# Patient Record
Sex: FEMALE | Race: BLACK OR AFRICAN AMERICAN | NOT HISPANIC OR LATINO | Employment: UNEMPLOYED | URBAN - METROPOLITAN AREA
[De-identification: names, ages, dates, MRNs, and addresses within clinical notes are randomized per-mention and may not be internally consistent; named-entity substitution may affect disease eponyms.]

---

## 2017-06-02 ENCOUNTER — HOSPITAL ENCOUNTER (EMERGENCY)
Facility: CLINIC | Age: 42
Discharge: HOME | End: 2017-06-02
Attending: EMERGENCY MEDICINE | Admitting: EMERGENCY MEDICINE
Payer: COMMERCIAL

## 2017-06-02 VITALS
RESPIRATION RATE: 16 BRPM | OXYGEN SATURATION: 90 % | HEART RATE: 79 BPM | SYSTOLIC BLOOD PRESSURE: 157 MMHG | TEMPERATURE: 98.9 F | DIASTOLIC BLOOD PRESSURE: 104 MMHG

## 2017-06-02 DIAGNOSIS — J02.0 ACUTE STREPTOCOCCAL PHARYNGITIS: ICD-10-CM

## 2017-06-02 LAB
DEPRECATED S PYO AG THROAT QL EIA: ABNORMAL
MICRO REPORT STATUS: ABNORMAL
SPECIMEN SOURCE: ABNORMAL

## 2017-06-02 PROCEDURE — 99284 EMERGENCY DEPT VISIT MOD MDM: CPT | Mod: Z6 | Performed by: EMERGENCY MEDICINE

## 2017-06-02 PROCEDURE — 87880 STREP A ASSAY W/OPTIC: CPT | Performed by: EMERGENCY MEDICINE

## 2017-06-02 PROCEDURE — 99283 EMERGENCY DEPT VISIT LOW MDM: CPT | Performed by: EMERGENCY MEDICINE

## 2017-06-02 PROCEDURE — 25000132 ZZH RX MED GY IP 250 OP 250 PS 637: Performed by: EMERGENCY MEDICINE

## 2017-06-02 RX ORDER — IBUPROFEN 600 MG/1
600 TABLET, FILM COATED ORAL ONCE
Status: COMPLETED | OUTPATIENT
Start: 2017-06-02 | End: 2017-06-02

## 2017-06-02 RX ORDER — IBUPROFEN 600 MG/1
600 TABLET, FILM COATED ORAL EVERY 8 HOURS PRN
Qty: 20 TABLET | Refills: 0 | Status: SHIPPED | OUTPATIENT
Start: 2017-06-02 | End: 2021-11-21

## 2017-06-02 RX ORDER — ACETAMINOPHEN 325 MG/1
975 TABLET ORAL ONCE
Status: COMPLETED | OUTPATIENT
Start: 2017-06-02 | End: 2017-06-02

## 2017-06-02 RX ORDER — AMOXICILLIN 500 MG/1
1000 CAPSULE ORAL 2 TIMES DAILY
Qty: 40 CAPSULE | Refills: 0 | Status: SHIPPED | OUTPATIENT
Start: 2017-06-02 | End: 2017-06-12

## 2017-06-02 RX ADMIN — ACETAMINOPHEN 975 MG: 325 TABLET, FILM COATED ORAL at 10:48

## 2017-06-02 RX ADMIN — IBUPROFEN 600 MG: 600 TABLET ORAL at 10:48

## 2017-06-02 ASSESSMENT — ENCOUNTER SYMPTOMS
SORE THROAT: 1
FEVER: 0

## 2017-06-02 NOTE — ED PROVIDER NOTES
History     Chief Complaint   Patient presents with     Pharyngitis     sore throat      HPI  Jessica Kennedy is a 42 year old female with a history of HTN who presents to the Emergency Department for evaluation of a sore throat. For the past 4 days, the patient has been experiencing a sore throat. He denies any other concerns or complaints at this time.    Past Medical History:   Diagnosis Date     Hypertension        Past Surgical History:   Procedure Laterality Date     HEAD & NECK SURGERY       ORTHOPEDIC SURGERY         No family history on file.    Social History   Substance Use Topics     Smoking status: Not on file     Smokeless tobacco: Not on file     Alcohol use Not on file       No current facility-administered medications for this encounter.      Current Outpatient Prescriptions   Medication     amoxicillin (AMOXIL) 500 MG capsule      No Known Allergies    I have reviewed the Medications, Allergies, Past Medical and Surgical History, and Social History in the Epic system.    Review of Systems   Constitutional: Negative for fever.   HENT: Positive for sore throat.    All other systems reviewed and are negative.      Physical Exam   BP: 122/71  Pulse: 79  Temp: 98.9  F (37.2  C)  Resp: 16  Weight:  (refused)  SpO2: 99 %  Physical Exam   Constitutional: She appears well-developed and well-nourished. No distress.   HENT:   Mouth/Throat: Oropharynx is clear and moist.   Neck: Neck supple.   Cardiovascular: Normal rate, regular rhythm and normal heart sounds.    Pulmonary/Chest: Effort normal and breath sounds normal.   Lymphadenopathy:     She has cervical adenopathy.   Neurological: She is alert.   Nursing note and vitals reviewed.      ED Course     10:12 AM  The patient was seen and examined by Dr. Robbins in Room 7.     ED Course     Procedures               Labs Ordered and Resulted from Time of ED Arrival Up to the Time of Departure from the ED   RAPID STREP SCREEN - Abnormal; Notable for the  following:        Result Value    Rapid Strep A Screen   (*)     Value: POSITIVE: Group A Streptococcal antigen detected by immunoassay.    All other components within normal limits            Assessments & Plan (with Medical Decision Making)   42 year old female without significant past medical history presenting to the ED today with a sore throat. A rapid strep test was obtained which is positive. Will discharge the patient home on Amoxacillin.     I have reviewed the nursing notes.  I have reviewed the findings, diagnosis, plan and need for follow up with the patient.  New Prescriptions    AMOXICILLIN (AMOXIL) 500 MG CAPSULE    Take 2 capsules (1,000 mg) by mouth 2 times daily for 10 days       Final diagnoses:   Acute streptococcal pharyngitis     IJerica, am serving as a trained medical scribe to document services personally performed by Rico Robbins MD, based on the provider's statements to me.      Rico MEJIA MD, was physically present and have reviewed and verified the accuracy of this note documented by Jerica Wallis.     6/2/2017   [facility], [place], EMERGENCY DEPARTMENT     Rico Robbins MD  06/02/17 7050

## 2017-06-02 NOTE — ED AVS SNAPSHOT
[facility], Emergency Department    [address]    [place] [address]    Phone:  [phone]    Fax:  [phone]                                       Jessica Kennedy   MRN: [id number]    Department:  [facility], Emergency Department   Date of Visit:  6/2/2017           After Visit Summary Signature Page     I have received my discharge instructions, and my questions have been answered. I have discussed any challenges I see with this plan with the nurse or doctor.    ..........................................................................................................................................  Patient/Patient Representative Signature      ..........................................................................................................................................  Patient Representative Print Name and Relationship to Patient    ..................................................               ................................................  Date                                            Time    ..........................................................................................................................................  Reviewed by Signature/Title    ...................................................              ..............................................  Date                                                            Time

## 2017-06-02 NOTE — DISCHARGE INSTRUCTIONS
Start antibiotics as directed  Your infectious to others for 24 hours after starting antibiotics    Pharyngitis: Strep (Confirmed)     You have had a positive test for strep throat. Strep throat is a contagious illness. It is spread by coughing, kissing or by touching others after touching your mouth or nose. Symptoms include throat pain which is worse with swallowing, aching all over, headache and fever. It is treated with antibiotic medication. This should help you start to feel better within 1-2 days.  Home care    Rest at home. Drink plenty of fluids to avoid dehydration.    No work or school for the first 2 days of taking the antibiotics. After this time, you will not be contagious. You can then return to school or work if you are feeling better.     The antibiotic medication must be taken for the full 10 days, even if you feel better. This is very important to ensure the infection is treated. It is also important to prevent drug-resistent organisms from developing. If you were given an antibiotic shot, no more antibiotics are needed.    You may use acetaminophen (Tylenol) or ibuprofen (Motrin, Advil) to control pain or fever, unless another medicine was prescribed for this. (NOTE: If you have chronic liver or kidney disease or ever had a stomach ulcer or GI bleeding, talk with your doctor before using these medicines.)    Throat lozenges or sprays (such as Chloraseptic) help reduce pain. Gargling with warm salt water will also reduce throat pain. Dissolve 1/2 teaspoon of salt in 1 glass of warm water. This may be useful just before meals.     Soft foods are okay. Avoid salty or spicy foods.  Follow-up care  Follow up with your healthcare provider or our staff if you are not improving over the next week.  When to seek medical advice  Call your healthcare provider right away if any of these occur:    Fever as directed by your doctor     New or worsening ear pain, sinus pain, or headache    Painful lumps in the  back of neck    Stiff neck    Lymph nodes are getting larger or becoming soft in the middle    Inability to swallow liquids, excessive drooling, or inability to open mouth wide due to throat pain    Signs of dehydration (very dark urine or no urine, sunken eyes, dizziness)    Trouble breathing or noisy breathing    Muffled voice    New rash    0764-6223 [employer]. [address] [place] [address]. All rights reserved. This information is not intended as a substitute for professional medical care. Always follow your healthcare professional's instructions.

## 2017-06-02 NOTE — ED AVS SNAPSHOT
[facility], Emergency Department    [address]    [place] [address]    Phone:  [phone]    Fax:  [phone]                                       Jessica Kennedy   MRN: [id number]    Department:  [facility], Emergency Department   Date of Visit:  6/2/2017           Patient Information     Date Of Birth          1975        Your diagnoses for this visit were:     Acute streptococcal pharyngitis        You were seen by Rico Robbins MD.        Discharge Instructions         Start antibiotics as directed  Your infectious to others for 24 hours after starting antibiotics    Pharyngitis: Strep (Confirmed)     You have had a positive test for strep throat. Strep throat is a contagious illness. It is spread by coughing, kissing or by touching others after touching your mouth or nose. Symptoms include throat pain which is worse with swallowing, aching all over, headache and fever. It is treated with antibiotic medication. This should help you start to feel better within 1-2 days.  Home care    Rest at home. Drink plenty of fluids to avoid dehydration.    No work or school for the first 2 days of taking the antibiotics. After this time, you will not be contagious. You can then return to school or work if you are feeling better.     The antibiotic medication must be taken for the full 10 days, even if you feel better. This is very important to ensure the infection is treated. It is also important to prevent drug-resistent organisms from developing. If you were given an antibiotic shot, no more antibiotics are needed.    You may use acetaminophen (Tylenol) or ibuprofen (Motrin, Advil) to control pain or fever, unless another medicine was prescribed for this. (NOTE: If you have chronic liver or kidney disease or ever had a stomach ulcer or GI bleeding, talk with your doctor before using these medicines.)    Throat lozenges or sprays (such as Chloraseptic) help reduce pain. Gargling with  warm salt water will also reduce throat pain. Dissolve 1/2 teaspoon of salt in 1 glass of warm water. This may be useful just before meals.     Soft foods are okay. Avoid salty or spicy foods.  Follow-up care  Follow up with your healthcare provider or our staff if you are not improving over the next week.  When to seek medical advice  Call your healthcare provider right away if any of these occur:    Fever as directed by your doctor     New or worsening ear pain, sinus pain, or headache    Painful lumps in the back of neck    Stiff neck    Lymph nodes are getting larger or becoming soft in the middle    Inability to swallow liquids, excessive drooling, or inability to open mouth wide due to throat pain    Signs of dehydration (very dark urine or no urine, sunken eyes, dizziness)    Trouble breathing or noisy breathing    Muffled voice    New rash    6751-7274 [employer]. [address] [place] [address]. All rights reserved. This information is not intended as a substitute for professional medical care. Always follow your healthcare professional's instructions.          24 Hour Appointment Hotline       To make an appointment at any [facility], call [phone] ([phone]). If you don't have a family doctor or clinic, we will help you find one. [facility] clinics are conveniently located to serve the needs of you and your family.             Review of your medicines      START taking        Dose / Directions Last dose taken    amoxicillin 500 MG capsule   Commonly known as:  AMOXIL   Dose:  1000 mg   Quantity:  40 capsule        Take 2 capsules (1,000 mg) by mouth 2 times daily for 10 days   Refills:  0        ibuprofen 600 MG tablet   Commonly known as:  ADVIL/MOTRIN   Dose:  600 mg   Quantity:  20 tablet        Take 1 tablet (600 mg) by mouth every 8 hours as needed for moderate pain   Refills:  0                Prescriptions were sent or printed at these locations (2  "Prescriptions)                   Other Prescriptions                Printed at Department/Unit printer (2 of 2)         amoxicillin (AMOXIL) 500 MG capsule               ibuprofen (ADVIL/MOTRIN) 600 MG tablet                Procedures and tests performed during your visit     Rapid strep screen      Orders Needing Specimen Collection     None      Pending Results     No orders found from 2017 to 6/3/2017.            Pending Culture Results     No orders found from 2017 to 6/3/2017.            Pending Results Instructions     If you had any lab results that were not finalized at the time of your Discharge, you can call the ED Lab Result RN at [phone]. You will be contacted by this team for any positive Lab results or changes in treatment. The nurses are available 7 days a week from 10A to 6:30P.  You can leave a message 24 hours per day and they will return your call.        Thank you for choosing [facility]       Thank you for choosing [facility] for your care. Our goal is always to provide you with excellent care. Hearing back from our patients is one way we can continue to improve our services. Please take a few minutes to complete the written survey that you may receive in the mail after you visit with us. Thank you!        [employer] Information     [employer] lets you send messages to your doctor, view your test results, renew your prescriptions, schedule appointments and more. To sign up, go to [email or web address] . Click on \"Log in\" on the left side of the screen, which will take you to the Welcome page. Then click on \"Sign up Now\" on the right side of the page.     You will be asked to enter the access code listed below, as well as some personal information. Please follow the directions to create your username and password.     Your access code is: [email or web address]  Expires: 2017 10:41 AM     Your access code will  in 90 days. If you need help or a new code, please call your [facility] clinic " or [phone].        Care EveryWhere ID     This is your Care EveryWhere ID. This could be used by other organizations to access your [facility] medical records  [id number]        After Visit Summary       This is your record. Keep this with you and show to your community pharmacist(s) and doctor(s) at your next visit.

## 2020-05-07 ENCOUNTER — HOSPITAL ENCOUNTER (EMERGENCY)
Facility: CLINIC | Age: 45
Discharge: HOME | End: 2020-05-07
Attending: EMERGENCY MEDICINE | Admitting: EMERGENCY MEDICINE
Payer: COMMERCIAL

## 2020-05-07 ENCOUNTER — APPOINTMENT (OUTPATIENT)
Dept: GENERAL RADIOLOGY | Facility: CLINIC | Age: 45
End: 2020-05-07
Attending: EMERGENCY MEDICINE
Payer: COMMERCIAL

## 2020-05-07 VITALS
RESPIRATION RATE: 20 BRPM | HEART RATE: 66 BPM | DIASTOLIC BLOOD PRESSURE: 70 MMHG | SYSTOLIC BLOOD PRESSURE: 138 MMHG | TEMPERATURE: 98.2 F | OXYGEN SATURATION: 100 % | WEIGHT: 206 LBS

## 2020-05-07 DIAGNOSIS — I10 UNCONTROLLED HYPERTENSION: ICD-10-CM

## 2020-05-07 LAB
ALBUMIN SERPL-MCNC: 3.8 G/DL (ref 3.4–5)
ALP SERPL-CCNC: 110 U/L (ref 40–150)
ALT SERPL W P-5'-P-CCNC: 17 U/L (ref 0–50)
ANION GAP SERPL CALCULATED.3IONS-SCNC: 8 MMOL/L (ref 3–14)
AST SERPL W P-5'-P-CCNC: 12 U/L (ref 0–45)
BASOPHILS # BLD AUTO: 0 10E9/L (ref 0–0.2)
BASOPHILS NFR BLD AUTO: 0.1 %
BILIRUB SERPL-MCNC: 0.6 MG/DL (ref 0.2–1.3)
BUN SERPL-MCNC: 17 MG/DL (ref 7–30)
CALCIUM SERPL-MCNC: 9.5 MG/DL (ref 8.5–10.1)
CHLORIDE SERPL-SCNC: 106 MMOL/L (ref 94–109)
CO2 SERPL-SCNC: 24 MMOL/L (ref 20–32)
CREAT SERPL-MCNC: 0.67 MG/DL (ref 0.52–1.04)
DIFFERENTIAL METHOD BLD: NORMAL
EOSINOPHIL # BLD AUTO: 0.1 10E9/L (ref 0–0.7)
EOSINOPHIL NFR BLD AUTO: 1 %
ERYTHROCYTE [DISTWIDTH] IN BLOOD BY AUTOMATED COUNT: 12.3 % (ref 10–15)
GFR SERPL CREATININE-BSD FRML MDRD: >90 ML/MIN/{1.73_M2}
GLUCOSE SERPL-MCNC: 105 MG/DL (ref 70–99)
HCG UR QL: NEGATIVE
HCT VFR BLD AUTO: 41.7 % (ref 35–47)
HGB BLD-MCNC: 14.2 G/DL (ref 11.7–15.7)
IMM GRANULOCYTES # BLD: 0 10E9/L (ref 0–0.4)
IMM GRANULOCYTES NFR BLD: 0.3 %
INR PPP: 1.07 (ref 0.86–1.14)
INTERNAL QC OK POCT: YES
LYMPHOCYTES # BLD AUTO: 3 10E9/L (ref 0.8–5.3)
LYMPHOCYTES NFR BLD AUTO: 28.3 %
MCH RBC QN AUTO: 30.6 PG (ref 26.5–33)
MCHC RBC AUTO-ENTMCNC: 34.1 G/DL (ref 31.5–36.5)
MCV RBC AUTO: 90 FL (ref 78–100)
MONOCYTES # BLD AUTO: 0.5 10E9/L (ref 0–1.3)
MONOCYTES NFR BLD AUTO: 4.9 %
NEUTROPHILS # BLD AUTO: 6.9 10E9/L (ref 1.6–8.3)
NEUTROPHILS NFR BLD AUTO: 65.4 %
NRBC # BLD AUTO: 0 10*3/UL
NRBC BLD AUTO-RTO: 0 /100
PLATELET # BLD AUTO: 248 10E9/L (ref 150–450)
POTASSIUM SERPL-SCNC: 3.5 MMOL/L (ref 3.4–5.3)
PROT SERPL-MCNC: 7.9 G/DL (ref 6.8–8.8)
RBC # BLD AUTO: 4.64 10E12/L (ref 3.8–5.2)
SODIUM SERPL-SCNC: 138 MMOL/L (ref 133–144)
TROPONIN I SERPL-MCNC: <0.015 UG/L (ref 0–0.04)
WBC # BLD AUTO: 10.6 10E9/L (ref 4–11)

## 2020-05-07 PROCEDURE — 99285 EMERGENCY DEPT VISIT HI MDM: CPT | Mod: 25 | Performed by: EMERGENCY MEDICINE

## 2020-05-07 PROCEDURE — 71045 X-RAY EXAM CHEST 1 VIEW: CPT

## 2020-05-07 PROCEDURE — 84484 ASSAY OF TROPONIN QUANT: CPT | Performed by: EMERGENCY MEDICINE

## 2020-05-07 PROCEDURE — 25000128 H RX IP 250 OP 636: Performed by: EMERGENCY MEDICINE

## 2020-05-07 PROCEDURE — 85610 PROTHROMBIN TIME: CPT | Performed by: EMERGENCY MEDICINE

## 2020-05-07 PROCEDURE — 93005 ELECTROCARDIOGRAM TRACING: CPT

## 2020-05-07 PROCEDURE — 96374 THER/PROPH/DIAG INJ IV PUSH: CPT

## 2020-05-07 PROCEDURE — 81025 URINE PREGNANCY TEST: CPT | Performed by: FAMILY MEDICINE

## 2020-05-07 PROCEDURE — 25000132 ZZH RX MED GY IP 250 OP 250 PS 637: Performed by: EMERGENCY MEDICINE

## 2020-05-07 PROCEDURE — 85025 COMPLETE CBC W/AUTO DIFF WBC: CPT | Performed by: EMERGENCY MEDICINE

## 2020-05-07 PROCEDURE — 99285 EMERGENCY DEPT VISIT HI MDM: CPT | Mod: 25

## 2020-05-07 PROCEDURE — 80053 COMPREHEN METABOLIC PANEL: CPT | Performed by: EMERGENCY MEDICINE

## 2020-05-07 PROCEDURE — 93010 ELECTROCARDIOGRAM REPORT: CPT | Mod: Z6 | Performed by: EMERGENCY MEDICINE

## 2020-05-07 RX ORDER — KETOROLAC TROMETHAMINE 15 MG/ML
15 INJECTION, SOLUTION INTRAMUSCULAR; INTRAVENOUS ONCE
Status: COMPLETED | OUTPATIENT
Start: 2020-05-07 | End: 2020-05-07

## 2020-05-07 RX ORDER — CLONIDINE HYDROCHLORIDE 0.1 MG/1
0.1 TABLET ORAL ONCE
Status: COMPLETED | OUTPATIENT
Start: 2020-05-07 | End: 2020-05-07

## 2020-05-07 RX ORDER — ACETAMINOPHEN 500 MG
1000 TABLET ORAL ONCE
Status: COMPLETED | OUTPATIENT
Start: 2020-05-07 | End: 2020-05-07

## 2020-05-07 RX ORDER — LISINOPRIL 20 MG/1
20 TABLET ORAL ONCE
Status: COMPLETED | OUTPATIENT
Start: 2020-05-07 | End: 2020-05-07

## 2020-05-07 RX ORDER — LISINOPRIL 20 MG/1
20 TABLET ORAL DAILY
COMMUNITY
End: 2020-05-07 | Stop reason: ALTCHOICE

## 2020-05-07 RX ORDER — AMLODIPINE BESYLATE 5 MG/1
5 TABLET ORAL AT BEDTIME
COMMUNITY
End: 2020-05-07

## 2020-05-07 RX ORDER — AMLODIPINE BESYLATE 10 MG/1
10 TABLET ORAL AT BEDTIME
Qty: 60 TABLET | Refills: 0 | Status: SHIPPED | OUTPATIENT
Start: 2020-05-08 | End: 2023-01-24

## 2020-05-07 RX ORDER — AMLODIPINE BESYLATE 5 MG/1
5 TABLET ORAL ONCE
Status: COMPLETED | OUTPATIENT
Start: 2020-05-07 | End: 2020-05-07

## 2020-05-07 RX ADMIN — CLONIDINE HYDROCHLORIDE 0.1 MG: 0.1 TABLET ORAL at 18:00

## 2020-05-07 RX ADMIN — ACETAMINOPHEN 1000 MG: 500 TABLET, FILM COATED ORAL at 18:18

## 2020-05-07 RX ADMIN — KETOROLAC TROMETHAMINE 15 MG: 15 INJECTION, SOLUTION INTRAMUSCULAR; INTRAVENOUS at 18:37

## 2020-05-07 RX ADMIN — AMLODIPINE BESYLATE 5 MG: 5 TABLET ORAL at 18:06

## 2020-05-07 RX ADMIN — LISINOPRIL 20 MG: 20 TABLET ORAL at 18:41

## 2020-05-07 NOTE — ED PROVIDER NOTES
History     Chief Complaint   Patient presents with     Headache     Hypertension     HPI  Jessica Kennedy is a 45 year old female who presents to the emergency department with complaints of a headache that she has had over the last 2 months.  Patient states the headache is superior and posterior.  Patient denies any fevers any coughing any visual blurriness any focal numbness or weakness.  Patient denies any vomiting and denies any chest pain or shortness of breath.  Patient states that her blood pressure has been high lately and that she has run out of her blood pressure medications.  Patient is uncertain what they are but that she [facility]  Patient has not taken her medications for at least a couple weeks.    I have reviewed the Medications, Allergies, Past Medical and Surgical History, and Social History in the [employer] system.    Past Medical History:   Diagnosis Date     Hypertension        Past Surgical History:   Procedure Laterality Date     HEAD & NECK SURGERY       ORTHOPEDIC SURGERY             Dose / Directions   amLODIPine 5 MG tablet  Commonly known as:  NORVASC      Dose:  5 mg  Take 5 mg by mouth At Bedtime  Refills:  0     ibuprofen 600 MG tablet  Commonly known as:  ADVIL/MOTRIN      Dose:  600 mg  Take 1 tablet (600 mg) by mouth every 8 hours as needed for moderate pain  Quantity:  20 tablet  Refills:  0     lisinopril 20 MG tablet  Commonly known as:  ZESTRIL      Dose:  20 mg  Take 20 mg by mouth daily  Refills:  0            Past medical history, past surgical history, medications, and allergies were reviewed with the patient. Additional pertinent items: None    No family history on file.    Social History     Tobacco Use     Smoking status: Not on file   Substance Use Topics     Alcohol use: Not on file     Social history was reviewed with the patient. Additional pertinent items: None    No Known Allergies    Review of Systems   Patient states she is currently  fasting   ROS: 10 point ROS neg other than the symptoms noted above in the HPI.      Physical Exam   BP: (!) 197/108  Pulse: 86  Temp: 98.2  F (36.8  C)  Resp: 12  Weight: 93.4 kg (206 lb)  SpO2: 99 %      Physical Exam  Vitals signs and nursing note reviewed.   Constitutional:       Appearance: She is not diaphoretic.      Comments: Lying with eyes closed but alert and conversant   HENT:      Head: Atraumatic.   Eyes:      Extraocular Movements: Extraocular movements intact.      Pupils: Pupils are equal, round, and reactive to light.   Neck:      Musculoskeletal: Neck supple.   Cardiovascular:      Rate and Rhythm: Regular rhythm.      Heart sounds: Normal heart sounds.   Pulmonary:      Breath sounds: Normal breath sounds.   Abdominal:      Palpations: Abdomen is soft.      Comments: Somewhat obese but nontender   Musculoskeletal:         General: No deformity.   Neurological:      General: No focal deficit present.      Mental Status: She is oriented to person, place, and time.      Motor: No weakness.   Psychiatric:         Mood and Affect: Mood normal.         ED Course     Orders Placed This Encounter - initially   Procedures     XR Chest Port 1 View     CBC with platelets differential     Troponin I     INR     Comprehensive metabolic panel     EKG 12 lead     Pulse oximetry nursing     Cardiac Continuous Monitoring     Peripheral IV: Standard     Review medications with patient     Oxygen: Nasal cannula, Oxygen mask     hCG qual urine POCT     Medications - initially   cloNIDine (CATAPRES) tablet 0.1 mg (0.1 mg Oral Given 5/7/20 1800)   amLODIPine (NORVASC) tablet 5 mg (5 mg Oral Given 5/7/20 1806)   acetaminophen (TYLENOL) tablet 1,000 mg (1,000 mg Oral Given 5/7/20 1818)          Procedures           EKG revealed a normal sinus rhythm at a rate of 74 with a NJ interval of 0.156 and a QRS duration of 0.084.  The patient had a normal axis with some nonspecific T wave changes but no acute ST segment changes  significant for ischemia.  This was read by me personally.    Results for orders placed or performed during the hospital encounter of 05/07/20 (from the past 24 hour(s))   CBC with platelets differential   Result Value Ref Range    WBC 10.6 4.0 - 11.0 10e9/L    RBC Count 4.64 3.8 - 5.2 10e12/L    Hemoglobin 14.2 11.7 - 15.7 g/dL    Hematocrit 41.7 35.0 - 47.0 %    MCV 90 78 - 100 fl    MCH 30.6 26.5 - 33.0 pg    MCHC 34.1 31.5 - 36.5 g/dL    RDW 12.3 10.0 - 15.0 %    Platelet Count 248 150 - 450 10e9/L    Diff Method Automated Method     % Neutrophils 65.4 %    % Lymphocytes 28.3 %    % Monocytes 4.9 %    % Eosinophils 1.0 %    % Basophils 0.1 %    % Immature Granulocytes 0.3 %    Nucleated RBCs 0 0 /100    Absolute Neutrophil 6.9 1.6 - 8.3 10e9/L    Absolute Lymphocytes 3.0 0.8 - 5.3 10e9/L    Absolute Monocytes 0.5 0.0 - 1.3 10e9/L    Absolute Eosinophils 0.1 0.0 - 0.7 10e9/L    Absolute Basophils 0.0 0.0 - 0.2 10e9/L    Abs Immature Granulocytes 0.0 0 - 0.4 10e9/L    Absolute Nucleated RBC 0.0    Troponin I   Result Value Ref Range    Troponin I ES <0.015 0.000 - 0.045 ug/L   INR   Result Value Ref Range    INR 1.07 0.86 - 1.14   Comprehensive metabolic panel   Result Value Ref Range    Sodium 138 133 - 144 mmol/L    Potassium 3.5 3.4 - 5.3 mmol/L    Chloride 106 94 - 109 mmol/L    Carbon Dioxide 24 20 - 32 mmol/L    Anion Gap 8 3 - 14 mmol/L    Glucose 105 (H) 70 - 99 mg/dL    Urea Nitrogen 17 7 - 30 mg/dL    Creatinine 0.67 0.52 - 1.04 mg/dL    GFR Estimate >90 >60 mL/min/[1.73_m2]    GFR Estimate If Black >90 >60 mL/min/[1.73_m2]    Calcium 9.5 8.5 - 10.1 mg/dL    Bilirubin Total 0.6 0.2 - 1.3 mg/dL    Albumin 3.8 3.4 - 5.0 g/dL    Protein Total 7.9 6.8 - 8.8 g/dL    Alkaline Phosphatase 110 40 - 150 U/L    ALT 17 0 - 50 U/L    AST 12 0 - 45 U/L   hCG qual urine POCT   Result Value Ref Range    HCG Qual Urine Negative neg    Internal QC OK Yes    XR Chest Port 1 View    Narrative    CHEST ONE VIEW  5/7/2020  7:02 PM     HISTORY: hypertension    COMPARISON: None.      Impression    IMPRESSION: Opacities of the lung bases are likely overlying soft  tissue. Minimal right pleural fluid and airspace disease cannot be  excluded. No pneumothorax. The cardiomediastinal silhouette is mildly  enlarged.    LESTER J FAHRNER, MD         Assessments & Plan (with Medical Decision Making)     I have reviewed the nursing notes.    Medications   cloNIDine (CATAPRES) tablet 0.1 mg (0.1 mg Oral Given 5/7/20 1800)   amLODIPine (NORVASC) tablet 5 mg (5 mg Oral Given 5/7/20 1806)   acetaminophen (TYLENOL) tablet 1,000 mg (1,000 mg Oral Given 5/7/20 1818)   ketorolac (TORADOL) injection 15 mg (15 mg Intravenous Given 5/7/20 1837)   lisinopril (ZESTRIL) tablet 20 mg (20 mg Oral Given 5/7/20 1841)      BP (!) 164/91   Pulse 70   Temp 98.2  F (36.8  C)   Resp 12   Wt 93.4 kg (206 lb)   SpO2 99%      Patient's blood pressure on discharge is 138/70 and at this time I will reinstitute the patient's Norvasc but increase the dose to 10 mg daily and just get rid of the lisinopril because the patient has not been taking it for several months anyway.    I have reviewed the findings, diagnosis, plan and need for follow up with the patient.       Review of your medicines      UNREVIEWED medicines. Ask your doctor about these medicines      Dose / Directions   ibuprofen 600 MG tablet  Commonly known as:  ADVIL/MOTRIN      Dose:  600 mg  Take 1 tablet (600 mg) by mouth every 8 hours as needed for moderate pain  Quantity:  20 tablet  Refills:  0        CONTINUE these medicines which may have CHANGED, or have new prescriptions. If we are uncertain of the size of tablets/capsules you have at home, strength may be listed as something that might have changed.      Dose / Directions   amLODIPine 10 MG tablet  Commonly known as:  NORVASC  This may have changed:      medication strength    how much to take      Dose:  10 mg  Start taking on:  May 8, 2020  Take  1 tablet (10 mg) by mouth At Bedtime  Quantity:  60 tablet  Refills:  0        STOP taking    lisinopril 20 MG tablet  Commonly known as:  ZESTRIL              Where to get your medicines      Some of these will need a paper prescription and others can be bought over the counter. Ask your nurse if you have questions.    Bring a paper prescription for each of these medications    amLODIPine 10 MG tablet           Final diagnoses:   Uncontrolled hypertension     Please make an appointment to follow up with Your Primary Care Provider or our [facility] (phone: [phone]) in one week for recheck.    Take your blood pressure daily, about the same time of day, and record it for your follow-up appointment.    Return to the emergency department for worsening.    Jeffery Simpson MD, MD    5/7/2020   [facility], [place], EMERGENCY DEPARTMENT     Jeffery Simpson MD  05/07/20 0350

## 2020-05-07 NOTE — ED AVS SNAPSHOT
[facility], [place], Emergency Department  [address]  [facility] [address]  Phone:  [phone]  Fax:  [phone]                                    Jessica Kennedy   MRN: [id number]    Department:  [facility], Emergency Department   Date of Visit:  5/7/2020           After Visit Summary Signature Page    I have received my discharge instructions, and my questions have been answered. I have discussed any challenges I see with this plan with the nurse or doctor.    ..........................................................................................................................................  Patient/Patient Representative Signature      ..........................................................................................................................................  Patient Representative Print Name and Relationship to Patient    ..................................................               ................................................  Date                                   Time    ..........................................................................................................................................  Reviewed by Signature/Title    ...................................................              ..............................................  Date                                               Time          22EPIC Rev 08/18

## 2020-05-07 NOTE — ED TRIAGE NOTES
Pt states she has a bad headache and feels her heart racing. Pt was taking tylenol with no relief (last night). Headache started March (doesn't know exact date).  Getting worse

## 2020-05-08 LAB — INTERPRETATION ECG - MUSE: NORMAL

## 2020-05-08 NOTE — DISCHARGE INSTRUCTIONS
Please make an appointment to follow up with Your Primary Care Provider or our [facility] (phone: [phone]) in one week for recheck.    Take your blood pressure daily, about the same time of day, and record it for your follow-up appointment.    Return to the emergency department for worsening.

## 2020-05-19 ENCOUNTER — HOSPITAL ENCOUNTER (EMERGENCY)
Facility: CLINIC | Age: 45
Discharge: HOME | End: 2020-05-19
Attending: EMERGENCY MEDICINE | Admitting: EMERGENCY MEDICINE
Payer: COMMERCIAL

## 2020-05-19 VITALS
TEMPERATURE: 97.7 F | OXYGEN SATURATION: 100 % | SYSTOLIC BLOOD PRESSURE: 147 MMHG | RESPIRATION RATE: 18 BRPM | DIASTOLIC BLOOD PRESSURE: 99 MMHG

## 2020-05-19 DIAGNOSIS — J02.9 ACUTE PHARYNGITIS, UNSPECIFIED ETIOLOGY: ICD-10-CM

## 2020-05-19 LAB
DEPRECATED S PYO AG THROAT QL EIA: NEGATIVE
SPECIMEN SOURCE: NORMAL
SPECIMEN SOURCE: NORMAL
STREP GROUP A PCR: NOT DETECTED

## 2020-05-19 PROCEDURE — 87651 STREP A DNA AMP PROBE: CPT | Performed by: EMERGENCY MEDICINE

## 2020-05-19 PROCEDURE — 25000132 ZZH RX MED GY IP 250 OP 250 PS 637: Performed by: EMERGENCY MEDICINE

## 2020-05-19 PROCEDURE — 87635 SARS-COV-2 COVID-19 AMP PRB: CPT | Performed by: EMERGENCY MEDICINE

## 2020-05-19 PROCEDURE — 99283 EMERGENCY DEPT VISIT LOW MDM: CPT | Performed by: EMERGENCY MEDICINE

## 2020-05-19 PROCEDURE — 40001204 ZZHCL STATISTIC STREP A RAPID: Performed by: EMERGENCY MEDICINE

## 2020-05-19 PROCEDURE — 99282 EMERGENCY DEPT VISIT SF MDM: CPT | Mod: Z6 | Performed by: EMERGENCY MEDICINE

## 2020-05-19 RX ORDER — ACETAMINOPHEN 500 MG
500-1000 TABLET ORAL EVERY 6 HOURS PRN
Qty: 1 BOTTLE | Refills: 0 | Status: SHIPPED | OUTPATIENT
Start: 2020-05-19 | End: 2020-05-19

## 2020-05-19 RX ORDER — ACETAMINOPHEN 500 MG
1000 TABLET ORAL ONCE
Status: COMPLETED | OUTPATIENT
Start: 2020-05-19 | End: 2020-05-19

## 2020-05-19 RX ORDER — ACETAMINOPHEN 500 MG
500-1000 TABLET ORAL EVERY 6 HOURS PRN
Qty: 1 BOTTLE | Refills: 0 | Status: ON HOLD | OUTPATIENT
Start: 2020-05-19 | End: 2023-08-13

## 2020-05-19 RX ADMIN — ACETAMINOPHEN 1000 MG: 500 TABLET, FILM COATED ORAL at 16:03

## 2020-05-19 ASSESSMENT — ENCOUNTER SYMPTOMS
GASTROINTESTINAL NEGATIVE: 1
FEVER: 0
SORE THROAT: 1
CHILLS: 0
RHINORRHEA: 0

## 2020-05-19 NOTE — ED PROVIDER NOTES
[facility] EMERGENCY DEPARTMENT ([facility])    5/19/20     ED 6      History     Chief Complaint   Patient presents with     Pharyngitis     sore throat and ear pain x 2 days     HPI evaluation via   Jessica Kennedy is a 45 year old female with history of latent TB, hypertension and prior episodes of strep pharyngitis who presents with sore throat, headache, left ear and neck pain for the past 2 days. She was fasting but broke her fast to drink cold water.  She has not taking any medications for this.  She denies exposures.  She says it hurts to swallow and talk.  She denies fevers/chills, cough, diarrhea, vomiting, abdominal pain.  She has hx of hypertension and is taking her meds.  She denies being pregnant.    Past Medical History  Past Medical History:   Diagnosis Date     Hypertension      Past Surgical History:   Procedure Laterality Date     HEAD & NECK SURGERY       ORTHOPEDIC SURGERY       acetaminophen (TYLENOL) 500 MG tablet  amLODIPine (NORVASC) 10 MG tablet  ibuprofen (ADVIL/MOTRIN) 600 MG tablet      No Known Allergies  Past medical history, past surgical history, medications, and allergies were reviewed with the patient. Additional pertinent items: None    Family History  No family history on file.  Family history was reviewed with the patient. Additional pertinent items: None    Social History  Social History     Tobacco Use     Smoking status: None   Substance Use Topics     Alcohol use: None     Drug use: None      Social history was reviewed with the patient. Additional pertinent items: None    Review of Systems   Constitutional: Negative for chills and fever.   HENT: Positive for ear pain and sore throat. Negative for rhinorrhea.    Gastrointestinal: Negative.    All other systems reviewed and are negative.    A complete review of systems was performed with pertinent positives and negatives noted in the HPI, and all other systems negative.    Physical Exam   BP: (!)  147/99  Heart Rate: 119  Temp: 97.7  F (36.5  C)  Resp: 18  SpO2: 100 %  Physical Exam  Vitals signs and nursing note reviewed.   Constitutional:       Appearance: She is well-developed.      Comments: Normal speech. No difficulty swallowing.    HENT:      Head: Normocephalic and atraumatic.      Right Ear: Tympanic membrane and ear canal normal.      Left Ear: Tympanic membrane and ear canal normal.      Nose: No rhinorrhea.      Comments: No dental pain or abscess     Mouth/Throat:      Mouth: Mucous membranes are moist. No oral lesions.      Pharynx: Uvula midline. No pharyngeal swelling, oropharyngeal exudate, posterior oropharyngeal erythema or uvula swelling.      Tonsils: No tonsillar exudate or tonsillar abscesses.   Eyes:      Conjunctiva/sclera: Conjunctivae normal.      Pupils: Pupils are equal, round, and reactive to light.   Neck:      Musculoskeletal: Normal range of motion and neck supple.      Thyroid: No thyromegaly.   Cardiovascular:      Rate and Rhythm: Normal rate.   Pulmonary:      Effort: Pulmonary effort is normal.   Lymphadenopathy:      Cervical: Cervical adenopathy (tender mild left anterior adenopathy) present.   Skin:     General: Skin is warm and dry.   Neurological:      General: No focal deficit present.      Mental Status: She is alert and oriented to person, place, and time.   Psychiatric:         Mood and Affect: Mood normal.         ED Course      Procedures         Results for orders placed or performed during the hospital encounter of 05/19/20   Streptococcus A Rapid Scr w Reflx to PCR     Status: None    Specimen: Throat   Result Value Ref Range    Strep Specimen Description Throat     Streptococcus Group A Rapid Screen Negative NEG^Negative     Medications   acetaminophen (TYLENOL) tablet 1,000 mg (1,000 mg Oral Given 5/19/20 1603)        Assessments & Plan (with Medical Decision Making)   The patient presents with left neck pain, sore throat and left ear pain. Diff dx: dental  infection, ear infection, pharyngitis, viral illness including covid.  Dentition normal.  She has mild left anterior neck lymphadenopathy.  Oral exam unremarkable.  No airway concerns. Rapid strep is negative.  Tylenol 1000 mg po given and she is feeling better.  covid test sent.  She was discharged home with guidelines to quarantine until results are back.      I have reviewed the nursing notes. I have reviewed the findings, diagnosis, plan and need for follow up with the patient.    New Prescriptions    ACETAMINOPHEN (TYLENOL) 500 MG TABLET    Take 1-2 tablets (500-1,000 mg) by mouth every 6 hours as needed for mild pain       Final diagnoses:   Acute pharyngitis, unspecified etiology       --  Fatuma Pena MD   Emergency Medicine   [facility], EMERGENCY DEPARTMENT  5/19/2020     Fatuma Pena MD  05/19/20 1672

## 2020-05-19 NOTE — DISCHARGE INSTRUCTIONS
You can take tylenol for pain.  Use as the bottle directs.     TODAY'S VISIT  YOU ARE BEING TESTED FOR COVID-19 (NOVEL CORONAVIRUS).   -  The cause of your symptoms is not yet known, but you are being tested for COVID-19.  -  It has been determined that you do not medically need to be hospitalized at this time, and  you can be monitored with self-isolation at home.     YOUR TESTS FOR THIS ILLNESS ARE CURRENTLY IN PROCESS.    -  These tests are performed by the [place] Department [facility].  -  Our staff will contact you with your results, likely within the next 24-72 hours.  -  If your test is positive, you will also be contacted by the [place] Department [facility].   -  Please remain under home isolation precautions until your healthcare provider and/or state and local health department tell you it is safe, and you no longer need to self-isolate at home.     SELF-ISOLATION INSTRUCTIONS  STAY HOME. Do not go to work, school, or public areas. Avoid using public transportation, ride-sharing (Uber/Lyft), or taxis. You should only leave your home if you require medical attention (See instructions below, please contact your provider first.)   SEPARATE YOURSELF FROM OTHER PEOPLE AND ANIMALS. As much as possible, you should stay in a specific room and away from other people in your home. You should use a separate bathroom, if available. Avoid contact with pets and other animals. When possible, have another household member care for your  family and animals while you are sick.   DO NOT SHARE HOUSEHOLD ITEMS. Do not share dishes, drinking glasses, eating utensils, towels, bedding, etc., with others or pets in your home. These items should be washed with soap and water.   WEAR A FACEMASK. Wear a facemask if you need to be around other people, and cover your mouth and nose with tissue when you cough or sneeze.  WASH YOUR HANDS OFTEN. Wash your hands with soap and water for at least 20 second, or use hand   regularly. Avoid touching your face with unwashed hands.     SELF-MONITORING INSTRUCTIONS  SEEK PROMPT MEDICAL ATTENTION IF YOUR ILLNESS IS WORSENING. Watch closely for new or worsening symptoms, such as fever, cough, shortness of breath or difficulty breathing.   SIGN UP FOR [employer]. Sign up for the [employer] application, using the information at the end of this document. Your results and a message about those results can be sent through [employer]. If you do not have [employer], we will call you with your results, but it may take longer.  IF YOU NEED MEDICAL CARE OR HAVE A MEDICAL APPOINTMENT (and it is not an emergency):   -  CALL YOUR HEALTHCARE PROVIDER BEFORE GOING TO THE [facility]  -  TELL THEM THAT YOU ARE BEING TESTING FOR AND MAY HAVE COVID-19.  YOUR CLOSE CONTACTS SHOULD MONITOR THEIR HEALTH. If your close contacts develop symptoms, they should visit www.[email or web address].org to determine if testing is needed, and where this is done.   If you have any questions, please contact your usual health care provider or the [facility] ([facility]) at [phone]    EMERGENCY INSTRUCTIONS  IF YOU NEED EMERGENCY MEDICAL ATTENTION, CALL 911 AND LET THEM KNOW YOU MAY HAVE ARE BEING EVALUATED FOR COVID-19.      WHAT IS COVID-19 (CORONAVIRUS)  COVID-19 is a viral respiratory illness caused by a newly identified coronavirus that was discovered in late 2019 in [place]. Coronaviruses are a large family of viruses. Some coronaviruses cause illnesses in people and others only circulate among animals. Rarely, animal coronaviruses can evolve and infect people. The virus causing COVID-19 may have emerged from an animal source, and it is now able to spread from person to person.     How does it spread?   The virus is thought to spread between people who are in close contact (within about six feet) through respiratory droplets produced when an infected person coughs, sneezes, or talks. It may also spread when one person touches a  surface or object that has the virus on it and then touches their mouth, nose, or eyes. However, this is not thought to be the main way the virus is transmitted.    SYMPTOMS  This coronavirus causes mild to severe respiratory illness with often with fever, cough, and/or difficulty breathing. After exposure, symptoms typically present within 2 to 14 days.     TREATMENTS AND PREVENTION  Patients may also be asked to self-quarantine at home in order to prevent the spread of the coronavirus. There is no antiviral treatment recommended for COVID-19. People with COVID-19 may receive supportive care to help relieve symptoms.    Prevention  No vaccine is currently available for the coronavirus causing COVID-19. The best way to prevent spread of the illness is to avoid exposure through simple precautions. Prevention steps include:   Stay home if you're feeling sick.   Avoid close contact with others, and try to stay at least 6-feet away from others if you're ill.   Wash your hands often with soap and warm water for at least 20 seconds, especially after going to the bathroom; before eating; and after blowing your nose, coughing, or sneezing. Use an alcohol-based hand  with at least 60 percent alcohol if soap and water are not readily available.   Clean and disinfect frequently touched objects and surfaces using a regular household cleaning spray or wipe.     Thank you for your understanding and cooperation.

## 2020-05-19 NOTE — ED AVS SNAPSHOT
[facility], [place], Emergency Department  [address]  [facility] [address]  Phone:  [phone]  Fax:  [phone]                                    Jessica Kennedy   MRN: [id number]    Department:  [facility], Emergency Department   Date of Visit:  5/19/2020           After Visit Summary Signature Page    I have received my discharge instructions, and my questions have been answered. I have discussed any challenges I see with this plan with the nurse or doctor.    ..........................................................................................................................................  Patient/Patient Representative Signature      ..........................................................................................................................................  Patient Representative Print Name and Relationship to Patient    ..................................................               ................................................  Date                                   Time    ..........................................................................................................................................  Reviewed by Signature/Title    ...................................................              ..............................................  Date                                               Time          22EPIC Rev 08/18

## 2020-05-20 LAB
SARS-COV-2 RNA SPEC QL NAA+PROBE: NOT DETECTED
SPECIMEN SOURCE: NORMAL

## 2021-04-20 ENCOUNTER — APPOINTMENT (OUTPATIENT)
Dept: GENERAL RADIOLOGY | Facility: CLINIC | Age: 46
End: 2021-04-20
Attending: EMERGENCY MEDICINE
Payer: COMMERCIAL

## 2021-04-20 ENCOUNTER — HOSPITAL ENCOUNTER (EMERGENCY)
Facility: CLINIC | Age: 46
Discharge: HOME | End: 2021-04-20
Attending: EMERGENCY MEDICINE | Admitting: EMERGENCY MEDICINE
Payer: COMMERCIAL

## 2021-04-20 VITALS
DIASTOLIC BLOOD PRESSURE: 92 MMHG | RESPIRATION RATE: 16 BRPM | HEART RATE: 75 BPM | SYSTOLIC BLOOD PRESSURE: 170 MMHG | WEIGHT: 220 LBS | OXYGEN SATURATION: 100 % | TEMPERATURE: 98.1 F

## 2021-04-20 DIAGNOSIS — B07.0 PLANTAR WARTS: ICD-10-CM

## 2021-04-20 PROCEDURE — 99284 EMERGENCY DEPT VISIT MOD MDM: CPT | Performed by: EMERGENCY MEDICINE

## 2021-04-20 PROCEDURE — 250N000013 HC RX MED GY IP 250 OP 250 PS 637: Performed by: EMERGENCY MEDICINE

## 2021-04-20 PROCEDURE — 73630 X-RAY EXAM OF FOOT: CPT | Mod: LT

## 2021-04-20 PROCEDURE — 99283 EMERGENCY DEPT VISIT LOW MDM: CPT

## 2021-04-20 RX ORDER — ACETAMINOPHEN 325 MG/1
975 TABLET ORAL ONCE
Status: COMPLETED | OUTPATIENT
Start: 2021-04-20 | End: 2021-04-20

## 2021-04-20 RX ADMIN — ACETAMINOPHEN 975 MG: 325 TABLET, FILM COATED ORAL at 17:00

## 2021-04-20 NOTE — ED PROVIDER NOTES
"    [facility] EMERGENCY DEPARTMENT ([facility])  4/20/21    History     Chief Complaint   Patient presents with     Foot Pain     has been having L foot pain \" I am not sure if there is wood or metal inside foot, I am not sure what I stepped on but I have been having pain, swelling for a month\" Pt denies seeing MD for this and has not taken any pain meds at all.     The history is provided by the patient and medical records. The history is limited by a language barrier. A  was used ((Togolese)).     Jessica Kennedy is a 46 year old female with a medical history significant for HTN and diabetes who presents to the Emergency Department for evaluation of left heel pain.  She notes she felt like something poked her foot about a month ago.  She did not see the object but feels like it is still bothering her.  Since then she has had pain with walking on the foot.  At rest she feels a \"tapping\" pain when she is sitting.  She denies measured fever or chills.        Past Medical History:   Diagnosis Date     Hypertension        Past Surgical History:   Procedure Laterality Date     HEAD & NECK SURGERY       ORTHOPEDIC SURGERY         No family history on file.    Social History     Tobacco Use     Smoking status: Not on file   Substance Use Topics     Alcohol use: Not on file       No current facility-administered medications for this encounter.      Current Outpatient Medications   Medication     acetaminophen (TYLENOL) 500 MG tablet     amLODIPine (NORVASC) 10 MG tablet     ibuprofen (ADVIL/MOTRIN) 600 MG tablet      No Known Allergies         Review of Systems   Musculoskeletal:        L foot pain       A complete review of systems was performed with pertinent positives and negatives noted in the HPI, and all other systems negative.    Physical Exam   BP: (!) 185/104  Pulse: 83  Temp: 98.1  F (36.7  C)  Resp: 16  Weight: 99.8 kg (220 lb)  SpO2: 97 %  Physical Exam  General: patient is alert and " oriented and in no acute distress   Head: atraumatic and normocephalic   EENT: moist mucus membranes, sclera anicteric  Neck: supple   Cardiovascular: regular rate and rhythm, extremities warm and well perfused, no lower extremity edema, brisk capillary refill in the left foot  Pulmonary: no respiratory distress  Musculoskeletal: normal range of motion of the ankle and toes without discomfort  Neurological: alert and oriented, moving all extremities symmetrically, gait normal   Skin: warm, dry, small area of callus on the left heel with tiny central area of thrombosed vessels, no surrounding erythema or fluctuance    ED Course    4:24 PM  The patient was seen and examined by Ernestine Mcclure in Room ED04.     Procedures               Results for orders placed or performed during the hospital encounter of 04/20/21   Foot XR, G/E 3 views, left     Status: None    Narrative    Examination:  XR FOOT LEFT G/E 3 VIEWS    Date:  4/20/2021 4:53 PM     Clinical Information: left heel pain rule out FB.     Comparison: none.      Impression    Impression:    1.  Skin thickening under the heel. No radiopaque foreign body.     2.  Left foot negative for acute fracture or joint malalignment.  Chronic rounded focus of heterotopic ossification versus an old  fracture fragment on the lateral aspect of the IP joint in the left  great toe.    DORIS MCPHERSON MD     Medications   acetaminophen (TYLENOL) tablet 975 mg (975 mg Oral Given 4/20/21 1700)        Assessments & Plan (with Medical Decision Making)   Ms. Kennedy is a 46 year old female with a medical history significant for HTN and diabetes who presents to the Emergency Department for evaluation of left heel pain.  She is neurovascularly intact.  On exam she does not have evidence of diabetic foot wound or cellulitis.  She did have a plain film of the left foot which I have reviewed and shows no evidence of acute fracture or foreign body.  Her history and exam are most consistent  with a plantar wart.  She was instructed to use salicylic acid daily and was instructed to follow-up with primary care in 2 weeks for reevaluation to ensure improvement.  She was given close return precautions for the emergency department and voiced understanding.    I have reviewed the nursing notes. I have reviewed the findings, diagnosis, plan and need for follow up with the patient.    Current Discharge Medication List      START taking these medications    Details   salicylic acid (COMPOUND W MAX STRENGTH) 17 % external gel Apply topically daily Apply to wart once a day and cover with a bandage.  Use daily for the next two weeks.  Qty: 1.25 g, Refills: 0             Final diagnoses:   Plantar warts     I, Radha Kam, am serving as a trained medical scribe to document services personally performed by Ernestine Mcclure MD based on the provider's statements to me on April 20, 2021.  This document has been checked and approved by the attending provider.    I, Ernestnie Mcclure MD, was physically present and have reviewed and verified the accuracy of this note documented by Radha Kam, medical scribe.      --  Ernestine Mcclure MD  [facility] EMERGENCY DEPARTMENT  4/20/2021     Ernestine Mcclure MD  04/20/21 [id number]

## 2021-04-20 NOTE — DISCHARGE INSTRUCTIONS
Please make an appointment to follow up with Your Primary Care Provider in 14 days for re-evaluation.    If you have worsening pain, redness, swelling, fevers or other concerns, return to the emergency department for re-evaluation.

## 2021-11-20 ENCOUNTER — HOSPITAL ENCOUNTER (EMERGENCY)
Facility: CLINIC | Age: 46
Discharge: HOME | End: 2021-11-21
Attending: FAMILY MEDICINE | Admitting: FAMILY MEDICINE
Payer: COMMERCIAL

## 2021-11-20 DIAGNOSIS — J02.9 ACUTE PHARYNGITIS, UNSPECIFIED ETIOLOGY: ICD-10-CM

## 2021-11-20 LAB
ALBUMIN SERPL-MCNC: 3.5 G/DL (ref 3.4–5)
ALP SERPL-CCNC: 102 U/L (ref 40–150)
ALT SERPL W P-5'-P-CCNC: 20 U/L (ref 0–50)
ANION GAP SERPL CALCULATED.3IONS-SCNC: 6 MMOL/L (ref 3–14)
AST SERPL W P-5'-P-CCNC: 18 U/L (ref 0–45)
BASOPHILS # BLD AUTO: 0.1 10E3/UL (ref 0–0.2)
BASOPHILS NFR BLD AUTO: 1 %
BILIRUB SERPL-MCNC: 0.3 MG/DL (ref 0.2–1.3)
BUN SERPL-MCNC: 13 MG/DL (ref 7–30)
CALCIUM SERPL-MCNC: 9.7 MG/DL (ref 8.5–10.1)
CHLORIDE BLD-SCNC: 106 MMOL/L (ref 94–109)
CO2 SERPL-SCNC: 25 MMOL/L (ref 20–32)
CREAT SERPL-MCNC: 0.75 MG/DL (ref 0.52–1.04)
DEPRECATED S PYO AG THROAT QL EIA: NEGATIVE
EOSINOPHIL # BLD AUTO: 0.2 10E3/UL (ref 0–0.7)
EOSINOPHIL NFR BLD AUTO: 2 %
ERYTHROCYTE [DISTWIDTH] IN BLOOD BY AUTOMATED COUNT: 11.8 % (ref 10–15)
GFR SERPL CREATININE-BSD FRML MDRD: >90 ML/MIN/1.73M2
GLUCOSE BLD-MCNC: 117 MG/DL (ref 70–99)
HCT VFR BLD AUTO: 38.7 % (ref 35–47)
HGB BLD-MCNC: 13.2 G/DL (ref 11.7–15.7)
IMM GRANULOCYTES # BLD: 0 10E3/UL
IMM GRANULOCYTES NFR BLD: 0 %
LYMPHOCYTES # BLD AUTO: 3.7 10E3/UL (ref 0.8–5.3)
LYMPHOCYTES NFR BLD AUTO: 30 %
MCH RBC QN AUTO: 30.4 PG (ref 26.5–33)
MCHC RBC AUTO-ENTMCNC: 34.1 G/DL (ref 31.5–36.5)
MCV RBC AUTO: 89 FL (ref 78–100)
MONOCYTES # BLD AUTO: 0.6 10E3/UL (ref 0–1.3)
MONOCYTES NFR BLD AUTO: 5 %
NEUTROPHILS # BLD AUTO: 7.6 10E3/UL (ref 1.6–8.3)
NEUTROPHILS NFR BLD AUTO: 62 %
NRBC # BLD AUTO: 0 10E3/UL
NRBC BLD AUTO-RTO: 0 /100
PLATELET # BLD AUTO: 279 10E3/UL (ref 150–450)
POTASSIUM BLD-SCNC: 3.3 MMOL/L (ref 3.4–5.3)
PROT SERPL-MCNC: 7.4 G/DL (ref 6.8–8.8)
RBC # BLD AUTO: 4.34 10E6/UL (ref 3.8–5.2)
SODIUM SERPL-SCNC: 137 MMOL/L (ref 133–144)
WBC # BLD AUTO: 12.3 10E3/UL (ref 4–11)

## 2021-11-20 PROCEDURE — 87651 STREP A DNA AMP PROBE: CPT | Performed by: FAMILY MEDICINE

## 2021-11-20 PROCEDURE — C9803 HOPD COVID-19 SPEC COLLECT: HCPCS | Performed by: FAMILY MEDICINE

## 2021-11-20 PROCEDURE — 36415 COLL VENOUS BLD VENIPUNCTURE: CPT | Performed by: FAMILY MEDICINE

## 2021-11-20 PROCEDURE — 250N000011 HC RX IP 250 OP 636: Performed by: FAMILY MEDICINE

## 2021-11-20 PROCEDURE — 99284 EMERGENCY DEPT VISIT MOD MDM: CPT | Performed by: FAMILY MEDICINE

## 2021-11-20 PROCEDURE — 250N000013 HC RX MED GY IP 250 OP 250 PS 637: Performed by: FAMILY MEDICINE

## 2021-11-20 PROCEDURE — 87637 SARSCOV2&INF A&B&RSV AMP PRB: CPT | Performed by: FAMILY MEDICINE

## 2021-11-20 PROCEDURE — 85004 AUTOMATED DIFF WBC COUNT: CPT | Performed by: FAMILY MEDICINE

## 2021-11-20 PROCEDURE — 258N000003 HC RX IP 258 OP 636: Performed by: FAMILY MEDICINE

## 2021-11-20 PROCEDURE — 96374 THER/PROPH/DIAG INJ IV PUSH: CPT | Performed by: FAMILY MEDICINE

## 2021-11-20 PROCEDURE — 99284 EMERGENCY DEPT VISIT MOD MDM: CPT | Mod: 25 | Performed by: FAMILY MEDICINE

## 2021-11-20 PROCEDURE — 96361 HYDRATE IV INFUSION ADD-ON: CPT | Performed by: FAMILY MEDICINE

## 2021-11-20 PROCEDURE — 80053 COMPREHEN METABOLIC PANEL: CPT | Performed by: FAMILY MEDICINE

## 2021-11-20 RX ORDER — ACETAMINOPHEN 500 MG
1000 TABLET ORAL ONCE
Status: COMPLETED | OUTPATIENT
Start: 2021-11-20 | End: 2021-11-20

## 2021-11-20 RX ORDER — KETOROLAC TROMETHAMINE 15 MG/ML
15 INJECTION, SOLUTION INTRAMUSCULAR; INTRAVENOUS ONCE
Status: COMPLETED | OUTPATIENT
Start: 2021-11-20 | End: 2021-11-20

## 2021-11-20 RX ADMIN — KETOROLAC TROMETHAMINE 15 MG: 15 INJECTION, SOLUTION INTRAMUSCULAR; INTRAVENOUS at 22:18

## 2021-11-20 RX ADMIN — ACETAMINOPHEN 1000 MG: 500 TABLET ORAL at 22:01

## 2021-11-20 RX ADMIN — SODIUM CHLORIDE 1000 ML: 9 INJECTION, SOLUTION INTRAVENOUS at 22:21

## 2021-11-21 VITALS
DIASTOLIC BLOOD PRESSURE: 90 MMHG | WEIGHT: 225 LBS | TEMPERATURE: 98.1 F | OXYGEN SATURATION: 98 % | HEART RATE: 85 BPM | RESPIRATION RATE: 18 BRPM | SYSTOLIC BLOOD PRESSURE: 147 MMHG

## 2021-11-21 LAB
FLUAV RNA SPEC QL NAA+PROBE: NEGATIVE
FLUBV RNA RESP QL NAA+PROBE: NEGATIVE
GROUP A STREP BY PCR: NOT DETECTED
RSV RNA SPEC NAA+PROBE: NEGATIVE
SARS-COV-2 RNA RESP QL NAA+PROBE: NEGATIVE

## 2021-11-21 RX ORDER — AMOXICILLIN 500 MG/1
500 CAPSULE ORAL 3 TIMES DAILY
Qty: 21 CAPSULE | Refills: 0 | Status: SHIPPED | OUTPATIENT
Start: 2021-11-21 | End: 2021-11-28

## 2021-11-21 RX ORDER — IBUPROFEN 200 MG
600 TABLET ORAL EVERY 6 HOURS PRN
Qty: 30 TABLET | Refills: 0 | Status: ON HOLD | OUTPATIENT
Start: 2021-11-21 | End: 2023-08-23

## 2021-11-21 NOTE — ED PROVIDER NOTES
ED Provider Note  [facility]      History     Chief Complaint   Patient presents with     Headache     Pharyngitis     For two days, worse on right. Reports issues drinking and talking.      HPI  Jessica Kennedy is a 46 year old female who presents with 3-day history of progressively worsening sore throat, is, fever, headache, feeling like she needs to clear her throat.  Is very painful for her to eat or drink.  She does take Tylenol which provides some relief.  She is immunized against Covid and does not believe she has any recent contagious exposures.  Not tried any other medications.  No shortness of breath.  Her chest does hurt when she takes a deep breath.  She has a prior history of streptococcal pharyngitis and feels this is similar.    Past Medical History  Past Medical History:   Diagnosis Date     Hypertension      Past Surgical History:   Procedure Laterality Date     HEAD & NECK SURGERY       ORTHOPEDIC SURGERY       amoxicillin (AMOXIL) 500 MG capsule  Benzocaine-Menthol 10-2.1 MG LOZG  ibuprofen (ADVIL/MOTRIN) 200 MG tablet  acetaminophen (TYLENOL) 500 MG tablet  amLODIPine (NORVASC) 10 MG tablet  salicylic acid (COMPOUND W MAX STRENGTH) 17 % external gel      No Known Allergies  Family History  No family history on file.  Social History   Social History     Tobacco Use     Smoking status: Never Smoker     Smokeless tobacco: Never Used   Substance Use Topics     Alcohol use: Never     Drug use: Never      Past medical history, past surgical history, medications, allergies, family history, and social history were reviewed with the patient. No additional pertinent items.       Review of Systems  A complete review of systems was performed with pertinent positives and negatives noted in the HPI, and all other systems negative.    Physical Exam   BP: (!) 153/105  Pulse: 93  Temp: 97.9  F (36.6  C)  Resp: 18  Weight: 102.1 kg (225 lb)  SpO2: 95 %  Physical Exam  Vitals and  nursing note reviewed.   Constitutional:       General: She is not in acute distress.     Appearance: She is not diaphoretic.   HENT:      Head: Atraumatic.      Mouth/Throat:      Mouth: No oral lesions.      Pharynx: Uvula midline. Posterior oropharyngeal erythema present. No pharyngeal swelling.   Eyes:      General: No scleral icterus.     Pupils: Pupils are equal, round, and reactive to light.   Cardiovascular:      Heart sounds: Normal heart sounds.   Pulmonary:      Effort: No respiratory distress.      Breath sounds: Normal breath sounds.   Abdominal:      General: Bowel sounds are normal.      Palpations: Abdomen is soft.      Tenderness: There is no abdominal tenderness.   Musculoskeletal:         General: No tenderness.      Cervical back: Normal range of motion and neck supple.   Lymphadenopathy:      Cervical: Cervical adenopathy present.   Skin:     General: Skin is warm.      Findings: No rash.   Neurological:      General: No focal deficit present.      Mental Status: She is oriented to person, place, and time.         ED Course      Procedures       The medical record was reviewed and interpreted.  Current labs reviewed and interpreted.  Previous labs reviewed and interpreted.  Managed outpatient prescription medications.   utilized.       Results for orders placed or performed during the hospital encounter of 11/20/21   Comprehensive metabolic panel     Status: Abnormal   Result Value Ref Range    Sodium 137 133 - 144 mmol/L    Potassium 3.3 (L) 3.4 - 5.3 mmol/L    Chloride 106 94 - 109 mmol/L    Carbon Dioxide (CO2) 25 20 - 32 mmol/L    Anion Gap 6 3 - 14 mmol/L    Urea Nitrogen 13 7 - 30 mg/dL    Creatinine 0.75 0.52 - 1.04 mg/dL    Calcium 9.7 8.5 - 10.1 mg/dL    Glucose 117 (H) 70 - 99 mg/dL    Alkaline Phosphatase 102 40 - 150 U/L    AST 18 0 - 45 U/L    ALT 20 0 - 50 U/L    Protein Total 7.4 6.8 - 8.8 g/dL    Albumin 3.5 3.4 - 5.0 g/dL    Bilirubin Total 0.3 0.2 - 1.3 mg/dL    GFR  Estimate >90 >60 mL/min/1.73m2   CBC with platelets and differential     Status: Abnormal   Result Value Ref Range    WBC Count 12.3 (H) 4.0 - 11.0 10e3/uL    RBC Count 4.34 3.80 - 5.20 10e6/uL    Hemoglobin 13.2 11.7 - 15.7 g/dL    Hematocrit 38.7 35.0 - 47.0 %    MCV 89 78 - 100 fL    MCH 30.4 26.5 - 33.0 pg    MCHC 34.1 31.5 - 36.5 g/dL    RDW 11.8 10.0 - 15.0 %    Platelet Count 279 150 - 450 10e3/uL    % Neutrophils 62 %    % Lymphocytes 30 %    % Monocytes 5 %    % Eosinophils 2 %    % Basophils 1 %    % Immature Granulocytes 0 %    NRBCs per 100 WBC 0 <1 /100    Absolute Neutrophils 7.6 1.6 - 8.3 10e3/uL    Absolute Lymphocytes 3.7 0.8 - 5.3 10e3/uL    Absolute Monocytes 0.6 0.0 - 1.3 10e3/uL    Absolute Eosinophils 0.2 0.0 - 0.7 10e3/uL    Absolute Basophils 0.1 0.0 - 0.2 10e3/uL    Absolute Immature Granulocytes 0.0 <=0.0 10e3/uL    Absolute NRBCs 0.0 10e3/uL   Symptomatic Influenza A/B & SARS-CoV2 (COVID-19) Virus PCR Multiplex Nasopharyngeal     Status: Normal    Specimen: Nasopharyngeal; Swab   Result Value Ref Range    Influenza A target Negative Negative    Influenza B target Negative Negative    RSV target Negative Negative    SARS CoV2 PCR Negative Negative, Testing sent to reference lab. Results will be returned via unsolicited result    Narrative    Testing was performed using the Xpert Xpress CoV2/Flu/RSV Assay on the Cepheid GeneXpert Instrument. This test should be ordered for the detection of SARS-CoV-2 and influenza viruses in individuals who meet clinical and/or epidemiological criteria. Test performance is unknown in asymptomatic patients. This test is for in vitro diagnostic use under the FDA EUA for laboratories certified under CLIA to perform high or moderate complexity testing. This test has not been FDA cleared or approved. A negative result does not rule out the presence of PCR inhibitors in the specimen or target RNA in concentration below the limit of detection for the assay. If only  one viral target is positive but coinfection with multiple targets is suspected, the sample should be re-tested with another FDA cleared, approved, or authorized test, if coinfection would change clinical management. This test was validated by the [facility]. These laboratories are certified under the Clinical  Laboratory Improvement Amendments of 1988 (CLIA-88) as qualified to perform high complexity laboratory testing.   Streptococcus A Rapid Scr w Reflx to PCR     Status: Normal    Specimen: Throat; Swab   Result Value Ref Range    Group A Strep antigen Negative Negative   CBC with platelets differential     Status: Abnormal    Narrative    The following orders were created for panel order CBC with platelets differential.  Procedure                               Abnormality         Status                     ---------                               -----------         ------                     CBC with platelets and d...[[id number]]  Abnormal            Final result                 Please view results for these tests on the individual orders.     Medications   acetaminophen (TYLENOL) tablet 1,000 mg (1,000 mg Oral Given 11/20/21 2201)   0.9% sodium chloride BOLUS (0 mLs Intravenous Stopped 11/21/21 0033)   ketorolac (TORADOL) injection 15 mg (15 mg Intravenous Given 11/20/21 2218)        Assessments & Plan (with Medical Decision Making)   46-year-old woman, previously healthy with a history of strep throat in the past presenting with 3-day history of progressively worsening sore throat, fever and myalgias.  Initial differential diagnosis includes tonsillo-pharyngitis due to group A beta-hemolytic strep or other bacterial etiology, COVID-19, infectious mononucleosis, other viral pharyngitis, peritonsillar abscess, retropharyngeal abscess, Pranav's angina, epiglottitis, thyroiditis, less likely meningitis, referred pain.  On exam, the patient has evidence of pharyngitis.  There is no sign of  Krystal-tonsillar abscess, Retropharyngeal abscess, Pranav's Angina, Epiglottitis, thyroid tenderness or Meningitis.  Her influenza, Covid testing and strep testing are negative.  She improved with IV fluids and Toradol.  There is no sign of airway compromise or any red flags to support life-threatening etiology of her symptoms.  Based on the clinical findings and the entire clinical scenario, the patient appears stable at this time to be treated symptomatically, and to follow-up as an outpatient for any further evaluation and treatment.  Discussed expected course, need for follow up, and indications for return with the patient.  See discharge instructions.        I have reviewed the nursing notes. I have reviewed the findings, diagnosis, plan and need for follow up with the patient.    New Prescriptions    AMOXICILLIN (AMOXIL) 500 MG CAPSULE    Take 1 capsule (500 mg) by mouth 3 times daily for 7 days    BENZOCAINE-MENTHOL 10-2.1 MG LOZG    Take 1 lozenge by mouth 4 times daily as needed (Cough or sore throat)    IBUPROFEN (ADVIL/MOTRIN) 200 MG TABLET    Take 3 tablets (600 mg) by mouth every 6 hours as needed for mild pain       Final diagnoses:   Acute pharyngitis, unspecified etiology       --  Erwin Saldaña MD  [facility] EMERGENCY DEPARTMENT  11/20/2021     Erwin Saldaña MD  11/21/21 0108

## 2021-11-21 NOTE — ED NOTES
Pt was given discharge paperwork and prescriptions, pt requested heartburn medication and was given prescription as asked. Vital signs stable. No questions or concerns.

## 2021-11-21 NOTE — RESULT ENCOUNTER NOTE
Group A Streptococcus PCR is NEGATIVE  No treatment or change in treatment [facility] ED lab result Strep Group A protocol.

## 2021-11-21 NOTE — DISCHARGE INSTRUCTIONS
Thank you for choosing [facility].     Please closely monitor for further symptoms. Return to the Emergency Department if you develop any new or worsening signs or symptoms.    If you received any opiate pain medications or sedatives during your visit, please do not drive for at least 8 hours.     Labs, cultures or final xray interpretations may still need to be reviewed.  We will call you if your plan of care needs to be changed.    Please follow up with your primary care physician or clinic.

## 2021-11-21 NOTE — ED TRIAGE NOTES
Patient reports two days of a headache and sore throat. Patient reports throat is more sore on the right. Reports difficulty talking and swallowing.

## 2021-11-23 LAB
ALT SERPL-CCNC: 12 U/L (ref 6–29)
AST SERPL-CCNC: 17 U/L (ref 10–30)
CREATININE (EXTERNAL): 0.58 MG/DL (ref 0.5–1.1)
GFR ESTIMATED (EXTERNAL): 112 ML/MIN/1.73M2
GFR ESTIMATED (IF AFRICAN AMERICAN) (EXTERNAL): 130 ML/MIN/1.73M2
GLUCOSE (EXTERNAL): 158 MG/DL (ref 65–99)
POTASSIUM (EXTERNAL): 4 MMOL/L (ref 3.5–5.3)

## 2021-12-09 LAB
CHOLESTEROL (EXTERNAL): 215 MG/DL
HDLC SERPL-MCNC: 60 MG/DL
NON HDL CHOLESTEROL (EXTERNAL): 155 MG/DL
TRIGLYCERIDES (EXTERNAL): 143 MG/DL

## 2022-03-17 LAB — HEP C HIM: NORMAL

## 2022-05-10 ENCOUNTER — HOSPITAL ENCOUNTER (EMERGENCY)
Facility: CLINIC | Age: 47
Discharge: HOME | End: 2022-05-11
Attending: EMERGENCY MEDICINE | Admitting: EMERGENCY MEDICINE
Payer: COMMERCIAL

## 2022-05-10 DIAGNOSIS — R73.9 HYPERGLYCEMIA: ICD-10-CM

## 2022-05-10 PROCEDURE — 99284 EMERGENCY DEPT VISIT MOD MDM: CPT | Performed by: EMERGENCY MEDICINE

## 2022-05-10 PROCEDURE — 99283 EMERGENCY DEPT VISIT LOW MDM: CPT | Performed by: EMERGENCY MEDICINE

## 2022-05-11 VITALS
HEIGHT: 69 IN | HEART RATE: 82 BPM | BODY MASS INDEX: 30.66 KG/M2 | RESPIRATION RATE: 16 BRPM | WEIGHT: 207 LBS | DIASTOLIC BLOOD PRESSURE: 98 MMHG | TEMPERATURE: 97.8 F | OXYGEN SATURATION: 100 % | SYSTOLIC BLOOD PRESSURE: 153 MMHG

## 2022-05-11 LAB
ALBUMIN SERPL-MCNC: 3.1 G/DL (ref 3.4–5)
ALP SERPL-CCNC: 101 U/L (ref 40–150)
ALT SERPL W P-5'-P-CCNC: 20 U/L (ref 0–50)
ANION GAP SERPL CALCULATED.3IONS-SCNC: 7 MMOL/L (ref 3–14)
AST SERPL W P-5'-P-CCNC: 11 U/L (ref 0–45)
BASE EXCESS BLDV CALC-SCNC: -0.7 MMOL/L (ref -7.7–1.9)
BASOPHILS # BLD AUTO: 0 10E3/UL (ref 0–0.2)
BASOPHILS NFR BLD AUTO: 1 %
BILIRUB SERPL-MCNC: 0.5 MG/DL (ref 0.2–1.3)
BUN SERPL-MCNC: 24 MG/DL (ref 7–30)
CALCIUM SERPL-MCNC: 9.9 MG/DL (ref 8.5–10.1)
CHLORIDE BLD-SCNC: 104 MMOL/L (ref 94–109)
CO2 SERPL-SCNC: 25 MMOL/L (ref 20–32)
CREAT SERPL-MCNC: 0.61 MG/DL (ref 0.52–1.04)
EOSINOPHIL # BLD AUTO: 0.2 10E3/UL (ref 0–0.7)
EOSINOPHIL NFR BLD AUTO: 2 %
ERYTHROCYTE [DISTWIDTH] IN BLOOD BY AUTOMATED COUNT: 11.8 % (ref 10–15)
GFR SERPL CREATININE-BSD FRML MDRD: >90 ML/MIN/1.73M2
GLUCOSE BLD-MCNC: 278 MG/DL (ref 70–99)
GLUCOSE BLDC GLUCOMTR-MCNC: 308 MG/DL (ref 70–99)
GLUCOSE BLDC GLUCOMTR-MCNC: 351 MG/DL (ref 70–99)
HCO3 BLDV-SCNC: 24 MMOL/L (ref 21–28)
HCT VFR BLD AUTO: 35.7 % (ref 35–47)
HGB BLD-MCNC: 12.2 G/DL (ref 11.7–15.7)
IMM GRANULOCYTES # BLD: 0 10E3/UL
IMM GRANULOCYTES NFR BLD: 0 %
LYMPHOCYTES # BLD AUTO: 3.2 10E3/UL (ref 0.8–5.3)
LYMPHOCYTES NFR BLD AUTO: 37 %
MCH RBC QN AUTO: 30.7 PG (ref 26.5–33)
MCHC RBC AUTO-ENTMCNC: 34.2 G/DL (ref 31.5–36.5)
MCV RBC AUTO: 90 FL (ref 78–100)
MONOCYTES # BLD AUTO: 0.5 10E3/UL (ref 0–1.3)
MONOCYTES NFR BLD AUTO: 6 %
NEUTROPHILS # BLD AUTO: 4.7 10E3/UL (ref 1.6–8.3)
NEUTROPHILS NFR BLD AUTO: 54 %
NRBC # BLD AUTO: 0 10E3/UL
NRBC BLD AUTO-RTO: 0 /100
O2/TOTAL GAS SETTING VFR VENT: 100 %
PCO2 BLDV: 40 MM HG (ref 40–50)
PH BLDV: 7.39 [PH] (ref 7.32–7.43)
PLATELET # BLD AUTO: 234 10E3/UL (ref 150–450)
PO2 BLDV: 47 MM HG (ref 25–47)
POTASSIUM BLD-SCNC: 3.7 MMOL/L (ref 3.4–5.3)
PROT SERPL-MCNC: 6.6 G/DL (ref 6.8–8.8)
RBC # BLD AUTO: 3.98 10E6/UL (ref 3.8–5.2)
SODIUM SERPL-SCNC: 136 MMOL/L (ref 133–144)
WBC # BLD AUTO: 8.6 10E3/UL (ref 4–11)

## 2022-05-11 PROCEDURE — 85025 COMPLETE CBC W/AUTO DIFF WBC: CPT | Performed by: EMERGENCY MEDICINE

## 2022-05-11 PROCEDURE — 80053 COMPREHEN METABOLIC PANEL: CPT | Performed by: EMERGENCY MEDICINE

## 2022-05-11 PROCEDURE — 36415 COLL VENOUS BLD VENIPUNCTURE: CPT | Performed by: EMERGENCY MEDICINE

## 2022-05-11 PROCEDURE — 82803 BLOOD GASES ANY COMBINATION: CPT | Performed by: EMERGENCY MEDICINE

## 2022-05-11 RX ORDER — SODIUM CHLORIDE 9 MG/ML
INJECTION, SOLUTION INTRAVENOUS CONTINUOUS
Status: DISCONTINUED | OUTPATIENT
Start: 2022-05-11 | End: 2022-05-11 | Stop reason: HOSPADM

## 2022-05-11 ASSESSMENT — ENCOUNTER SYMPTOMS
FEVER: 0
MYALGIAS: 1
COLOR CHANGE: 0
EYE REDNESS: 0
COUGH: 0
CONFUSION: 0
CHILLS: 0
SHORTNESS OF BREATH: 0
DIFFICULTY URINATING: 0
ARTHRALGIAS: 0
NECK STIFFNESS: 0
HEADACHES: 0
ABDOMINAL PAIN: 1

## 2022-05-11 NOTE — ED PROVIDER NOTES
[facility] EMERGENCY DEPARTMENT ([facility])       5/10/22  History     Chief Complaint   Patient presents with     Hyperglycemia     Hypertension     The history is provided by the patient and medical records. A  was used ([employer]).     Jessica Kennedy is a 47 year old female with a past medical history significant for diabetes mellitus type 2 with diabetic neuropathy, hypertension, GERD, bilateral preglaucoma, hyperlipidemia who presents to the Emergency Department for evaluation of hyperglycemia.    Patient reports HPI information through iPad [employer] .    Patient reports that she has been experiencing high blood sugars for the past 2 months.  She states that she saw her PCP today who referred her to the ED due to her high blood sugars.  She says that in April 2022 (please see chart review below) she was hospitalized for 3 days due to hyperglycemia.  She adds that she has been taking her metformin and insulin as prescribed.  She notes that her blood sugar has been between 200-300.  She adds that today her blood sugar was about 350.  She says that she is experiencing symptoms including body aches. No abd pain Patient denies recent medication changes.  Patient denies fever, chills, cough, shortness of breath, diarrhea, and constipation.      Per chart review, patient was admitted to [facility] from 04/12/2022 to 0/14/22 for evaluation of hyperglycemia. admitted on 4/12/2022 for nausea, abdominal pain and found to have significant hyperglycemia and hypertension with blood sugars greater than 400 and systolic blood pressure greater than 200. On initial labs she was noted to have a low sodium at 132, low magnesium 1.5 with normal BNP and negative troponin. Hemoglobin A1c 9.7. She was admitted to the medical floor, given about 15 units of insulin, IV Protonix and started on IV fluids. On initial CT she was noted to have incidental bilateral adrenal masses found  measuring 3.1 on the left and 2.6 on the right. Follow-up MRI to evaluate for malignancy (4/13/22) however patient did not wish to wait for results.  Patient's family members were informed that they will be contacted if the MRI showed concern of malignancy or if further action needed to be taken.    CT ABD AND PELVIS WITH IV CONTRAST  IMPRESSION:  1. Findings most likely reflective of lipid poor bilateral adrenal adenomas.  In a patient with a history of cancer, metastatic disease should be considered.  Pheochromocytoma should be considered in a patient with corresponding symptoms  associated with this entity.  2. Moderate diffuse fatty infiltration of the liver.       Past Medical History:   Diagnosis Date     Hypertension        Past Surgical History:   Procedure Laterality Date     HEAD & NECK SURGERY       ORTHOPEDIC SURGERY         History reviewed. No pertinent family history.    Social History     Tobacco Use     Smoking status: Never Smoker     Smokeless tobacco: Never Used   Substance Use Topics     Alcohol use: Never       Current Facility-Administered Medications   Medication     0.9% sodium chloride BOLUS    Followed by     sodium chloride 0.9% infusion     Current Outpatient Medications   Medication     acetaminophen (TYLENOL) 500 MG tablet     amLODIPine (NORVASC) 10 MG tablet     Benzocaine-Menthol 10-2.1 MG LOZG     ibuprofen (ADVIL/MOTRIN) 200 MG tablet     salicylic acid (COMPOUND W MAX STRENGTH) 17 % external gel      No Known Allergies n    I have reviewed the Medications, Allergies, Past Medical and Surgical History, and Social History in the Epic system.    Review of Systems   Constitutional: Negative for chills and fever.   HENT: Negative for congestion.    Eyes: Negative for redness.   Respiratory: Negative for cough and shortness of breath.    Cardiovascular: Negative for chest pain.   Gastrointestinal: Positive for abdominal pain (Generalized).   Genitourinary: Negative for difficulty  "urinating.   Musculoskeletal: Positive for myalgias. Negative for arthralgias and neck stiffness.   Skin: Negative for color change.   Neurological: Negative for headaches.   Psychiatric/Behavioral: Negative for confusion.     A complete review of systems was performed with pertinent positives and negatives noted in the HPI, and all other systems negative.    Physical Exam   BP: (!) 144/91  Pulse: 86  Temp: 97.8  F (36.6  C)  Resp: 18  Height: 175.3 cm (5' 9\")  Weight: 93.9 kg (207 lb)  SpO2: 96 %      Physical Exam  Physical Exam   Constitutional: oriented to person, place, and time. appears well-developed and well-nourished.   HENT:   Head: Normocephalic and atraumatic.   Neck: Normal range of motion.   Pulmonary/Chest: Effort normal. No respiratory distress.   Cardiac: No murmurs, rubs, gallops. RRR.  Abdominal: Abdomen soft, nontender, nondistended. No rebound tenderness.  MSK: Long bones without deformity or evidence of trauma  Neurological: alert and oriented to person, place, and time.   Skin: Skin is warm and dry.   Psychiatric:  normal mood and affect.  behavior is normal. Thought content normal.     ED Course     At 12:59 AM the patient was seen and examined by Bernabe Graves MD in Room ED04.        Procedures           Results for orders placed or performed during the hospital encounter of 05/10/22 (from the past 24 hour(s))   Glucose by meter   Result Value Ref Range    GLUCOSE BY METER POCT 308 (H) 70 - 99 mg/dL     Medications   0.9% sodium chloride BOLUS (has no administration in time range)     Followed by   sodium chloride 0.9% infusion (has no administration in time range)             Assessments & Plan (with Medical Decision Making)   MDM  Patient is presenting with hyperglycemia.  She otherwise appears well and nontoxic.  She has mild hypertension as well.  Labs reassuring without evidence of DKA.  Blood sugar did come down with fluids.  Her not feel that further treatment is necessary for her " hyperglycemia.  She has no urinary symptoms and did not want to provide us.  Otherwise looks nontoxic and less likely infection, cardiac ischemia or other issue causing her blood sugar to be elevated.  The patient does have a glucose monitor and insulin at home.  It sounds like her sugars have been elevated for months.  She will follow-up with her primary care provider.    I have reviewed the nursing notes.    I have reviewed the findings, diagnosis, plan and need for follow up with the patient.    New Prescriptions    No medications on file       Final diagnoses:   Hyperglycemia     I, Torie Gonzales, am serving as a trained medical scribe to document services personally performed by Bernabe Graves MD based on the provider's statements to me on May 11, 2022.  This document has been checked and approved by the attending provider.    I, Bernabe Graves MD, was physically present and have reviewed and verified the accuracy of this note documented by Torie Gonzales medical scribe.      Bernabe Graves MD  5/10/2022   [facility] EMERGENCY DEPARTMENT     Bernabe Graves MD  05/11/22 0420

## 2022-05-11 NOTE — ED TRIAGE NOTES
Pt requesting to be seen for high blood pressure and hyperglycemia.  At home blood glucose was 380.     Triage Assessment     Row Name 05/10/22 1470       Triage Assessment (Adult)    Airway WDL WDL       Respiratory WDL    Respiratory WDL WDL       Skin Circulation/Temperature WDL    Skin Circulation/Temperature WDL WDL       Cardiac WDL    Cardiac WDL WDL       Peripheral/Neurovascular WDL    Peripheral Neurovascular WDL WDL       Cognitive/Neuro/Behavioral WDL    Cognitive/Neuro/Behavioral WDL WDL

## 2022-05-31 LAB
HBA1C MFR BLD: 11 %
HEP C HIM: NORMAL

## 2022-06-10 LAB — HBA1C MFR BLD: 11 %

## 2022-10-03 LAB
ALBUMIN (URINE) MG/SPEC: 63 MG/DL
ALBUMIN/CREATININE RATIO: 1658 MCG/MG CREAT
ALT SERPL-CCNC: 9 U/L (ref 6–29)
AST SERPL-CCNC: 12 U/L (ref 10–35)
CHOLESTEROL (EXTERNAL): 188 MG/DL
CREATININE (EXTERNAL): 0.7 MG/DL (ref 0.5–0.99)
CREATININE (URINE): 38 MG/DL (ref 20–275)
GLUCOSE (EXTERNAL): 190 MG/DL (ref 65–99)
HBA1C MFR BLD: 8.6 %
HDLC SERPL-MCNC: 67 MG/DL
NON HDL CHOLESTEROL (EXTERNAL): 121 MG/DL
POTASSIUM (EXTERNAL): 3.5 MMOL/L (ref 3.5–5.3)
TRIGLYCERIDES (EXTERNAL): 225 MG/DL

## 2022-10-05 LAB
ALBUMIN (URINE) MG/SPEC: 27.4 MG/DL
ALBUMIN/CREATININE RATIO: 1096 MCG/MG CREAT
CREATININE (URINE): 25 MG/DL (ref 20–275)

## 2022-12-06 LAB
ALT SERPL-CCNC: 8 U/L (ref 6–29)
AST SERPL-CCNC: 10 U/L (ref 10–35)
CREATININE (EXTERNAL): 0.81 MG/DL (ref 0.5–0.99)
GLUCOSE (EXTERNAL): 165 MG/DL (ref 65–99)
HBA1C MFR BLD: 8.1 %
POTASSIUM (EXTERNAL): 4.1 MMOL/L (ref 3.5–5.3)

## 2022-12-08 ENCOUNTER — TRANSFERRED RECORDS (OUTPATIENT)
Dept: ENDOCRINOLOGY | Facility: CLINIC | Age: 47
End: 2022-12-08

## 2022-12-16 ENCOUNTER — TRANSFERRED RECORDS (OUTPATIENT)
Dept: HEALTH INFORMATION MANAGEMENT | Facility: CLINIC | Age: 47
End: 2022-12-16

## 2023-01-03 NOTE — PROGRESS NOTES
1/3/23 9:30am: Phone call to [facility] film room requesting images push to PACS for 4/13/22 MRI abdomen and CT abdomen/pelvis. They will push it by the end of today.  Julian Kay [facility]  Adult Endocrinology  [facility]    Jessica is a 48 year old who is being evaluated via a billable video visit.      How would you like to obtain your AVS? Mail a copy  If the video visit is dropped, the invitation should be resent by: Text to cell phone: [phone]  Will anyone else be joining your video visit? No    Patient has Jabari sensor. Her children help her get it set up so she does not know the password and username to l ink and share data with clinic today. Patient states blood sugar range 150-170 morning, 250-300 2hr after meals. She states CT was completed recently but contrast was not used, may need to repeat CT with contrast to recheck. She also wants to have tests to check kidneys and other areas.    Julian [facility]  Adult Endocrinology  [facility]

## 2023-01-04 ENCOUNTER — VIRTUAL VISIT (OUTPATIENT)
Dept: ENDOCRINOLOGY | Facility: CLINIC | Age: 48
End: 2023-01-04
Payer: COMMERCIAL

## 2023-01-04 ENCOUNTER — TELEPHONE (OUTPATIENT)
Dept: ENDOCRINOLOGY | Facility: CLINIC | Age: 48
End: 2023-01-04

## 2023-01-04 DIAGNOSIS — R79.89 ELEVATED PLASMA METANEPHRINES: ICD-10-CM

## 2023-01-04 DIAGNOSIS — E27.8 MASS OF BOTH ADRENAL GLANDS (H): Primary | ICD-10-CM

## 2023-01-04 DIAGNOSIS — D35.00 PHEOCHROMOCYTOMA, UNSPECIFIED LATERALITY: Primary | ICD-10-CM

## 2023-01-04 DIAGNOSIS — D35.00 PHEOCHROMOCYTOMA, UNSPECIFIED LATERALITY: ICD-10-CM

## 2023-01-04 PROCEDURE — 99205 OFFICE O/P NEW HI 60 MIN: CPT | Mod: 95 | Performed by: INTERNAL MEDICINE

## 2023-01-04 RX ORDER — AMLODIPINE AND BENAZEPRIL HYDROCHLORIDE 10; 40 MG/1; MG/1
1 CAPSULE ORAL DAILY
COMMUNITY
Start: 2022-12-19 | End: 2023-01-24 | Stop reason: ALTCHOICE

## 2023-01-04 RX ORDER — DAPAGLIFLOZIN 10 MG/1
1 TABLET, FILM COATED ORAL
COMMUNITY
Start: 2022-12-21 | End: 2023-03-08

## 2023-01-04 RX ORDER — IODINE SOLUTION STRONG 5% (LUGOL'S) 5 %
1.2 SOLUTION ORAL DAILY
Qty: 15 ML | Refills: 0 | Status: SHIPPED | OUTPATIENT
Start: 2023-01-04 | End: 2023-01-07

## 2023-01-04 RX ORDER — AZELASTINE HYDROCHLORIDE 0.5 MG/ML
1 SOLUTION/ DROPS OPHTHALMIC 2 TIMES DAILY
COMMUNITY
Start: 2021-11-23 | End: 2023-04-05

## 2023-01-04 RX ORDER — FINERENONE 10 MG/1
1 TABLET, FILM COATED ORAL DAILY
COMMUNITY
Start: 2022-11-21 | End: 2023-01-24 | Stop reason: DRUGHIGH

## 2023-01-04 RX ORDER — IODINE SOLUTION STRONG 5% (LUGOL'S) 5 %
SOLUTION ORAL
Qty: 15 ML | Refills: 0 | Status: CANCELLED | OUTPATIENT
Start: 2023-01-04

## 2023-01-04 RX ORDER — ASPIRIN 81 MG/1
81 TABLET, CHEWABLE ORAL DAILY
COMMUNITY
Start: 2022-12-19 | End: 2023-04-05

## 2023-01-04 RX ORDER — IODINE SOLUTION STRONG 5% (LUGOL'S) 5 %
1.2 SOLUTION ORAL 3 TIMES DAILY
Qty: 15 ML | Refills: 0 | Status: SHIPPED | OUTPATIENT
Start: 2023-01-04 | End: 2023-01-04

## 2023-01-04 NOTE — PROGRESS NOTES
Video-Visit Details    Type of service:  Video Visit    Joined the call at 1/4/2023, 10:10:33 am.  Left the call at 1/4/2023, 11:33:34 am.    Originating Location (pt. Location): Home  Distant Location (provider location): Off-site    Mode of Communication:  Video Conference via [facility]     Jessica Kennedy is a 48 year old [place] female with a history of hypertension, seen for evaluation of bilateral adrenal masses in the context of elevated plasma metanephrines.    She was formally diagnosed with hypertension in 2017 and it appears her blood pressure has been difficult to control, although the compliance is difficult to assess.  Clinically, she does endorse signs and symptoms suggestive of pheochromocytoma: Heat intolerance, tachycardia, headaches, constipation.  The recent hypercalcemia is most likely due to catecholamine excess.  The CT images revealed bilateral adrenal masses, measuring 2.5 and 3.4 cm in maximum diameter, with a noncontrast Hounsfield unit density of 30/45 and heterogeneous enhancement with contrast, suggestive of pheochromocytoma.  Plasma metanephrines were elevated, when checked by LCMS.  Family history is significant for hypertension on the paternal side of the family, with the caveat the patient has limited knowledge of her family medical history.     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  Given the presentation and her age, there is a strong suspicion for germline mutations, with MEN A and VHL being more prevalent in patients with bilateral pheochromocytomas.  In general, the risk of malignancy is low and the preferred approach is cortical sparing bilateral adrenalectomy, if posssible.  This surgical approach has a risk of recurrent pheochromocytoma of approximately 13%.    Briefly discussed with the patient about surgery and the risk of primary adrenal insufficiency, requiring treatment with steroids  lifelong.  Also discussed about the fairly high risk of having an inherited disease, which can be transmitted to her children, and she agreed to complete genetic testing.    Plan:  Recheck plasma metanephrines using our assay  Check ACTH, cortisol and DHEA-S  Orders placed for hereditary genomics for paraganglioma/pheochromocytoma.  Patient will sign the consent prior to testing.  I123 MIBG, to evaluation for metastatic disease.  Counseled the patient on the importance of taking iodine prior to this imaging test and I prescribed both SSKI and Lugol solution, as SSKI is frequently not covered by insurance.  Also discussed with nuclear medicine who recommended to discontinue amlodipine 2 to 3 days prior to the imaging test.  Appointment to be scheduled with a genetic counselor     I could not make any changes in her medications and the plan is to contact her later today, when her daughter is back home and she is able to tell us the medications she takes.      Orders Placed This Encounter   Procedures     NM I 123 MIBG     Metanephrines Plasma Free     Adrenal corticotropin     Cortisol     DHEA sulfate     Cancer Risk Mgmt/Cancer Genetic Counseling Referral       The patient is seen in consultation at the request of Dr. Kelley.  The patient was seen with the help of a [facility] .    History of Present Illness:  Jessica Kennedy is a 48 year old female with a past medical history significant for hypertension and type 2 diabetes, complicated by nephropathy.     Jess[place] immig[place] in [place] in [facility].  She was born in [place].  She reports being first told she had an elevated blood pressure in 2020.  According to the outside notes, she was taking blood pressure medications back in 2017, when she was also diagnosed with prediabetes.  The patient is illiterate and she could not tell me the medications she has been taking.  The only medication she knows is Farxiga, which she  "considers to be an antihypertensive medication.     Both her blood pressure and her blood glucose have been uncontrolled.  Her diabetes is currently managed by Dr. Kelley and she has a follow-up appointment scheduled with him on 1/10/22.  She is a single mother and she reports going through significant stress and anxiety.  Her weight back in 2017 was 204 pounds, according to the outside records.  Her current weight is 207 pounds.  Since last year, she did loss a significant amount of weight, of approximately 20 pounds or more and the patient attributes this to dietary changes.     She denies experiencing episodes of hot flashes.  Her menstrual periods are regular, monthly.  However, she reports feeling warm all the time and sweating easily, with minimal physical activity.  These symptoms have been present for last 2 years.  Quite often, she feels her heart beating faster.  There is a history of chronic headaches, now present on a daily basis and requiring treatment with Tylenol.  For the last 3 to 4 months, she has been experiencing constipation, with 3-4 bowel movements a week.   At home, she has been checking her blood pressure and reports values variable between 160/100 and 200/120.    On questioning, she denies new stretch marks, facial hair, acne.  When she lies down at night, she describes experiencing some pain \" where the kidneys are\".  With physical activity like climbing stairs, she gets shortness of breath and her leg muscles start to hurt.  While doing house chores or driving, she does experience intermittent episodes of lightheadedness.    The contrast abdominal CT from April 13, 2022 revealed bilateral adrenal nodules, measuring 3.1 x 2.1 cm on the left and 2.6 x 1.9 cm on the right.  The abdominal MRI from 4/13/2022 showed no signal dropout on out of phase images.  The dedicated adrenal CT from 12/8/2022, done at [facility] revealed stable adrenal nodules, lipid poor, with lack of calcification " or hyperenhancement.  Precontrast images on the right side demonstrate an average density of about 30 Hounsfield units. On the left, average density is between 40-45 Hounsfield units.  The right adrenal mass was measured at 2.5 cm, the left adrenal gland was measured at 3.4 cm in length. With contrast, the masses enhance heterogeneously.  I reviewed the CT and MRI images.    The blood work done at [facility], revealed the following results:  12/6/2022: Free metanephrines 942 (<57), free normetanephrine 647 (<148), LC/MS assay; chromogranin A 363 (<311)  10/5/2022: Free metanephrine 663, free normetanephrine 414, aldosterone 10 (LC MS), renin activity 0.48 (0.25-5.82), aldosterone to renin ratio 20.8  12/16/2022 24-hour urinary dopamine 147 (), epinephrine 5 (2-24) norepinephrine 54 ().  No 24 hours urinary creatinine or urinary volume was reported.    Seen in ER on 12/5/22 with hyperglycemia and hypertension.  At that time, her blood pressure was 179/118 pulse 72   Prior visit at the emergency room in September 2022 documents a blood pressure of 168/116.  Outside notes reviewed.    Most recent A1c values:  8/20/2022 A1c 10.1%  12/6/2022 A1c 8.1%    Other pertinent labs reviewed in the outside records:  3/17/2022  hepatitis panel negative  6/10/2022 MANDY 65 antibodies, insulin autoantibodies and antiislet cell antibodies, zinc transporter antibodies undetectable; C-peptide 2.56  10/20/2022 normal CBC  10/3/2022 triglycerides 225, HDL cholesterol 67, LDL cholesterol 90, magnesium 1.6 (1.5-2.5), vitamin D level 21, morning cortisol 6.4 (time of collection unavailable), B12 419, ferritin 51, free T4 1.3 (0.8-1.8), TSH 1.72  10/5/2022 urine microalbumin to creatinine ratio 1096  12/6/2022 phosphate 2.9 (2.5-4.5), CEA 1.8, parathyroid hormone 67 (16-77), BUN mildly elevated at 27, creatinine normal at 0.81, sodium 133, potassium 4.1, albumin 4.2, calcium 10.4 (8.6-10.2).  Prior calcium levels were 10.2  "on 11/23/21 and 10.8 on 10/3/2022.  Alkaline phosphatase has been normal.    The chest x-ray from September 2022 showed clear lungs.  Head CT from December 2022 was unremarkable, done for evaluation of headaches.     9/29/2022 calcium 11.1 (normal range 8.4-10.4.)  12/5/2022 calcium 9.6 (normal range 8.5-10.5)  9/29/2022 TSH 0.99    Antihypertensive medications:  Carvedilol, 25 mg twice daily  Amlodipine/benadril 10/40 mg daily (lotrel) - amlodipine 14 days   Finerinone 20 mg daily    Antidiabetic meds:   Metformin 1 gm BID  Farxiga, 10 mg daily     A1c was 6.6 on 2/17/2021, 9.7 on 4/13/2022 12/14/20 C peptide 6.8, glucose 125, MANDY 65 antibodies negative       Thyroid US from 8/28/20 done at [facility]:  \"The right thyroid lobe measures: 1.5 cmx1.4 cmx3.6 cm   The left thyroid lobe measures: 1.1 cmx1.2 cmx3.2 cm   The isthmus measures: 1 mm in AP diameter.     The thyroid parenchyma appears: mildly heterogeneous.     A nodule in the mid left lobe measures 0.7 cm and has low suspicion for malignancy (1-5%). There are   small solid nodules inferior to each lobe of the thyroid gland, the largest on   the right measuring 1.5 cm, likely parathyroid glands. Additionally, there is a   1.2 cm soft tissue mass superior to the left lobe of the thyroid gland, which   may represent a lymph node.\"    Heart rate 67 on the EKG from 12/5/2022  Sinus rhythm with a heart rate of 75 on the EKG from September 2022.  Sinus tachycardia noted on the EKG from September 2020.  Transthoracic echocardiogram from January 2021 was unremarkable, with an ejection fraction of 59%.    9/29/2022 TSH 0.99    Past Medical History   Past Medical History:   Diagnosis Date     Hypertension    Right proximal tibial and fibular fracture 2017   Post traumatic deformity at the fourth right proximal phalangeal base 2018   Degenerative changes of the knees    Past Surgical History   Past Surgical History:   Procedure Laterality Date     HEAD & NECK SURGERY   "     ORTHOPEDIC SURGERY         Current Medications    Current Outpatient Medications:      acetaminophen (TYLENOL) 500 MG tablet, Take 1-2 tablets (500-1,000 mg) by mouth every 6 hours as needed for mild pain, Disp: 1 Bottle, Rfl: 0     amLODIPine-benazepril (LOTREL) 10-40 MG capsule, Take 1 capsule by mouth daily, Disp: , Rfl:      aspirin (ASA) 81 MG chewable tablet, Take 81 mg by mouth daily CHEW AND SWALLOW, Disp: , Rfl:      azelastine (OPTIVAR) 0.05 % ophthalmic solution, Apply 1 drop to eye 2 times daily, Disp: , Rfl:      Benzocaine-Menthol 10-2.1 MG LOZG, Take 1 lozenge by mouth 4 times daily as needed (Cough or sore throat), Disp: 84 lozenge, Rfl: 0     FARXIGA 10 MG TABS tablet, Take 1 tablet by mouth daily at 2 pm, Disp: , Rfl:      ibuprofen (ADVIL/MOTRIN) 200 MG tablet, Take 3 tablets (600 mg) by mouth every 6 hours as needed for mild pain, Disp: 30 tablet, Rfl: 0     KERENDIA 10 MG TABS, Take 1 tablet by mouth daily, Disp: , Rfl:      metFORMIN (GLUCOPHAGE) 1000 MG tablet, Take 1,000 mg by mouth 2 times daily (with meals), Disp: , Rfl:      salicylic acid (COMPOUND W MAX STRENGTH) 17 % external gel, Apply topically daily Apply to wart once a day and cover with a bandage.  Use daily for the next two weeks., Disp: 1.25 g, Rfl: 0     amLODIPine (NORVASC) 10 MG tablet, Take 1 tablet (10 mg) by mouth At Bedtime, Disp: 60 tablet, Rfl: 0    Family History   Father passed away from MI while her mother was pregnant with her (not sure of his age). Paternal grandfather and paternal uncles have HTN.  No family history of diabetes. Mother  of cancer.      Social History  Single. She has 8 children, 9 to 24 yo. She denies smoking, drinking alcohol or using illicit drugs. Occupation: home care.     Review of Systems   Systemic:             As above  Eye:                      No eye symptoms   Efren-Laryngeal:     No dysphagia (she occasionally chokes when eating), no hoarseness, intermittent cough when her throat  is dry     Breast:                  No breast symptoms  Genitourinary:       Urinates 3-4 times a night; some urinary urgency  Neurological:        No tremor; numbness sensation in her hands and feet for the last year  Musculoskeletal:  Diffuse musculoskeletal pain   Skin:                     increased hair falling out               Vital Signs     Previous Weights:    Wt Readings from Last 10 Encounters:   05/10/22 93.9 kg (207 lb)   11/20/21 102.1 kg (225 lb)   04/20/21 99.8 kg (220 lb)   05/07/20 93.4 kg (206 lb)        LMP 12/14/2022 (Approximate)     Physical Exam  General Appearance: General obesity, no distress noted  Eyes: grossly normal to inspection, conjunctivae and sclerae normal, no lid lag or stare   Respiratory: no audible wheeze, cough, or visible cyanosis.  No visible retractions or increased work of breathing.    Neurological: Cranial nerves grossly intact; no tremor of the hands     Lab Results  I reviewed prior lab results documented in Epic.    No results found for: TSH    80 minutes spent on the date of the encounter doing chart review, history and exam, documentation and further activities as noted above.

## 2023-01-04 NOTE — TELEPHONE ENCOUNTER
Dr. Vanessa saw patient today for virtual consult for bi-lateral pheochromocytoma.    Dr. Vanessa would like patient to have MIBG nuclear medicine scan. Patient will need to take either SSKI or Lugol's for 2 days prior to scan and day of scan (3 total days). Dr. Vanessa placed both prescriptions to determine what patient can get for better cost.     As per Dr. Vanessa patient's blood pressure medications will need to change for scan. Patient also needing better blood pressure control. At the time of visit patient was not able to give clear answers on what her current blood pressure medications and doses are. Patient is not able to read or write.  Patient does have daughter, Radha, listed as emergency contact. Writer will contact patient to confirm Radha's phone number and confirm ok for verbal release to review medications and medical follow-up.     Writer contacted patient on 3 way call with . No answer by patient. Left voicemail for patient to contact our office.     If patient or family does not return call today, writer will call back tomorrow.         Missy Patricia RN  Endocrine Care Coordinator  [facility]

## 2023-01-04 NOTE — LETTER
Patient:  Jessica Kennedy  :   1975  MRN:     [id number]        Ms.Muhubo PORFIRIO Kennedy  [address]  [place] [address]        2023    Dear ,    These are the most recent lab results.    No results found from the In Basket message.    It was a pleasure to see you at your recent visit. Please let me know if you have any questions or concerns.     Sincerely,    Deloris Vanessa MD

## 2023-01-04 NOTE — TELEPHONE ENCOUNTER
Per Endocrine Clinic staff, we received an automated fax from [employer] saying Lugol's solution plan requires generic med to be dispensed or initiate prior auth for brand.        Writer contacted pharmacy to review. They are unable to get SSKI currently (on back order) and it is not covered by insurance. They can get generic Lugol's but that too requires prior authorization.     Writer will request PA team to complete PA on Iodine solution. If not approved writer confirmed that the prescription would be $11.00 out of pocket.      Will make separate encounter for prior authorization.       Missy Patricia RN  Endocrine Care Coordinator  [facility]

## 2023-01-04 NOTE — Clinical Note
Please call her at 3 pm when her kids are at home and ask the kids to confirm the meds she is taking. The patient is illiterate.  Please schedule pt for an apt with the genetic counselor, RTC 1 month and blood work. The labs can be done anytime (she does not need to fast). She lives in  and the labs should be done here. At the time she comes for the labs, please ask her to sign the consent form for the genetic testing.

## 2023-01-04 NOTE — TELEPHONE ENCOUNTER
Left Voicemail (1st Attempt) for the patient to call back and schedule the following:    Appointment type: return  Provider: dr. ferrera  Return date: 2/4/2023   Specialty phone number: [phone]   Additonal Notes: Return in about 1 month (around 2/4/2023) for nonfasting labs    .Pricila cervantes Procedure   Orthopedics, Podiatry, Sports Medicine, Ent ,Eye , Audiology, Adult Endocrine & Diabetes, Nutrition & Medication Therapy Management Specialties   [facility]

## 2023-01-04 NOTE — LETTER
1/4/2023         RE: Jessica Kennedy  [address]  [facility] [address]        Dear Colleague,    Thank you for referring your patient, Jessica Kennedy, to the [facility]. Please see a copy of my visit note below.    1/3/23 9:30am: Phone call to [facility] film room requesting images push to PACS for 4/13/22 MRI abdomen and CT abdomen/pelvis. They will push it by the end of today.  Julian Kay [facility]  Adult Endocrinology  [facility]    Jessica is a 48 year old who is being evaluated via a billable video visit.      How would you like to obtain your AVS? Mail a copy  If the video visit is dropped, the invitation should be resent by: Text to cell phone: [phone]  Will anyone else be joining your video visit? No    Patient has Jabari sensor. Her children help her get it set up so she does not know the password and username to l ink and share data with clinic today. Patient states blood sugar range 150-170 morning, 250-300 2hr after meals. She states CT was completed recently but contrast was not used, may need to repeat CT with contrast to recheck. She also wants to have tests to check kidneys and other areas.    Julian Kay CMA  Adult Endocrinology  [facility]        Video-Visit Details    Type of service:  Video Visit    Joined the call at 1/4/2023, 10:10:33 am.  Left the call at 1/4/2023, 11:33:34 am.    Originating Location (pt. Location): Home  Distant Location (provider location): Off-site    Mode of Communication:  Video Conference via [employer]      Assessment     Jessica Kennedy is a 48 year old [place] female with a history of hypertension, seen for evaluation of bilateral adrenal masses in the context of elevated plasma metanephrines.    She was formally diagnosed with hypertension in 2017 and it appears her blood pressure has been difficult to control, although the compliance is difficult to  assess.  Clinically, she does endorse signs and symptoms suggestive of pheochromocytoma: Heat intolerance, tachycardia, headaches, constipation.  The recent hypercalcemia is most likely due to catecholamine excess.  The CT images revealed bilateral adrenal masses, measuring 2.5 and 3.4 cm in maximum diameter, with a noncontrast Hounsfield unit density of 30/45 and heterogeneous enhancement with contrast, suggestive of pheochromocytoma.  Plasma metanephrines were elevated, when checked by LCMS.  Family history is significant for hypertension on the paternal side of the family, with the caveat the patient has limited knowledge of her family medical history.     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  Given the presentation and her age, there is a strong suspicion for germline mutations, with MEN A and VHL being more prevalent in patients with bilateral pheochromocytomas.  In general, the risk of malignancy is low and the preferred approach is cortical sparing bilateral adrenalectomy, if posssible.  This surgical approach has a risk of recurrent pheochromocytoma of approximately 13%.    Briefly discussed with the patient about surgery and the risk of primary adrenal insufficiency, requiring treatment with steroids lifelong.  Also discussed about the fairly high risk of having an inherited disease, which can be transmitted to her children, and she agreed to complete genetic testing.    Plan:  Recheck plasma metanephrines using our assay  Check ACTH, cortisol and DHEA-S  Orders placed for hereditary genomics for paraganglioma/pheochromocytoma.  Patient will sign the consent prior to testing.  I123 MIBG, to evaluation for metastatic disease.  Counseled the patient on the importance of taking iodine prior to this imaging test and I prescribed both SSKI and Lugol solution, as SSKI is frequently not covered by insurance.  Also discussed with nuclear medicine who  recommended to discontinue amlodipine 2 to 3 days prior to the imaging test.  Appointment to be scheduled with a genetic counselor     I could not make any changes in her medications and the plan is to contact her later today, when her daughter is back home and she is able to tell us the medications she takes.      Orders Placed This Encounter   Procedures     NM I 123 MIBG     Metanephrines Plasma Free     Adrenal corticotropin     Cortisol     DHEA sulfate     Cancer Risk Mgmt/Cancer Genetic Counseling Referral       The patient is seen in consultation at the request of Dr. Kelley.  The patient was seen with the help of a [facility] .    History of Present Illness:  Jessica Kennedy is a 48 year old female with a past medical history significant for hypertension and type 2 diabetes, complicated by nephropathy.     Jess[place] immigrated to [place] in [place], from a [place] in [place].  She was born in [facility].  She reports being first told she had an elevated blood pressure in 2020.  According to the outside notes, she was taking blood pressure medications back in 2017, when she was also diagnosed with prediabetes.  The patient is illiterate and she could not tell me the medications she has been taking.  The only medication she knows is Farxiga, which she considers to be an antihypertensive medication.     Both her blood pressure and her blood glucose have been uncontrolled.  Her diabetes is currently managed by Dr. Kelley and she has a follow-up appointment scheduled with him on 1/10/22.  She is a single mother and she reports going through significant stress and anxiety.  Her weight back in 2017 was 204 pounds, according to the outside records.  Her current weight is 207 pounds.  Since last year, she did loss a significant amount of weight, of approximately 20 pounds or more and the patient attributes this to dietary changes.     She denies experiencing episodes of hot flashes.  Her menstrual  "periods are regular, monthly.  However, she reports feeling warm all the time and sweating easily, with minimal physical activity.  These symptoms have been present for last 2 years.  Quite often, she feels her heart beating faster.  There is a history of chronic headaches, now present on a daily basis and requiring treatment with Tylenol.  For the last 3 to 4 months, she has been experiencing constipation, with 3-4 bowel movements a week.   At home, she has been checking her blood pressure and reports values variable between 160/100 and 200/120.    On questioning, she denies new stretch marks, facial hair, acne.  When she lies down at night, she describes experiencing some pain \" where the kidneys are\".  With physical activity like climbing stairs, she gets shortness of breath and her leg muscles start to hurt.  While doing house chores or driving, she does experience intermittent episodes of lightheadedness.    The contrast abdominal CT from April 13, 2022 revealed bilateral adrenal nodules, measuring 3.1 x 2.1 cm on the left and 2.6 x 1.9 cm on the right.  The abdominal MRI from 4/13/2022 showed no signal dropout on out of phase images.  The dedicated adrenal CT from 12/8/2022, done at [facility] revealed stable adrenal nodules, lipid poor, with lack of calcification or hyperenhancement.  Precontrast images on the right side demonstrate an average density of about 30 Hounsfield units. On the left, average density is between 40-45 Hounsfield units.  The right adrenal mass was measured at 2.5 cm, the left adrenal gland was measured at 3.4 cm in length. With contrast, the masses enhance heterogeneously.  I reviewed the CT and MRI images.    The blood work done at [facility], revealed the following results:  12/6/2022: Free metanephrines 942 (<57), free normetanephrine 647 (<148), LC/MS assay; chromogranin A 363 (<311)  10/5/2022: Free metanephrine 663, free normetanephrine 414, aldosterone 10 (LC MS), " renin activity 0.48 (0.25-5.82), aldosterone to renin ratio 20.8  12/16/2022 24-hour urinary dopamine 147 (), epinephrine 5 (2-24) norepinephrine 54 ().  No 24 hours urinary creatinine or urinary volume was reported.    Seen in ER on 12/5/22 with hyperglycemia and hypertension.  At that time, her blood pressure was 179/118 pulse 72   Prior visit at the emergency room in September 2022 documents a blood pressure of 168/116.  Outside notes reviewed.    Most recent A1c values:  8/20/2022 A1c 10.1%  12/6/2022 A1c 8.1%    Other pertinent labs reviewed in the outside records:  3/17/2022  hepatitis panel negative  6/10/2022 MANDY 65 antibodies, insulin autoantibodies and antiislet cell antibodies, zinc transporter antibodies undetectable; C-peptide 2.56  10/20/2022 normal CBC  10/3/2022 triglycerides 225, HDL cholesterol 67, LDL cholesterol 90, magnesium 1.6 (1.5-2.5), vitamin D level 21, morning cortisol 6.4 (time of collection unavailable), B12 419, ferritin 51, free T4 1.3 (0.8-1.8), TSH 1.72  10/5/2022 urine microalbumin to creatinine ratio 1096  12/6/2022 phosphate 2.9 (2.5-4.5), CEA 1.8, parathyroid hormone 67 (16-77), BUN mildly elevated at 27, creatinine normal at 0.81, sodium 133, potassium 4.1, albumin 4.2, calcium 10.4 (8.6-10.2).  Prior calcium levels were 10.2 on 11/23/21 and 10.8 on 10/3/2022.  Alkaline phosphatase has been normal.    The chest x-ray from September 2022 showed clear lungs.  Head CT from December 2022 was unremarkable, done for evaluation of headaches.     9/29/2022 calcium 11.1 (normal range 8.4-10.4.)  12/5/2022 calcium 9.6 (normal range 8.5-10.5)  9/29/2022 TSH 0.99    Antihypertensive medications:  Carvedilol, 25 mg twice daily  Amlodipine/benadril 10/40 mg daily (lotrel) - amlodipine 14 days   Finerinone 20 mg daily    Antidiabetic meds:   Metformin 1 gm BID  Farxiga, 10 mg daily     A1c was 6.6 on 2/17/2021, 9.7 on 4/13/2022 12/14/20 C peptide 6.8, glucose 125, MANDY 65  "antibodies negative       Thyroid US from 8/28/20 done at [facility]:  \"The right thyroid lobe measures: 1.5 cmx1.4 cmx3.6 cm   The left thyroid lobe measures: 1.1 cmx1.2 cmx3.2 cm   The isthmus measures: 1 mm in AP diameter.     The thyroid parenchyma appears: mildly heterogeneous.     A nodule in the mid left lobe measures 0.7 cm and has low suspicion for malignancy (1-5%). There are   small solid nodules inferior to each lobe of the thyroid gland, the largest on   the right measuring 1.5 cm, likely parathyroid glands. Additionally, there is a   1.2 cm soft tissue mass superior to the left lobe of the thyroid gland, which   may represent a lymph node.\"    Heart rate 67 on the EKG from 12/5/2022  Sinus rhythm with a heart rate of 75 on the EKG from September 2022.  Sinus tachycardia noted on the EKG from September 2020.  Transthoracic echocardiogram from January 2021 was unremarkable, with an ejection fraction of 59%.    9/29/2022 TSH 0.99    Past Medical History   Past Medical History:   Diagnosis Date     Hypertension    Right proximal tibial and fibular fracture 2017   Post traumatic deformity at the fourth right proximal phalangeal base 2018   Degenerative changes of the knees    Past Surgical History   Past Surgical History:   Procedure Laterality Date     HEAD & NECK SURGERY       ORTHOPEDIC SURGERY         Current Medications    Current Outpatient Medications:      acetaminophen (TYLENOL) 500 MG tablet, Take 1-2 tablets (500-1,000 mg) by mouth every 6 hours as needed for mild pain, Disp: 1 Bottle, Rfl: 0     amLODIPine-benazepril (LOTREL) 10-40 MG capsule, Take 1 capsule by mouth daily, Disp: , Rfl:      aspirin (ASA) 81 MG chewable tablet, Take 81 mg by mouth daily CHEW AND SWALLOW, Disp: , Rfl:      azelastine (OPTIVAR) 0.05 % ophthalmic solution, Apply 1 drop to eye 2 times daily, Disp: , Rfl:      Benzocaine-Menthol 10-2.1 MG LOZG, Take 1 lozenge by mouth 4 times daily as needed (Cough or sore throat), " Disp: 84 lozenge, Rfl: 0     FARXIGA 10 MG TABS tablet, Take 1 tablet by mouth daily at 2 pm, Disp: , Rfl:      ibuprofen (ADVIL/MOTRIN) 200 MG tablet, Take 3 tablets (600 mg) by mouth every 6 hours as needed for mild pain, Disp: 30 tablet, Rfl: 0     KERENDIA 10 MG TABS, Take 1 tablet by mouth daily, Disp: , Rfl:      metFORMIN (GLUCOPHAGE) 1000 MG tablet, Take 1,000 mg by mouth 2 times daily (with meals), Disp: , Rfl:      salicylic acid (COMPOUND W MAX STRENGTH) 17 % external gel, Apply topically daily Apply to wart once a day and cover with a bandage.  Use daily for the next two weeks., Disp: 1.25 g, Rfl: 0     amLODIPine (NORVASC) 10 MG tablet, Take 1 tablet (10 mg) by mouth At Bedtime, Disp: 60 tablet, Rfl: 0    Family History   Father passed away from MI while her mother was pregnant with her (not sure of his age). Paternal grandfather and paternal uncles have HTN.  No family history of diabetes. Mother  of cancer.      Social History  Single. She has 8 children, 9 to 22 yo. She denies smoking, drinking alcohol or using illicit drugs. Occupation: home care.     Review of Systems   Systemic:             As above  Eye:                      No eye symptoms   Efren-Laryngeal:     No dysphagia (she occasionally chokes when eating), no hoarseness, intermittent cough when her throat is dry     Breast:                  No breast symptoms  Genitourinary:       Urinates 3-4 times a night; some urinary urgency  Neurological:        No tremor; numbness sensation in her hands and feet for the last year  Musculoskeletal:  Diffuse musculoskeletal pain   Skin:                     increased hair falling out               Vital Signs     Previous Weights:    Wt Readings from Last 10 Encounters:   05/10/22 93.9 kg (207 lb)   21 102.1 kg (225 lb)   21 99.8 kg (220 lb)   20 93.4 kg (206 lb)        LMP 2022 (Approximate)     Physical Exam  General Appearance: General obesity, no distress noted  Eyes:  grossly normal to inspection, conjunctivae and sclerae normal, no lid lag or stare   Respiratory: no audible wheeze, cough, or visible cyanosis.  No visible retractions or increased work of breathing.    Neurological: Cranial nerves grossly intact; no tremor of the hands     Lab Results  I reviewed prior lab results documented in Epic.    No results found for: TSH    80 minutes spent on the date of the encounter doing chart review, history and exam, documentation and further activities as noted above.                            Again, thank you for allowing me to participate in the care of your patient.        Sincerely,        Deloris Vanessa MD

## 2023-01-06 ENCOUNTER — TELEPHONE (OUTPATIENT)
Dept: ENDOCRINOLOGY | Facility: CLINIC | Age: 48
End: 2023-01-06

## 2023-01-06 NOTE — CONFIDENTIAL NOTE
Writer contacted patient again to try and review medications with patient and family member. No answer again. Left voicemail for patient to contact our office.     Writer will also try to phone number again listed for daughter in chart, this time with . Again a male answered and hung up the line again on writer and . No other numbers listed in chart. Writer had  call patient one more time. Again it went right to voicemail.     Will try back again on Monday.    Will update Dr. Vanessa.      Missy Patricia, RN  Endocrine Care Coordinator  [facility]

## 2023-01-06 NOTE — CONFIDENTIAL NOTE
Received notice that patient requires a prior authorization for Lugol's generic solution.    strong iodine (LUGOL'S) 5 % solution 15 mL 0 1/4/2023 1/7/2023 No   Sig - Route: Take 1.2 mLs by mouth daily for 3 days - Oral   Sent to pharmacy as: Iodine Strong 5 % Oral Solution (LUGOL'S)   Class: E-Prescribe   Order: [id number]   E-Prescribing Status: Receipt confirmed by pharmacy (1/4/2023  1:06 PM CST)         Will send to prior authorization team.        Missy Patricia, RN  Endocrine Care Coordinator  [facility]

## 2023-01-06 NOTE — TELEPHONE ENCOUNTER
Central Prior Authorization Team   Phone: [phone]    PA Initiation    Medication: Lugol's/Iodine solution  Insurance Company: [employer] - Phone [phone] Fax [phone]  Pharm[facility] the Rx[id number] [place], [place] - [address] AT [facility]  Filling Pharmacy Phone: [phone]  Filling Pharmacy Fax:    Start Date: 1/6/2023

## 2023-01-09 ENCOUNTER — TELEPHONE (OUTPATIENT)
Dept: ENDOCRINOLOGY | Facility: CLINIC | Age: 48
End: 2023-01-09

## 2023-01-09 NOTE — TELEPHONE ENCOUNTER
PRIOR AUTHORIZATION DENIED    Medication: Lugol's/Iodine solution    Denial Date: 1/9/2023    Denial Rational:       Appeal Information: This medication was denied. If physician would like to appeal because patient has contraindication or allergy to covered medication please write letter of medical necessity and route back to PA team to initiate.  If no further action is needed please close encounter thank you.

## 2023-01-09 NOTE — TELEPHONE ENCOUNTER
Medication Appeal Initiation    We have initiated an appeal for the requested medication:  Medication: Lugol's/Iodine solution  Appeal Start Date:  1/9/2023  Insurance Company: [employer] - Phone [phone] Fax [phone]  Comments:  Faxed letter to [facility] [phone]

## 2023-01-09 NOTE — CONFIDENTIAL NOTE
"Writer again attempted to contact patient on 3 way call with . Patient did answer the phone. Writer reviewed with patient that Dr. Vanessa is wanting to follow-up to determine what blood pressure medications she is taking. Discussed with patient the importance of getting her blood pressure under control as well as the plan for MIBG scan and labs.     Patient reports that she is taking Metformin 1,000 mg for her diabetes and a medication with \"10-40\" for her blood pressure, along with Tylenol and Aspirin. Patient reports that her blood sugars are between 160-170 and her blood pressure last night was 170/100 with a heart rate of 86. Patient notes that she has not taken her blood pressure today but \"knows it is high.\" Patient reports that she also as of yesterday has a fever, sore throat, and headache. Patient feels she has \"the flu.\"    Patient is not able to reach or write. Questioned if patient would be available to have an in person visit to go over genetic lab consent form, discuss Lugol's drops and MIBG scan. Patient states that she can come to clinic for visit, she just needs date and time. Writer asked patient if she has a family member or friend who could also come along as support. Patient states that her oldest daughter, who is 18 years old, does not live with them and is at school or work most days. Patient notes that she does have a 16 year old daughter who could come with her if needed.     When speaking with Jessica her breathing became labored. Writer asked patient if she was having difficulty breathing. Patient confirms that yes, she is having difficulty breathing and she is also having pressure and heaviness between her breasts. Writer recommended patient be evaluated in the emergency room now. Patient states that she will call her daughter, Radha, to see if she can take her to the emergency room. Patient will call an ambulance if she is not able to reach her. Writer advised patient that " she will call back to her in 10 mns to confirm she is going in to be evaluated. Total length of call 32 minutes.       Writer paged Dr. Vanessa. Reviewed above information with Dr. Vanessa. Per Dr. Vanessa, lets get patient scheduled for MIBG and plan to call her tomorrow, since being evaluated tonight for current symptoms, to add another blood pressure medication.       Writer then called patient back with . Patient notes that she is not able to reach her daughter. Writer questioned if patient will be calling an ambulance for symptoms. Patient states that she wants to monitor her symptoms and take a nap to see if she feels better. Writer explained that with her chest pressure/heaviness, along with shortness of breath and high blood pressure it is recommended she be evaluated in the emergency room now. Patient verbalizes understanding and agrees to plan. She notes that she wants to try her daughter first again, otherwise she will call for an ambulance.       Missy Patricia RN  Endocrine Care Coordinator  [facility]

## 2023-01-09 NOTE — CONFIDENTIAL NOTE
Please refer to 1/6/23 telephone encounter. PA is to be done on:  strong iodine (LUGOL'S) 5 % solution 15 mL 0 1/4/2023 1/7/2023 No   Sig - Route: Take 1.2 mLs by mouth daily for 3 days - Oral   Sent to pharmacy as: Iodine Strong 5 % Oral Solution (LUGOL'S)   Class: E-Prescribe   Order: [id number]   E-Prescribing Status: Receipt confirmed by pharmacy (1/4/2023  1:06 PM CST)       PA in process.     Missy Patricia, RN  Endocrine Care Coordinator  [facility]

## 2023-01-09 NOTE — TELEPHONE ENCOUNTER
"Prior Authorization Retail Medication Request    Medication/Dose: Potassium Iodide 1gm/mL solution    \"Plan does not cover this medication POTASSIUM IODIDE 1GM/ML Solution and product on back order.  Please call plan at [phone] to initiate prior authorization or call/fax pharmacy to change medication.  Patient ID # is [id number].\"  " Nutrition Assessment   Assessment Type: Initial assessment  Reason for Visit: Consult  Referral Requested By: Physician/Staff (Dr. Ledezma)  Chart Medications Lab Results Reviewed: yes    Nutritional Risk Factors: Unintentional weight loss and Poor PO prior to admission    Current Diet Order: NPO  Diet Tolerance: n/a  Food Allergies: no  Priority Points: Status 2    Demographic/Anthropometrics Information  Gender: male   Patient Age: 78 year old  Height: Height: 5' 9\" (175.3 cm)  Weight: Weight: 75.2 kg  BMI: Body mass index is 24.48 kg/m².    Usual Body Weight: 78.5 kg  % Weight change: -4 % x 2-3 weeks per pt's spouse at bedside  Reason for Weight Change: Decreased intake    Weight Classification: Normal weight (BMI 18.5-24.9)    Estimated Nutritional Needs  Assessment Weight: 75  kg  Energy Needs: 23-27 kcal/kg  (3054-1180 kcal/day)  Protein Needs: 1 g/kg      (75 grams/day)  Fluid Needs: 1800-2250ml per day     Nutrition Diagnosis (PES)  Inadequate oral intake related to Difficulty swallowing as evidenced by Need for NPO status    Nutrition Plan  Recommended Nutrition Intervention: Coordination of nutrition care by a nutrition professional  Monitor: Biochemical data, medical tests, procedures, Weight and Enteral nutrition    Discharge Needs: Pending  Care Plan Discussed With: Patient and Family  Goals: Meet >/= 75% of estimated needs  Goal Progress: Initiated  Timeframe to Achieve Goal: 1-3 days    Dietitian Notes/Impressions/Recommendations:  10/1/20: Received consult to start tube feedings. Dobhoff tube placed today. Pt has failed swallow evaluations since admission. Pt was admitted 2 days ago for weakness, dyspnea, fevers. COVID was negative. Spouse reports 6-10 lb wt loss over past few weeks due to poor appetite. Noted PMHx of HTN, HLD, CAD, atrial fibrillation, prostate cancer, CKD stage 3. K+ has been on the higher side of normal, and pt also getting a high dose of steroid - therefore will start Nepro with  Carbsteady formula.   Weight Hx: moderate-severe wt loss in timeframe   NFPE: n/a      TREATMENT PLAN: Monitoring & Interventions   1. Enteral nutrition: Recommend Nepro @ 20 ml/hr, increase as tolerated by 20 ml q 6 hrs to goal rate of 40 ml/hr.  This will provide total of 1728 kcals, 78 gm protein, 698 ml free water.   2. FWF: Recommend 200 ml q 6 hrs. Adjust PRN.   3. Monitor TF tolerance, labs, weights, ability to swallow. RD to follow.        Malnutrition Status: deferred

## 2023-01-10 NOTE — CONFIDENTIAL NOTE
Writer scheduled patient for MIBG scan on 1/26 (injection) and 1/27 (scan). Writer will have patient come in to clinic next week on 1/18 for nurse visit ( Dr. Vanessa in clinic this day) to have  go over Genetic lab consent and to help patient write out schedule for MIBG scan and drops pre-scan.     Dr. Vanessa updated. Dr. Vanessa would like to know if patient was dispensed Kerendia 10 mg? If she has been we will have her also take this for her blood pressure, if not while she is in clinic next week we will prescribe Spironolactone.       Writer contacted [facility] to confirm patient medications. [facility] staff confirmed that patient did  Kerendia 10 mg tablets the end of November but only a 30 day supply. Patient does also have a 20 mg prescription for Kerendia that she did not . Pharmacy staff also confirmed that patient did  the amlodipine 10-40 mg tablet, along with Metformin and Farxia on 12/21/2022.       Will update Dr. Vanessa.      Missy Patricia, RN  Endocrine Care Coordinator  [facility]

## 2023-01-10 NOTE — CONFIDENTIAL NOTE
Writer contact patient with  to check in and determine if she went in for evaluation. Patient reports that she felt better and did not go in for evaluation. Patient states that she no longer has difficulty breathing and does not have any chest pressure.     Patient reports that her blood pressure when she woke up today was 163/97 and now at 10:40am, it was 150/94.    Writer explained that Dr. Vanessa is wanting to add another blood pressure medication and confirmed patient pharmacy is [facility].       Patient confirmed with writer that she can read and write in Liberian.      Writer will review further with Dr. Vanessa.         Missy Patricia, RN  Endocrine Care Coordinator  [facility]

## 2023-01-10 NOTE — TELEPHONE ENCOUNTER
MEDICATION APPEAL APPROVED    Medication: Lugol's/Iodine solution  Authorization Effective Date: 12/11/2022  Authorization Expiration Date: 1/10/2024  Approved Dose/Quantity:   Reference #:     Insurance Company: [employer] [phone] Fax [phone]  Expected CoPay:       CoPay Card Available:      Foundation Assistance Needed:    Which Pharmacy is filling the prescription (Not needed for infusion/clinic administered): [id number] [place] [address] AT [facility]    Per call from [employer] this has been approved.  Pharmacy already received the call and they have a paid claim.     Tech did say that another medication, Potassium Iodide 1gm/ml was also sent to the pharmacy.      If that is no longer needed due to this appeal being approved please call the pharmacy to cancel that Rx.

## 2023-01-10 NOTE — CONFIDENTIAL NOTE
Dr. Vanessa received call from patients primary care provider because patient is in clinic now and wanted to know if Dr. Vanessa wanted anything relayed at this time.     Dr. Vanessa will have them advise patient to come to clinic next week on 1/18/23 for nurse visit (Dr. Vanessa in office that day) to go over consent form for labs and to go over details for MIBG scan prep. She will also have patient start Kerendia 20 mg daily. Will also advise patient to  iodine drops but do not take them until review with nurse at 1/18/23 nurse visit.       Missy Patricia, RN  Endocrine Care Coordinator  [facility]

## 2023-01-10 NOTE — PATIENT INSTRUCTIONS
Will have patient come in for nurse visit next week on 1/17 or 1/18 (while Dr. Vanessa is in clinic). Will request in person .     Scheduled patient for MIBG scan on 1/26 and 1/27.    Patient will need to take Lugol's drops 1/25, 1/26 (at least 30 mins before injection), and 1/27.

## 2023-01-10 NOTE — CONFIDENTIAL NOTE
Please see 1/4/23 telephone note. Patient has 1/18/23 visit to go over MIBG scan with iodine prep.       Missy Patricia RN  Endocrine Care Coordinator  [facility]

## 2023-01-11 NOTE — TELEPHONE ENCOUNTER
Discussed with Dr. Kelley who saw the patient in the clinic today.  The patient was in the clinic at the time of the call.  The plan is to discontinue lisinopril as the patient complains of a dry cough and replace it with losartan, 50 mg twice daily.  The patient should continue to take carvedilol.  The plan is to discontinue Norvasc and start treatment with Kerendia, 20 mg daily.

## 2023-01-18 ENCOUNTER — TELEPHONE (OUTPATIENT)
Dept: ENDOCRINOLOGY | Facility: CLINIC | Age: 48
End: 2023-01-18

## 2023-01-18 NOTE — CONFIDENTIAL NOTE
Please refer back to 1/4/2023 telephone encounter for past details.       Patient did not show to her nurse visit on 1/18/23 to review genetic lab consent form and to review upcoming plan for MIBG scan with Lugol's drops.       Contacted patient on 3 way call with . Left voicemail for patient to contact clinic.      Missy Patricia RN  Endocrine Care Coordinator  [facility]

## 2023-01-19 ENCOUNTER — PATIENT OUTREACH (OUTPATIENT)
Dept: CARE COORDINATION | Facility: CLINIC | Age: 48
End: 2023-01-19

## 2023-01-19 NOTE — PROGRESS NOTES
Social Work Telephone Message Note  [facility]     Patient Name:  Jessica Kennedy  /Age:  1975 (48 year old)    Referral Source: Dr Otf wheat  Reason for Referral:  Resources and support     contacted Patient via telephone on 23 via the support of interpretive services. Sw received a consult to connect with patient regarding medications and affordability.  Spoke with Jessica and stated that her MA was likely inactive. Provided guidance and encouraged patient to connect with [facility] and check to see status of her MA. She thought she may need to provide updated income information since that was overdue.    Patient denied any additional concerns or questions. Provided her writer contact information and encouraged her to call with additional concerns or questions. Sw will continue to assist as needed.               MOY Lopez, [facility]    [facility]  [phone]  [email or web address]

## 2023-01-20 NOTE — CONFIDENTIAL NOTE
Writer again attempted to contact patient on 3 way call with .  attempted to contact patient on phone number x2 and both times the phone is not working and no voicemail available.     Writer also tried phone with no  and it rings twice and then has busy signal.     Writer will try back again on Monday.      Missy Patricia, RN  Endocrine Care Coordinator  [facility]

## 2023-01-23 NOTE — CONFIDENTIAL NOTE
Writer spoke to patient today, 1/23/23, via 3 way call with . Patient had talked to call center and rescheduled her nurse visit and labs to tomorrow, 1/24/23. Writer explained to patient at that time we will go over plan for MIBG scan later this week. Patient did confirm that she has the Lugol's drops and she will being them tomorrow.       Missy Patricia RN  Endocrine Care Coordinator  [facility]

## 2023-01-24 ENCOUNTER — OFFICE VISIT (OUTPATIENT)
Dept: ENDOCRINOLOGY | Facility: CLINIC | Age: 48
End: 2023-01-24
Payer: COMMERCIAL

## 2023-01-24 ENCOUNTER — LAB (OUTPATIENT)
Dept: LAB | Facility: CLINIC | Age: 48
End: 2023-01-24
Payer: COMMERCIAL

## 2023-01-24 VITALS — HEART RATE: 68 BPM | DIASTOLIC BLOOD PRESSURE: 119 MMHG | OXYGEN SATURATION: 98 % | SYSTOLIC BLOOD PRESSURE: 177 MMHG

## 2023-01-24 DIAGNOSIS — D35.00 PHEOCHROMOCYTOMA, UNSPECIFIED LATERALITY: Primary | ICD-10-CM

## 2023-01-24 DIAGNOSIS — D35.00 PHEOCHROMOCYTOMA, UNSPECIFIED LATERALITY: ICD-10-CM

## 2023-01-24 DIAGNOSIS — R79.89 ELEVATED PLASMA METANEPHRINES: ICD-10-CM

## 2023-01-24 DIAGNOSIS — E27.8 MASS OF BOTH ADRENAL GLANDS (H): ICD-10-CM

## 2023-01-24 LAB
CORTIS SERPL-MCNC: 7.3 UG/DL
INTERPRETATION: ABNORMAL
SIGNIFICANT RESULTS: ABNORMAL
SPECIMEN DESCRIPTION: ABNORMAL
TEST DETAILS, MDL: ABNORMAL

## 2023-01-24 PROCEDURE — 82533 TOTAL CORTISOL: CPT

## 2023-01-24 PROCEDURE — 82024 ASSAY OF ACTH: CPT

## 2023-01-24 PROCEDURE — 83835 ASSAY OF METANEPHRINES: CPT | Mod: 90

## 2023-01-24 PROCEDURE — 36415 COLL VENOUS BLD VENIPUNCTURE: CPT

## 2023-01-24 PROCEDURE — 82627 DEHYDROEPIANDROSTERONE: CPT

## 2023-01-24 RX ORDER — LOSARTAN POTASSIUM 50 MG/1
50 TABLET ORAL DAILY
Qty: 30 TABLET | Refills: 0 | Status: SHIPPED | OUTPATIENT
Start: 2023-01-24 | End: 2023-01-30

## 2023-01-24 RX ORDER — CARVEDILOL 25 MG/1
25 TABLET ORAL 2 TIMES DAILY WITH MEALS
Qty: 60 TABLET | Refills: 0
Start: 2023-01-24 | End: 2023-02-22

## 2023-01-24 RX ORDER — FINERENONE 20 MG/1
20 TABLET, FILM COATED ORAL DAILY
Qty: 30 TABLET | Refills: 0
Start: 2023-01-24 | End: 2023-02-22

## 2023-01-24 RX ORDER — AMLODIPINE BESYLATE 10 MG/1
10 TABLET ORAL AT BEDTIME
Qty: 30 TABLET | Refills: 0 | Status: SHIPPED | OUTPATIENT
Start: 2023-01-24 | End: 2023-02-15

## 2023-01-24 NOTE — PATIENT INSTRUCTIONS
MIBG scan on 1/26 and 1/27.     Patient will need to take Lugol's drops 1/25, 1/26 (30 mins before injection), and 1/27.    1/25/23: Take Lugol's drops 24 hours before MIBG injection (1:00pm)    1/26/23: Take Lugol's drops 30 minutes before MIBG injection (12:30pm)    1/27/23:Take Lugol's drops 12:30pm before MIBG scan

## 2023-01-24 NOTE — PROGRESS NOTES
Please refer back to 1/4 and 1/18 telephone encounter.     Patient presented today to review and discuss upcoming plan for MIBG scan, to review/sign Consent for GeneticTesting and to address her blood pressure medications.     Danish  in person at today's nurse visit.     First writer reviewed upcoming MIBG scan plan.     1/25/23: Take Lugol's drops 24 hours before MIBG injection (1:00pm)     1/26/23: Take Lugol's drops 30 minutes before MIBG injection (12:30pm)     1/27/23:Take Lugol's drops 12:30pm before MIBG scan      Writer reviewed in detail. Patient also brought her Lugol's drops and writer provided patient with syringe to use to draw up the 1.2 ml each time. Patient verbalizes understanding. Patient transcribed instructions in Danish. Patient also has the address for the 1/26 injection and 1/27 scan.      At time of nurse visit patient's blood pressure was 177/119 (pulse 68). Dr. Vanessa came into nurse visit to review patient's medications for her blood pressure. Patient reports that she is taking Kerendia 20 mg daily and Carvedilol 25 mg twice daily. Patient notes that her primary doctor took her off the amLODIPine-benazepril (LOTREL) 10-40 MG capsule last week.     Dr. Vanessa discussed with patient that she would like to add Amlodipine 10 mg daily and losartan 50 mg daily. This will be in addition to her Kerendia and Carvedilol. Dr. Vanessa explained to patient that once the MIBG scan is completed we will make change in blood pressure medications that will work better with her hypertension but cannot until after scan is completed or the medications will interfere with the results. Patient verbalizes understanding and agrees to plan. Prescriptions sent to pharmacy and medication list updated. Patient also transcribed the medications for her blood pressure in Danish.        Patient was also here to sign consent form for genetic testing for pheochromocytomas. Writer reviewed form in detail.  Dr. Vanessa also came in and discussed form with patient. Patient initialed and signed form appropriately.       Writer will call patient tomorrow to confirm taking Lugol's drops. Patient will go to pharmacy to  Amlodipine 10 mg and losartan 50 mg.    Writer walked patient over to lab for bloodwork.       Total time of visit 60 minutes.     Missy Patricia RN  Endocrine Care Coordinator  [facility]

## 2023-01-25 ENCOUNTER — TELEPHONE (OUTPATIENT)
Dept: ENDOCRINOLOGY | Facility: CLINIC | Age: 48
End: 2023-01-25
Payer: COMMERCIAL

## 2023-01-25 LAB
ACTH PLAS-MCNC: 12 PG/ML
DHEA-S SERPL-MCNC: 82 UG/DL (ref 35–430)
INTERPRETATION: NORMAL

## 2023-01-25 NOTE — CONFIDENTIAL NOTE
Please refer to 1/24/23 nurse visit note for follow-up details.       Missy Patricia, RN  Endocrine Care Coordinator  [facility]

## 2023-01-25 NOTE — CONFIDENTIAL NOTE
Please refer back to yesterday's nurse visit (1/24/23) for past details.     Writer contacted patient on 3 way call with  to check in patient to review plan for today, which is to take 1.2ml of Lugol's drops. Patient verbalizes understanding and agrees to plan.     Writer questioned if patient was able to get her blood pressure medications. Patient notes that she went there last night and they were not ready but they are ready now and she is going to pick them up.     Writer will check in again tomorrow with patient. Also ensured that patient has clinic phone number with any questions or concerns.         Missy Patricia, RN  Endocrine Care Coordinator  [facility]

## 2023-01-26 ENCOUNTER — HOSPITAL ENCOUNTER (OUTPATIENT)
Dept: NUCLEAR MEDICINE | Facility: CLINIC | Age: 48
Setting detail: NUCLEAR MEDICINE
Discharge: HOME | End: 2023-01-26
Attending: INTERNAL MEDICINE
Payer: COMMERCIAL

## 2023-01-26 PROCEDURE — 78802 RP LOCLZJ TUM WHBDY 1 D IMG: CPT | Mod: 26 | Performed by: RADIOLOGY

## 2023-01-26 PROCEDURE — A9509 IODINE I-123 SOD IODIDE MIL: HCPCS | Performed by: INTERNAL MEDICINE

## 2023-01-26 PROCEDURE — 343N000001 HC RX 343: Performed by: INTERNAL MEDICINE

## 2023-01-26 PROCEDURE — 78803 RP LOCLZJ TUM SPECT 1 AREA: CPT | Mod: 26 | Performed by: RADIOLOGY

## 2023-01-26 PROCEDURE — 78830 RP LOCLZJ TUM SPECT W/CT 1: CPT

## 2023-01-26 RX ADMIN — IOBENGUANE I-123 11 MILLICURIE: 2 INJECTION INTRAVENOUS at 13:30

## 2023-01-26 NOTE — CONFIDENTIAL NOTE
Writer contacted patient to confirm plan for today, 1/26/23. Patient has MIBG injection and patient needs to take her drops 30 minutes before injection.     Patient confirms that she took her drops and is leaving to her injection appointment. Patient did note that she feels the Lugol's drops made her sick to her stomach yesterday. Patient notes that she was vomiting.     Writer advised patient that message will be sent to Dr. Vanessa and writer will call back with recommendations later today. Patient heading to her appointment now.      Writer paged Dr. Vanessa.     Dr. Vanessa noted that it is not common side effect from Lugol's drops. Hopefully patient will be able to tolerate today.    Will contact patient later today for update.      Missy Patricia RN  Endocrine Care Coordinator  [facility]

## 2023-01-26 NOTE — CONFIDENTIAL NOTE
Writer confirmed via Epic that patient did have injection today at 1:00pm. Writer again reached out to patient. No answer. Left voicemail for patient to contact clinic.    Writer will request Endocrine clinic staff contact patient tomorrow to confirm she tolerated drops and will take 3rd dose before scan tomorrow 1/27/23.    Missy Patricia, RN  Endocrine Care Coordinator  [facility]

## 2023-01-27 ENCOUNTER — HOSPITAL ENCOUNTER (OUTPATIENT)
Dept: NUCLEAR MEDICINE | Facility: CLINIC | Age: 48
Setting detail: NUCLEAR MEDICINE
Discharge: HOME | End: 2023-01-27
Attending: INTERNAL MEDICINE
Payer: COMMERCIAL

## 2023-01-27 LAB
ANNOTATION COMMENT IMP: ABNORMAL
METANEPHS SERPL-SCNC: 3.42 NMOL/L
NORMETANEPHRINE SERPL-SCNC: 2.3 NMOL/L

## 2023-01-27 NOTE — TELEPHONE ENCOUNTER
I contacted Jessica MONROY Brent with the assistance of the [facility] .    She confirmed that she is going to take her Lugols drops at noon today. Scan to follow.    She did not have any further questions for me.    Sury Capellan RN, [facility]

## 2023-01-30 ENCOUNTER — TELEPHONE (OUTPATIENT)
Dept: ENDOCRINOLOGY | Facility: CLINIC | Age: 48
End: 2023-01-30
Payer: COMMERCIAL

## 2023-01-30 DIAGNOSIS — E11.9 TYPE 2 DIABETES MELLITUS NOT AT GOAL (H): Primary | ICD-10-CM

## 2023-01-30 RX ORDER — DOXAZOSIN 1 MG/1
1 TABLET ORAL 2 TIMES DAILY
Qty: 180 TABLET | Refills: 3 | Status: SHIPPED | OUTPATIENT
Start: 2023-01-30 | End: 2023-02-15

## 2023-01-30 NOTE — PROGRESS NOTES
Missy, please call the patient and tell her that the MIBG scan revealed that both adrenal glands have tumors which are over secreting the hormone that causes the high blood pressure.  I placed a referral for her to see Dr. Caceres.  Meantime, we should try to schedule her with a genetic counselor.     Here is the plan regarding changing her antihypertensive regimen prior to bilateral adrenalectomy:   Start doxazosin at 1 mg twice daily for 3 days, then increase the 2 mg twice daily. The prescription is for 1 mg. She should take the first dose at bedtime. Dizziness is common with this medication.   Continue the carvedilol, kerendia and the amlodipine   Discontinue the losartan     Encourage her to drink at least 8 glasses of water daily   We should try to schedule her in the clinic for a BP/orthostasis check weekly. Maybe at the end of this week?

## 2023-01-30 NOTE — CONFIDENTIAL NOTE
Writer received the following message from Dr. Vanessa:      Missy, please call the patient and tell her that the MIBG scan revealed that both adrenal glands have tumors which are over secreting the hormone that causes the high blood pressure.  I placed a referral for her to see Dr. Caceres.  Meantime, we should try to schedule her with a genetic counselor.      Here is the plan regarding changing her antihypertensive regimen prior to bilateral adrenalectomy:     Start doxazosin at 1 mg twice daily for 3 days, then increase the 2 mg twice daily. The prescription is for 1 mg. She should take the first dose at bedtime. Dizziness is common with this medication.   Continue the carvedilol, kerendia and the amlodipine   Discontinue the losartan      Encourage her to drink at least 8 glasses of water daily   We should try to schedule her in the clinic for a BP/orthostasis check weekly. Maybe at the end of this week?           Writer attempted to contact patient. No answer. Writer will try patient back tomorrow.       Missy Patricia RN  Endocrine Care Coordinator  [facility]

## 2023-01-31 ENCOUNTER — TELEPHONE (OUTPATIENT)
Dept: ENDOCRINOLOGY | Facility: CLINIC | Age: 48
End: 2023-01-31
Payer: COMMERCIAL

## 2023-01-31 RX ORDER — FLASH GLUCOSE SENSOR
KIT MISCELLANEOUS
Qty: 6 EACH | Refills: 11 | Status: SHIPPED | OUTPATIENT
Start: 2023-01-31 | End: 2023-06-28

## 2023-01-31 NOTE — CONFIDENTIAL NOTE
Writer contacted Lola with [facility] to review further. Writer explained the importance of genetic testing. Writer also explained that currently the genetic counselor appointment is in March but we hope to get that moved up. Patient also received counseling on the genetic lab from Dr. Vanessa in clinic on 1/24/23. Sammi notes that all that is need then is a brief letter from Dr. Vanessa stating how this test will impact the future management or treatment of patient's disease. They need to know how it will help her make decisions for patient's care.    Writer will send to Dr. Vanessa to review. Lola is asking this be faxed within the next 24 hours.     Writer will text page Dr. Vanessa and send via in-basket.       Missy Patricia RN  Endocrine Care Coordinator  [facility]

## 2023-01-31 NOTE — LETTER
Patient:  Jessica Kennedy  :   1975  MRN:     [id number]        Ms.Muhubo PORFIRIO Kennedy  [address]    [facility] [address]        2023    Dear ,    To whom it may concern,     I am writing this letter in support of my patient, Jessica Clark.     The patient was diagnosed with bilateral pheochromocytoma and she is scheduled to undergo bilateral adrenalectomy in the near future.  Approximately 80% of patients with bilateral pheochromocytoma might carry a germline mutation.  I think this would be important for us to know this prior to surgery as it might guide us on whether or not to pursue subtotal adrenalectomy.  It is also important for patient's children, as Jessica has 8 biological children. Genetic disease like MEN 2A, VHL, MEN 2B and NF1 have been associated with bilateral pheochromocytoma.  By themselves, these diseases have significant comorbidities and mortality and her children would benefit from early screening.     Please let me know if you have questions.    Sincerely,    Deloris Vanessa MD

## 2023-01-31 NOTE — CONFIDENTIAL NOTE
Dr. Vanessa wrote letter. See letter tab 1/31/23.    Letter faxed to [phone].    Lola with  Care updated.       Missy Patricia, RN  Endocrine Care Coordinator  [facility]

## 2023-01-31 NOTE — TELEPHONE ENCOUNTER
[facility] Call Center    Phone Message    May a detailed message be left on voicemail: yes     Reason for Call: Other: Sammi ashton [facility] coordination calling. states they are working on authorization for patients genetic testing for patients paraganglioma diagnosis and she is not meeting criteria for federal but they are hoing that she can meet criteria for state . The information they are looking for is whether the patient has had genetic counciling and that documentation and a brief description on how this will influence her future treatment and prognosis. please advise Lola at [phone] fax: [phone] this is needed within the next 24 hrs      Action Taken: Other: endo    Travel Screening: Not Applicable

## 2023-01-31 NOTE — CONFIDENTIAL NOTE
Writer again attempted to contact patient on 3 way call with .       Writer reviewed in detail recommendations from Dr. Vanessa. Patient verbalizes understanding and repeated back instructions. Patient also wrote down instructions in Malaysian. Patient will stop Losartan 50 mg daily and continue Kerendia 20 mg daily, amlodipine 10 mg daily and Carvedilol 25 mg twice daily. Patient will also now add doxazosin 1 mg twice daily for 3 days and then 2 mg twice daily thereafter. Patient confirms she will take that first dose at bedtime. Patient also instructed to drink at least 8 glasses of water daily.     Scheduled patient for nurse visit for this Friday.     Patient is also asking Dr. Vanessa to send in a 14 day Jabari sensor refill. Patient states that she is out of refills.       Writer will send request to Dr. Vanessa to sign Jabari Rx refill.     Total length call 32 minutes.       Missy Patricia RN  Endocrine Care Coordinator  [facility]

## 2023-02-03 ENCOUNTER — DOCUMENTATION ONLY (OUTPATIENT)
Dept: LAB | Facility: CLINIC | Age: 48
End: 2023-02-03

## 2023-02-03 ENCOUNTER — TELEPHONE (OUTPATIENT)
Dept: ENDOCRINOLOGY | Facility: CLINIC | Age: 48
End: 2023-02-03

## 2023-02-03 NOTE — TELEPHONE ENCOUNTER
Patient calling in stating that she cannot come to the visit today to have her blood pressure checked. Writer called back on 3 way call with . Patient states that she does not have a car today and cannot come to nurse visit. Patient also states that she was not able to  her new blood pressure medication but is having her neighbor bring her today.    Please refer back to 1/30/23 telephone encounter. Patient was to start doxazosin at 1 mg twice daily for 3 days, then increase the 2 mg twice daily. She was to take the first dose at bedtime and continue the carvedilol, kerendia and the amlodipine but discontinue the losartan. Patient reports that she does take her blood pressure in the morning and the evening. Patient reports that her blood pressure has been running between 140-150/90's.     Patient states that she did stop the Losartan but will not start the doxazosin until tonight. Re-reviewed with patient in detail that plan is to take doxazosin at 1 mg twice daily for 3 days, then increase the 2 mg twice daily. Patient verbalizes understanding and agrees to plan. Writer provided patient with on call provider phone number for after hour or weekend concerns.     Writer will check back in with patient on Monday. Will update Dr. Vanessa.         Missy Patricia, RN  Endocrine Care Coordinator  [facility]

## 2023-02-03 NOTE — PROGRESS NOTES
Jessica Kennedy has an upcoming lab appointment:    Future Appointments   Date Time Provider Department Center   2/3/2023 11:00 AM MG ENDO NURSE [facility] [place]   2/6/2023  1:10 PM LAB FIRST FLOOR [facility] [place]   2/9/2023  9:00 AM Shannan Rosa [facility]   2/15/2023  7:30 AM Domenico Caceres MD [facility]     Patient is scheduled for the following lab(s): Dr. Vanessa    There is no order available. Please review and place either future orders or HMPO (Review of Health Maintenance Protocol Orders), as appropriate.    Health Maintenance Due   Topic     ANNUAL REVIEW OF HM ORDERS      HIV SCREENING      Martha Moreno

## 2023-02-06 ENCOUNTER — TELEPHONE (OUTPATIENT)
Dept: ENDOCRINOLOGY | Facility: CLINIC | Age: 48
End: 2023-02-06

## 2023-02-06 NOTE — LETTER
Patient:  Jessica Kennedy  :   1975  MRN:     [id number]        Ms.Muhubo PORFIRIO Kennedy  [address]    [place] [address]        2023    To whom it may concern,     I am treating Jessica Clark for bilateral pheochromocytoma.  The patient has severe hypertension, difficult to control, and she is in need of doxazosin, at a dose much higher than the regular dose prescribed for essential hypertension.  Also, in order to gradually titrate the dose up, she might need prescriptions of different strengths.   Patient with pheochromocytoma might need dosages of doxazosin as high as 56 mg daily in order to control the high blood pressure.  Please see below reference article: J Clin Endocrinol Metab 105: 9719-7311, 2020.    I am hoping that the insurance is going to provide coverage for doxazosin at the recommended dosages, in order for the patient to safely pursue pheochromocytoma surgery.    Please let me know if you have questions.    Sincerely,    Deloris Vanessa MD

## 2023-02-06 NOTE — CONFIDENTIAL NOTE
Please note there is now a prior authorization encounter for doxazosin.     Writer attempted to contact patient on 2 way call to review. Patient reports that she is not able to get the medication. Writer explained that writer will work with prior authorization team to get approval. Patient verbalizes understanding. Patient reports that her blood pressure today was 140/93 with heart rate of 82.    Will update Dr. Vanessa.       Missy Patricia, RN  Endocrine Care Coordinator  [facility]

## 2023-02-06 NOTE — TELEPHONE ENCOUNTER
Central Prior Authorization Team   Phone: [phone]    PA Initiation    Medication: doxazosin (CARDURA) 1 MG tablet  Insurance Company: [employer] - Phone [phone] Fax [phone]  Pharmacy Filling the Rx: [id number] [place], [place] - [address] AT [facility]  Filling Pharmacy Phone: [phone]  Filling Pharmacy Fax: [phone]  Start Date: 2/6/2023             Please note in pt chart this patient's psychiatric care is being discharged from me and returned to their primary care provider. Thanks!

## 2023-02-07 NOTE — TELEPHONE ENCOUNTER
PRIOR AUTHORIZATION DENIED    Medication: doxazosin (CARDURA) 1 MG tablet-PA DENIED     Denial Date: 2/6/2023    Denial Rational:         Appeal Information:

## 2023-02-08 NOTE — TELEPHONE ENCOUNTER
[facility] Call Center    Phone Message    May a detailed message be left on voicemail: yes     Reason for Call: Other: Nirali Pharm D calling from [facility] about the appeal for the doxazosin. She needs to talk the care team about the medication. Please call her back to discuss further.     Action Taken: Message routed to:  [facility] Endo    Travel Screening: Not Applicable

## 2023-02-08 NOTE — TELEPHONE ENCOUNTER
Medication Appeal Initiation    We have initiated an appeal for the requested medication:  Medication: doxazosin (CARDURA) 1 MG tablet-PA DENIED , INITIATE PA APPEAL   Appeal Start Date:  2/8/2023  Insurance Company: [employer] - Phone [phone] Fax [phone]  Comments:       Initiate Urgent PA Appeal via fax [phone] with Letter of Medical Necessity (dated 2/7/2023) attached with Request. Awaiting on a response on Appeal.

## 2023-02-08 NOTE — CONFIDENTIAL NOTE
Writer contacted representative to discuss further.  Care representative noted that they figured it out and they will stefani of for quantity limit to all doses up to 56 mg daily.       Writer called patient on 3 way call with  to give update on appeal approval. Patient updated. Also re-reviewed with patient dosing instructions 1 mg twice daily for 3 days, then increase the 2 mg twice daily. Patient will take first dose in evening as recommended by Dr. Vanessa in case of dizziness.       Writer also contacted pharmacy and prescription went through at $0 copay.      Will update Dr. Vanessa.      Missy Patricia, RN  Endocrine Care Coordinator  [facility]

## 2023-02-09 ENCOUNTER — VIRTUAL VISIT (OUTPATIENT)
Dept: ONCOLOGY | Facility: CLINIC | Age: 48
End: 2023-02-09
Attending: INTERNAL MEDICINE
Payer: COMMERCIAL

## 2023-02-09 DIAGNOSIS — D35.00 PHEOCHROMOCYTOMA, UNSPECIFIED LATERALITY: Primary | ICD-10-CM

## 2023-02-09 PROCEDURE — 96040 HC GENETIC COUNSELING, EACH 30 MINUTES: CPT | Mod: TEL,95 | Performed by: GENETIC COUNSELOR, MS

## 2023-02-09 NOTE — CONFIDENTIAL NOTE
Per Dr. Vanessa, would like patient to come in for blood pressure check Friday if possible.       Writer contacted patient to review. Scheduled patient for blood pressure check tomorrow, 2/20/23 at 11:15am. Patient confirms she was able to get the doxazosin and started it last night. Patient has not checked her blood pressure today.       Missy Patricia RN  Endocrine Care Coordinator  [facility]

## 2023-02-09 NOTE — PROGRESS NOTES
"Jessica is a 48 year old who is being evaluated via a billable telephone visit.      Distant Location (provider location):  Off-site    Phone call duration: 39 minutes    2/9/2023    Referring Provider: Deloris Vanessa MD    Presenting Information:   I spoke with Jessica Kennedy via  over the phone today for genetic counseling to discuss her personal and family history of cancer. With her permission, this appointment was conducted virtually due to COVID-19 precautions. We talked today to review this history, cancer screening recommendations, and available genetic testing options.    Personal History:  Jessica is a 48 year old female. She was seen in endocrinology by Dr. Vanessa on 1/4/23 due to bilateral adrenal masses in the context of elevated plasma metanephrines. She has a history of hypertension since 2017 and reported \"signs and symptoms suggestive of pheochromocytoma: Heat intolerance, tachycardia, headaches, constipation.\" A NM I 123 MIBG scan on 1/26/23 showed \"uptake in the bilateral adrenal nodules consistent with pheochromocytoma.\" She is planning for bilateral adrenalectomy.     Genetic testing via the paraganglioma/pheochromocytoma panel has been requested by Dr. Vanessa.     Jessica has clinical breast exams and mammograms; her most recent mammogram on 8/31/20 was negative. Jessica has not had a colonoscopy.     Family History: (Please see scanned pedigree for detailed family history information)  Children:    She has five daughters and three sons (ages 10-24), all with no known history of tumors or cancer.  Siblings:    She has one full sister, five half-sisters, and four half-brothers (some half-siblings through mother and some through father). She reports that one of her sisters has had a tumor removed from her uterus that was not a cancer. She is not aware of any siblings with high blood pressure.  Maternal:    Her mother was diagnosed with a cancer at age 58. Jessica is not " "sure what type it was, but it was in her stomach. She passed away at age 63.    She is not aware of any other tumors or cancers in her maternal relatives.     Her grandmother passed away after being \"bitten by something,\" and her grandfather passed away due to an unknown cause.  Paternal:    Her father passed away at a young age due to a heart attack while Jessica's mother was pregnant with her.    She is not aware of any tumors or cancers in her paternal relatives, although she does report that her father's siblings have high blood pressure.    Her grandfather also had high blood pressure and was \"very forgetful.\"     Her grandmother passed away last year at age 110 and was very healthy.    Her maternal and paternal ethnicity is [place].     Discussion:    We reviewed the features of sporadic, familial, and hereditary cancers. In looking at Jessica's family history, it is possible that a cancer susceptibility gene is present due to her recent diagnosis of bilateral adrenal nodules consistent with pheochromocytoma.   We discussed the natural history and genetics of hereditary cancer. We discussed the genetic testing via the paraganglioma/pheochromocytoma panel that has been requested by Dr. Vanessa.  Pheochromocytomas (PCC) and paragangliomas (PGLs) are endocrine tumors that arise from the nerves and tissues associated with the autonomic nervous system, which controls involuntary processes in our body such as heart rate, breathing, and digestion. PCCs form on the adrenal gland, on top of the kidney. PGLs arise in other locations of the body, such as the head, neck, chest, and abdomen.   Paragangliomas and pheochromocytomas are thought to have a hereditary component in at least one third of cases. Hereditary Paraganglioma and Pheochromocytoma syndrome is often caused by mutations in the SDHA, SDHAF2, SDHB, SDHC, or SDHD genes. There are also additional genes that could cause increased risk for developing " paragangliomas. As many of these genes present with overlapping features in a family and accurate cancer risk cannot always be established based upon the pedigree analysis alone, it is reasonable for Jessica to have panel genetic testing to analyze multiple genes at once.    A handout regarding hereditary cancer and the information we discussed will be provided to Jessica along with this letter and can be found in the after visit summary.      Based on her personal and family history, Jessica meets current National Comprehensive Cancer Network (NCCN) criteria for genetic testing of Hereditary Endocrine Neoplasia Syndromes.      We reviewed that if Jessica is found to have an inherited gene mutation that has caused her bilateral pheochromocytomas, then her children, siblings, and other relatives may also have the same mutation and could also be at risk to develop these tumors. We discussed that if a mutation is identified by her testing, then genetic testing for her family members will also be recommended.     Medical Management: For Jessica, we reviewed that the information from genetic testing may determine:    surgical approach to treat Jessica's bilateral pheochromocytomas (per Dr. Vanessa, it might guide us on whether or not to pursue subtotal adrenalectomy),    additional tumor/cancer screening for which Jessica may qualify (i.e. biochemical evaluation, imaging, etc.),    These recommendations will be discussed in detail once genetic testing is completed.     Plan:  1) Genetic testing via the paraganglioma/pheochromocytoma panel offered by the [facility] at [facility] has already been requested by Dr. Vanessa. No additional genetic testing was ordered today.  2) Results should be available in 4-6 weeks.  3) Dr. Vanessa will review results with Jessica when available. I am also happy to discuss her results with her when available.     Shannan Rosa MS, [facility]  Licensed, Certified Genetic  Counselor  Office: [phone]  Email: [email or web address]

## 2023-02-09 NOTE — LETTER
"    Cancer Risk Management  Program Locations    [facility]  [facility]  Mailing Address  Cancer Risk Management Program  [facility]  [address]  [place] [address]    New patient appointments  [phone]  February 13, 2023    Jessica Kennedy  [address]    [place] [address]      Dear Jessica,    It was a pleasure speaking with you over the phone for genetic counseling on 2/9/2023. Here is a copy of the progress note from our discussion. If you have any additional questions, please feel free to call.     Referring Provider: Deloris Vanessa MD    Presenting Information:   I spoke with Jessica Kennedy via  over the phone today for genetic counseling to discuss her personal and family history of cancer. With her permission, this appointment was conducted virtually due to COVID-19 precautions. We talked today to review this history, cancer screening recommendations, and available genetic testing options.    Personal History:  Jessica is a 48 year old female. She was seen in endocrinology by Dr. Vanessa on 1/4/23 due to bilateral adrenal masses in the context of elevated plasma metanephrines. She has a history of hypertension since 2017 and reported \"signs and symptoms suggestive of pheochromocytoma: Heat intolerance, tachycardia, headaches, constipation.\" A NM I 123 MIBG scan on 1/26/23 showed \"uptake in the bilateral adrenal nodules consistent with pheochromocytoma.\" She is planning for bilateral adrenalectomy.     Genetic testing via the paraganglioma/pheochromocytoma panel has been requested by Dr. Vanessa.     Jessica has clinical breast exams and mammograms; her most recent mammogram on 8/31/20 was negative. Jessica has not had a colonoscopy.     Family History: (Please see scanned " "pedigree for detailed family history information)  Children:    She has five daughters and three sons (ages 10-24), all with no known history of tumors or cancer.  Siblings:    She has one full sister, five half-sisters, and four half-brothers (some half-siblings through mother and some through father). She reports that one of her sisters has had a tumor removed from her uterus that was not a cancer. She is not aware of any siblings with high blood pressure.  Maternal:    Her mother was diagnosed with a cancer at age 58. Jessica is not sure what type it was, but it was in her stomach. She passed away at age 63.    She is not aware of any other tumors or cancers in her maternal relatives.     Her grandmother passed away after being \"bitten by something,\" and her grandfather passed away due to an unknown cause.  Paternal:    Her father passed away at a young age due to a heart attack while Jessica's mother was pregnant with her.    She is not aware of any tumors or cancers in her paternal relatives, although she does report that her father's siblings have high blood pressure.    Her grandfather also had high blood pressure and was \"very forgetful.\"     Her grandmother passed away last year at age 110 and was very healthy.    Her maternal and paternal ethnicity is [place].     Discussion:    We reviewed the features of sporadic, familial, and hereditary cancers. In looking at Jessica's family history, it is possible that a cancer susceptibility gene is present due to her recent diagnosis of bilateral adrenal nodules consistent with pheochromocytoma.   We discussed the natural history and genetics of hereditary cancer. We discussed the genetic testing via the paraganglioma/pheochromocytoma panel that has been requested by Dr. Vanessa.  Pheochromocytomas (PCC) and paragangliomas (PGLs) are endocrine tumors that arise from the nerves and tissues associated with the autonomic nervous system, which controls involuntary " processes in our body such as heart rate, breathing, and digestion. PCCs form on the adrenal gland, on top of the kidney. PGLs arise in other locations of the body, such as the head, neck, chest, and abdomen.   Paragangliomas and pheochromocytomas are thought to have a hereditary component in at least one third of cases. Hereditary Paraganglioma and Pheochromocytoma syndrome is often caused by mutations in the SDHA, SDHAF2, SDHB, SDHC, or SDHD genes. There are also additional genes that could cause increased risk for developing paragangliomas. As many of these genes present with overlapping features in a family and accurate cancer risk cannot always be established based upon the pedigree analysis alone, it is reasonable for Jessica to have panel genetic testing to analyze multiple genes at once.    A handout regarding hereditary cancer and the information we discussed will be provided to Jessica along with this letter and can be found in the after visit summary.      Based on her personal and family history, Jessica meets current National Comprehensive Cancer Network (NCCN) criteria for genetic testing of Hereditary Endocrine Neoplasia Syndromes.      We reviewed that if Jessica is found to have an inherited gene mutation that has caused her bilateral pheochromocytomas, then her children, siblings, and other relatives may also have the same mutation and could also be at risk to develop these tumors. We discussed that if a mutation is identified by her testing, then genetic testing for her family members will also be recommended.     Medical Management: For Jessica, we reviewed that the information from genetic testing may determine:    surgical approach to treat Jessica's bilateral pheochromocytomas (per Dr. Vanessa, it might guide us on whether or not to pursue subtotal adrenalectomy),    additional tumor/cancer screening for which Jessica may qualify (i.e. biochemical evaluation, imaging, etc.),    These  recommendations will be discussed in detail once genetic testing is completed.     Plan:  1) Genetic testing via the paraganglioma/pheochromocytoma panel offered by the [facility] at [facility] has already been requested by Dr. Vanessa. No additional genetic testing was ordered today.  2) Results should be available in 4-6 weeks.  3) Dr. Vanessa will review results with Jessica when available. I am also happy to discuss her results with her when available.     Shannan Rosa MS, [facility]  Licensed, Certified Genetic Counselor  Office: [phone]  Email: [email or web address]

## 2023-02-13 NOTE — TELEPHONE ENCOUNTER
REFERRAL INFORMATION:    Referring Provider:  Deloris Vanessa MD    Referring Clinic:  [facility]    Reason for Visit/Diagnosis: Adrenal tumor // per pt // recs in Epic       FUTURE VISIT INFORMATION:    Appointment Date: 2/15/23    Appointment Time: 7:30AM     NOTES RECORD STATUS  DETAILS   OFFICE NOTE from Referring Provider Internal 1/4/23 OV  from Deloris Vanessa MD   IMAGING (CT, MRI, US, XR)  Internal  Received  1/26/23 NM I 123 MIBG     NM : 12/8/22 CT ABD  [facility]: 4/13/22 CT ABD

## 2023-02-13 NOTE — PATIENT INSTRUCTIONS
Assessing Cancer Risk  Only about 5-10% of cancers are thought to be due to an inherited cancer susceptibility gene.    These families often have:  Several people with the same or related types of cancer  Cancers diagnosed at a young age (before age 50)  Individuals with more than one primary cancer  Multiple generations of the family affected with cancer    Genetic Testing  Genetic testing involves a simple blood test and will look at the genetic information in select genes for any harmful mutations that are associated with increased cancer risk.  If possible, it is recommended that the person(s) who has had cancer be tested before other family members.  That person will give us the most useful information about whether or not a specific gene is associated with the cancer in the family.     Results  There are three possible results of genetic testing:  Positive--a harmful mutation was identified  Negative--no mutation was identified  Variant of unknown significance--a variation in one of the genes was identified, but it is unclear how this impacts cancer risk in the family    Advantages and Disadvantages  There are advantages and disadvantages to genetic testing of these genes.    Advantages  May clarify your cancer risk  Can help you make medical decisions  May explain the cancers in your family  May give useful information to your family members (if you share your results)    Disadvantages  Possible negative emotional impact of learning about inherited cancer risk  Uncertainty in interpreting a negative test result in some situations  Possible genetic discrimination concerns (see below)    Inheritance   Mutations in most cancer risk genes are inherited in an autosomal dominant pattern.  This means that if a parent has a mutation, each of his or her children will have a 50% chance of inheriting that same mutation.  Therefore, each child--male or female--would have a 50% chance of being at increased risk for  developing cancer.                                              Image obtained from Genetics Home Reference, 2013     Genetic Information Nondiscrimination Act (JC)  CJ is a federal law that protects individuals from health insurance or employment discrimination based on a genetic test result alone.  Although rare, there are currently no legal protections in terms of life insurance, long term care, or disability insurances.  Visit the National Human Genome Research [place] at Genome.gov/67215373 to learn more.    Reducing Cancer Risk  If a harmful mutation is found in a cancer risk gene, there may be certain screens or preventative surgeries that can be offered. This information will be discussed after genetic testing is completed. If no mutations are found on genetic testing, screening is then recommended based on personal and/or family history of cancer.     Questions to Think About Regarding Genetic Testing  What effect will the test result have on me and my relationship with my family members if I have an inherited gene mutation?  If I don t have a gene mutation?  Should I share my test results, and how will my family react to this news, which may also affect them?  Are my children ready to learn new information that may one day affect their own health?    Resources  American Cancer Society (ACS) cancer.org   National Cancer [place] (NCI) cancer.gov     Please call us if you have any questions or concerns.   Cancer Risk Management Program [phone] ([phone])  Riki Baum, MS [facility]  [phone]  Radha Zayas, MS, [facility] [phone]  Coreen Nathan, MS, [facility]  [phone]  Patricia Washington, MS, [facility]  [phone]  Shannan Rosa, MS, [facility]  [phone]  Lupe Simmons, MS, [facility] [phone]  Abbey Galeas, MS, [facility] [phone]

## 2023-02-14 ENCOUNTER — OFFICE VISIT (OUTPATIENT)
Dept: ENDOCRINOLOGY | Facility: CLINIC | Age: 48
End: 2023-02-14
Payer: COMMERCIAL

## 2023-02-14 VITALS — SYSTOLIC BLOOD PRESSURE: 137 MMHG | HEART RATE: 79 BPM | DIASTOLIC BLOOD PRESSURE: 90 MMHG

## 2023-02-14 DIAGNOSIS — R03.0 ELEVATED BLOOD PRESSURE READING: Primary | ICD-10-CM

## 2023-02-14 NOTE — PROGRESS NOTES
Orthostatic blood Pressure readings.     Per Dr. Vanessa instructions:  BP readings    5 Minutes sittin/94 p 82  2 minute stand: 156/100 p. 88  3 minute layin/90 p 79    Reyna Kelly on 2023 at 10:24 AM

## 2023-02-15 ENCOUNTER — TELEPHONE (OUTPATIENT)
Dept: ENDOCRINOLOGY | Facility: CLINIC | Age: 48
End: 2023-02-15

## 2023-02-15 ENCOUNTER — OFFICE VISIT (OUTPATIENT)
Dept: SURGERY | Facility: CLINIC | Age: 48
End: 2023-02-15
Attending: INTERNAL MEDICINE
Payer: COMMERCIAL

## 2023-02-15 ENCOUNTER — PRE VISIT (OUTPATIENT)
Dept: SURGERY | Facility: CLINIC | Age: 48
End: 2023-02-15

## 2023-02-15 VITALS
SYSTOLIC BLOOD PRESSURE: 143 MMHG | WEIGHT: 205.4 LBS | BODY MASS INDEX: 30.42 KG/M2 | HEIGHT: 69 IN | DIASTOLIC BLOOD PRESSURE: 96 MMHG | OXYGEN SATURATION: 100 % | HEART RATE: 90 BPM

## 2023-02-15 DIAGNOSIS — D35.00 PHEOCHROMOCYTOMA, UNSPECIFIED LATERALITY: Primary | ICD-10-CM

## 2023-02-15 DIAGNOSIS — D35.00 PHEOCHROMOCYTOMA, UNSPECIFIED LATERALITY: ICD-10-CM

## 2023-02-15 PROCEDURE — 99204 OFFICE O/P NEW MOD 45 MIN: CPT | Performed by: SURGERY

## 2023-02-15 RX ORDER — CEFAZOLIN SODIUM 2 G/50ML
2 SOLUTION INTRAVENOUS
Status: CANCELLED | OUTPATIENT
Start: 2023-02-15

## 2023-02-15 RX ORDER — DOXAZOSIN 2 MG/1
4 TABLET ORAL 2 TIMES DAILY
Qty: 360 TABLET | Refills: 3 | Status: SHIPPED | OUTPATIENT
Start: 2023-02-15 | End: 2023-02-22

## 2023-02-15 RX ORDER — CEFAZOLIN SODIUM 2 G/50ML
2 SOLUTION INTRAVENOUS SEE ADMIN INSTRUCTIONS
Status: CANCELLED | OUTPATIENT
Start: 2023-02-15

## 2023-02-15 ASSESSMENT — PAIN SCALES - GENERAL: PAINLEVEL: EXTREME PAIN (8)

## 2023-02-15 NOTE — PROGRESS NOTES
This is a 48-year-old female referred by  for consideration of  bilateral cortical sparing adrenalectomies.  The patient is a 48-year-old female from [facility] whom I interviewed and examined with the aid of an online  as well as my nurse.  The patient has a history of difficult to control hypertension since 2017.  As part of her work-up she has been noted to have elevated plasma free metanephrines.  CT scan of the abdomen is consistent with bilateral adrenal masses and she has had an NM I-123 MIBG scan on January 27, 2023 demonstrating a right 2.4 x 1.8 cm adrenal lesion and a 3.5 x 2.45 centimeters lesion on the left uptake in the bilateral adrenal nodules is consistent with a pheochromocytoma.  On CT imaging the Hounsfield units were 30/45.  Plasma metanephrines most recently were  Elevated: 3.42 nmol/L (metanephrines) and 2.30 nmol/L (normetanephrine).  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  The patient has been started on alpha blockade 1 mg of prazosin twice a day for 3 days.  Her most recent blood pressures still remain elevated and are under active management and surveillance by the endocrine team.  Her past medical history significant for type 2 diabetes and dyslipidemia as well as:  Past Medical History:   Diagnosis Date     Hypertension      Past Surgical History:   Procedure Laterality Date     HEAD & NECK SURGERY       ORTHOPEDIC SURGERY         Current Outpatient Medications:      acetaminophen (TYLENOL) 500 MG tablet, Take 1-2 tablets (500-1,000 mg) by mouth every 6 hours as needed for mild pain, Disp: 1 Bottle, Rfl: 0     amLODIPine (NORVASC) 10 MG tablet, Take 1 tablet (10 mg) by mouth At Bedtime, Disp: 30 tablet, Rfl: 0     aspirin (ASA) 81 MG chewable tablet, Take 81 mg by mouth daily CHEW AND SWALLOW, Disp: , Rfl:      azelastine (OPTIVAR) 0.05 % ophthalmic solution, Apply 1 drop to eye 2 times daily, Disp: , Rfl:       "Benzocaine-Menthol 10-2.1 MG LOZG, Take 1 lozenge by mouth 4 times daily as needed (Cough or sore throat), Disp: 84 lozenge, Rfl: 0     carvedilol (COREG) 25 MG tablet, Take 1 tablet (25 mg) by mouth 2 times daily (with meals), Disp: 60 tablet, Rfl: 0     Continuous Blood Gluc Sensor (FREESTYLE ABHIJIT 14 DAY SENSOR) MISC, Change every 14 days., Disp: 6 each, Rfl: 11     doxazosin (CARDURA) 1 MG tablet, Take 1 tablet (1 mg) by mouth 2 times daily, Disp: 180 tablet, Rfl: 3     FARXIGA 10 MG TABS tablet, Take 1 tablet by mouth daily at 2 pm, Disp: , Rfl:      Finerenone (KERENDIA) 20 MG TABS, Take 20 mg by mouth daily, Disp: 30 tablet, Rfl: 0     ibuprofen (ADVIL/MOTRIN) 200 MG tablet, Take 3 tablets (600 mg) by mouth every 6 hours as needed for mild pain, Disp: 30 tablet, Rfl: 0     metFORMIN (GLUCOPHAGE) 1000 MG tablet, Take 1,000 mg by mouth 2 times daily (with meals), Disp: , Rfl:      salicylic acid (COMPOUND W MAX STRENGTH) 17 % external gel, Apply topically daily Apply to wart once a day and cover with a bandage.  Use daily for the next two weeks., Disp: 1.25 g, Rfl: 0     BP (!) 143/96 (BP Location: Left arm, Patient Position: Sitting, Cuff Size: Adult Large)   Pulse 90   Ht 1.753 m (5' 9\")   Wt 93.2 kg (205 lb 6.4 oz)   SpO2 100%   BMI 30.33 kg/m      On physical examination the patient has mixed habitus obesity.  I examined her back for access for the posterior approach for adrenalectomy.  There appears to be an adequate space at the level of the 12th rib.  Abdomen is soft and was not deeply palpated.  No scars noted    Assessment/plan: As we await the results of the genetic testing plan will be to proceed with bilateral cortical sparing adrenalectomy as a possible.  Preferable approach will be posterior which we will attempt first should the access be challenging then we will do the right side followed by the left side.  The patient understands our intent to try and preserve cortex.  She is not quite " ready for surgery and will work with my office and endocrine to schedule.  We discussed in the presence of  the procedure and its details.

## 2023-02-15 NOTE — NURSING NOTE
"Chief Complaint   Patient presents with     New Patient     Adrenal tumor       Vitals:    02/15/23 0737   BP: (!) 143/96   BP Location: Left arm   Patient Position: Sitting   Cuff Size: Adult Large   Pulse: 90   SpO2: 100%   Weight: 93.2 kg (205 lb 6.4 oz)   Height: 1.753 m (5' 9\")       Body mass index is 30.33 kg/m .                          Rd Sanderson, EMT    "

## 2023-02-15 NOTE — Clinical Note
Dr. Pastor rescue thank you so much for sending this patient.  Her body mass index and habitus may make the posterior approach somewhat of a challenge but I think is still the preferable way to move forward with the bilateral cortical sparing adrenalectomy's.  If not we will do this laterally on 1 side followed by the other at the same setting.  The patient understands this plan and understands the need to have adequate alpha blockade prior to moving forward.  Do think the genetic testing will be important to have back before proceeding with surgery?  Amos: Lets start with posterior bilateral cortical sparing adrenalectomy's we will need 6 hours.  I also will need to perform an intraoperative ultrasound in order to delineate the extent of the pheochromocytoma to preserve the maximal amount of cortex.

## 2023-02-15 NOTE — Clinical Note
Please work with 's nurse to coordinate the timing for surgery (sorry for the previously misspelling)

## 2023-02-15 NOTE — LETTER
2/15/2023       RE: Jessica Kennedy  [address]  [address]  [facility] [place] [address]     Dear Colleague,    Thank you for referring your patient, Jessica Kennedy, to the [facility]. Please see a copy of my visit note below.    This is a 48-year-old female referred by  for consideration of  bilateral cortical sparing adrenalectomies.  The patient is a 48-year-old female from [facility] whom I interviewed and examined with the aid of an online  as well as my nurse.  The patient has a history of difficult to control hypertension since 2017.  As part of her work-up she has been noted to have elevated plasma free metanephrines.  CT scan of the abdomen is consistent with bilateral adrenal masses and she has had an NM I-123 MIBG scan on January 27, 2023 demonstrating a right 2.4 x 1.8 cm adrenal lesion and a 3.5 x 2.45 centimeters lesion on the left uptake in the bilateral adrenal nodules is consistent with a pheochromocytoma.  On CT imaging the Hounsfield units were 30/45.  Plasma metanephrines most recently were  Elevated: 3.42 nmol/L (metanephrines) and 2.30 nmol/L (normetanephrine).  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  The patient has been started on alpha blockade 1 mg of prazosin twice a day for 3 days.  Her most recent blood pressures still remain elevated and are under active management and surveillance by the endocrine team.  Her past medical history significant for type 2 diabetes and dyslipidemia as well as:  Past Medical History:   Diagnosis Date     Hypertension      Past Surgical History:   Procedure Laterality Date     HEAD & NECK SURGERY       ORTHOPEDIC SURGERY         Current Outpatient Medications:      acetaminophen (TYLENOL) 500 MG tablet, Take 1-2 tablets (500-1,000 mg) by mouth every 6 hours as needed for mild pain, Disp: 1  "Bottle, Rfl: 0     amLODIPine (NORVASC) 10 MG tablet, Take 1 tablet (10 mg) by mouth At Bedtime, Disp: 30 tablet, Rfl: 0     aspirin (ASA) 81 MG chewable tablet, Take 81 mg by mouth daily CHEW AND SWALLOW, Disp: , Rfl:      azelastine (OPTIVAR) 0.05 % ophthalmic solution, Apply 1 drop to eye 2 times daily, Disp: , Rfl:      Benzocaine-Menthol 10-2.1 MG LOZG, Take 1 lozenge by mouth 4 times daily as needed (Cough or sore throat), Disp: 84 lozenge, Rfl: 0     carvedilol (COREG) 25 MG tablet, Take 1 tablet (25 mg) by mouth 2 times daily (with meals), Disp: 60 tablet, Rfl: 0     Continuous Blood Gluc Sensor ([employer] ABHIJIT 14 DAY SENSOR) [facility], Change every 14 days., Disp: 6 each, Rfl: 11     doxazosin (CARDURA) 1 MG tablet, Take 1 tablet (1 mg) by mouth 2 times daily, Disp: 180 tablet, Rfl: 3     FARXIGA 10 MG TABS tablet, Take 1 tablet by mouth daily at 2 pm, Disp: , Rfl:      Finerenone (KERENDIA) 20 MG TABS, Take 20 mg by mouth daily, Disp: 30 tablet, Rfl: 0     ibuprofen (ADVIL/MOTRIN) 200 MG tablet, Take 3 tablets (600 mg) by mouth every 6 hours as needed for mild pain, Disp: 30 tablet, Rfl: 0     metFORMIN (GLUCOPHAGE) 1000 MG tablet, Take 1,000 mg by mouth 2 times daily (with meals), Disp: , Rfl:      salicylic acid (COMPOUND W MAX STRENGTH) 17 % external gel, Apply topically daily Apply to wart once a day and cover with a bandage.  Use daily for the next two weeks., Disp: 1.25 g, Rfl: 0     BP (!) 143/96 (BP Location: Left arm, Patient Position: Sitting, Cuff Size: Adult Large)   Pulse 90   Ht 1.753 m (5' 9\")   Wt 93.2 kg (205 lb 6.4 oz)   SpO2 100%   BMI 30.33 kg/m      On physical examination the patient has mixed habitus obesity.  I examined her back for access for the posterior approach for adrenalectomy.  There appears to be an adequate space at the level of the 12th rib.  Abdomen is soft and was not deeply palpated.  No scars noted    Assessment/plan: As we await the results of the genetic testing " plan will be to proceed with bilateral cortical sparing adrenalectomy as a possible.  Preferable approach will be posterior which we will attempt first should the access be challenging then we will do the right side followed by the left side.  The patient understands our intent to try and preserve cortex.  She is not quite ready for surgery and will work with my office and endocrine to schedule.  We discussed in the presence of  the procedure and its details.      Sincerely,    Domenico Caceres MD

## 2023-02-15 NOTE — TELEPHONE ENCOUNTER
Please refer to 2/14/23 nurse visit for past details.     ----- Message from Deloris Vanessa MD sent at 2/15/2023  2:35 PM CST -----  Regarding: RE: Blood Pressures  Please ask her to increase the dose of doxazocin to 4 mg BID and discontinue the amlodipine. Please ask her to contact us with the BP and pulse numbers in 3 days.   ----- Message -----  From: Ruth Patricia RN  Sent: 2/15/2023  11:23 AM CST  To: Deloris Vanessa MD  Subject: Blood Pressures                                  Hello Dr. Vanessa,    Patient was in clinic yesterday for blood pressure check. See 2/14/23 encounter. Reyna noted she had forgot to copy you on it yesterday. Patient should be up to doxazosin 2 mg twice daily.    Thanks!  Missy

## 2023-02-15 NOTE — TELEPHONE ENCOUNTER
Writer attempted to contact patient. No answer. Left voicemail for patient to contact our office.         Missy Patricia RN  Endocrine Care Coordinator  [facility]

## 2023-02-16 ENCOUNTER — TELEPHONE (OUTPATIENT)
Dept: LAB | Facility: CLINIC | Age: 48
End: 2023-02-16
Payer: COMMERCIAL

## 2023-02-16 ENCOUNTER — LAB (OUTPATIENT)
Dept: LAB | Facility: CLINIC | Age: 48
End: 2023-02-16
Payer: COMMERCIAL

## 2023-02-16 DIAGNOSIS — E31.22 MULTIPLE ENDOCRINE NEOPLASIA TYPE 2A (MEN2A) (H): Primary | ICD-10-CM

## 2023-02-16 DIAGNOSIS — D35.00 PHEOCHROMOCYTOMA, UNSPECIFIED LATERALITY: Primary | ICD-10-CM

## 2023-02-16 PROCEDURE — 81404 MOPATH PROCEDURE LEVEL 5: CPT | Performed by: INTERNAL MEDICINE

## 2023-02-16 PROCEDURE — 81479 UNLISTED MOLECULAR PATHOLOGY: CPT | Performed by: INTERNAL MEDICINE

## 2023-02-16 PROCEDURE — 81405 MOPATH PROCEDURE LEVEL 6: CPT | Performed by: INTERNAL MEDICINE

## 2023-02-16 PROCEDURE — 81408 MOPATH PROCEDURE LEVEL 9: CPT | Performed by: INTERNAL MEDICINE

## 2023-02-16 PROCEDURE — 81406 MOPATH PROCEDURE LEVEL 7: CPT | Performed by: INTERNAL MEDICINE

## 2023-02-16 PROCEDURE — G0452 MOLECULAR PATHOLOGY INTERPR: HCPCS | Mod: 26 | Performed by: PATHOLOGY

## 2023-02-16 NOTE — CONFIDENTIAL NOTE
Writer contacted patient on 3 way call with . Advised patient of recommendations from Dr. Vanessa. Patient verbalizes understanding and agrees to plan. Patient repeated back the recommendations and has no further questions at this time. Patient will  the 2 mg tablets of doxazosin to take 2 tabs twice daily (4mg BID). If patient is not able to get to pharmacy tonight, she will take four of her 1 mg tablets. Writer also explained the importance of not mixing tablets into pill bottles. All doses should stay separate. Patient verbalizes understanding and agrees to plan.     Scheduled patient for video nurse visit next week on 2/22/23 when patient is available.         Missy Patricia RN  Endocrine Care Coordinator  [facility]

## 2023-02-16 NOTE — PROGRESS NOTES
With the help of a [place] , notified Jessica that we received prior authorization approval for her genetic testing. Valid from 1/4/23 to 7/4/23.     Explained that insurance benefits may still apply, therefore, there could be an out of pocket cost.Provided her with estimated out of pocket cost for testing.    Jessica expressed understanding and stated that she wants to proceed with testing. We will call when results are available. Jessica had no further questions. [facility] Hold For Preauthorization: [[id number]] order was placed by a genetics provider.    TRISH Chavez  Genomics Billing    [facility]   [facility] Diagn[facility]  [address] [place] [id number] [phone]

## 2023-02-17 ENCOUNTER — PATIENT OUTREACH (OUTPATIENT)
Dept: CARE COORDINATION | Facility: CLINIC | Age: 48
End: 2023-02-17

## 2023-02-17 ENCOUNTER — APPOINTMENT (OUTPATIENT)
Dept: INTERPRETER SERVICES | Facility: CLINIC | Age: 48
End: 2023-02-17
Payer: COMMERCIAL

## 2023-02-17 ENCOUNTER — TELEPHONE (OUTPATIENT)
Dept: ENDOCRINOLOGY | Facility: CLINIC | Age: 48
End: 2023-02-17

## 2023-02-17 NOTE — TELEPHONE ENCOUNTER
Writer contacted patient in 3 way call with . Writer began to give patient results and  requested to add another  to the line due to difficulty with terminology.     Writer was then disconnected. Writer called back to  services and called patient again.       Patient advised of results and recommendations. Writer discussed in detail results and reasoning for labs and thyroid ultrasound. Patient verbalizes understanding. Scheduled patient for labs, ultrasound, and office visit with Dr. Vanessa next week.     Patient became tearful while speaking with writer. Patient kept stating that she is not well and she hates herself. Writer asked patient if she was having symptoms of depression or having suicidal thoughts. Patient stated that she cannot talk about this now but would confirm with writer that she is safe and will come to appointments as scheduled next week. Writer asked if she has family support with her. Patient states that she has no family support and she has 8 children who are too young to help her. She notes she has one older in college but she is not home often. Patient also noted that her  is still in [place] and his sponsorship was denied. Patient states that she does not have the means to pursue legal action and she is trying to work and take care of her children on top of these medical needs. Writer asked if patient would be willing to speak with our clinic  and/or our Behavioral Health Clinician. Patient states that she is open to writer talking to social work but states that she does not want to talk with [facility] because it is too difficult with the language barrier. Writer advised patient that we can also look into Hungarian speaking behavioral health. Writer advised patient that we will be in contact after talking with social work. Patient verbalizes understanding and agrees to plan.     Writer reached out to , Shalonda, to review  above details.  Please see Shalonda's telephone encounter from today, 2/17/23 for further details. Writer also called Dr. Vanessa and updated her on all details.         Missy Patricia RN  Endocrine Care Coordinator  [facility]

## 2023-02-17 NOTE — PROGRESS NOTES
Social Work Telephone Message Note  [facility]     Patient Name:  Jessica Kennedy  /Age:  1975 (48 year old)    Referral Source: Ruth Patricia, RN  Reason for Referral:  Resources and support     attempted to contact Patient via telephone and the support of interpretive services on 23.  Writer was contacted by [facility] regarding support needed for patient regarding assistance with her 8 children for an upcoming surgery. Patients  is in [place] and was denied sponsorship. Patient also in need of  provider, however understandably only wanting a Nigerien speaking provider.    Writer connected with [employer] and they were able to provide me information with a  who is Nigerien speaking.    Jasmyne [facility] - [phone]. Writer contacted [facility] and he shared that they could potentially file for an Expedited Humanitarian [employer]ol, however following Covid, things are taking much longer to move forward. Jasmyne encouraged writer to give his contact informationt o patient and he would see if he could help    Was also given contact information for [facility] [phone]. They stated that would be able to connect patient with a Behavior Health Provider that speaks Nigerien.    Again, writer attempted to connect with patient however was only able to leave a message with writer contact information via the support of interpretive services. Sw will continue to assist as needed.           MOY Lopez, [facility]    [facility]  [phone]  [email or web address]

## 2023-02-17 NOTE — RESULT ENCOUNTER NOTE
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  The next (and most important) step remains to pursue adrenal surgery.  Meantime, I want her to schedule an appointment with me to discuss more about the thyroid and parathyroid disease associated with this condition.  Prior to seeing me, she is going to require to have a calcitonin level checked and a thyroid ultrasound. You can schedule her either at 7 am or 4 pm on Wednesdays.

## 2023-02-17 NOTE — PROGRESS NOTES
Social Work Telephone Note  [facility]     Patient Name:  Jessica Kennedy  /Age:  1975 (48 year old)      Patient returned writers call from earlier today.    With the support of interpretive services, writer was able to talk to Jessica and provide her with information that I had for an  as well as information of an a Behavioral Health Provider that speaks Swazi.    Jessica, shared that she had filed for sponsorship for her  back in 2017 and has been waiting this time.    Provided Jessica with the  - Jasmyne Brandon - [phone]    Also provided her with [facility] for Behavioral Health Provider - [phone].        MOY Lopez, [facility]    [facility]  [phone]  [email or web address]

## 2023-02-17 NOTE — TELEPHONE ENCOUNTER
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  The next (and most important) step remains to pursue adrenal surgery.  Meantime, I want her to schedule an appointment with me to discuss more about the thyroid and parathyroid disease associated with this condition.  Prior to seeing me, she is going to require to have a calcitonin level checked and a thyroid ultrasound. You can schedule her either at 7 am or 4 pm on Wednesdays.

## 2023-02-20 ENCOUNTER — ANCILLARY PROCEDURE (OUTPATIENT)
Dept: ULTRASOUND IMAGING | Facility: CLINIC | Age: 48
End: 2023-02-20
Attending: INTERNAL MEDICINE
Payer: COMMERCIAL

## 2023-02-20 DIAGNOSIS — E31.22 MULTIPLE ENDOCRINE NEOPLASIA TYPE 2A (MEN2A) (H): ICD-10-CM

## 2023-02-20 LAB
ALBUMIN SERPL-MCNC: 3.6 G/DL (ref 3.4–5)
CALCIUM SERPL-MCNC: 10 MG/DL (ref 8.5–10.1)
PHOSPHATE SERPL-MCNC: 3.3 MG/DL (ref 2.5–4.5)
PTH-INTACT SERPL-MCNC: 64 PG/ML (ref 15–65)

## 2023-02-20 PROCEDURE — 76536 US EXAM OF HEAD AND NECK: CPT | Performed by: RADIOLOGY

## 2023-02-20 PROCEDURE — 83970 ASSAY OF PARATHORMONE: CPT

## 2023-02-20 PROCEDURE — 82308 ASSAY OF CALCITONIN: CPT | Mod: 90

## 2023-02-20 PROCEDURE — 82040 ASSAY OF SERUM ALBUMIN: CPT

## 2023-02-20 PROCEDURE — 82310 ASSAY OF CALCIUM: CPT

## 2023-02-20 PROCEDURE — 36415 COLL VENOUS BLD VENIPUNCTURE: CPT

## 2023-02-20 PROCEDURE — 84100 ASSAY OF PHOSPHORUS: CPT

## 2023-02-20 PROCEDURE — 82306 VITAMIN D 25 HYDROXY: CPT

## 2023-02-20 PROCEDURE — 99000 SPECIMEN HANDLING OFFICE-LAB: CPT

## 2023-02-21 ENCOUNTER — TELEPHONE (OUTPATIENT)
Dept: ENDOCRINOLOGY | Facility: CLINIC | Age: 48
End: 2023-02-21
Payer: COMMERCIAL

## 2023-02-21 LAB
DEPRECATED CALCIDIOL+CALCIFEROL SERPL-MC: 24 UG/L (ref 20–75)
VITAMIN D2 SERPL-MCNC: 5 UG/L
VITAMIN D3 SERPL-MCNC: 19 UG/L

## 2023-02-21 NOTE — PROGRESS NOTES
Outcome for 02/21/23 11:09 AM: Patient is using jennifer sensor and reader. Pt is unable to upload jennifer from home or come into clinic. Pt was only able to get a few readings off jennifer located below.  used [place]  id# [id number]  Larisa Arroyo MA    2/21/23  6:06am: 152  8:35am: 146  9:20am: 170

## 2023-02-21 NOTE — TELEPHONE ENCOUNTER
Outcome for 02/21/23 11:09 AM: Patient is using jennifer sensor and reader. Pt is unable to upload jennifer from home or come into clinic. Pt was only able to get a few readings off jennifer located below. Tried walking patient through getting historical data but patient unable to.   Larisa Arroyo MA    2/21/23  6:06am: 152  8:35am: 146  9:20am: 170

## 2023-02-22 ENCOUNTER — VIRTUAL VISIT (OUTPATIENT)
Dept: ENDOCRINOLOGY | Facility: CLINIC | Age: 48
End: 2023-02-22
Payer: COMMERCIAL

## 2023-02-22 DIAGNOSIS — D35.00 PHEOCHROMOCYTOMA, UNSPECIFIED LATERALITY: Primary | ICD-10-CM

## 2023-02-22 LAB — CALCIT SERPL-MCNC: 56.7 PG/ML

## 2023-02-22 PROCEDURE — 99215 OFFICE O/P EST HI 40 MIN: CPT | Mod: VID | Performed by: INTERNAL MEDICINE

## 2023-02-22 PROCEDURE — 99417 PROLNG OP E/M EACH 15 MIN: CPT | Mod: VID | Performed by: INTERNAL MEDICINE

## 2023-02-22 RX ORDER — DOXAZOSIN 2 MG/1
8 TABLET ORAL 2 TIMES DAILY
Qty: 360 TABLET | Refills: 3 | Status: SHIPPED | OUTPATIENT
Start: 2023-02-22 | End: 2023-03-08

## 2023-02-22 RX ORDER — METOPROLOL SUCCINATE 50 MG/1
50 TABLET, EXTENDED RELEASE ORAL DAILY
Qty: 90 TABLET | Refills: 3 | Status: SHIPPED | OUTPATIENT
Start: 2023-02-22 | End: 2023-03-08

## 2023-02-22 NOTE — NURSING NOTE
Is the patient currently in the state of [place]? YES    Visit mode:VIDEO    If the visit is dropped, the patient can be reconnected by: VIDEO VISIT: Text to cell phone: [phone]    Will anyone else be joining the visit? NO    How would you like to obtain your AVS? Mail a copy    Are changes needed to the allergy or medication list? NO    Reason for visit:   Chief Complaint   Patient presents with     Video Visit     Adrenal Follow Up     Leora Ya MA         4 Unable to assess

## 2023-02-22 NOTE — Clinical Note
Please call patient on Monday with the BP and pulse numbers.  Also schedule her for an in person clinic in 2 weeks - end of the clinic or 7 am

## 2023-02-22 NOTE — LETTER
2/22/2023         RE: Jessica Kennedy  [address]  [address]  [facility] [address]        Dear Colleague,    Thank you for referring your patient, Jessica Kennedy, to the [facility]. Please see a copy of my visit note below.    Outcome for 02/21/23 11:09 AM: Patient is using jennifer sensor and reader. Pt is unable to upload jennifer from home or come into clinic. Pt was only able to get a few readings off jennifer located below.  used Sudanese  id# [id number]  Larisa Arroyo MA    2/21/23  6:06am: 152  8:35am: 146  9:20am: 170        Video-Visit Details    Type of service:  Video Visit    Video Start Time (time video started): 4:11 pm  Video End Time (time video stopped): 4:59 pm     Originating Location (pt. Location): Home  Distant Location (provider location): Off-site    Mode of Communication:  Video Conference via [facility]    Assessment     Jessica Kennedy is a 48 year old [place] female seen in follow-up for evaluation of bilateral pheochromocytoma and MEN2A.      1.  Bilateral synchronous pheochromocytoma    I have discussed with the surgeon the option of considering subtotal adrenalectomy.  I counseled the patient on the risk of adrenal insufficiency, signs and symptoms of adrenal insufficiency and treatment with hydrocortisone and Florinef.  Counseled on the administration of these medications and their essential role.  Discussed about complications which can occur during surgery especially if her blood pressure and pulse are not well controlled preop.  Recommendations:  Discontinue Finerinone  Increase Doxazocin to 4 tablets twice daily (8 mg twice daily)  Change carvedilol to metoprolol, 50 mg daily.   Maintain a good hydration by drinking 8 glasses of 8oz of liquids daily  Have salty snacks ad libitum     2. Medullary thyroid cancer  I recommended to consider prophylactic thyroidectomy following the adrenal surgery.  I counseled the patient on the very high  likelihood she has medullary thyroid cancer and the overall guarded prognosis of this type of cancer.  Discussed about what thyroid surgery involves, recovery time, complications associated with surgery, the need of taking thyroid hormone by mouth postop, lifelong.    3.  Primary hyperparathyroidism  Following adrenalectomy we are going to pursue a SPECT-CT to further try to identify the enlarged parathyroid glands and pursue both thyroidectomy and parathyroidectomy simultaneously.    4.  Type 2 diabetes  Overall decent control.      The patient is overwhelmed by her medical problems.  Denies significant depression or seeing a counselor who speaks English.  She is a single mother with 6 kids in the household. The youngest is 10 yo and the oldest (22 yo) has significant developmental delays and requires constant care.  She expresses her concern regarding the care of her children while she is hospitalized.  A  is already involved.     No orders of the defined types were placed in this encounter.    =========================================================================================================================================  The patient is seen with the help of a [employer] , available over the phone.    History of Present Illness:  Jessica Kennedy is a 48 year old female with a past medical history significant for hypertension, obesity and type 2 diabetes seen in follow-up for evaluation of recently diagnosed bilateral pheochromocytoma in the context of MEN2A.    The patient has a history of hypertension formally diagnosed in 2017 and symptoms suggestive of pheochromocytoma: Heat intolerance, tachycardia, headaches, constipation.  The bilateral adrenal masses were initially discovered on abdominal CT in April 2022.  The abdominal MRI from 4/13/2022 showed no signal dropout on out of phase images. The 12/2022 adrenal CT revealed bilateral adrenal masses, measuring 2.5 and 3.4 cm in  maximum diameter, with a noncontrast Hounsfield unit density of 30/45 and heterogeneous enhancement with contrast, suggestive of pheochromocytoma.  Plasma metanephrines were elevated, when checked by LCMS.  Family history is significant for hypertension on the paternal side of the family, with the caveat the patient has limited knowledge of her family medical history.      In preparation for bilateral subtotal adrenalectomy, the patient was started on treatment with doxazosin.  Current antihypertensive medications:  Doxazocin 2 tablets of 2 mg each, twice daily (4 mg BID). Doxazocin started on 1/30/23; last dose increase on 2/15/23.  Carvedilol, 25 mg twice daily  Finerinone, 20 mg daily    The patient is illiterate but I was able to verify with her during the visit that she takes the correct medications, at the correct dose.    Overall, since her last visit with me, she reports some improvement of the headaches.  At the time of the visit, she did experience a headache.  I asked her to check her blood pressure during the visit and it was 151/96, with a pulse of 75.  The patient attributes the elevated blood pressure to recent physical activity (walking).     I reviewed the blood pressure numbers documented in our records:  ENDO VITALS-[facility] 2/15/2023 2/14/2023 2/14/2023 2/14/2023 1/24/2023   /96 137/90 156/100 145/94 177/119   Pulse 90 79 88 82 68       2. MEN2A  Recent labs revealed an elevated calcitonin level of 56.7.   confirmed the patient has not had a calcitonin level checked in the past.  Calcium level was normal but at the upper end of normal range, phosphorus was normal at 3.3 and parathyroid hormone level was 64.  Vitamin D level was 24.    I reviewed the ultrasound images from 2/20/2020.  Possible parathyroid adenoma right inferior thyroid gland.  The left inferior thyroid pole is not well visualized.  There is a left thyroid nodule measuring 0.7 cm, poorly delineated, with  microcalcifications.    3. Type 2 diabetes   Current antidiabetic regimen:  Metformin 1 gm BID  Farxiga, 10 mg daily  I reviewed the provided blood sugar numbers.      Pertinent outside labs and imaging:   The blood work done at [facility], revealed the following results:  12/6/2022: Free metanephrines 942 (<57), free normetanephrine 647 (<148), LC/MS assay; chromogranin A 363 (<311)  10/5/2022: Free metanephrine 663, free normetanephrine 414, aldosterone 10 (LC MS), renin activity 0.48 (0.25-5.82), aldosterone to renin ratio 20.8  12/16/2022 24-hour urinary dopamine 147 (), epinephrine 5 (2-24) norepinephrine 54 ().  No 24 hours urinary creatinine or urinary volume was reported.    Most recent A1c values:  8/20/2022 A1c 10.1%  12/6/2022 A1c 8.1%    Other pertinent labs reviewed in the outside records:  3/17/2022  hepatitis panel negative  6/10/2022 MANDY 65 antibodies, insulin autoantibodies and antiislet cell antibodies, zinc transporter antibodies undetectable; C-peptide 2.56  10/20/2022 normal CBC  10/3/2022 triglycerides 225, HDL cholesterol 67, LDL cholesterol 90, magnesium 1.6 (1.5-2.5), vitamin D level 21, morning cortisol 6.4 (time of collection unavailable), B12 419, ferritin 51, free T4 1.3 (0.8-1.8), TSH 1.72  10/5/2022 urine microalbumin to creatinine ratio 1096  12/6/2022 phosphate 2.9 (2.5-4.5), CEA 1.8, parathyroid hormone 67 (16-77), BUN mildly elevated at 27, creatinine normal at 0.81, sodium 133, potassium 4.1, albumin 4.2, calcium 10.4 (8.6-10.2).  Prior calcium levels were 10.2 on 11/23/21 and 10.8 on 10/3/2022.  Alkaline phosphatase has been normal.    The chest x-ray from September 2022 showed clear lungs.  Head CT from December 2022 was unremarkable, done for evaluation of headaches.     9/29/2022 calcium 11.1 (normal range 8.4-10.4.)  12/5/2022 calcium 9.6 (normal range 8.5-10.5)  9/29/2022 TSH 0.99     A1c was 6.6 on 2/17/2021, 9.7 on 4/13/2022 12/14/20 C peptide 6.8,  "glucose 125, MANDY 65 antibodies negative       Thyroid US from 8/28/20 done at [facility]:  \"The right thyroid lobe measures: 1.5 cmx1.4 cmx3.6 cm   The left thyroid lobe measures: 1.1 cmx1.2 cmx3.2 cm   The isthmus measures: 1 mm in AP diameter.     The thyroid parenchyma appears: mildly heterogeneous.     A nodule in the mid left lobe measures 0.7 cm and has low suspicion for malignancy (1-5%). There are   small solid nodules inferior to each lobe of the thyroid gland, the largest on   the right measuring 1.5 cm, likely parathyroid glands. Additionally, there is a   1.2 cm soft tissue mass superior to the left lobe of the thyroid gland, which   may represent a lymph node.\"    Transthoracic echocardiogram from January 2021 was unremarkable, with an ejection fraction of 59%.    Past Medical History   Past Medical History:   Diagnosis Date     Hypertension    Right proximal tibial and fibular fracture 2017   Post traumatic deformity at the fourth right proximal phalangeal base 2018   Degenerative changes of the knees    Past Surgical History   Past Surgical History:   Procedure Laterality Date     HEAD & NECK SURGERY       ORTHOPEDIC SURGERY         Current Medications    Current Outpatient Medications:      acetaminophen (TYLENOL) 500 MG tablet, Take 1-2 tablets (500-1,000 mg) by mouth every 6 hours as needed for mild pain, Disp: 1 Bottle, Rfl: 0     aspirin (ASA) 81 MG chewable tablet, Take 81 mg by mouth daily CHEW AND SWALLOW, Disp: , Rfl:      azelastine (OPTIVAR) 0.05 % ophthalmic solution, Apply 1 drop to eye 2 times daily, Disp: , Rfl:      Benzocaine-Menthol 10-2.1 MG LOZG, Take 1 lozenge by mouth 4 times daily as needed (Cough or sore throat), Disp: 84 lozenge, Rfl: 0     carvedilol (COREG) 25 MG tablet, Take 1 tablet (25 mg) by mouth 2 times daily (with meals), Disp: 60 tablet, Rfl: 0     Continuous Blood Gluc Sensor (FREESTYLE ABHIJIT 14 DAY SENSOR) MISC, Change every 14 days., Disp: 6 each, Rfl: 11     " doxazosin (CARDURA) 2 MG tablet, Take 2 tablets (4 mg) by mouth 2 times daily, Disp: 360 tablet, Rfl: 3     FARXIGA 10 MG TABS tablet, Take 1 tablet by mouth daily at 2 pm, Disp: , Rfl:      Finerenone (KERENDIA) 20 MG TABS, Take 20 mg by mouth daily, Disp: 30 tablet, Rfl: 0     ibuprofen (ADVIL/MOTRIN) 200 MG tablet, Take 3 tablets (600 mg) by mouth every 6 hours as needed for mild pain, Disp: 30 tablet, Rfl: 0     metFORMIN (GLUCOPHAGE) 1000 MG tablet, Take 1,000 mg by mouth 2 times daily (with meals), Disp: , Rfl:      salicylic acid (COMPOUND W MAX STRENGTH) 17 % external gel, Apply topically daily Apply to wart once a day and cover with a bandage.  Use daily for the next two weeks., Disp: 1.25 g, Rfl: 0    Family History   Father passed away from MI while her mother was pregnant with her (not sure of his age). Paternal grandfather and paternal uncles have HTN.  No family history of diabetes. Mother  of cancer.      Social History  Jessica immig[place] in , from [place] in [place].  [place] was [place] [facility].    Single. She has 8 children, 9 to 24 yo. She denies smoking, drinking alcohol or using illicit drugs. Occupation: home care.     Review of Systems   Systemic:             As above  Eye:                      No eye symptoms   Efren-Laryngeal:     No dysphagia (she occasionally chokes when eating), no hoarseness, intermittent cough when her throat is dry     Breast:                  No breast symptoms  Genitourinary:       Urinates 3-4 times a night; some urinary urgency  Neurological:        No tremor; numbness sensation in her hands and feet for the last year  Musculoskeletal:  Diffuse musculoskeletal pain   Skin:                     increased hair falling out               Vital Signs     Previous Weights:    Wt Readings from Last 10 Encounters:   02/15/23 93.2 kg (205 lb 6.4 oz)   05/10/22 93.9 kg (207 lb)   21 102.1 kg (225 lb)   21 99.8 kg (220 lb)   20 93.4 kg  (206 lb)        There were no vitals taken for this visit.    Physical Exam  General Appearance: General obesity, no distress noted  Eyes: grossly normal to inspection, conjunctivae and sclerae normal, no lid lag or stare   Respiratory: no audible wheeze, cough, or visible cyanosis.  No visible retractions or increased work of breathing.    Neurological: Cranial nerves grossly intact; no tremor of the hands     65 minutes spent on the date of the encounter doing chart review, history and exam, documentation and further activities as noted above.                            Again, thank you for allowing me to participate in the care of your patient.        Sincerely,        Deloris Vanessa MD

## 2023-02-22 NOTE — PROGRESS NOTES
Video-Visit Details    Type of service:  Video Visit    Video Start Time (time video started): 4:11 pm  Video End Time (time video stopped): 4:59 pm     Originating Location (pt. Location): Home  Distant Location (provider location): Off-site    Mode of Communication:  Video Conference via [facility]     Jessica Kennedy is a 48 year old [place] female seen in follow-up for evaluation of bilateral pheochromocytoma and MEN2A.      1.  Bilateral synchronous pheochromocytoma    I have discussed with the surgeon the option of considering subtotal adrenalectomy.  I counseled the patient on the risk of adrenal insufficiency, signs and symptoms of adrenal insufficiency and treatment with hydrocortisone and Florinef.  Counseled on the administration of these medications and their essential role.  Discussed about complications which can occur during surgery especially if her blood pressure and pulse are not well controlled preop.  Recommendations:  Discontinue Finerinone  Increase Doxazocin to 4 tablets twice daily (8 mg twice daily)  Change carvedilol to metoprolol, 50 mg daily.   Maintain a good hydration by drinking 8 glasses of 8oz of liquids daily  Have salty snacks ad libitum     2. Medullary thyroid cancer  I recommended to consider prophylactic thyroidectomy following the adrenal surgery.  I counseled the patient on the very high likelihood she has medullary thyroid cancer and the overall guarded prognosis of this type of cancer.  Discussed about what thyroid surgery involves, recovery time, complications associated with surgery, the need of taking thyroid hormone by mouth postop, lifelong.    3.  Primary hyperparathyroidism  Following adrenalectomy we are going to pursue a SPECT-CT to further try to identify the enlarged parathyroid glands and pursue both thyroidectomy and parathyroidectomy simultaneously.    4.  Type 2 diabetes  Overall decent control.      The patient is overwhelmed by her medical  problems.  Denies significant depression or seeing a counselor who speaks English.  She is a single mother with 6 kids in the household. The youngest is 8 yo and the oldest (22 yo) has significant developmental delays and requires constant care.  She expresses her concern regarding the care of her children while she is hospitalized.  A  is already involved.     No orders of the defined types were placed in this encounter.    =========================================================================================================================================  The patient is seen with the help of a [employer] , available over the phone.    History of Present Illness:  Jessica Kennedy is a 48 year old female with a past medical history significant for hypertension, obesity and type 2 diabetes seen in follow-up for evaluation of recently diagnosed bilateral pheochromocytoma in the context of MEN2A.    The patient has a history of hypertension formally diagnosed in 2017 and symptoms suggestive of pheochromocytoma: Heat intolerance, tachycardia, headaches, constipation.  The bilateral adrenal masses were initially discovered on abdominal CT in April 2022.  The abdominal MRI from 4/13/2022 showed no signal dropout on out of phase images. The 12/2022 adrenal CT revealed bilateral adrenal masses, measuring 2.5 and 3.4 cm in maximum diameter, with a noncontrast Hounsfield unit density of 30/45 and heterogeneous enhancement with contrast, suggestive of pheochromocytoma.  Plasma metanephrines were elevated, when checked by LCMS.  Family history is significant for hypertension on the paternal side of the family, with the caveat the patient has limited knowledge of her family medical history.      In preparation for bilateral subtotal adrenalectomy, the patient was started on treatment with doxazosin.  Current antihypertensive medications:  Doxazocin 2 tablets of 2 mg each, twice daily (4 mg BID).  Doxazocin started on 1/30/23; last dose increase on 2/15/23.  Carvedilol, 25 mg twice daily  Finerinone, 20 mg daily    The patient is illiterate but I was able to verify with her during the visit that she takes the correct medications, at the correct dose.    Overall, since her last visit with me, she reports some improvement of the headaches.  At the time of the visit, she did experience a headache.  I asked her to check her blood pressure during the visit and it was 151/96, with a pulse of 75.  The patient attributes the elevated blood pressure to recent physical activity (walking).     I reviewed the blood pressure numbers documented in our records:  ENDO VITALS-[facility] 2/15/2023 2/14/2023 2/14/2023 2/14/2023 1/24/2023   /96 137/90 156/100 145/94 177/119   Pulse 90 79 88 82 68       2. MEN2A  Recent labs revealed an elevated calcitonin level of 56.7.   confirmed the patient has not had a calcitonin level checked in the past.  Calcium level was normal but at the upper end of normal range, phosphorus was normal at 3.3 and parathyroid hormone level was 64.  Vitamin D level was 24.    I reviewed the ultrasound images from 2/20/2020.  Possible parathyroid adenoma right inferior thyroid gland.  The left inferior thyroid pole is not well visualized.  There is a left thyroid nodule measuring 0.7 cm, poorly delineated, with microcalcifications.    3. Type 2 diabetes   Current antidiabetic regimen:  Metformin 1 gm BID  Farxiga, 10 mg daily  I reviewed the provided blood sugar numbers.      Pertinent outside labs and imaging:   The blood work done at [facility], revealed the following results:  12/6/2022: Free metanephrines 942 (<57), free normetanephrine 647 (<148), LC/MS assay; chromogranin A 363 (<311)  10/5/2022: Free metanephrine 663, free normetanephrine 414, aldosterone 10 (LC MS), renin activity 0.48 (0.25-5.82), aldosterone to renin ratio 20.8  12/16/2022 24-hour urinary dopamine 147  "(), epinephrine 5 (2-24) norepinephrine 54 ().  No 24 hours urinary creatinine or urinary volume was reported.    Most recent A1c values:  8/20/2022 A1c 10.1%  12/6/2022 A1c 8.1%    Other pertinent labs reviewed in the outside records:  3/17/2022  hepatitis panel negative  6/10/2022 MANDY 65 antibodies, insulin autoantibodies and antiislet cell antibodies, zinc transporter antibodies undetectable; C-peptide 2.56  10/20/2022 normal CBC  10/3/2022 triglycerides 225, HDL cholesterol 67, LDL cholesterol 90, magnesium 1.6 (1.5-2.5), vitamin D level 21, morning cortisol 6.4 (time of collection unavailable), B12 419, ferritin 51, free T4 1.3 (0.8-1.8), TSH 1.72  10/5/2022 urine microalbumin to creatinine ratio 1096  12/6/2022 phosphate 2.9 (2.5-4.5), CEA 1.8, parathyroid hormone 67 (16-77), BUN mildly elevated at 27, creatinine normal at 0.81, sodium 133, potassium 4.1, albumin 4.2, calcium 10.4 (8.6-10.2).  Prior calcium levels were 10.2 on 11/23/21 and 10.8 on 10/3/2022.  Alkaline phosphatase has been normal.    The chest x-ray from September 2022 showed clear lungs.  Head CT from December 2022 was unremarkable, done for evaluation of headaches.     9/29/2022 calcium 11.1 (normal range 8.4-10.4.)  12/5/2022 calcium 9.6 (normal range 8.5-10.5)  9/29/2022 TSH 0.99     A1c was 6.6 on 2/17/2021, 9.7 on 4/13/2022 12/14/20 C peptide 6.8, glucose 125, MANDY 65 antibodies negative       Thyroid US from 8/28/20 done at [facility]:  \"The right thyroid lobe measures: 1.5 cmx1.4 cmx3.6 cm   The left thyroid lobe measures: 1.1 cmx1.2 cmx3.2 cm   The isthmus measures: 1 mm in AP diameter.     The thyroid parenchyma appears: mildly heterogeneous.     A nodule in the mid left lobe measures 0.7 cm and has low suspicion for malignancy (1-5%). There are   small solid nodules inferior to each lobe of the thyroid gland, the largest on   the right measuring 1.5 cm, likely parathyroid glands. Additionally, there is a   1.2 cm soft " "tissue mass superior to the left lobe of the thyroid gland, which   may represent a lymph node.\"    Transthoracic echocardiogram from January 2021 was unremarkable, with an ejection fraction of 59%.    Past Medical History   Past Medical History:   Diagnosis Date     Hypertension    Right proximal tibial and fibular fracture 2017   Post traumatic deformity at the fourth right proximal phalangeal base 2018   Degenerative changes of the knees    Past Surgical History   Past Surgical History:   Procedure Laterality Date     HEAD & NECK SURGERY       ORTHOPEDIC SURGERY         Current Medications    Current Outpatient Medications:      acetaminophen (TYLENOL) 500 MG tablet, Take 1-2 tablets (500-1,000 mg) by mouth every 6 hours as needed for mild pain, Disp: 1 Bottle, Rfl: 0     aspirin (ASA) 81 MG chewable tablet, Take 81 mg by mouth daily CHEW AND SWALLOW, Disp: , Rfl:      azelastine (OPTIVAR) 0.05 % ophthalmic solution, Apply 1 drop to eye 2 times daily, Disp: , Rfl:      Benzocaine-Menthol 10-2.1 MG LOZG, Take 1 lozenge by mouth 4 times daily as needed (Cough or sore throat), Disp: 84 lozenge, Rfl: 0     carvedilol (COREG) 25 MG tablet, Take 1 tablet (25 mg) by mouth 2 times daily (with meals), Disp: 60 tablet, Rfl: 0     Continuous Blood Gluc Sensor ([employer]HIJIT 14 DAY SENSOR) MIS, Change every 14 days., Disp: 6 each, Rfl: 11     doxazosin (CARDURA) 2 MG tablet, Take 2 tablets (4 mg) by mouth 2 times daily, Disp: 360 tablet, Rfl: 3     FARXIGA 10 MG TABS tablet, Take 1 tablet by mouth daily at 2 pm, Disp: , Rfl:      Finerenone (KERENDIA) 20 MG TABS, Take 20 mg by mouth daily, Disp: 30 tablet, Rfl: 0     ibuprofen (ADVIL/MOTRIN) 200 MG tablet, Take 3 tablets (600 mg) by mouth every 6 hours as needed for mild pain, Disp: 30 tablet, Rfl: 0     metFORMIN (GLUCOPHAGE) 1000 MG tablet, Take 1,000 mg by mouth 2 times daily (with meals), Disp: , Rfl:      salicylic acid (COMPOUND W MAX STRENGTH) 17 % external gel, " Apply topically daily Apply to wart once a day and cover with a bandage.  Use daily for the next two weeks., Disp: 1.25 g, Rfl: 0    Family History   Father passed away from MI while her mother was pregnant with her (not sure of his age). Paternal grandfather and paternal uncles have HTN.  No family history of diabetes. Mother  of cancer.      Social History  Jessica immig[place] to [place] in [place] from [place] in [place] [place].  She was born in [place].    Single. She has 8 children, 9 to 22 yo. She denies smoking, drinking alcohol or using illicit drugs. Occupation: home care.     Review of Systems   Systemic:             As above  Eye:                      No eye symptoms   Efren-Laryngeal:     No dysphagia (she occasionally chokes when eating), no hoarseness, intermittent cough when her throat is dry     Breast:                  No breast symptoms  Genitourinary:       Urinates 3-4 times a night; some urinary urgency  Neurological:        No tremor; numbness sensation in her hands and feet for the last year  Musculoskeletal:  Diffuse musculoskeletal pain   Skin:                     increased hair falling out               Vital Signs     Previous Weights:    Wt Readings from Last 10 Encounters:   02/15/23 93.2 kg (205 lb 6.4 oz)   05/10/22 93.9 kg (207 lb)   21 102.1 kg (225 lb)   21 99.8 kg (220 lb)   20 93.4 kg (206 lb)        There were no vitals taken for this visit.    Physical Exam  General Appearance: General obesity, no distress noted  Eyes: grossly normal to inspection, conjunctivae and sclerae normal, no lid lag or stare   Respiratory: no audible wheeze, cough, or visible cyanosis.  No visible retractions or increased work of breathing.    Neurological: Cranial nerves grossly intact; no tremor of the hands     65 minutes spent on the date of the encounter doing chart review, history and exam, documentation and further activities as noted above.

## 2023-02-27 ENCOUNTER — TELEPHONE (OUTPATIENT)
Dept: ENDOCRINOLOGY | Facility: CLINIC | Age: 48
End: 2023-02-27
Payer: COMMERCIAL

## 2023-02-27 ENCOUNTER — APPOINTMENT (OUTPATIENT)
Dept: INTERPRETER SERVICES | Facility: CLINIC | Age: 48
End: 2023-02-27
Payer: COMMERCIAL

## 2023-02-27 DIAGNOSIS — D35.00 PHEOCHROMOCYTOMA, UNSPECIFIED LATERALITY: Primary | ICD-10-CM

## 2023-02-27 NOTE — TELEPHONE ENCOUNTER
Received message from Dr. Vanessa as follows:     Deloris Vanessa MD  [facility] Endocrinology Adult [place]. Please call patient on Monday with the BP and pulse numbers. Also schedule her for an in person clinic in 2 weeks - end of the clinic or 7 am         Per 2/22/23 progress note:    In preparation for bilateral subtotal adrenalectomy, the patient was started on treatment with doxazosin.  Current antihypertensive medications:  Doxazocin 2 tablets of 2 mg each, twice daily (4 mg BID). Doxazocin started on 1/30/23; last dose increase on 2/15/23.  Carvedilol, 25 mg twice daily  Finerinone, 20 mg daily            Writer attempted to contact patient on 3 way call with . No answer. Left voicemail for patient to contact our office. Writer also advised patient via voicemail that we will call back tomorrow if we do not heard back from her.         Missy Patricia RN  Endocrine Care Coordinator  [facility]

## 2023-02-28 ENCOUNTER — APPOINTMENT (OUTPATIENT)
Dept: INTERPRETER SERVICES | Facility: CLINIC | Age: 48
End: 2023-02-28
Payer: COMMERCIAL

## 2023-02-28 NOTE — TELEPHONE ENCOUNTER
Writer again contacted patient on 3 way call with  (patient in past has asked for calls after 3pm).     Patient reports that she is not home right now but her blood pressure today was 140/90, pulse 88. Writer asked if there was time patient could either come to clinic or speak over the phone to complete another set of orthostatic blood pressure readings.     Patient states that she can be reached tomorrow, 3/1/23 at 10am to do Orthostatic blood pressure readings.     Writer will call patient tomorrow to obtain blood pressure readings. Writer will update Dr. Vanessa.      Missy Patricia, RN  Endocrine Care Coordinator  [facility]

## 2023-03-01 ENCOUNTER — APPOINTMENT (OUTPATIENT)
Dept: INTERPRETER SERVICES | Facility: CLINIC | Age: 48
End: 2023-03-01
Payer: COMMERCIAL

## 2023-03-01 NOTE — TELEPHONE ENCOUNTER
Writer again attempted to contact patient on 3 way call with  to obtain orthostatic blood pressure readings.     Blood Pressure and pulse readings:    Lying for 5 minutes:    Standing for 2 minutes:    Standing for 5 minutes total:        No answer by patient. Will try back again at later time.       Missy Patricia, RN  Endocrine Care Coordinator  [facility]

## 2023-03-01 NOTE — TELEPHONE ENCOUNTER
Patient returning call, please try again between tomorrow 11-11:30. Patient missed previous call as she was at a doctor appt. thnak you

## 2023-03-01 NOTE — TELEPHONE ENCOUNTER
Writer called patient with  at 10am as per her request. No answer x2.     Will try back at later time.     Please note Dr. Vanessa recommended:If her BP tomorrow is still >120/80 at rest (and most likely is) please ask her to increase doxazocin by 2 mg BID, to a total dose of 10 mg BID.        Missy Patricia RN  Endocrine Care Coordinator  [facility]

## 2023-03-02 NOTE — TELEPHONE ENCOUNTER
Writer contacted patient again on 3 way call with  at time preferred. No answer. Will try back another time before 11:30am.      Missy Patricia RN  Endocrine Care Coordinator  [facility]

## 2023-03-02 NOTE — TELEPHONE ENCOUNTER
Patient called back while writer was on the phone with imaging.       Writer again contacted patient with 3 way call with  to obtain blood pressure readings.         Blood Pressure and pulse readings:     Lying for 5 minutes: 140/76, pulse 70     Standing for 2 minutes: 142/92, pulse 84     Standing for 5 minutes total: 143/93, pulse 73        Patient confirms that she is taking the Doxazosin 8 mg twice daily but reports that it makes her feel dizzy and very sleepy, like she can't open her eyes, like she cannot do anything.Writer explained to patient that plan was to increase the medication. Patient states that she feels drunk and does not want to take the medication twice daily and wants to only take it at night.       Writer scheduled patient for office visit with Dr. Vanessa next week on 3/8/23 at 11:30am. Writer will also send this to Dr. Vanessa to review and determine further recommendations. Patient verbalizes understanding and agrees to plan.     Total time of call: 34 minutes      Missy Patricia RN  Endocrine Care Coordinator  [facility]

## 2023-03-03 ENCOUNTER — APPOINTMENT (OUTPATIENT)
Dept: INTERPRETER SERVICES | Facility: CLINIC | Age: 48
End: 2023-03-03
Payer: COMMERCIAL

## 2023-03-03 RX ORDER — PHENOXYBENZAMINE HYDROCHLORIDE 10 MG/1
10 CAPSULE ORAL DAILY
Qty: 90 CAPSULE | Refills: 1 | Status: SHIPPED | OUTPATIENT
Start: 2023-03-03 | End: 2023-03-08

## 2023-03-03 NOTE — TELEPHONE ENCOUNTER
Writer first called [facility] to see if phenoxybenzamine is covered. After review with pharmacy technician, the phenoxybenzamine is not covered and not on formulary.       Writer reviewed with Dr. Vanessa.     Per Dr. Vanessa:  Have patient try stopping the metoprolol and going back to the Carvedilol, 25 mg twice daily. No change to Doxazocin to 4 tablets twice daily (8 mg twice daily).        Writer contacted patient to review on 3 way call with . Reviewed with patient above recommendations from Dr. Vanessa. Patient verbalizes understanding. Patient reports that she was taking metoprolol 50 mg twice daily. Prescription was for 50 mg once daily. Writer re-reviewed with Dr. Vanessa and plan still to stop the metoprolol and go back to the Carvedilol, 25 mg twice daily. Patient advised and she verbalizes understanding and agrees to plan. Patient will continue the Doxazocin to 4 tablets twice daily (8 mg twice daily). Patient verbalizes understanding and agrees to plan.     Writer also updated patient that we will still plan to do prior authorization for phenoxybenzamine. Patient verbalizes understanding.       Patient will follow-up next week with Dr. Vanessa on Wednesday and will call sooner with any questions or concerns.       Missy Patricia RN  Endocrine Care Coordinator  [facility]

## 2023-03-03 NOTE — TELEPHONE ENCOUNTER
Is the fatigue related to metoprolol or is it related to doxazosin?  The only other option I have, besides doxazosin, is phenoxybenzamine.  I placed a prescription for 10 mg daily phenoxybenzamine but I doubt it will be covered by her pharmacy.  If covered, this should be initially an add on to the current dose of doxazosin and metoprolol, with a plan to gradually increase the dose of phenoxybenzamine and discontinue doxazosin.  But I think it is important to determine whether is doxazosin or metoprolol causing the fatigue.  I also want you to remind her we will have to postpone surgery if we cannot control her blood pressure prior to surgery.

## 2023-03-07 ENCOUNTER — TELEPHONE (OUTPATIENT)
Dept: ENDOCRINOLOGY | Facility: CLINIC | Age: 48
End: 2023-03-07

## 2023-03-07 NOTE — TELEPHONE ENCOUNTER
Central Prior Authorization Team   Phone: [phone]      PA Initiation    Medication: Phenoxybenzamine 10mg  Insurance Company: [employer] - Phone [phone] Fax [phone]  [id number] [place] [place] [facility] [address] AT [facility]  Filling Pharmacy Phone: [phone]  Filling Pharmacy Fax:    Start Date: 3/7/2023

## 2023-03-07 NOTE — TELEPHONE ENCOUNTER
Prior Authorization Retail Medication Request    Medication/Dose: Phenoxybenzamine 10mg      Insurance number: [phone]  ID: [id number]    Rosi Capps CMA  Adult Endocrinology  [facility] [place]

## 2023-03-08 ENCOUNTER — OFFICE VISIT (OUTPATIENT)
Dept: ENDOCRINOLOGY | Facility: CLINIC | Age: 48
End: 2023-03-08
Payer: COMMERCIAL

## 2023-03-08 VITALS — WEIGHT: 210.4 LBS | BODY MASS INDEX: 31.07 KG/M2

## 2023-03-08 DIAGNOSIS — C73 MEDULLARY THYROID CARCINOMA (H): ICD-10-CM

## 2023-03-08 DIAGNOSIS — D35.00 PHEOCHROMOCYTOMA, UNSPECIFIED LATERALITY: Primary | ICD-10-CM

## 2023-03-08 DIAGNOSIS — E11.9 TYPE 2 DIABETES MELLITUS NOT AT GOAL (H): ICD-10-CM

## 2023-03-08 DIAGNOSIS — E26.09 PRIMARY HYPERALDOSTERONISM (H): ICD-10-CM

## 2023-03-08 LAB — HBA1C MFR BLD: 8.4 % (ref 4.3–?)

## 2023-03-08 PROCEDURE — 95251 CONT GLUC MNTR ANALYSIS I&R: CPT | Performed by: INTERNAL MEDICINE

## 2023-03-08 PROCEDURE — 99215 OFFICE O/P EST HI 40 MIN: CPT | Performed by: INTERNAL MEDICINE

## 2023-03-08 PROCEDURE — 83036 HEMOGLOBIN GLYCOSYLATED A1C: CPT | Performed by: INTERNAL MEDICINE

## 2023-03-08 RX ORDER — FINERENONE 20 MG/1
1 TABLET, FILM COATED ORAL DAILY
COMMUNITY
End: 2023-03-08

## 2023-03-08 RX ORDER — DOXAZOSIN 2 MG/1
12 TABLET ORAL 2 TIMES DAILY
Qty: 540 TABLET | Refills: 3 | Status: SHIPPED | OUTPATIENT
Start: 2023-03-08 | End: 2023-03-21

## 2023-03-08 RX ORDER — GLIPIZIDE 5 MG/1
5 TABLET ORAL DAILY
Status: ON HOLD | COMMUNITY
End: 2023-08-23

## 2023-03-08 RX ORDER — CARVEDILOL 25 MG/1
25 TABLET ORAL 2 TIMES DAILY WITH MEALS
COMMUNITY
End: 2023-07-19

## 2023-03-08 NOTE — TELEPHONE ENCOUNTER
Prior Authorization Approval    Authorization Effective Date: 2/5/2023  Authorization Expiration Date: 3/6/2024  Medication: Phenoxybenzamine 10mg  Approved Dose/Quantity: 90 per 90 days  Reference #: [id number]   Insurance Company: [employer] - Phone [phone] Fax [phone]  Expected CoPay:        Which Pharmacy is filling the prescription (Not needed for infusion/clinic administered): [id number] [place] [address] AT [facility]  Pharmacy Notified: Yes  Patient Notified: No

## 2023-03-08 NOTE — LETTER
3/8/2023         RE: Jessica Kennedy  [address]  [address]  [facility] [address]        Dear Colleague,    Thank you for referring your patient, Jessica Kennedy, to the [facility]. Please see a copy of my visit note below.    Video-Visit Details    Type of service:  Video Visit    Video Start Time (time video started): 4:11 pm  Video End Time (time video stopped): 4:59 pm     Originating Location (pt. Location): Home  Distant Location (provider location): Off-site    Mode of Communication:  Video Conference via [employer]    [facility]     Jessica Kennedy is a 48 year old [place] female seen in follow-up for evaluation of bilateral pheochromocytoma and MEN2A.     1.  Bilateral synchronous pheochromocytoma  The plan is to pursue cortical sparing bilateral adrenalectomy  Recommendations:  Discontinue Finerinone (this was recommended at her last visit with me but the patient continued to take it)  Increase Doxazocin to 6 tablets twice daily (12 mg twice daily)  Continue carvedilol at 25 mg twice daily  Maintain a good hydration by drinking 8 glasses of 8oz of liquids daily  Have salty snacks ad libitum   Were going to continue to monitor the patient's vitals and schedule her in our clinic on a weekly basis.   The patient is requesting a letter to support her  getting a visa and I completed one at the time of the encounter.    2. Medullary thyroid cancer and primary hyperparathyroidism  Following adrenalectomy we are going to pursue a SPECT-CT to further try to identify the enlarged parathyroid glands and pursue both thyroidectomy and parathyroidectomy simultaneously.    3.  Type 2 diabetes, currently medicated with glipizide and metformin.  She was not able to tolerate SGLT2 inhibitors.  Overall, although the A1c remains elevated, the glycemic control significantly improved recently, through dietary changes.  I did not prescribe additional antidiabetic medications, as I  want the patient to focus on blood pressure management prior to surgery and not potential other side effects related to antidiabetic medications.  There is room to increase glipizide, if needed.  For now, the agreement was to continue to work on diet and exercise.    Orders Placed This Encounter   Procedures     Continuous Glucose Monitoring >=72 hours PHYS INTERP     Hemoglobin A1c POCT     =========================================================================================================================================    History of Present Illness:  Jessica Kennedy is a 48 year old female with a past medical history significant for hypertension, obesity and type 2 diabetes seen in follow-up.  The patient is seen with the help of a [employer]  via iPad.  She did bring all her medications to the appointment and we were able to write on the label their administration using numbers, as the patient is illiterate.    1. Bilateral pheochromocytoma in the context of MEN2A.    The patient has a history of hypertension formally diagnosed in 2017 and symptoms suggestive of pheochromocytoma: Heat intolerance, tachycardia, headaches, constipation.  The bilateral adrenal masses were initially discovered on abdominal CT in April 2022.  The abdominal MRI from 4/13/2022 showed no signal dropout on out of phase images. The 12/2022 adrenal CT revealed bilateral adrenal masses, measuring 2.5 and 3.4 cm in maximum diameter, with a noncontrast Hounsfield unit density of 30/45 and heterogeneous enhancement with contrast, suggestive of pheochromocytoma.  Plasma metanephrines were elevated..     In preparation for bilateral subtotal adrenalectomy, the patient was started on treatment with doxazosin.  Current antihypertensive medications:  Doxazocin 4 tablets of 2 mg each, twice daily (8 mg BID). Doxazocin started on 1/30/23.  Carvedilol, 25 mg twice daily  Finerinone, 20 mg daily    I did prescribe metoprolol but the  patient reported experiencing fatigue so it was replaced by carvedilol.  Reports feeling better since taking carvedilol.  The headaches have significantly improved.  She continues to feel warm most of the time and she does experience hot flashes.    2. MEN2A  Recent labs revealed an elevated calcitonin level of 56.7.  The patient has not had a calcitonin level checked in the past.  Calcium level was normal but at the upper end of normal range, phosphorus was normal at 3.3 and parathyroid hormone level was 64.  Vitamin D level was 24.    The ultrasound images from 2/20/2023 identifued a possible parathyroid adenoma in the right inferior thyroid gland and a left thyroid nodule measuring 0.7 cm, poorly delineated, with ?microcalcifications.  The decision was not to pursue biopsy and pursue thyroidectomy following pheochromocytoma surgery.    3. Type 2 diabetes   Current antidiabetic regimen:  Metformin 1 gm BID  Glipizide, 5 mg daily.   She quit Farxiga 1 month ago due to genital fungal infections and dizziness.    A1c today was 8.4%, stable since December 2022.  For the last few weeks she has been wearing a Jabari sensor and we downloaded in the clinic.  On the sensor, the glucose was 178, with a glucose variability of 24, corresponding to a GMI of 7.6%.  58% of the glucose numbers were within target of , 5% of were above 250 and there was no significant hypoglycemia documented by the sensor.  The patient admits to healthier eating habits and trying to restrict carbohydrates in her diet.  She has been having more fruits and vegetables, lean meat and she has been drinking smoothies.  Remembers being treated with an injectable drug for diabetes in the past but she cannot provide details or the reason why it was discontinued.    Pertinent outside labs and imaging:   The blood work done at [facility], revealed the following results:  12/6/2022: Free metanephrines 942 (<57), free normetanephrine 647 (<148),  "LC/MS assay; chromogranin A 363 (<311)  10/5/2022: Free metanephrine 663, free normetanephrine 414, aldosterone 10 (LC MS), renin activity 0.48 (0.25-5.82), aldosterone to renin ratio 20.8  12/16/2022 24-hour urinary dopamine 147 (), epinephrine 5 (2-24) norepinephrine 54 ().  No 24 hours urinary creatinine or urinary volume was reported.    Most recent A1c values:  8/20/2022 A1c 10.1%  12/6/2022 A1c 8.1%    Other pertinent labs reviewed in the outside records:  3/17/2022  hepatitis panel negative  6/10/2022 MANDY 65 antibodies, insulin autoantibodies and antiislet cell antibodies, zinc transporter antibodies undetectable; C-peptide 2.56  10/20/2022 normal CBC  10/3/2022 triglycerides 225, HDL cholesterol 67, LDL cholesterol 90, magnesium 1.6 (1.5-2.5), vitamin D level 21, morning cortisol 6.4 (time of collection unavailable), B12 419, ferritin 51, free T4 1.3 (0.8-1.8), TSH 1.72  10/5/2022 urine microalbumin to creatinine ratio 1096  12/6/2022 phosphate 2.9 (2.5-4.5), CEA 1.8, parathyroid hormone 67 (16-77), BUN mildly elevated at 27, creatinine normal at 0.81, sodium 133, potassium 4.1, albumin 4.2, calcium 10.4 (8.6-10.2).  Prior calcium levels were 10.2 on 11/23/21 and 10.8 on 10/3/2022.  Alkaline phosphatase has been normal.    The chest x-ray from September 2022 showed clear lungs.  Head CT from December 2022 was unremarkable, done for evaluation of headaches.     9/29/2022 calcium 11.1 (normal range 8.4-10.4.)  12/5/2022 calcium 9.6 (normal range 8.5-10.5)  9/29/2022 TSH 0.99     A1c was 6.6 on 2/17/2021, 9.7 on 4/13/2022 12/14/20 C peptide 6.8, glucose 125, MANDY 65 antibodies negative     Thyroid US from 8/28/20 done at [facility]:  \"The right thyroid lobe measures: 1.5 cmx1.4 cmx3.6 cm   The left thyroid lobe measures: 1.1 cmx1.2 cmx3.2 cm   The isthmus measures: 1 mm in AP diameter.     The thyroid parenchyma appears: mildly heterogeneous.     A nodule in the mid left lobe measures 0.7 cm and " "has low suspicion for malignancy (1-5%). There are   small solid nodules inferior to each lobe of the thyroid gland, the largest on   the right measuring 1.5 cm, likely parathyroid glands. Additionally, there is a   1.2 cm soft tissue mass superior to the left lobe of the thyroid gland, which   may represent a lymph node.\"    Transthoracic echocardiogram from January 2021 was unremarkable, with an ejection fraction of 59%.    Past Medical History   Past Medical History:   Diagnosis Date     Hypertension    Right proximal tibial and fibular fracture 2017   Post traumatic deformity at the fourth right proximal phalangeal base 2018   Degenerative changes of the knees    Past Surgical History   Past Surgical History:   Procedure Laterality Date     HEAD & NECK SURGERY       ORTHOPEDIC SURGERY         Current Medications    Current Outpatient Medications:      acetaminophen (TYLENOL) 500 MG tablet, Take 1-2 tablets (500-1,000 mg) by mouth every 6 hours as needed for mild pain, Disp: 1 Bottle, Rfl: 0     Continuous Blood Gluc Sensor (FREESTYLE ABHIJIT 14 DAY SENSOR) MISC, Change every 14 days., Disp: 6 each, Rfl: 11     aspirin (ASA) 81 MG chewable tablet, Take 81 mg by mouth daily CHEW AND SWALLOW, Disp: , Rfl:      azelastine (OPTIVAR) 0.05 % ophthalmic solution, Apply 1 drop to eye 2 times daily, Disp: , Rfl:      Benzocaine-Menthol 10-2.1 MG LOZG, Take 1 lozenge by mouth 4 times daily as needed (Cough or sore throat), Disp: 84 lozenge, Rfl: 0     doxazosin (CARDURA) 2 MG tablet, Take 4 tablets (8 mg) by mouth 2 times daily, Disp: 360 tablet, Rfl: 3     FARXIGA 10 MG TABS tablet, Take 1 tablet by mouth daily at 2 pm, Disp: , Rfl:      ibuprofen (ADVIL/MOTRIN) 200 MG tablet, Take 3 tablets (600 mg) by mouth every 6 hours as needed for mild pain, Disp: 30 tablet, Rfl: 0     metFORMIN (GLUCOPHAGE) 1000 MG tablet, Take 1,000 mg by mouth 2 times daily (with meals), Disp: , Rfl:      metoprolol succinate ER (TOPROL XL) 50 MG " 24 hr tablet, Take 1 tablet (50 mg) by mouth daily, Disp: 90 tablet, Rfl: 3     phenoxybenzamine (DIBENZYLINE) 10 MG capsule, Take 1 capsule (10 mg) by mouth daily, Disp: 90 capsule, Rfl: 1     salicylic acid (COMPOUND W MAX STRENGTH) 17 % external gel, Apply topically daily Apply to wart once a day and cover with a bandage.  Use daily for the next two weeks., Disp: 1.25 g, Rfl: 0    Family History   Father passed away from MI while her mother was pregnant with her (not sure of his age). Paternal grandfather and paternal uncles have HTN.  No family history of diabetes. Mother  of cancer.      Social History  [place] immig[place] to [place] in , from a refugee camp in [place].  She was born in [place].    Single. She has 8 children, 9 to 24 yo. She denies smoking, drinking alcohol or using illicit drugs. Occupation: home care.               Vital Signs     Previous Weights:    Wt Readings from Last 10 Encounters:   02/15/23 93.2 kg (205 lb 6.4 oz)   05/10/22 93.9 kg (207 lb)   21 102.1 kg (225 lb)   21 99.8 kg (220 lb)   20 93.4 kg (206 lb)        Wt 95.4 kg (210 lb 6.4 oz)   BMI 31.07 kg/m       BP Readings from Last 3 Encounters:   02/15/23 (!) 143/96   23 (!) 137/90   23 (!) 177/119     BP lying down 162/104 pulse 71; 2 minutes 142/88, 5 minutes 138/86 pulse 79.    Physical Exam  General Appearance: General obesity, no distress noted  Eyes: grossly normal to inspection, conjunctivae and sclerae normal, no lid lag or stare   Respiratory: no audible wheeze, cough, or visible cyanosis.  No visible retractions or increased work of breathing.    Neurological: Cranial nerves grossly intact; no tremor of the hands     45 minutes spent on the date of the encounter doing chart review, history and exam, documentation and further activities as noted above.                            Again, thank you for allowing me to participate in the care of your patient.         Sincerely,        Deloris Vanessa MD

## 2023-03-08 NOTE — PROGRESS NOTES
Video-Visit Details    Type of service:  Video Visit    Video Start Time (time video started): 4:11 pm  Video End Time (time video stopped): 4:59 pm     Originating Location (pt. Location): Home  Distant Location (provider location): Off-site    Mode of Communication:  Video Conference [facility]    Assessment     Jessica Kennedy is a 48 year old [place] female seen in follow-up for evaluation of bilateral pheochromocytoma and MEN2A.     1.  Bilateral synchronous pheochromocytoma  The plan is to pursue cortical sparing bilateral adrenalectomy  Recommendations:  Discontinue Finerinone (this was recommended at her last visit with me but the patient continued to take it)  Increase Doxazocin to 6 tablets twice daily (12 mg twice daily)  Continue carvedilol at 25 mg twice daily  Maintain a good hydration by drinking 8 glasses of 8oz of liquids daily  Have salty snacks ad libitum   Were going to continue to monitor the patient's vitals and schedule her in our clinic on a weekly basis.   The patient is requesting a letter to support her  getting a visa and I completed one at the time of the encounter.    2. Medullary thyroid cancer and primary hyperparathyroidism  Following adrenalectomy we are going to pursue a SPECT-CT to further try to identify the enlarged parathyroid glands and pursue both thyroidectomy and parathyroidectomy simultaneously.    3.  Type 2 diabetes, currently medicated with glipizide and metformin.  She was not able to tolerate SGLT2 inhibitors.  Overall, although the A1c remains elevated, the glycemic control significantly improved recently, through dietary changes.  I did not prescribe additional antidiabetic medications, as I want the patient to focus on blood pressure management prior to surgery and not potential other side effects related to antidiabetic medications.  There is room to increase glipizide, if needed.  For now, the agreement was to continue to work on diet and  exercise.    Orders Placed This Encounter   Procedures     Continuous Glucose Monitoring >=72 hours PHYS INTERP     Hemoglobin A1c POCT     =========================================================================================================================================    History of Present Illness:  Jessica Kennedy is a 48 year old female with a past medical history significant for hypertension, obesity and type 2 diabetes seen in follow-up.  The patient is seen with the help of a [employer]  via iPad.  She did bring all her medications to the appointment and we were able to write on the label their administration using numbers, as the patient is illiterate.    1. Bilateral pheochromocytoma in the context of MEN2A.    The patient has a history of hypertension formally diagnosed in 2017 and symptoms suggestive of pheochromocytoma: Heat intolerance, tachycardia, headaches, constipation.  The bilateral adrenal masses were initially discovered on abdominal CT in April 2022.  The abdominal MRI from 4/13/2022 showed no signal dropout on out of phase images. The 12/2022 adrenal CT revealed bilateral adrenal masses, measuring 2.5 and 3.4 cm in maximum diameter, with a noncontrast Hounsfield unit density of 30/45 and heterogeneous enhancement with contrast, suggestive of pheochromocytoma.  Plasma metanephrines were elevated..     In preparation for bilateral subtotal adrenalectomy, the patient was started on treatment with doxazosin.  Current antihypertensive medications:  Doxazocin 4 tablets of 2 mg each, twice daily (8 mg BID). Doxazocin started on 1/30/23.  Carvedilol, 25 mg twice daily  Finerinone, 20 mg daily    I did prescribe metoprolol but the patient reported experiencing fatigue so it was replaced by carvedilol.  Reports feeling better since taking carvedilol.  The headaches have significantly improved.  She continues to feel warm most of the time and she does experience hot flashes.    2.  MEN2A  Recent labs revealed an elevated calcitonin level of 56.7.  The patient has not had a calcitonin level checked in the past.  Calcium level was normal but at the upper end of normal range, phosphorus was normal at 3.3 and parathyroid hormone level was 64.  Vitamin D level was 24.    The ultrasound images from 2/20/2023 identifued a possible parathyroid adenoma in the right inferior thyroid gland and a left thyroid nodule measuring 0.7 cm, poorly delineated, with ?microcalcifications.  The decision was not to pursue biopsy and pursue thyroidectomy following pheochromocytoma surgery.    3. Type 2 diabetes   Current antidiabetic regimen:  Metformin 1 gm BID  Glipizide, 5 mg daily.   She quit Farxiga 1 month ago due to genital fungal infections and dizziness.    A1c today was 8.4%, stable since December 2022.  For the last few weeks she has been wearing a Jabari sensor and we downloaded in the clinic.  On the sensor, the glucose was 178, with a glucose variability of 24, corresponding to a GMI of 7.6%.  58% of the glucose numbers were within target of , 5% of were above 250 and there was no significant hypoglycemia documented by the sensor.  The patient admits to healthier eating habits and trying to restrict carbohydrates in her diet.  She has been having more fruits and vegetables, lean meat and she has been drinking smoothies.  Remembers being treated with an injectable drug for diabetes in the past but she cannot provide details or the reason why it was discontinued.    Pertinent outside labs and imaging:   The blood work done at [facility], revealed the following results:  12/6/2022: Free metanephrines 942 (<57), free normetanephrine 647 (<148), LC/MS assay; chromogranin A 363 (<311)  10/5/2022: Free metanephrine 663, free normetanephrine 414, aldosterone 10 (LC MS), renin activity 0.48 (0.25-5.82), aldosterone to renin ratio 20.8  12/16/2022 24-hour urinary dopamine 147 (), epinephrine 5  "(2-24) norepinephrine 54 ().  No 24 hours urinary creatinine or urinary volume was reported.    Most recent A1c values:  8/20/2022 A1c 10.1%  12/6/2022 A1c 8.1%    Other pertinent labs reviewed in the outside records:  3/17/2022  hepatitis panel negative  6/10/2022 MANDY 65 antibodies, insulin autoantibodies and antiislet cell antibodies, zinc transporter antibodies undetectable; C-peptide 2.56  10/20/2022 normal CBC  10/3/2022 triglycerides 225, HDL cholesterol 67, LDL cholesterol 90, magnesium 1.6 (1.5-2.5), vitamin D level 21, morning cortisol 6.4 (time of collection unavailable), B12 419, ferritin 51, free T4 1.3 (0.8-1.8), TSH 1.72  10/5/2022 urine microalbumin to creatinine ratio 1096  12/6/2022 phosphate 2.9 (2.5-4.5), CEA 1.8, parathyroid hormone 67 (16-77), BUN mildly elevated at 27, creatinine normal at 0.81, sodium 133, potassium 4.1, albumin 4.2, calcium 10.4 (8.6-10.2).  Prior calcium levels were 10.2 on 11/23/21 and 10.8 on 10/3/2022.  Alkaline phosphatase has been normal.    The chest x-ray from September 2022 showed clear lungs.  Head CT from December 2022 was unremarkable, done for evaluation of headaches.     9/29/2022 calcium 11.1 (normal range 8.4-10.4.)  12/5/2022 calcium 9.6 (normal range 8.5-10.5)  9/29/2022 TSH 0.99     A1c was 6.6 on 2/17/2021, 9.7 on 4/13/2022 12/14/20 C peptide 6.8, glucose 125, MANDY 65 antibodies negative     Thyroid US from 8/28/20 done at [facility]:  \"The right thyroid lobe measures: 1.5 cmx1.4 cmx3.6 cm   The left thyroid lobe measures: 1.1 cmx1.2 cmx3.2 cm   The isthmus measures: 1 mm in AP diameter.     The thyroid parenchyma appears: mildly heterogeneous.     A nodule in the mid left lobe measures 0.7 cm and has low suspicion for malignancy (1-5%). There are   small solid nodules inferior to each lobe of the thyroid gland, the largest on   the right measuring 1.5 cm, likely parathyroid glands. Additionally, there is a   1.2 cm soft tissue mass superior to the " "left lobe of the thyroid gland, which   may represent a lymph node.\"    Transthoracic echocardiogram from January 2021 was unremarkable, with an ejection fraction of 59%.    Past Medical History   Past Medical History:   Diagnosis Date     Hypertension    Right proximal tibial and fibular fracture 2017   Post traumatic deformity at the fourth right proximal phalangeal base 2018   Degenerative changes of the knees    Past Surgical History   Past Surgical History:   Procedure Laterality Date     HEAD & NECK SURGERY       ORTHOPEDIC SURGERY         Current Medications    Current Outpatient Medications:      acetaminophen (TYLENOL) 500 MG tablet, Take 1-2 tablets (500-1,000 mg) by mouth every 6 hours as needed for mild pain, Disp: 1 Bottle, Rfl: 0     Continuous Blood Gluc Sensor (FREESTYLE ABHIJIT 14 DAY SENSOR) MISC, Change every 14 days., Disp: 6 each, Rfl: 11     aspirin (ASA) 81 MG chewable tablet, Take 81 mg by mouth daily CHEW AND SWALLOW, Disp: , Rfl:      azelastine (OPTIVAR) 0.05 % ophthalmic solution, Apply 1 drop to eye 2 times daily, Disp: , Rfl:      Benzocaine-Menthol 10-2.1 MG LOZG, Take 1 lozenge by mouth 4 times daily as needed (Cough or sore throat), Disp: 84 lozenge, Rfl: 0     doxazosin (CARDURA) 2 MG tablet, Take 4 tablets (8 mg) by mouth 2 times daily, Disp: 360 tablet, Rfl: 3     FARXIGA 10 MG TABS tablet, Take 1 tablet by mouth daily at 2 pm, Disp: , Rfl:      ibuprofen (ADVIL/MOTRIN) 200 MG tablet, Take 3 tablets (600 mg) by mouth every 6 hours as needed for mild pain, Disp: 30 tablet, Rfl: 0     metFORMIN (GLUCOPHAGE) 1000 MG tablet, Take 1,000 mg by mouth 2 times daily (with meals), Disp: , Rfl:      metoprolol succinate ER (TOPROL XL) 50 MG 24 hr tablet, Take 1 tablet (50 mg) by mouth daily, Disp: 90 tablet, Rfl: 3     phenoxybenzamine (DIBENZYLINE) 10 MG capsule, Take 1 capsule (10 mg) by mouth daily, Disp: 90 capsule, Rfl: 1     salicylic acid (COMPOUND W MAX STRENGTH) 17 % external gel, " Apply topically daily Apply to wart once a day and cover with a bandage.  Use daily for the next two weeks., Disp: 1.25 g, Rfl: 0    Family History   Father passed away from MI while her mother was pregnant with her (not sure of his age). Paternal grandfather and paternal uncles have HTN.  No family history of diabetes. Mother  of cancer.      Social History  Jess[place] immig[place] to [place] in [place], from a refugee camp in [facility].  She was born in [place].    Single. She has 8 children, 9 to 24 yo. She denies smoking, drinking alcohol or using illicit drugs. Occupation: home care.               Vital Signs     Previous Weights:    Wt Readings from Last 10 Encounters:   02/15/23 93.2 kg (205 lb 6.4 oz)   05/10/22 93.9 kg (207 lb)   21 102.1 kg (225 lb)   21 99.8 kg (220 lb)   20 93.4 kg (206 lb)        Wt 95.4 kg (210 lb 6.4 oz)   BMI 31.07 kg/m       BP Readings from Last 3 Encounters:   02/15/23 (!) 143/96   23 (!) 137/90   23 (!) 177/119     BP lying down 162/104 pulse 71; 2 minutes 142/88, 5 minutes 138/86 pulse 79.    Physical Exam  General Appearance: General obesity, no distress noted  Eyes: grossly normal to inspection, conjunctivae and sclerae normal, no lid lag or stare   Respiratory: no audible wheeze, cough, or visible cyanosis.  No visible retractions or increased work of breathing.    Neurological: Cranial nerves grossly intact; no tremor of the hands     45 minutes spent on the date of the encounter doing chart review, history and exam, documentation and further activities as noted above.

## 2023-03-08 NOTE — LETTER
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     She is going to require bilateral adrenalectomy, thyroidectomy and parathyroid surgery. The adrenal surgery is considered a high risk surgical procedure, as both the adrenal glands are surgically removed. In preparation for surgery, the patient is going to require to have the blood pressure medications constantly adjusted, 1-2 times a week, in order to attain a very good blood pressure control and decrease her cardiovascular risk at the time of the procedure. Once the adrenal glands are surgically removed, she will most likely require treatment with steroid medications lifelong.  Following surgery, she is going to require to be scheduled for the second surgery, which consists of surgical removal of the thyroid gland and parathyroid surgery.   Both these surgeries involve recovery time and frequent monitoring postoperatively, with clinical f/up appointments.     Please let me know if you have any questions or concerns.     Sincerely,    Deloris Vanessa MD

## 2023-03-14 ENCOUNTER — APPOINTMENT (OUTPATIENT)
Dept: INTERPRETER SERVICES | Facility: CLINIC | Age: 48
End: 2023-03-14
Payer: COMMERCIAL

## 2023-03-14 ENCOUNTER — TELEPHONE (OUTPATIENT)
Dept: SURGERY | Facility: CLINIC | Age: 48
End: 2023-03-14
Payer: COMMERCIAL

## 2023-03-14 NOTE — TELEPHONE ENCOUNTER
Called patient through  Services to discuss April 3 as a surgery date for an adrenalectomy with Dr. Caceres. Call went to , left my call back number with  for patient to call to discuss.

## 2023-03-21 ENCOUNTER — OFFICE VISIT (OUTPATIENT)
Dept: ENDOCRINOLOGY | Facility: CLINIC | Age: 48
End: 2023-03-21
Payer: COMMERCIAL

## 2023-03-21 DIAGNOSIS — D35.00 PHEOCHROMOCYTOMA, UNSPECIFIED LATERALITY: ICD-10-CM

## 2023-03-21 NOTE — PROGRESS NOTES
Patient presented today for blood pressure check.    Dr. Patelcu out of office but discussed patient with Dr. Elizabeth prior.      Medical assistant, Stewart Francois, provided patient's orthostatic blood pressure readings.         Dr. Elizabeth recommended patient increase her Doxazocin to 7 tablets twice daily (14 mg twice daily). (patient previously on Doxazocin 12 mg twice daily).        Writer went into exam room with patient. Patient confirmed in English that she is currently taking Doxazocin 6 mg twice daily. Advised patient to increase to  Doxazocin to 7 tablets twice daily (14 mg twice daily). Patient verbalizes understanding and agrees to plan. Patient questioned if she can get a 14 mg tablet. Writer explained that there is not one available and she will have to use the 2 mg tablets. Patient verbalizes understanding. Dr. Elizabeth recommended patient have blood pressure check on Monday, 3/27. Patient requests a phone call at 11am.     Writer scheduled phone visit for 3/27/23.         Writer will send to Dr. Elizabeth to send updated prescription.         Missy Patricia RN  Endocrine Care Coordinator  [facility]

## 2023-03-22 ENCOUNTER — TELEPHONE (OUTPATIENT)
Dept: ENDOCRINOLOGY | Facility: CLINIC | Age: 48
End: 2023-03-22
Payer: COMMERCIAL

## 2023-03-22 RX ORDER — DOXAZOSIN 2 MG/1
14 TABLET ORAL 2 TIMES DAILY
Qty: 420 TABLET | Refills: 1 | Status: SHIPPED | OUTPATIENT
Start: 2023-03-22 | End: 2023-03-27

## 2023-03-22 NOTE — TELEPHONE ENCOUNTER
Per chart review Phenoxybenzamine was not able to be cancelled at pharmacy and call needed to be made to cancel. Dr. Vanessa had originally tried to send in to see if covered but cancelled Rx on 3/8/23.       Outpatient Medication Detail     Disp Refills Start End CHRISTOPHER   phenoxybenzamine (DIBENZYLINE) 10 MG capsule (Discontinued) 90 capsule 1 3/3/2023 3/8/2023 --   Sig - Route: Take 1 capsule (10 mg) by mouth daily - Oral   Patient not taking: Reported on 3/8/2023        Sent to pharmacy as: Phenoxybenzamine HCl 10 MG Oral Capsule (DIBENZYLINE)   Class: E-Prescribe   Order: [id number]   E-Prescribing Status: Receipt confirmed by pharmacy (3/3/2023  1:18 PM CST)   E-Cancel Status: Request denied by pharmacy (3/8/2023 12:30 PM CST)       E-Cancel Status Note: Unable to Cancel Rx. Please contact Pharmacy         Writer called pharmacy to cancel Rx but they noted that patient picked up #30 tablets on 3/14/23.     Writer called patient on 3 way call with  to confirm she is not taking the Phenoxybenzamine.     Patient reports that she is currently not home and driving but can check when she is home but she notes that she does not think she is taking it. Patient asks for call tomorrow to further discuss. Patient will review and update Dr. Elizabeth in Dr. Vanessa's absence.     Missy Patricia, RN  Endocrine Care Coordinator  [facility]

## 2023-03-24 ENCOUNTER — APPOINTMENT (OUTPATIENT)
Dept: INTERPRETER SERVICES | Facility: CLINIC | Age: 48
End: 2023-03-24
Payer: COMMERCIAL

## 2023-03-24 NOTE — TELEPHONE ENCOUNTER
Please note Dr. Elizabeth was updated. Writer also called patient on 3 way call with . Patient confirmed that she has the Phenoxybenzamine but she is not taking the medication and won't. Prescription has been cancelled at pharmacy.     Writer will follow-up with patient on Monday as planned.       Missy Patricia RN  Endocrine Care Coordinator  [facility]

## 2023-03-27 ENCOUNTER — VIRTUAL VISIT (OUTPATIENT)
Dept: ENDOCRINOLOGY | Facility: CLINIC | Age: 48
End: 2023-03-27
Payer: COMMERCIAL

## 2023-03-27 DIAGNOSIS — D35.00 PHEOCHROMOCYTOMA, UNSPECIFIED LATERALITY: Primary | ICD-10-CM

## 2023-03-27 DIAGNOSIS — D35.00 PHEOCHROMOCYTOMA, UNSPECIFIED LATERALITY: ICD-10-CM

## 2023-03-27 RX ORDER — DOXAZOSIN 2 MG/1
18 TABLET ORAL 2 TIMES DAILY
Qty: 1600 TABLET | Refills: 1 | Status: SHIPPED | OUTPATIENT
Start: 2023-03-27 | End: 2023-04-05

## 2023-03-27 NOTE — PROGRESS NOTES
Writer again attempted to contact patient to obtain blood pressure readings.     As per 3/21/23 encounter patient was increased to Doxazocin to 7 tablets twice daily (14 mg twice daily).     Patient reports that she is not currently home but her blood pressure today was 150/100, pulse 76. Patient confirms that she increased to Doxazocin to 7 tablets twice daily (14 mg twice daily).    Writer asked patient if she would be available to call tomorrow to obtain orthostatic blood pressure readings. Patient asked to be called between 11am-12pm.    Writer will call patient at that time.     Will send this update above on current blood pressure to Dr. Vanessa to review.         Missy Patricia RN  Endocrine Care Coordinator  [facility]

## 2023-03-27 NOTE — PROGRESS NOTES
Attempted to contact patient to review blood pressure readings. No answer. Left voicemail for patient to contact our office. Will try back at later time.       Missy Patricia RN  Endocrine Care Coordinator  [facility]

## 2023-03-28 ENCOUNTER — TELEPHONE (OUTPATIENT)
Dept: ENDOCRINOLOGY | Facility: CLINIC | Age: 48
End: 2023-03-28

## 2023-03-28 ENCOUNTER — TELEPHONE (OUTPATIENT)
Dept: ENDOCRINOLOGY | Facility: CLINIC | Age: 48
End: 2023-03-28
Payer: COMMERCIAL

## 2023-03-28 NOTE — TELEPHONE ENCOUNTER
Rec'd message back via CoverMyMeds that a Clinical Override is not needed.   However, in reviewing the chart the request we submitted to insurance was for 2mg (14 tabs per day), and most recently her dose has been increased to (18 tabs per day).   I'll check to see if a Qty Limit Auth is needed for this.

## 2023-03-28 NOTE — TELEPHONE ENCOUNTER
Central Prior Authorization Team   Phone: [phone]      QUANTITY LIMIT Initiation - I've submitted the P/A request to insurance as an URGENT request.    Medication: doxazosin (CARDURA) 2 MG tablet (18 tabs per day)  Insurance Company: [employer] - Phone [phone] Fax [phone]  Pharm[facility] Rx: [id number] [place] [address] [address] AT [facility] & [facility]  Filling Pharmacy Phone: [phone]  Filling Pharmacy Fax:    Start Date: 3/28/2023

## 2023-03-28 NOTE — TELEPHONE ENCOUNTER
Patient had requested call between 11-12 today. Writer had left message at 11:13am. Writer again attempted to contact patient on 3 way call with .     Patient advised of recommendations from Dr. Vanessa. Patient verbalizes understanding. Patient requested to be changed to a medication that she does not have to take so many tablets. Patient explained that there is not another option and unfortunately the amount of tablets having to take will be large but not forever. Writer reviewed with patient needing pre prepare her for surgery and control her blood pressure.  advised patient and then  lost connection. Patient in English did she she verbalizes understanding and will take the 9 tablets twice daily. Writer did offer to get another  on the line and patient denied. Writer advised patient that I will check win with Dr. Vanessa to determine next follow-up. Writer will then give patient a call. Patient verbalizes understanding and agrees to plan.       Missy Patricia RN  Endocrine Care Coordinator  [facility]

## 2023-03-28 NOTE — TELEPHONE ENCOUNTER
Please refer to 3/27/23 nurse visit note for past details.    Dr. Vanessa reviewed and sent writer the following message last evening:      I saw her blood pressure from today and it was elevated.  Please ask her to increase the dose of doxazosin from 14 BID to 18 BID.        doxazosin (CARDURA) 2 MG tablet 1600 tablet 1 3/27/2023  No   Sig - Route: Take 9 tablets (18 mg) by mouth 2 times daily - Oral   Sent to pharmacy as: Doxazosin Mesylate 2 MG Oral Tablet (CARDURA)   Class: E-Prescribe   Order: [id number]   E-Prescribing Status: Receipt confirmed by pharmacy (3/27/2023  8:19 PM CDT)         Writer attempted to contact patient, no answer. Left voicemail for patient to contact our office.       Missy Patricia RN  Endocrine Care Coordinator  [facility]

## 2023-03-29 NOTE — TELEPHONE ENCOUNTER
Quantity Limit Authorization not needed - Per insurance, there is an approval on file previously, the filling pharmacy needs to call their pharmacy help desk for any help in getting rx processed with an override.  I have notified pharmacy.    Medication: doxazosin (CARDURA) 2 MG tablet (18 tabs per day)  Insurance Company: [employer] - Phone [phone] Fax [phone]  Expected CoPay:      Pharm[facility] the Rx[id number] - [facility] [address] AT [facility]  Pharmacy Notified: Yes  Patient Notified: No

## 2023-04-05 ENCOUNTER — OFFICE VISIT (OUTPATIENT)
Dept: ENDOCRINOLOGY | Facility: CLINIC | Age: 48
End: 2023-04-05
Payer: COMMERCIAL

## 2023-04-05 VITALS
HEART RATE: 82 BPM | BODY MASS INDEX: 30.13 KG/M2 | OXYGEN SATURATION: 97 % | SYSTOLIC BLOOD PRESSURE: 159 MMHG | WEIGHT: 204 LBS | DIASTOLIC BLOOD PRESSURE: 97 MMHG

## 2023-04-05 DIAGNOSIS — C73 MEDULLARY THYROID CARCINOMA (H): ICD-10-CM

## 2023-04-05 DIAGNOSIS — D35.00 PHEOCHROMOCYTOMA, UNSPECIFIED LATERALITY: Primary | ICD-10-CM

## 2023-04-05 DIAGNOSIS — E26.09 PRIMARY HYPERALDOSTERONISM (H): ICD-10-CM

## 2023-04-05 DIAGNOSIS — E11.9 TYPE 2 DIABETES MELLITUS NOT AT GOAL (H): ICD-10-CM

## 2023-04-05 PROCEDURE — 99215 OFFICE O/P EST HI 40 MIN: CPT | Performed by: INTERNAL MEDICINE

## 2023-04-05 RX ORDER — DOXAZOSIN 2 MG/1
18 TABLET ORAL 2 TIMES DAILY
Qty: 1600 TABLET | Refills: 1 | Status: CANCELLED | OUTPATIENT
Start: 2023-04-05

## 2023-04-05 RX ORDER — AMLODIPINE BESYLATE 10 MG/1
10 TABLET ORAL DAILY
Qty: 90 TABLET | Refills: 1 | Status: SHIPPED | OUTPATIENT
Start: 2023-04-05 | End: 2023-07-19

## 2023-04-05 RX ORDER — DOXAZOSIN 8 MG/1
24 TABLET ORAL 2 TIMES DAILY
Qty: 54 TABLET | Refills: 3 | Status: ON HOLD | OUTPATIENT
Start: 2023-04-05 | End: 2023-08-23

## 2023-04-05 RX ORDER — OMEPRAZOLE 40 MG/1
40 CAPSULE, DELAYED RELEASE ORAL DAILY
Qty: 90 CAPSULE | Refills: 1 | Status: SHIPPED | OUTPATIENT
Start: 2023-04-05

## 2023-04-05 RX ORDER — ASPIRIN 81 MG/1
81 TABLET, CHEWABLE ORAL DAILY
Qty: 90 TABLET | Refills: 1 | Status: ON HOLD | OUTPATIENT
Start: 2023-04-05 | End: 2023-08-23

## 2023-04-05 NOTE — LETTER
4/5/2023         RE: Jessica Kennedy  [address]  [address]  [facility] [address]        Dear Colleague,    Thank you for referring your patient, Jessica Kennedy, to the [facility]. Please see a copy of my visit note below.    =======================================================================================================================================  Assessment     Jessica Kennedy is a 48 year old [place] female seen in follow-up for evaluation of bilateral pheochromocytoma and MEN2A.     1.  Bilateral synchronous pheochromocytoma  The plan is to pursue cortical sparing bilateral adrenalectomy  Controlling the blood pressure preop has been challenging.  Recently, there is a concern the patient may not absorb doxazosin given the intermittent episodes of nausea and vomiting.    Recommendations:  Increase doxazosin to 24 mg twice daily.  I changed the prescription to 8 mg tablets and I instructed the patient to take 3 tablets twice daily.  Written instructions also provided.  Continue carvedilol at 25 mg twice daily  Were going to continue to monitor the patient's vitals on a weekly basis.   I am going to ask my colleague Dr. Elizabeth to follow-up with her in a couple of weeks.  Alternative treatment options to be considered if the blood pressure remains difficult to control: Adding phenoxybenzamine (which was approved by her insurance but I am not sure it is available in pharmacy), adding nifedipine or Norvasc, considering treatment with metyrosine.    2. Medullary thyroid cancer and primary hyperparathyroidism  Following adrenalectomy we are going to pursue a SPECT-CT to further try to identify the enlarged parathyroid glands and pursue both thyroidectomy and parathyroidectomy simultaneously.    3.  Type 2 diabetes, currently medicated with glipizide and metformin.  She was not able to tolerate SGLT2 inhibitors.  Overall, the glycemic control is fair, in the context of  Ramadan.  I did not prescribe additional antidiabetic medications, as I want the patient to focus on blood pressure management prior to surgery and not potential other side effects related to antidiabetic medications.    4.  Sore throat, heartburns, nausea  Recommended treatment with 40 mg omeprazole, on a trial basis.    No orders of the defined types were placed in this encounter.    =========================================================================================================================================    History of Present Illness:  Jessica Kennedy is a 48 year old female with a past medical history significant for hypertension, obesity and type 2 diabetes seen in follow-up.  The patient is seen with the help of a EventBoard  via iPad.      1. Bilateral pheochromocytoma in the context of MEN2A.    The patient has a history of hypertension formally diagnosed in 2017 and symptoms suggestive of pheochromocytoma: Heat intolerance, tachycardia, headaches, constipation.  The bilateral adrenal masses were initially discovered on abdominal CT in April 2022.  The abdominal MRI from 4/13/2022 showed no signal dropout on out of phase images. The 12/2022 adrenal CT revealed bilateral adrenal masses, measuring 2.5 and 3.4 cm in maximum diameter, with a noncontrast Hounsfield unit density of 30/45 and heterogeneous enhancement with contrast, suggestive of pheochromocytoma.  Plasma metanephrines were elevated..     In preparation for bilateral subtotal adrenalectomy, the patient was started on treatment with doxazosin.  Current antihypertensive medications:  Doxazocin 9 tablets of 2 mg each, twice daily (18 mg BID). Doxazocin started on 1/30/23. Last dose increase occurred on 3/27/23.   Carvedilol, 25 mg twice daily  I did prescribe metoprolol but the patient reported experiencing fatigue so it was replaced by carvedilol.     She has been fasting for Ramgiovanna.  She takes the doxazosin at 4 in the morning  and at 730 to 8 PM.  Quite frequently, she has been experiencing nausea and, at times, she does vomit shortly after eating.  Denies constipation.  She does endorse heartburns and a recent sore throat.  Since she vomits right after eating and taking the tablets of doxazosin, it is possible she is actually not absorbing it.  Yesterday, she vomited right after dinner.  Today in the morning, she did not.  She cannot state how frequent are the episodes of vomiting.    2. MEN2A  Recent labs revealed an elevated calcitonin level of 56.7.  The patient has not had a calcitonin level checked in the past.  Calcium level was normal but at the upper end of normal range, phosphorus was normal at 3.3 and parathyroid hormone level was 64.  Vitamin D level was 24.    The ultrasound images from 2/20/2023 identified a possible parathyroid adenoma in the right inferior thyroid gland and a left thyroid nodule measuring 0.7 cm, poorly delineated, with ?microcalcifications.  The decision was not to pursue biopsy and pursue thyroidectomy following pheochromocytoma surgery.    3. Type 2 diabetes   Current antidiabetic regimen:  Metformin 1 gm BID  Glipizide, 5 mg daily.   She quit Farxiga due to genital fungal infections and dizziness.    Most recent A1c from 3/8/23 was 8.4%.     I reviewed the Dexcom readings at the time of the visit.  The patient has significantly improved her diet.  Average glucose is 160 and 71% of the glucose numbers are in range, 6% are above 240.  There is no hypoglycemia.  There is a significant postprandial spike with dinner, but otherwise the glucose is fairly well controlled.    Pertinent outside labs and imaging:   The blood work done at [facility], revealed the following results:  12/6/2022: Free metanephrines 942 (<57), free normetanephrine 647 (<148), LC/MS assay; chromogranin A 363 (<311)  10/5/2022: Free metanephrine 663, free normetanephrine 414, aldosterone 10 (LC MS), renin activity 0.48  "(0.25-5.82), aldosterone to renin ratio 20.8  12/16/2022 24-hour urinary dopamine 147 (), epinephrine 5 (2-24) norepinephrine 54 ().  No 24 hours urinary creatinine or urinary volume was reported.    Other pertinent labs reviewed in the outside records:  3/17/2022  hepatitis panel negative  6/10/2022 MANDY 65 antibodies, insulin autoantibodies and antiislet cell antibodies, zinc transporter antibodies undetectable; C-peptide 2.56  10/20/2022 normal CBC  10/3/2022 triglycerides 225, HDL cholesterol 67, LDL cholesterol 90, magnesium 1.6 (1.5-2.5), vitamin D level 21, morning cortisol 6.4 (time of collection unavailable), B12 419, ferritin 51, free T4 1.3 (0.8-1.8), TSH 1.72  10/5/2022 urine microalbumin to creatinine ratio 1096  12/6/2022 phosphate 2.9 (2.5-4.5), CEA 1.8, parathyroid hormone 67 (16-77), BUN mildly elevated at 27, creatinine normal at 0.81, sodium 133, potassium 4.1, albumin 4.2, calcium 10.4 (8.6-10.2).  Prior calcium levels were 10.2 on 11/23/21 and 10.8 on 10/3/2022.  Alkaline phosphatase has been normal.    The chest x-ray from September 2022 showed clear lungs.  Head CT from December 2022 was unremarkable, done for evaluation of headaches.     9/29/2022 calcium 11.1 (normal range 8.4-10.4.)  12/5/2022 calcium 9.6 (normal range 8.5-10.5)  9/29/2022 TSH 0.99     A1c was 6.6 on 2/17/2021, 9.7 on 4/13/2022 12/14/20 C peptide 6.8, glucose 125, MANDY 65 antibodies negative     Thyroid US from 8/28/20 done at [facility]:  \"The right thyroid lobe measures: 1.5 cmx1.4 cmx3.6 cm   The left thyroid lobe measures: 1.1 cmx1.2 cmx3.2 cm   The isthmus measures: 1 mm in AP diameter.     The thyroid parenchyma appears: mildly heterogeneous.     A nodule in the mid left lobe measures 0.7 cm and has low suspicion for malignancy (1-5%). There are   small solid nodules inferior to each lobe of the thyroid gland, the largest on   the right measuring 1.5 cm, likely parathyroid glands. Additionally, there is a " "  1.2 cm soft tissue mass superior to the left lobe of the thyroid gland, which   may represent a lymph node.\"    Transthoracic echocardiogram from January 2021 was unremarkable, with an ejection fraction of 59%.    Past Medical History   Past Medical History:   Diagnosis Date     Hypertension    Right proximal tibial and fibular fracture 2017   Post traumatic deformity at the fourth right proximal phalangeal base 2018   Degenerative changes of the knees    Past Surgical History   Past Surgical History:   Procedure Laterality Date     HEAD & NECK SURGERY       ORTHOPEDIC SURGERY         Current Medications    Current Outpatient Medications:      acetaminophen (TYLENOL) 500 MG tablet, Take 1-2 tablets (500-1,000 mg) by mouth every 6 hours as needed for mild pain, Disp: 1 Bottle, Rfl: 0     aspirin (ASA) 81 MG chewable tablet, Take 81 mg by mouth daily CHEW AND SWALLOW, Disp: , Rfl:      azelastine (OPTIVAR) 0.05 % ophthalmic solution, Apply 1 drop to eye 2 times daily (Patient not taking: Reported on 3/8/2023), Disp: , Rfl:      Benzocaine-Menthol 10-2.1 MG LOZG, Take 1 lozenge by mouth 4 times daily as needed (Cough or sore throat), Disp: 84 lozenge, Rfl: 0     carvedilol (COREG) 25 MG tablet, Take 25 mg by mouth 2 times daily (with meals), Disp: , Rfl:      Continuous Blood Gluc Sensor ([employer] ABHIJIT 14 DAY SENSOR) MIS, Change every 14 days., Disp: 6 each, Rfl: 11     doxazosin (CARDURA) 2 MG tablet, Take 9 tablets (18 mg) by mouth 2 times daily, Disp: 1600 tablet, Rfl: 1     glipiZIDE (GLUCOTROL) 5 MG tablet, Take 5 mg by mouth daily, Disp: , Rfl:      ibuprofen (ADVIL/MOTRIN) 200 MG tablet, Take 3 tablets (600 mg) by mouth every 6 hours as needed for mild pain, Disp: 30 tablet, Rfl: 0     metFORMIN (GLUCOPHAGE) 1000 MG tablet, Take 1,000 mg by mouth 2 times daily (with meals), Disp: , Rfl:      salicylic acid (COMPOUND W MAX STRENGTH) 17 % external gel, Apply topically daily Apply to wart once a day and cover " with a bandage.  Use daily for the next two weeks. (Patient not taking: Reported on 3/8/2023), Disp: 1.25 g, Rfl: 0    Family History   Father passed away from MI while her mother was pregnant with her (not sure of his age). Paternal grandfather and paternal uncles have HTN.  No family history of diabetes. Mother  of cancer.      Social History  Jessica immigrated to [place] in [place], from a [place] in [place].  She was born in [place].    Single. She has 8 children, 9 to 22 yo. She denies smoking, drinking alcohol or using illicit drugs. Occupation: home care.               Vital Signs     Previous Weights:    Wt Readings from Last 10 Encounters:   23 95.4 kg (210 lb 6.4 oz)   02/15/23 93.2 kg (205 lb 6.4 oz)   05/10/22 93.9 kg (207 lb)   21 102.1 kg (225 lb)   21 99.8 kg (220 lb)   20 93.4 kg (206 lb)        BP (!) 159/97 (BP Location: Left arm, Patient Position: Sitting, Cuff Size: Adult Large)   Pulse 82   Wt 92.5 kg (204 lb)   LMP 2023 (Exact Date)   SpO2 97%   BMI 30.13 kg/m       BP lying down 175/100 pulse 74     BP Readings from Last 3 Encounters:   02/15/23 (!) 143/96   23 (!) 137/90   23 (!) 177/119     General appearance: General obesity, no distress noted, facial melasma  Eyes: conjunctivae and extraocular motions are normal. Pupils are equal, round, and reactive to light. No lid lag, no stare.  HENT: oropharynx clear and moist with mild erythema; neck no JVD, no bruits, no thyromegaly, no palpable nodules  Cardiovascular: regular rhythm, no murmurs, distal pulses palpable, no edema  Respiratory: chest clear, no rales, no rhonchi    45 minutes spent on the date of the encounter doing chart review, history and exam, documentation and further activities as noted above.                 Again, thank you for allowing me to participate in the care of your patient.        Sincerely,        Deloris Vanessa MD

## 2023-04-05 NOTE — PROGRESS NOTES
=======================================================================================================================================  Assessment     Jessica Kennedy is a 48 year old [place] female seen in follow-up for evaluation of bilateral pheochromocytoma and MEN2A.     1.  Bilateral synchronous pheochromocytoma  The plan is to pursue cortical sparing bilateral adrenalectomy  Controlling the blood pressure preop has been challenging.  Recently, there is a concern the patient may not absorb doxazosin given the intermittent episodes of nausea and vomiting.    Recommendations:  Increase doxazosin to 24 mg twice daily.  I changed the prescription to 8 mg tablets and I instructed the patient to take 3 tablets twice daily.  Written instructions also provided.  Continue carvedilol at 25 mg twice daily  Were going to continue to monitor the patient's vitals on a weekly basis.   I am going to ask my colleague Dr. Elizabeth to follow-up with her in a couple of weeks.  Alternative treatment options to be considered if the blood pressure remains difficult to control: Adding phenoxybenzamine (which was approved by her insurance but I am not sure it is available in pharmacy), adding nifedipine or Norvasc, considering treatment with metyrosine.    2. Medullary thyroid cancer and primary hyperparathyroidism  Following adrenalectomy we are going to pursue a SPECT-CT to further try to identify the enlarged parathyroid glands and pursue both thyroidectomy and parathyroidectomy simultaneously.    3.  Type 2 diabetes, currently medicated with glipizide and metformin.  She was not able to tolerate SGLT2 inhibitors.  Overall, the glycemic control is fair, in the context of Ramadan.  I did not prescribe additional antidiabetic medications, as I want the patient to focus on blood pressure management prior to surgery and not potential other side effects related to antidiabetic medications.    4.  Sore throat, heartburns,  nausea  Recommended treatment with 40 mg omeprazole, on a trial basis.    No orders of the defined types were placed in this encounter.    =========================================================================================================================================    History of Present Illness:  Jessica Kennedy is a 48 year old female with a past medical history significant for hypertension, obesity and type 2 diabetes seen in follow-up.  The patient is seen with the help of a [employer]  via iPad.      1. Bilateral pheochromocytoma in the context of MEN2A.    The patient has a history of hypertension formally diagnosed in 2017 and symptoms suggestive of pheochromocytoma: Heat intolerance, tachycardia, headaches, constipation.  The bilateral adrenal masses were initially discovered on abdominal CT in April 2022.  The abdominal MRI from 4/13/2022 showed no signal dropout on out of phase images. The 12/2022 adrenal CT revealed bilateral adrenal masses, measuring 2.5 and 3.4 cm in maximum diameter, with a noncontrast Hounsfield unit density of 30/45 and heterogeneous enhancement with contrast, suggestive of pheochromocytoma.  Plasma metanephrines were elevated..     In preparation for bilateral subtotal adrenalectomy, the patient was started on treatment with doxazosin.  Current antihypertensive medications:  Doxazocin 9 tablets of 2 mg each, twice daily (18 mg BID). Doxazocin started on 1/30/23. Last dose increase occurred on 3/27/23.   Carvedilol, 25 mg twice daily  I did prescribe metoprolol but the patient reported experiencing fatigue so it was replaced by carvedilol.     She has been fasting for Ramadan.  She takes the doxazosin at 4 in the morning and at 730 to 8 PM.  Quite frequently, she has been experiencing nausea and, at times, she does vomit shortly after eating.  Denies constipation.  She does endorse heartburns and a recent sore throat.  Since she vomits right after eating and taking  the tablets of doxazosin, it is possible she is actually not absorbing it.  Yesterday, she vomited right after dinner.  Today in the morning, she did not.  She cannot state how frequent are the episodes of vomiting.    2. MEN2A  Recent labs revealed an elevated calcitonin level of 56.7.  The patient has not had a calcitonin level checked in the past.  Calcium level was normal but at the upper end of normal range, phosphorus was normal at 3.3 and parathyroid hormone level was 64.  Vitamin D level was 24.    The ultrasound images from 2/20/2023 identified a possible parathyroid adenoma in the right inferior thyroid gland and a left thyroid nodule measuring 0.7 cm, poorly delineated, with ?microcalcifications.  The decision was not to pursue biopsy and pursue thyroidectomy following pheochromocytoma surgery.    3. Type 2 diabetes   Current antidiabetic regimen:  Metformin 1 gm BID  Glipizide, 5 mg daily.   She quit Farxiga due to genital fungal infections and dizziness.    Most recent A1c from 3/8/23 was 8.4%.     I reviewed the Dexcom readings at the time of the visit.  The patient has significantly improved her diet.  Average glucose is 160 and 71% of the glucose numbers are in range, 6% are above 240.  There is no hypoglycemia.  There is a significant postprandial spike with dinner, but otherwise the glucose is fairly well controlled.    Pertinent outside labs and imaging:   The blood work done at [facility], revealed the following results:  12/6/2022: Free metanephrines 942 (<57), free normetanephrine 647 (<148), LC/MS assay; chromogranin A 363 (<311)  10/5/2022: Free metanephrine 663, free normetanephrine 414, aldosterone 10 (LC MS), renin activity 0.48 (0.25-5.82), aldosterone to renin ratio 20.8  12/16/2022 24-hour urinary dopamine 147 (), epinephrine 5 (2-24) norepinephrine 54 ().  No 24 hours urinary creatinine or urinary volume was reported.    Other pertinent labs reviewed in the outside  "records:  3/17/2022  hepatitis panel negative  6/10/2022 MANDY 65 antibodies, insulin autoantibodies and antiislet cell antibodies, zinc transporter antibodies undetectable; C-peptide 2.56  10/20/2022 normal CBC  10/3/2022 triglycerides 225, HDL cholesterol 67, LDL cholesterol 90, magnesium 1.6 (1.5-2.5), vitamin D level 21, morning cortisol 6.4 (time of collection unavailable), B12 419, ferritin 51, free T4 1.3 (0.8-1.8), TSH 1.72  10/5/2022 urine microalbumin to creatinine ratio 1096  12/6/2022 phosphate 2.9 (2.5-4.5), CEA 1.8, parathyroid hormone 67 (16-77), BUN mildly elevated at 27, creatinine normal at 0.81, sodium 133, potassium 4.1, albumin 4.2, calcium 10.4 (8.6-10.2).  Prior calcium levels were 10.2 on 11/23/21 and 10.8 on 10/3/2022.  Alkaline phosphatase has been normal.    The chest x-ray from September 2022 showed clear lungs.  Head CT from December 2022 was unremarkable, done for evaluation of headaches.     9/29/2022 calcium 11.1 (normal range 8.4-10.4.)  12/5/2022 calcium 9.6 (normal range 8.5-10.5)  9/29/2022 TSH 0.99     A1c was 6.6 on 2/17/2021, 9.7 on 4/13/2022 12/14/20 C peptide 6.8, glucose 125, MANDY 65 antibodies negative     Thyroid US from 8/28/20 done at [facility]:  \"The right thyroid lobe measures: 1.5 cmx1.4 cmx3.6 cm   The left thyroid lobe measures: 1.1 cmx1.2 cmx3.2 cm   The isthmus measures: 1 mm in AP diameter.     The thyroid parenchyma appears: mildly heterogeneous.     A nodule in the mid left lobe measures 0.7 cm and has low suspicion for malignancy (1-5%). There are   small solid nodules inferior to each lobe of the thyroid gland, the largest on   the right measuring 1.5 cm, likely parathyroid glands. Additionally, there is a   1.2 cm soft tissue mass superior to the left lobe of the thyroid gland, which   may represent a lymph node.\"    Transthoracic echocardiogram from January 2021 was unremarkable, with an ejection fraction of 59%.    Past Medical History   Past Medical " History:   Diagnosis Date     Hypertension    Right proximal tibial and fibular fracture 2017   Post traumatic deformity at the fourth right proximal phalangeal base 2018   Degenerative changes of the knees    Past Surgical History   Past Surgical History:   Procedure Laterality Date     HEAD & NECK SURGERY       ORTHOPEDIC SURGERY         Current Medications    Current Outpatient Medications:      acetaminophen (TYLENOL) 500 MG tablet, Take 1-2 tablets (500-1,000 mg) by mouth every 6 hours as needed for mild pain, Disp: 1 Bottle, Rfl: 0     aspirin (ASA) 81 MG chewable tablet, Take 81 mg by mouth daily CHEW AND SWALLOW, Disp: , Rfl:      azelastine (OPTIVAR) 0.05 % ophthalmic solution, Apply 1 drop to eye 2 times daily (Patient not taking: Reported on 3/8/2023), Disp: , Rfl:      Benzocaine-Menthol 10-2.1 MG LOZG, Take 1 lozenge by mouth 4 times daily as needed (Cough or sore throat), Disp: 84 lozenge, Rfl: 0     carvedilol (COREG) 25 MG tablet, Take 25 mg by mouth 2 times daily (with meals), Disp: , Rfl:      Continuous Blood Gluc Sensor (FREESTYLE ABHIJIT 14 DAY SENSOR) MIS, Change every 14 days., Disp: 6 each, Rfl: 11     doxazosin (CARDURA) 2 MG tablet, Take 9 tablets (18 mg) by mouth 2 times daily, Disp: 1600 tablet, Rfl: 1     glipiZIDE (GLUCOTROL) 5 MG tablet, Take 5 mg by mouth daily, Disp: , Rfl:      ibuprofen (ADVIL/MOTRIN) 200 MG tablet, Take 3 tablets (600 mg) by mouth every 6 hours as needed for mild pain, Disp: 30 tablet, Rfl: 0     metFORMIN (GLUCOPHAGE) 1000 MG tablet, Take 1,000 mg by mouth 2 times daily (with meals), Disp: , Rfl:      salicylic acid (COMPOUND W MAX STRENGTH) 17 % external gel, Apply topically daily Apply to wart once a day and cover with a bandage.  Use daily for the next two weeks. (Patient not taking: Reported on 3/8/2023), Disp: 1.25 g, Rfl: 0    Family History   Father passed away from MI while her mother was pregnant with her (not sure of his age). Paternal grandfather and  paternal uncles have HTN.  No family history of diabetes. Mother  of cancer.      Social History  Jessica immigrated to [place] in [place], from a refugee [place] in [place].  She was born in [place].    Single. She has 8 children, 9 to 24 yo. She denies smoking, drinking alcohol or using illicit drugs. Occupation: home care.               Vital Signs     Previous Weights:    Wt Readings from Last 10 Encounters:   23 95.4 kg (210 lb 6.4 oz)   02/15/23 93.2 kg (205 lb 6.4 oz)   05/10/22 93.9 kg (207 lb)   21 102.1 kg (225 lb)   21 99.8 kg (220 lb)   20 93.4 kg (206 lb)        BP (!) 159/97 (BP Location: Left arm, Patient Position: Sitting, Cuff Size: Adult Large)   Pulse 82   Wt 92.5 kg (204 lb)   LMP 2023 (Exact Date)   SpO2 97%   BMI 30.13 kg/m       BP lying down 175/100 pulse 74     BP Readings from Last 3 Encounters:   02/15/23 (!) 143/96   23 (!) 137/90   23 (!) 177/119     General appearance: General obesity, no distress noted, facial melasma  Eyes: conjunctivae and extraocular motions are normal. Pupils are equal, round, and reactive to light. No lid lag, no stare.  HENT: oropharynx clear and moist with mild erythema; neck no JVD, no bruits, no thyromegaly, no palpable nodules  Cardiovascular: regular rhythm, no murmurs, distal pulses palpable, no edema  Respiratory: chest clear, no rales, no rhonchi    45 minutes spent on the date of the encounter doing chart review, history and exam, documentation and further activities as noted above.

## 2023-04-05 NOTE — NURSING NOTE
Jessica Kennedy's goals for this visit include:   Chief Complaint   Patient presents with     RECHECK     Pheochromocytoma     She requests these members of her care team be copied on today's visit information: yes    PCP: Mariela, [facility]    Referring Provider:  No referring provider defined for this encounter.    BP (!) 159/97 (BP Location: Left arm, Patient Position: Sitting, Cuff Size: Adult Large)   Pulse 82   Wt 92.5 kg (204 lb)   LMP 03/19/2023 (Exact Date)   SpO2 97%   BMI 30.13 kg/m      Do you need any medication refills at today's visit? no    Julian DAVILA, TIM  Adult Endocrinology  [facility]

## 2023-04-12 ENCOUNTER — VIRTUAL VISIT (OUTPATIENT)
Dept: ENDOCRINOLOGY | Facility: CLINIC | Age: 48
End: 2023-04-12
Payer: COMMERCIAL

## 2023-04-12 DIAGNOSIS — D35.00 PHEOCHROMOCYTOMA, UNSPECIFIED LATERALITY: Primary | ICD-10-CM

## 2023-04-12 PROCEDURE — 99207 PR NO CHARGE NURSE ONLY: CPT | Mod: 93

## 2023-04-12 NOTE — PROGRESS NOTES
"As per Dr. Vanessa's progress note from 4/5/2023:    1.  Bilateral synchronous pheochromocytoma  The plan is to pursue cortical sparing bilateral adrenalectomy  Controlling the blood pressure preop has been challenging.  Recently, there is a concern the patient may not absorb doxazosin given the intermittent episodes of nausea and vomiting.     Recommendations:  Increase doxazosin to 24 mg twice daily.  I changed the prescription to 8 mg tablets and I instructed the patient to take 3 tablets twice daily.  Written instructions also provided.  Continue carvedilol at 25 mg twice daily  Were going to continue to monitor the patient's vitals on a weekly basis.     I am going to ask my colleague Dr. Elizabeth to follow-up with her in a couple of weeks.  Alternative treatment options to be considered if the blood pressure remains difficult to control: Adding phenoxybenzamine (which was approved by her insurance but I am not sure it is available in pharmacy), adding nifedipine or Norvasc, considering treatment with metyrosine.        Dr. Vanessa had patient scheduled for phone visit with writer to call and get blood pressure readings.     Writer called patient on 3 way call with . Patient reports that her blood pressure is \"sometimes good and sometimes bad.\"    Patient states that last night before she went to bed her blood pressure was 134/76, heart rate 88.     Writer asked if we could do orthostatic blood pressure readings while on the phone (5 minutes lying down, 2 minutes standing, and 3 more minutes standing).        5 minutes lying down: Blood pressure:  152/91  Pulse: 95      2 minutes total standing: Blood pressure: 170/105     Pulse: 92    5 minutes total standing: Blood pressure: 170/101     Pulse: 99        Patient confirms that she is taking doxazosin 8mg tablets, taking 3 in the AM and 3 in the PM but notes that she is only taking Carvedilol 25 mg ONCE daily in PM. Patient then stated that " because of Ramadan she is taking her medications differently. Patient states that because of fasting with Ramadan she takes the Doxazosin at 8pm and 4am and Carvedilol 25 mg at 8pm only.      Patient had to co  her daughter. Writer will send information to Dr. Elizabeth to review in Dr. Vanessa's absence.       Total length of call: 32 minutes    Missy Patricia RN  Endocrine Care Coordinator  [facility]      Reviewed progress notes, vitals and recommendation from primary endocrinologist.   Will add Norvasc 5 mg daily. Prescription is sent to pharmacy.   Please inform patient. Other plan to continue per previous orders.   Ricardo Elizabeth MD

## 2023-04-12 NOTE — Clinical Note
Yan Elizabeth,  I have had difficulty reaching patient but was able to reach her today. I noticed that Dr. Vanessa had sent in an Rx for Norvasc 10 mg on 4/5 before she left. Patient states that she used to take it but has not been. Do you want her to start on 5 mg or 10 mg?  Thank you! Missy

## 2023-04-13 RX ORDER — AMLODIPINE BESYLATE 5 MG/1
5 TABLET ORAL DAILY
Qty: 30 TABLET | Refills: 1 | Status: SHIPPED | OUTPATIENT
Start: 2023-04-13 | End: 2023-05-02

## 2023-04-13 NOTE — PROGRESS NOTES
As per Dr. Elizabeth addendum:    Reviewed progress notes, vitals and recommendation from primary endocrinologist.   Will add Norvasc 5 mg daily. Prescription is sent to pharmacy.   Please inform patient. Other plan to continue per previous orders.   Ricardo Elizabeth MD          Left voicemail for patient to contact our office.       Missy Patricia RN  Endocrine Care Coordinator  [facility]

## 2023-04-17 NOTE — PROGRESS NOTES
Patient has not returned clinics message.    Writer again attempted to contact patient on 3 way call with .       No answer. Left voicemail for patient to contact our office.       Missy Patricia RN  Endocrine Care Coordinator  [facility]

## 2023-04-19 NOTE — PROGRESS NOTES
Clinic has not received call back from patient. Writer attempted again to call patient on 3 way call with .     Advised patient of Dr. Elizabeth's recommendations.Writer will check in with Dr. Elizabeth and call patient tomorrow. Patient also confirms that today her blood pressure was 150/100 with pulse of 76. Patient reports that she used to take amlodipine (on medication list from 4/5/23 for 10 mg by Dr. Vanessa). Writer advised patient that message will be sent to Dr. Elizabeth to determine if patient should take Norvasc should be 5 mg or 10 mg daily. Patient will not be home until after 5 pm to and notes she will be home tomorrow at 12pm. Patient agrees to plan.       Missy Patricia RN  Endocrine Care Coordinator  [facility]

## 2023-04-20 NOTE — PROGRESS NOTES
Per Dr. Elizabeth:    Norvasc 10 mg daily should be okay.                   Writer called patient on 3 way call with  at patient requested time. No answer. Left voicemail for patient to contact our office.       Missy Patricia RN  Endocrine Care Coordinator  [facility]

## 2023-04-21 NOTE — PROGRESS NOTES
Writer has not heard back from patient. Writer again attempted to contact patient on 3 way call with . Advised patient of recommendations from Dr. Elizabeth. Patient verbalizes understanding and agrees to plan.     Writer will plan to call patient next Wednesday and patient will call sooner with any questions or concerns.       Missy Patricia RN  Endocrine Care Coordinator  [facility]

## 2023-04-26 ENCOUNTER — TELEPHONE (OUTPATIENT)
Dept: ENDOCRINOLOGY | Facility: CLINIC | Age: 48
End: 2023-04-26
Payer: COMMERCIAL

## 2023-04-26 NOTE — TELEPHONE ENCOUNTER
Please advise to continue the same medication.   Please schedule orthostatic in clinic 5/2/23 before 11:30 am and schedule at the 11:30 opening with this writer.   Agree with the recommendation given.   Ricardo Elizabeth MD

## 2023-04-26 NOTE — TELEPHONE ENCOUNTER
Please refer back to 4/12/23 nurse note for recent details.     Writer reached out to patient on 3 way call with  to determine how blood pressure if running since adding the amlodipine 10 mg daily to daily regimen.     Patient reports that her blood pressure is running good. Patient notes that she did not take it today but yesterday (4/25/23) morning it was 144/99 with pulse of 76.    Writer asked patient to check blood pressure while on the phone with her. Patient checked blood pressure and it was 143/83 with pulse of 74.     Patient confirms that she added the amlodipine 10 mg daily. Patient reports that she is having some dizziness upon standing. Patient states that sometimes she has to sit back down.     Writer did ask patient if sitting on edge of bed or chair before standing helps. Patient states that yes, this does help some. Writer advised patient that message will be sent to Dr. Elizabeth who is covering for Dr. Vanessa. In the meantime writer advised patient to always sit for short time before standing and stand for short time before walking. Patient verbalizes understanding and agrees to plan.         Missy Patricia RN  Endocrine Care Coordinator  [facility]

## 2023-04-28 NOTE — TELEPHONE ENCOUNTER
Writer contacted patient on 3 way call with  services. Advised her of Dr. Elizabeth's recommendations. Patient verbalizes understanding. Patient states that she is not able to come to the clinic on Tuesday but can do video visit on 5/2 at 11:30am. Patient cannot come Monday either. Patient does agrees to do Orthostatic blood pressure via phone again with nurse on Tuesday at 9am.    Writer will update Dr. Elizabeth.        Missy Patricia, RN  Endocrine Care Coordinator  [facility]

## 2023-05-01 NOTE — TELEPHONE ENCOUNTER
Per Dr. Elizabeth:    Please change the appointment with me to a day she can come to the clinic for orthostatic (TBD) and cancel scheduled appointment with me for 5/2. She can keep her appointment with you as scheduled for next week 5/2.   Thank you           Appointment with Dr. Elizabeth on 5/2 canceled. Called patient on 3 way call with . No answer. Left voicemail for patient and will call again tomorrow.       Missy Patricia RN  Endocrine Care Coordinator  [facility]

## 2023-05-02 ENCOUNTER — VIRTUAL VISIT (OUTPATIENT)
Dept: ENDOCRINOLOGY | Facility: CLINIC | Age: 48
End: 2023-05-02
Payer: COMMERCIAL

## 2023-05-02 VITALS — SYSTOLIC BLOOD PRESSURE: 126 MMHG | HEART RATE: 83 BPM | DIASTOLIC BLOOD PRESSURE: 82 MMHG

## 2023-05-02 DIAGNOSIS — E27.8 MASS OF BOTH ADRENAL GLANDS (H): Primary | ICD-10-CM

## 2023-05-02 NOTE — PROGRESS NOTES
Virtual visit with nurse.   Reported home BP noted. Progress note by primary endocrinologist noted.   Please schedule pt for a clinic orthostatics per previous order by Dr. Vanessa.   Ricardo Elizabeth MD

## 2023-05-02 NOTE — NURSING NOTE
Blood pressures completed by patient at home.    09:35 am - 109/65  Pulse - 85    09:37 am - 139/85  Pulse - 90     09:40 am - 126/82  Pulse - 83    Will forward to Dr. Elizabeth to review.    Rosi Capps, [facility]  Adult Endocrinology  [facility] [place]

## 2023-05-02 NOTE — Clinical Note
When patient returns call. Schedule Orthostatic blood pressure appointment (preferably Tuesday when Dr. Elizabeth in office).

## 2023-05-02 NOTE — Clinical Note
Please schedule pt for a clinic orthostatics per previous order by Dr. Vanessa.  Ricardo Elizabeth MD

## 2023-05-03 NOTE — TELEPHONE ENCOUNTER
Please refer to 5/2/23 virtual visit note.      Missy Patricia, RN  Endocrine Care Coordinator  [facility]

## 2023-05-05 NOTE — PROGRESS NOTES
As per Dr. Elizabeth:    Thank you for your follow up with this patient.     Please schedule BP visit for orthostatics in clinic per Radulescu's parameters.       If parameters are met (looks like might be from home BP); we will alert her surgeon so that they can schedule her surgery. I don't think we need to wait until 5/22/23 for orthostatic check.     I was planning to see her in clinic as scheduled regarding referral to the surgeon etc..  but when I checked the chart she has already seen the surgeon and ready to go.     Thank you,   Ricarod Elizabeth MD           Writer contacted patient on 3 way call with  to schedule blood pressure visit for next week. No answer. Left voicemail for patient to contact our office. Will try back again on Monday.       Missy Patricia RN  Endocrine Care Coordinator  [facility]

## 2023-05-08 NOTE — PROGRESS NOTES
Writer again attempted to contact patient on 3 way call with . Advised patient of Dr. Elizabeth's recommendations. Patient states that she can come to clinic this Friday in the morning. Writer is not in clinic this Friday, 5/12/23. Patient cannot do another day. Writer will review with clinic staff that will be working Friday to determine if patient is able to come that day. Writer will then call back to patient with update. Patient verbalizes understanding and agrees to plan.     Patient also wanted refills on her Frestyle Jabari sensors and doxazocin 8 mg tablets. Currently both prescriptions should have refills on file. Writer advised patient that clinic will call pharmacy to confirm. Patient verbalizes understanding and agrees to plan.    Patient confirmed with [facility] both have refills and they will refill them now.      Missy Patricia RN  Endocrine Care Coordinator  [facility]

## 2023-05-09 NOTE — PROGRESS NOTES
Confirmed with Endocrine clinic staff member, Stewart Francois, that patient's request for blood pressure check on Friday, 5/12/23, can be accommodated.     Contacted patient on 3 way call with  to schedule nurse visit. Scheduled patient for orthostatic blood pressure check this Friday, 5/12/23 at 10:00am.      Missy Patricia RN  Endocrine Care Coordinator  [facility]

## 2023-05-12 ENCOUNTER — OFFICE VISIT (OUTPATIENT)
Dept: ENDOCRINOLOGY | Facility: CLINIC | Age: 48
End: 2023-05-12
Payer: COMMERCIAL

## 2023-05-12 DIAGNOSIS — R03.0 ELEVATED BLOOD PRESSURE READING: Primary | ICD-10-CM

## 2023-05-12 PROCEDURE — 99207 PR NO CHARGE NURSE ONLY: CPT

## 2023-05-12 NOTE — Clinical Note
Hi Dr Elizabeth, patient came in for nurse appointment blood pressure check today. Readings are available. Please advise.  TIM Jordan Adult Endocrinology [facility]

## 2023-05-12 NOTE — PROGRESS NOTES
Patient here for orthostatic blood pressure check. She complains of acid reflux. Writer wrote down Omeprazole on medication list for patient to follow up with her pharmacy. She states is taking her blood pressure medication every day.      TIM Jordan  Adult Endocrinology  [facility]

## 2023-05-16 ENCOUNTER — TELEPHONE (OUTPATIENT)
Dept: ENDOCRINOLOGY | Facility: CLINIC | Age: 48
End: 2023-05-16
Payer: COMMERCIAL

## 2023-05-16 NOTE — TELEPHONE ENCOUNTER
Received message from Dr. Elizabeth as follows:    Team,     This is Dr. Vanessa's pt. We are following pt's BP in her absence in a setting of pheochromocytoma. See nurse visit from 5/12/23 for details on blood pressure.     Jessica Kennedy blood pressure looks good for now. Please advise to continue all medications as she is currently doing. Please advise increased fluid and salt intake.  Please cancel her appointment with me (hold the space) on 5/22/23 and add-on to Dr. Kraus's scheduled when she is back first day. She has completed a referral to the surgeon already. I will message the surgeon re: when they are planning surgery and depending on the plan we might need to add metyrosine just a few days prior to surgery so that Jessica will not have significant side effects unnecessarily ahead of scheduled date.     Please let me know if you have any questions.   Ricardo Elizabeth MD                   Writer contacted patient on 3 way call with  to review above from Dr. Elizabeth. Patient advised of above details. Patient verbalizes understanding. Patient will continue medications as currently taking and will also increase salt and fluid intake. Scheduled patient for office visit with Dr. Vanessa on 6/28/23. Canceled 5/22/23 office visit with Dr. Elizabeth.    Will update Dr. Elizabeth.      Missy Patricia, RN  Endocrine Care Coordinator  [facility]

## 2023-06-07 ENCOUNTER — OFFICE VISIT (OUTPATIENT)
Dept: SURGERY | Facility: CLINIC | Age: 48
End: 2023-06-07
Payer: COMMERCIAL

## 2023-06-07 VITALS
SYSTOLIC BLOOD PRESSURE: 147 MMHG | WEIGHT: 214.7 LBS | HEIGHT: 69 IN | HEART RATE: 76 BPM | DIASTOLIC BLOOD PRESSURE: 92 MMHG | OXYGEN SATURATION: 98 % | BODY MASS INDEX: 31.8 KG/M2

## 2023-06-07 DIAGNOSIS — D35.00 PHEOCHROMOCYTOMA, UNSPECIFIED LATERALITY: Primary | ICD-10-CM

## 2023-06-07 PROCEDURE — 99213 OFFICE O/P EST LOW 20 MIN: CPT | Performed by: SURGERY

## 2023-06-07 NOTE — PROGRESS NOTES
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  We are tentatively planning a bilateral adrenalectomy with attempt for cortical sparing at least on one side if possible.  Today I covered the procedure its risks potential complications and alternatives.  We will attempt to go through the posterior approach.  I do see some uninvolved adrenal gland on the patient's right side but not so much on the left.  We will attempt further delineation using intraoperative ultrasonography.  We will target a date for July 10 if possible.

## 2023-06-07 NOTE — NURSING NOTE
"(   Chief Complaint   Patient presents with     RECHECK     Follow up    )    ( Weight: 97.4 kg (214 lb 11.2 oz) )  ( Height: 175.3 cm (5' 9\") )  ( BMI (Calculated): 31.71 )  (   )  (   )  (   )  (   )  (   )  (   )    ( BP: (!) 147/92 )  (   )  (   )  (   )  ( Pulse: 76 )  (   )  ( SpO2: 98 % )    ( There is no problem list on file for this patient.   )  (   Current Outpatient Medications   Medication Sig Dispense Refill     acetaminophen (TYLENOL) 500 MG tablet Take 1-2 tablets (500-1,000 mg) by mouth every 6 hours as needed for mild pain 1 Bottle 0     amLODIPine (NORVASC) 10 MG tablet Take 1 tablet (10 mg) by mouth daily 90 tablet 1     aspirin (ASA) 81 MG chewable tablet Take 1 tablet (81 mg) by mouth daily CHEW AND SWALLOW 90 tablet 1     Benzocaine-Menthol 10-2.1 MG LOZG Take 1 lozenge by mouth 4 times daily as needed (Cough or sore throat) 84 lozenge 0     carvedilol (COREG) 25 MG tablet Take 25 mg by mouth 2 times daily (with meals)       Continuous Blood Gluc Sensor (FREESTYLE ABHIJIT 14 DAY SENSOR) MISC Change every 14 days. 6 each 11     Continuous Blood Gluc Sensor (FREESTYLE ABHIJIT 2 SENSOR) MISC 1 each every 14 days 6 each 3     doxazosin (CARDURA) 8 MG tablet Take 3 tablets (24 mg) by mouth 2 times daily 54 tablet 3     glipiZIDE (GLUCOTROL) 5 MG tablet Take 5 mg by mouth daily       ibuprofen (ADVIL/MOTRIN) 200 MG tablet Take 3 tablets (600 mg) by mouth every 6 hours as needed for mild pain 30 tablet 0     metFORMIN (GLUCOPHAGE) 1000 MG tablet Take 1,000 mg by mouth 2 times daily (with meals)       omeprazole (PRILOSEC) 40 MG DR capsule Take 1 capsule (40 mg) by mouth daily 90 capsule 1    )  ( Diabetes Eval:    )    ( Pain Eval:  Data Unavailable )    ( Wound Eval:       )    (   History   Smoking Status     Never   Smokeless Tobacco     Never    )    ( Signed By:  Rd Sanderson, EMT; June 7, 2023; 11:41 AM )    "

## 2023-06-07 NOTE — LETTER
6/7/2023       RE: Jessica Kennedy  [address]  [address]  [facility] [address]     Dear Colleague,    Thank you for referring your patient, Jessica Kennedy, to the [facility] [place] at [facility]. Please see a copy of my visit note below.    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  We are tentatively planning a bilateral adrenalectomy with attempt for cortical sparing at least on one side if possible.  Today I covered the procedure its risks potential complications and alternatives.  We will attempt to go through the posterior approach.  I do see some uninvolved adrenal gland on the patient's right side but not so much on the left.  We will attempt further delineation using intraoperative ultrasonography.  We will target a date for July 10 if possible.    Sincerely,    Domenico Caceres MD

## 2023-06-07 NOTE — Clinical Note
Tentatively we can plan a date of surgery on July 10 if there is time available and if not July 17.  My team can confirm today.  We will proceed with bilateral adrenalectomy using the posterior approach with cortical sparing approach.  It appears that this will be more likely on the right than on the left.  For the case we will schedule bilateral cortical sparing posterior adrenalectomy with intraoperative ultrasonography.  Case time 7 hours.

## 2023-06-08 ENCOUNTER — APPOINTMENT (OUTPATIENT)
Dept: INTERPRETER SERVICES | Facility: CLINIC | Age: 48
End: 2023-06-08
Payer: COMMERCIAL

## 2023-06-08 ENCOUNTER — TELEPHONE (OUTPATIENT)
Dept: SURGERY | Facility: CLINIC | Age: 48
End: 2023-06-08
Payer: COMMERCIAL

## 2023-06-08 ENCOUNTER — HOSPITAL ENCOUNTER (INPATIENT)
Facility: CLINIC | Age: 48
Setting detail: SURGERY ADMIT
End: 2023-06-08
Attending: SURGERY | Admitting: SURGERY
Payer: COMMERCIAL

## 2023-06-08 NOTE — TELEPHONE ENCOUNTER
Called  Services to call patient to let her know the adrenalectomy with Dr. Caceres has been scheduled on Monday, July 10. Patient will need to arrive at [facility] at 5:30 a.m. for a 7:30 case start.   Gave  the phone number for patient to schedule her pre-op H&P with PAC and requested this be scheduled in-person about 3 weeks prior to surgery.

## 2023-06-19 ENCOUNTER — TELEPHONE (OUTPATIENT)
Dept: SURGERY | Facility: CLINIC | Age: 48
End: 2023-06-19
Payer: COMMERCIAL

## 2023-06-19 ENCOUNTER — APPOINTMENT (OUTPATIENT)
Dept: INTERPRETER SERVICES | Facility: CLINIC | Age: 48
End: 2023-06-19
Payer: COMMERCIAL

## 2023-06-19 NOTE — TELEPHONE ENCOUNTER
Called patient through  Services to discuss surgery date of July 10 for her adrenalectomy with Dr. Caceres. Case start time is 7:30 a.m., will need to arrive at 5:30 a.m.  PAC in-person appointment scheduled June 20 at 1 p.m., 5th floor [facility] [facility]

## 2023-06-19 NOTE — TELEPHONE ENCOUNTER
FUTURE VISIT INFORMATION      SURGERY INFORMATION:    Date: 7/10/23    Location: uu or    Surgeon:  Domenico Caceres MD    Anesthesia Type:  general    Procedure: ADRENALECTOMY, LAPAROSCOPIC with cortical sparing    Consult: ov 23    RECORDS REQUESTED FROM:       Primary Care Provider: Ellis Parker M.D.  - [place]    Most recent EKG+ Tracin22- [facility]

## 2023-06-20 ENCOUNTER — OFFICE VISIT (OUTPATIENT)
Dept: SURGERY | Facility: CLINIC | Age: 48
End: 2023-06-20
Payer: COMMERCIAL

## 2023-06-20 ENCOUNTER — ANESTHESIA EVENT (OUTPATIENT)
Dept: MEDSURG UNIT | Facility: CLINIC | Age: 48
End: 2023-06-20
Payer: COMMERCIAL

## 2023-06-20 ENCOUNTER — PRE VISIT (OUTPATIENT)
Dept: SURGERY | Facility: CLINIC | Age: 48
End: 2023-06-20

## 2023-06-20 VITALS
WEIGHT: 215 LBS | BODY MASS INDEX: 33.74 KG/M2 | SYSTOLIC BLOOD PRESSURE: 146 MMHG | HEIGHT: 67 IN | RESPIRATION RATE: 16 BRPM | TEMPERATURE: 98.4 F | HEART RATE: 87 BPM | DIASTOLIC BLOOD PRESSURE: 88 MMHG | OXYGEN SATURATION: 99 %

## 2023-06-20 DIAGNOSIS — Z01.818 PRE-OP EVALUATION: Primary | ICD-10-CM

## 2023-06-20 PROCEDURE — 99205 OFFICE O/P NEW HI 60 MIN: CPT | Performed by: NURSE PRACTITIONER

## 2023-06-20 ASSESSMENT — ENCOUNTER SYMPTOMS: ORTHOPNEA: 0

## 2023-06-20 ASSESSMENT — PAIN SCALES - GENERAL: PAINLEVEL: SEVERE PAIN (7)

## 2023-06-20 ASSESSMENT — LIFESTYLE VARIABLES: TOBACCO_USE: 0

## 2023-06-20 NOTE — H&P
Pre-Operative H & P     CC:  Preoperative exam to assess for increased cardiopulmonary risk while undergoing surgery and anesthesia.    Date of Encounter: 6/20/2023  Primary Care Physician:  Mariela, [facility]     Reason for visit: Pre-operative evaluation    HPI  Jessica Kennedy is a 48 year old female who presents for pre-operative H & P in preparation for  Procedure Information     Case: [id number] Date/Time: 07/10/23 0730    Procedure: ADRENALECTOMY, LAPAROSCOPIC with cortical sparing (Bilateral: Abdomen)    Anesthesia type: General    Diagnosis: Pheochromocytoma, unspecified laterality [D35.00]    Pre-op diagnosis: Pheochromocytoma, unspecified laterality [D35.00]    Location:  OR  /  OR    Providers: Domenico Caceres MD          Brent Lopez is a 48 year old male with a PMH significant for HTN, DM2, bilateral synchronous pheochromocytoma initially noted on abdominal CT in April 2022 in the context of Medullary thyroid cancer and primary hyperparathyroidism and GERD. She has consulted with Dr. Caceres and presents today in preparation for the above recommended procedure.      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, [id number]       Past Surgical History  Past Surgical History:   Procedure Laterality Date     HEAD & NECK SURGERY       ORTHOPEDIC SURGERY         Prior to Admission Medications  Current Outpatient Medications   Medication Sig Dispense Refill     acetaminophen (TYLENOL) 500 MG tablet Take 1-2 tablets (500-1,000 mg) by mouth every 6 hours as needed for mild pain 1 Bottle 0     amLODIPine (NORVASC) 10 MG  tablet Take 1 tablet (10 mg) by mouth daily 90 tablet 1     carvedilol (COREG) 25 MG tablet Take 25 mg by mouth 2 times daily (with meals)       doxazosin (CARDURA) 8 MG tablet Take 3 tablets (24 mg) by mouth 2 times daily 54 tablet 3     glipiZIDE (GLUCOTROL) 5 MG tablet Take 5 mg by mouth daily       ibuprofen (ADVIL/MOTRIN) 200 MG tablet Take 3 tablets (600 mg) by mouth every 6 hours as needed for mild pain 30 tablet 0     metFORMIN (GLUCOPHAGE) 1000 MG tablet Take 1,000 mg by mouth 2 times daily (with meals)       omeprazole (PRILOSEC) 40 MG DR capsule Take 1 capsule (40 mg) by mouth daily 90 capsule 1     aspirin (ASA) 81 MG chewable tablet Take 1 tablet (81 mg) by mouth daily CHEW AND SWALLOW (Patient not taking: Reported on 6/20/2023) 90 tablet 1     Benzocaine-Menthol 10-2.1 MG LOZG Take 1 lozenge by mouth 4 times daily as needed (Cough or sore throat) 84 lozenge 0     Continuous Blood Gluc Sensor (FREESTYLE ABHIJIT 14 DAY SENSOR) MISC Change every 14 days. 6 each 11     Continuous Blood Gluc Sensor (FREESTYLE ABHIJIT 2 SENSOR) MISC 1 each every 14 days 6 each 3       Allergies  Allergies   Allergen Reactions     Hydromorphone Hives     Other reaction(s): Hives     Ace Inhibitors      Other reaction(s): Unknown       Social History  Social History     Socioeconomic History     Marital status:      Spouse name: Not on file     Number of children: Not on file     Years of education: Not on file     Highest education level: Not on file   Occupational History     Not on file   Tobacco Use     Smoking status: Never     Passive exposure: Never     Smokeless tobacco: Never   Vaping Use     Vaping status: Not on file   Substance and Sexual Activity     Alcohol use: Never     Drug use: Never     Sexual activity: Not on file   Other Topics Concern     Not on file   Social History Narrative     Not on file     Social Determinants of Health     Financial Resource Strain: Not on file   Food Insecurity: Not on file  "  Transportation Needs: Not on file   Physical Activity: Not on file   Stress: Not on file   Social Connections: Not on file   Intimate Partner Violence: Not on file   Housing Stability: Not on file       Family History  No family history on file.    Review of Systems  The complete review of systems is negative other than noted in the HPI or here.   Anesthesia Evaluation   Pt has had prior anesthetic. Type: General.        ROS/MED HX  ENT/Pulmonary:     (+) sleep apnea, doesn't use CPAP,  (-) tobacco use, asthma and HARLEY risk factors   Neurologic:  - neg neurologic ROS     Cardiovascular:     (+) hypertension-range: systolic 135-140/ ----Previous cardiac testing   Echo: Date: 5/12/2023 Results:    Stress Test: Date: Results:    ECG Reviewed: Date: Results:    Cath: Date: Results:   (-) MERCER, orthopnea/PND and irregular heartbeat/palpitations   METS/Exercise Tolerance: 3 - Able to walk 1-2 blocks without stopping    Hematologic:  - neg hematologic  ROS     Musculoskeletal:  - neg musculoskeletal ROS     GI/Hepatic:     (+) GERD, Asymptomatic on medication,     Renal/Genitourinary: Comment: Pheochromocytoma      Endo:     (+) type II DM, Last HgA1c: 8.4, date: 3/8/2023, Not using insulin, thyroid problem,  Thyroid disease - Other Medullary thyroid cancer and primary hyperparathyroidism , Obesity,     Psychiatric/Substance Use:  - neg psychiatric ROS     Infectious Disease:  - neg infectious disease ROS     Malignancy:  - neg malignancy ROS     Other:            BP (!) 146/88 (BP Location: Right arm, Patient Position: Sitting, Cuff Size: Adult Large)   Pulse 87   Temp 98.4  F (36.9  C) (Oral)   Resp 16   Ht 1.702 m (5' 7\")   Wt 97.5 kg (215 lb)   LMP 05/09/2023 (Approximate)   SpO2 99%   Breastfeeding No   BMI 33.67 kg/m      Physical Exam   Constitutional: Awake, alert, cooperative, no apparent distress, and appears stated age.  Eyes: Pupils equal, round and reactive to light, extra ocular muscles intact, " sclera clear, conjunctiva normal.  HENT: Normocephalic, oral pharynx with moist mucus membranes, good dentition. No goiter appreciated.   Respiratory: Clear to auscultation bilaterally, no crackles or wheezing.  Cardiovascular: Regular rate and rhythm, normal S1 and S2, and no murmur noted.  Carotids +2, no bruits. No edema. Palpable pulses to radial  DP and PT arteries.   GI: Normal bowel sounds, soft, slightly-distended, non-tender, no masses palpated, no hepatosplenomegaly.  Surgical scars: deferred  Lymph/Hematologic: No cervical lymphadenopathy and no supraclavicular lymphadenopathy.  Genitourinary:  deferred  Skin: Warm and dry.  No rashes at anticipated surgical site.   Musculoskeletal: Full ROM of neck. There is no redness, warmth, or swelling of the joints. Gross motor strength is normal.    Neurologic: Awake, alert, oriented to name, place and time. Cranial nerves II-XII are grossly intact. Gait is normal.   Neuropsychiatric: Calm, cooperative. Normal affect.     Prior Labs/Diagnostic Studies   All labs and imaging personally reviewed     Echocardiogram 5/12/2023  For the complete report, see the Order-Level Documents.   Final Impressions   1. Normal left ventricular chamber size.   2. Calculated 2-D biplane volumetric left ventricular ejection fraction 59 %.   3. Normal cardiac valves.   4. Normal inferior vena cava size with normal inspiratory collapse (>50%).   5. Normal ascending aorta diameter.   6. Normal abdominal aorta Doppler flow pattern ( no coarctation).        EKG/ stress test - if available please see in ROS above   Echo result w/o MOPS:      The patient's records and results personally reviewed by this provider.     Outside records reviewed from: Care Everywhere    LAB/DIAGNOSTIC STUDIES TODAY:        Assessment      Jessica Kennedy is a 48 year old female seen as a PAC referral for risk assessment and optimization for anesthesia.    Plan/Recommendations  Pt will be optimized for the  "proposed procedure.  See below for details on the assessment, risk, and preoperative recommendations    NEUROLOGY  - No history of TIA, CVA or seizure  -Post Op delirium risk factors:  No risk identified    ENT  - No current airway concerns.  Will need to be reassessed day of surgery.  Mallampati: I  TM: > 3    CARDIAC  - No history of CAD and Afib   No anginal symptoms, Denies palpitations, PND, dizziness or syncope.   - Hypertension- difficult to control in setting of pheochromocytoma on multiple meds. Including amlodipine, doxazosin, cardura and carvedilol. Follows with Dr. Vanessa.  In his note BP at home and today ranges 130-145/80's   In Dr. Elizabeth note 5/16/2023  \" I will message the surgeon re: when they are planning surgery and depending on the plan we might need to add metyrosine just a few days prior to surgery so that Muhubo will not have significant side effects unnecessarily ahead of scheduled date. \"    - METS (Metabolic Equivalents)  Able to walk 1-2 blocks without stopping.   Patient CANNOT perform 4 METS exercise without symptoms            Total Score: 1    Functional Capacity: Unable to complete 4 METS      RCRI-Very low risk: Class 1 0.4% complication rate            Total Score: 0        PULMONARY  - Obstructive Sleep Apnea  HARLEY without home CPAP use.  Unable to tolerate mask    - Denies asthma or inhaler use  - Tobacco History      History   Smoking Status     Never   Smokeless Tobacco     Never       GI  - GERD  symptoms improving on recent iniation of PPI  PONV High Risk  Total Score: 3           1 AN PONV: Pt is Female    1 AN PONV: Patient is not a current smoker    1 AN PONV: Intended Post Op Opioids        /RENAL  - Baseline Creatinine  WNL  Pheochromocytoma- Procedure scheduled as above.     ENDOCRINE    - BMI: Estimated body mass index is 33.67 kg/m  as calculated from the following:    Height as of this encounter: 1.702 m (5' 7\").    Weight as of this encounter: 97.5 kg (215 " lb).  + Obesity BMI 33  Medullary thyroid cancer and primary hyperparathyroidism   - Diabetes  Diabetes Mellitus, Type 2, non-insulin dependent.  Hold morning oral hypoglycemic medications. Recommend close monitoring of the patient's blood glucose levels throughout the perioperative period.  Component Ref Range & Units 3 mo ago     Hemoglobin A1C POCT 4.3 - <5.7 % 8.4 Abnormal                Specimen Collected: 03/08/23 11:59 AM Last Resulted: 03/08/23 11:59              HEME  VTE Low Risk 0.26%            Total Score: 0      - No history of abnormal bleeding or antiplatelet use.    Of Note  Patient is wishing to post-pone surgery by 10 days to care for her son who just sustained a spinal injury.  She is a single mother of 5 with no outside help.   She currently is sleeping on a mattress on her floor. This would be unsuitable for her post-operative period.   I have put in a consult for  to contact her and evaluate her needs for herself pot-operatively and care of her children while she is hospitalized and re- cooperating.   I will contact the surgical team regarding our conversation today and ask them to reach out to her for scheduling details.   - Patient is also aware that if her surgical date is past 30 days of today she will need to have an updated H & P.     Different anesthesia methods/types have been discussed with the patient, but they are aware that the final plan will be decided by the assigned anesthesia provider on the date of service.  Patient was discussed with Dr Jiang    The patient is optimized for their procedure. AVS with information on surgery time/arrival time, meds and NPO status given by nursing staff. No further diagnostic testing indicated.      On the day of service:     Prep time: 20 minutes  Visit time: 25 minutes  Documentation time: 20 minutes  ------------------------------------------  Total time: 65 minutes      ARTIE Welsh CNP  Preoperative Assessment  [facility]  Clinic and Surgery Center  Phone: [phone]  Fax: [phone]

## 2023-06-20 NOTE — PATIENT INSTRUCTIONS
Preparing for Your Surgery  Shakiraseb gleason      Name:  Jessica Kennedy   MRN:  [id number]   :  1975   Today's Date:  2023       Arriving for surgery:  MELISA whitlockiieric 7/10/23 maxicajameel mack varghese 5:30 subaxnimo  Surgery date:  7/10/23  Arrival time:  5:30 am    Please come to:    [facility] [address] [place] [address]. Russ isticmaali cmtaa baarknaza  albaabkristian cartagena cisbitaalka.  waxa la luc karaa 24 saa/maalintii.     Please come to:      [facility] [address]  [address] [place]  [address]      The [facility] Patient /Visitor Ramp is located at [address]. Patients and visitors who self-park will receive the reduced hospital parking rate. If the Patient /Visitor Ramp is full, please follow the signs to the  parking located at the main hospital entrance.     parking is available ( 24 hours/ 7 days a week)    Discounted parking pass options are available for patients and visitors. They can be purchased at the  desk at the main hospital entrance.    -    Stop at the security desk and they will direct surgery patients to the 3rd floor Surgery Waiting Room. [phone]   Jhon rolandiska ammaanka oo dena freemana qalliinkristian liveaqa steffen Grady Sugitamaricarmena Florencialliinkristian. [phone]    -  If you are in need of directions, wheelchair or escort please stop at the Information/security desk in the lobby.       What can I eat or drink?  -  You may eat and drink normally up to 8 hours prior to arrival time. (Until 9:30 pm on 23)  -  You may have clear liquids until 2 hours prior to arrival time. (Until 3:30 am on 7/10/23)  - Carind cuni kartaa oo cabbi kartaa si caashakira ah ilaa 8 saacadonino townsend hor wakhtiga imaatinka. ([facility] 9:30 galabnimo  23)  - Carin orosco [facility] 2 [facility] hor wakhtiga imaatinka. (Ilaa  3:30 subaxnimo 7/10/23)    Gaviota tammikristen saafiga ah:  Biyo  maraq cad  Casiirka (tufaaxa, canabka cad, karamberriga cad  iyo cider) kateo humberto judd  Cabitakashif aaeric sanders, budada ku salaysan  (liin iyo Thor-Aid)  Songa (Sprite, 7-Up, joe sinjibiil iyo seltzer)  Jaz edmonds (caano la'humberto ama labeen)  Gatorade    -  No Alcohol or cannabis products for at least 24 hours before surgery.   - Aalkomilton ama heladioshiimary donu yarajerson 24 saacadood ka hor qaliinka.    Which medicines can I take?    Hold Aspirin for 7 days before surgery.   Hold Multivitamins for 7 days before surgery.  Hold Supplements for 7 days before surgery.  Hold Ibuprofen (Advil, Motrin) for 3 day(s) before surgery--unless otherwise directed by surgeon.  Hold Naproxen (Aleve) for 4 days before surgery.    -  DO NOT take these medications the day of surgery:  Aspirin - stop 7 days before surgery  Glipizide (Glucotrol)  Ibuprofen - stop 3 days before surgery  Metformin (Glucophage)    - Ha alondra fernandez:  Aspirin - jooji 7 maalmood ka hor qaliinka  Glipizide (Glucotrol)  Ibuprofen - jooji 3 maalmood ka hor qaliinka  Metformin (Glucophage)    -  PLEASE TAKE these medications the night before or the day of surgery at or close to your usual time:  Tylenol if needed  Amlodipine (Norvasc)  Carvedilol (Coreg)  Doxazosin (Cardura)  Omeprazole (Prilosec)    - GAVINO townsend hormegan lyn dhow:  Tylenol haddii loo baahdo  Amlodipine (Norvasc)  Carvedilol (Coreg)  Doxazosin (Cardura)  Omeprazole (Prilosec)    How do I prepare myself?  - Please take 2 showers (one the night prior to surgery and one the morning of surgery) using Scrubcare or Hibiclens soap.    Use this soap only from the neck to your toes.     Leave the soap on your skin for one minute--then rinse thoroughly.      You may use your own shampoo and conditioner. No other  hair products.   - Please remove all jewelry and body piercings.  - No lotions, deodorants or fragrance.  - No makeup or fingernail polish.   - Bring your ID and insurance card.    Sideen isu diyaariyaa?  - Fadlan qaado 2 qubays (pallavi habeen ka hor qalliinka iyo pallavi subaxda qalliinka) adoo isticmaalaya saabuunta Scrubcare ama Hibiclens.    Isticmaal saabuuntan kaliya qoorta ilaa suulashaada.     Ku dhaaf saabuunta maqaarkaaga pallavi daqiiqo - ka dibna si fiican u biyo raaci.     Waxaa laga yaabaa inaad isticmaasho shaambadaada iyo qaboojiyahaaga. Ma joserajerson wax kale oo jose ah.  - Fadlan ka saar dhammaan dahabka iyo daloolinta jirka.  - Ma jiro lotion, carfyo ama udgoon.  - Ma jiro wax la isku qurxiyo ama ciddiyaha faraha.  Ana qaado aqoonsigaaga iyo kaarka caymiska.    Covid testing policy as of 12/06/2022  Your surgeon will notify and schedule you for a COVID test if one is needed before surgery--please direct any questions or COVID symptoms to your surgeon      Questions or Concerns:    - For any questions regarding the day of surgery or your hospital stay, please contact the Pre Admission Nursing Office at [phone].     - If you have health changes between today and your surgery, please call your surgeon.     - For questions after surgery, please call your surgeons office.     - Howard maradiaga [facility] ku saabsan maalinta qalliinka ama josefinaogitashola isottoaastephanie, phoebe heller xiriir Xafiiska Tangkaaliyaha  Admission Admission at [phone].    - Cecilio carrdahalynn isbeddel caafimaad inta u dhaxaysa maanta iyo qalliinka, phoebe grossman dhakhtarkaaga qalliinka.    - Jaylen qalliirenay campo, jackilan wac heladiofiiska dakotah fernandez.       Tilmaamaha Booqdaha ee Hadda    Saacadaha booqashada: 8 subaxnimo ilaa 8:30 galabnimo     Bukaannada la xaqiijiyay ama miltonoga kellykisan yahay inay qabaan calaamadaha COVID 19 ama america:     Booqde looma ogola marcelokaanka christophCopper Springs Hospitalfabián . Carruurta (ka francie da'da 18) dena tirado 1  qof oo booqde ah.     Dadka buka ama muujinaya calaamadaha COVID 19 ama hargabka:    Looma ogola in ay ash booqdaan bukaanada--waxa kaliya oo aan samayn karnaa ka-reebis xaaladaha gaarka ah.      Fadlan raac tilmaamahan booqashadaada:          Fadlan ilaali fogaanta bulshada          Weji-xidhashadu waa ikhtiyaari - si kastaba flores ahaatee mararka qaarkood waxaa laga yaabaa in lagu weydiiyo inaad xidhato maaskaro badbaadada naftaada iyo kuwa kaleba     Gacmahaaga ku nadiifi gacmo nadiifiyaha khamriga. Sidan samee markaad timaado oo aad ka baxdo dhismaha iyo qolka bukaanka,    Mar labaad ka jarek markaad taabato maaskaradaada ama wax kasta oo qolka ku jira.    Si toos ah ugu tag oo uga ash bax qolka aad booqanayso.     Joog qolka bukaanka muddada booqashadaada. Xaddid inaad aado meelaha kale ee cisbitaalka intii suurtogal ah     Bacaha iyo jaakadaha kaga tag guriga ama baabuurka.     Caafimaadka qof kasta, fadlan ha u yariel oo ha tegin inta lagu jiro booqashadaada. Taas waxaa ka mid ah sigaarka  inta lagu guda jiro booqashadaada.

## 2023-06-21 ENCOUNTER — APPOINTMENT (OUTPATIENT)
Dept: INTERPRETER SERVICES | Facility: CLINIC | Age: 48
End: 2023-06-21
Payer: COMMERCIAL

## 2023-06-21 ENCOUNTER — TELEPHONE (OUTPATIENT)
Dept: SURGERY | Facility: CLINIC | Age: 48
End: 2023-06-21
Payer: COMMERCIAL

## 2023-06-21 ENCOUNTER — PATIENT OUTREACH (OUTPATIENT)
Dept: CARE COORDINATION | Facility: CLINIC | Age: 48
End: 2023-06-21
Payer: COMMERCIAL

## 2023-06-21 NOTE — TELEPHONE ENCOUNTER
Called patient through  Services that per the request of PAC her surgery currently scheduled on 7/10 will be moved to 8/14 with Dr. Domenico Caceres. Noted that she will need an updated H&P with PAC. VM message left.

## 2023-06-27 LAB
ABO/RH(D): NORMAL
ANTIBODY SCREEN: NEGATIVE
SPECIMEN EXPIRATION DATE: NORMAL

## 2023-06-27 NOTE — PROGRESS NOTES
Outcome for 06/27/23 8:58 AM: Data obtained via [employer] website-sent reminder via [employer] data link   Nadya Mann CMA  Adult Endocrinology  [facility]

## 2023-06-28 ENCOUNTER — OFFICE VISIT (OUTPATIENT)
Dept: ENDOCRINOLOGY | Facility: CLINIC | Age: 48
End: 2023-06-28
Payer: COMMERCIAL

## 2023-06-28 ENCOUNTER — TELEPHONE (OUTPATIENT)
Dept: ENDOCRINOLOGY | Facility: CLINIC | Age: 48
End: 2023-06-28

## 2023-06-28 VITALS — WEIGHT: 210.7 LBS | OXYGEN SATURATION: 98 % | RESPIRATION RATE: 16 BRPM | BODY MASS INDEX: 33 KG/M2

## 2023-06-28 DIAGNOSIS — E11.9 TYPE 2 DIABETES MELLITUS NOT AT GOAL (H): ICD-10-CM

## 2023-06-28 DIAGNOSIS — D35.00 PHEOCHROMOCYTOMA, UNSPECIFIED LATERALITY: ICD-10-CM

## 2023-06-28 DIAGNOSIS — C73 MEDULLARY THYROID CARCINOMA (H): Primary | ICD-10-CM

## 2023-06-28 DIAGNOSIS — Z01.818 PRE-OP EVALUATION: ICD-10-CM

## 2023-06-28 LAB
ANION GAP SERPL CALCULATED.3IONS-SCNC: 13 MMOL/L (ref 7–15)
BUN SERPL-MCNC: 21.7 MG/DL (ref 6–20)
CALCIUM SERPL-MCNC: 10.5 MG/DL (ref 8.6–10)
CHLORIDE SERPL-SCNC: 99 MMOL/L (ref 98–107)
CREAT SERPL-MCNC: 0.75 MG/DL (ref 0.51–0.95)
DEPRECATED HCO3 PLAS-SCNC: 22 MMOL/L (ref 22–29)
ERYTHROCYTE [DISTWIDTH] IN BLOOD BY AUTOMATED COUNT: 11.9 % (ref 10–15)
GFR SERPL CREATININE-BSD FRML MDRD: >90 ML/MIN/1.73M2
GLUCOSE SERPL-MCNC: 295 MG/DL (ref 70–99)
HBA1C MFR BLD: 9 % (ref 4.3–?)
HCT VFR BLD AUTO: 43.1 % (ref 35–47)
HGB BLD-MCNC: 14.3 G/DL (ref 11.7–15.7)
MCH RBC QN AUTO: 30 PG (ref 26.5–33)
MCHC RBC AUTO-ENTMCNC: 33.2 G/DL (ref 31.5–36.5)
MCV RBC AUTO: 90 FL (ref 78–100)
PLATELET # BLD AUTO: 288 10E3/UL (ref 150–450)
POTASSIUM SERPL-SCNC: 3.8 MMOL/L (ref 3.4–5.3)
RBC # BLD AUTO: 4.77 10E6/UL (ref 3.8–5.2)
SODIUM SERPL-SCNC: 134 MMOL/L (ref 136–145)
WBC # BLD AUTO: 10.3 10E3/UL (ref 4–11)

## 2023-06-28 PROCEDURE — 85027 COMPLETE CBC AUTOMATED: CPT | Performed by: INTERNAL MEDICINE

## 2023-06-28 PROCEDURE — 99000 SPECIMEN HANDLING OFFICE-LAB: CPT | Performed by: INTERNAL MEDICINE

## 2023-06-28 PROCEDURE — 36415 COLL VENOUS BLD VENIPUNCTURE: CPT | Performed by: INTERNAL MEDICINE

## 2023-06-28 PROCEDURE — 83036 HEMOGLOBIN GLYCOSYLATED A1C: CPT | Performed by: INTERNAL MEDICINE

## 2023-06-28 PROCEDURE — 86900 BLOOD TYPING SEROLOGIC ABO: CPT | Performed by: INTERNAL MEDICINE

## 2023-06-28 PROCEDURE — 86901 BLOOD TYPING SEROLOGIC RH(D): CPT | Performed by: INTERNAL MEDICINE

## 2023-06-28 PROCEDURE — 99214 OFFICE O/P EST MOD 30 MIN: CPT | Performed by: INTERNAL MEDICINE

## 2023-06-28 PROCEDURE — 82308 ASSAY OF CALCITONIN: CPT | Mod: 90 | Performed by: INTERNAL MEDICINE

## 2023-06-28 PROCEDURE — 86850 RBC ANTIBODY SCREEN: CPT | Performed by: INTERNAL MEDICINE

## 2023-06-28 PROCEDURE — 80048 BASIC METABOLIC PNL TOTAL CA: CPT | Performed by: INTERNAL MEDICINE

## 2023-06-28 RX ORDER — FLASH GLUCOSE SCANNING READER
EACH MISCELLANEOUS
Qty: 1 EACH | Refills: 0 | Status: SHIPPED | OUTPATIENT
Start: 2023-06-28

## 2023-06-28 RX ORDER — PROCHLORPERAZINE 25 MG/1
SUPPOSITORY RECTAL
Qty: 1 EACH | Refills: 0 | Status: SHIPPED | OUTPATIENT
Start: 2023-06-28 | End: 2023-07-06 | Stop reason: ALTCHOICE

## 2023-06-28 RX ORDER — PEN NEEDLE, DIABETIC 32GX 5/32"
NEEDLE, DISPOSABLE MISCELLANEOUS
Qty: 100 EACH | Refills: 3 | Status: SHIPPED | OUTPATIENT
Start: 2023-06-28

## 2023-06-28 RX ORDER — LANCETS
1 EACH MISCELLANEOUS DAILY
Qty: 100 EACH | Refills: 3 | Status: SHIPPED | OUTPATIENT
Start: 2023-06-28

## 2023-06-28 RX ORDER — PROCHLORPERAZINE 25 MG/1
SUPPOSITORY RECTAL
Qty: 1 EACH | Refills: 1 | Status: SHIPPED | OUTPATIENT
Start: 2023-06-28 | End: 2023-07-06 | Stop reason: ALTCHOICE

## 2023-06-28 RX ORDER — GLUCOSAMINE HCL/CHONDROITIN SU 500-400 MG
CAPSULE ORAL
Qty: 100 EACH | Refills: 3 | Status: SHIPPED | OUTPATIENT
Start: 2023-06-28

## 2023-06-28 RX ORDER — PROCHLORPERAZINE 25 MG/1
SUPPOSITORY RECTAL
Qty: 3 EACH | Refills: 5 | Status: SHIPPED | OUTPATIENT
Start: 2023-06-28 | End: 2023-07-06 | Stop reason: ALTCHOICE

## 2023-06-28 RX ORDER — PHENOXYBENZAMINE HYDROCHLORIDE 10 MG/1
10 CAPSULE ORAL DAILY
Qty: 90 CAPSULE | Refills: 3 | Status: SHIPPED | OUTPATIENT
Start: 2023-06-28 | End: 2023-07-05

## 2023-06-28 RX ORDER — FLASH GLUCOSE SENSOR
KIT MISCELLANEOUS
Qty: 6 EACH | Refills: 11 | Status: SHIPPED | OUTPATIENT
Start: 2023-06-28

## 2023-06-28 NOTE — TELEPHONE ENCOUNTER
Left Voicemail (1st Attempt) for the patient to call back and schedule the following:    Appointment type: diabetes educator appointment   Specialty phone number: [phone]     Pricila cervantes Procedure   Orthopedics, Podiatry, Sports Medicine, Ent ,Eye , Audiology, Adult Endocrine & Diabetes, Nutrition & Medication Therapy Management Specialties   [facility]

## 2023-06-28 NOTE — NURSING NOTE
Jessica Kennedy's goals for this visit include: NONE  She requests these members of her care team be copied on today's visit information: YES    PCP: Mariela, [facility]    Referring Provider:  No referring provider defined for this encounter.    Resp 16   Wt 95.6 kg (210 lb 11.2 oz)   LMP 05/09/2023 (Approximate)   SpO2 98%   BMI 33.00 kg/m      Do you need any medication refills at today's visit? NONE    Bernabe Jeter, EMT

## 2023-06-28 NOTE — LETTER
6/28/2023         RE: Jessica Kennedy  [address]  [address]  [facility] [address]        Dear Colleague,    Thank you for referring your patient, Jessica Kennedy, to the [facility]. Please see a copy of my visit note below.    Outcome for 06/27/23 8:58 AM: Data obtained via [employer] website-sent reminder via [employer] data link   Nadya Mann CMA  Adult Endocrinology  [facility]         =======================================================================================================================================  Assessment     Jessica Kennedy is a 48 year old [place] female seen in follow-up for evaluation of bilateral pheochromocytoma and MEN2A.     1.  Bilateral synchronous pheochromocytoma  The plan is to pursue cortical sparing bilateral adrenalectomy  Controlling the blood pressure preop has been challenging.  I recommended to add phenoxybenzamine 10 mg daily to her current antihypertensive regimen.  If there is no improvement, the plan is to consider treatment with losartan.  We're going to continue to monitor the patient's vitals on a weekly basis.  Close to surgery, I am considering hospitalizing the patient and starting treatment with metyrosine.   Follow-up BMP today    2. Medullary thyroid cancer and primary hyperparathyroidism  Following adrenalectomy we are going to pursue a SPECT-CT to further try to identify the enlarged parathyroid glands and pursue both thyroidectomy and parathyroidectomy simultaneously.  Follow-up calcitonin level    3.  Type 2 diabetes, uncontrolled, currently medicated with glipizide and metformin.  She was not able to tolerate SGLT2 inhibitors.   I recommended treatment with insulin in order to achieve a better glycemic control preop.  Counseled the patient on insulin administration and risk of hypoglycemia.  The plan is to start NPH at 15 units in the morning.  She did meet with the diabetes educator after the visit to learn  about insulin administration.  Prescription for sensors placed (either Jabari or Dexcom, based on her insurance coverage).    Orders Placed This Encounter   Procedures     Basic metabolic panel     Calcitonin     Adult Type and Screen     =========================================================================================================================================    History of Present Illness:  Jessica Kennedy is a 48 year old female with a past medical history significant for hypertension, obesity and type 2 diabetes seen in follow-up.  The patient is seen with the help of a [employer]  via iPad.      1. Bilateral pheochromocytoma in the context of MEN2A.    The patient has a history of hypertension formally diagnosed in 2017 and symptoms suggestive of pheochromocytoma: Heat intolerance, tachycardia, headaches, constipation.  The bilateral adrenal masses were initially discovered on abdominal CT in April 2022.  The abdominal MRI from 4/13/2022 showed no signal dropout on out of phase images. The 12/2022 adrenal CT revealed bilateral adrenal masses, measuring 2.5 and 3.4 cm in maximum diameter, with a noncontrast Hounsfield unit density of 30/45 and heterogeneous enhancement with contrast, suggestive of pheochromocytoma.  Plasma metanephrines were elevated..     In preparation for bilateral subtotal adrenalectomy, the patient was started on treatment with doxazosin..  Current antihypertensive medications:  Amlodipine, 10 mg daily   Doxazosin 24 mg BID (3 tablets of 8 mg each BID)  Doxazocin started on 1/30/23.   Carvedilol, 25 mg twice daily (metoprolol was prescribed but the patient reported experiencing fatigue so it was replaced by carvedilol).     2. MEN2A  The calcitonin level was 56.7 in February 2023.  The patient has not had a calcitonin level checked in the past.  Calcium level was normal but at the upper end of normal range, phosphorus was normal at 3.3 and parathyroid hormone level was  64.  Vitamin D level was 24.    The ultrasound images from 2/20/2023 identified a possible parathyroid adenoma in the right inferior thyroid gland and a left thyroid nodule measuring 0.7 cm, poorly delineated, with ?microcalcifications.  The decision was not to pursue biopsy and pursue thyroidectomy following pheochromocytoma surgery.    3. Type 2 diabetes   Current antidiabetic regimen:  Metformin 1 gm BID  Glipizide, 5 mg daily.   She quit Farxiga due to genital fungal infections and dizziness.    A1c today 9%   She ran out of sensors and she is requesting a prescription.    She does have a glucometer at home and she reports checking blood glucose in the morning, with numbers in the 100s.    Pertinent outside labs and imaging:   The blood work done at [facility], revealed the following results:  12/6/2022: Free metanephrines 942 (<57), free normetanephrine 647 (<148), LC/MS assay; chromogranin A 363 (<311)  10/5/2022: Free metanephrine 663, free normetanephrine 414, aldosterone 10 (LC MS), renin activity 0.48 (0.25-5.82), aldosterone to renin ratio 20.8  12/16/2022 24-hour urinary dopamine 147 (), epinephrine 5 (2-24) norepinephrine 54 ().  No 24 hours urinary creatinine or urinary volume was reported.    Other pertinent labs reviewed in the outside records:  6/10/2022 MANDY 65 antibodies, insulin autoantibodies and antiislet cell antibodies, zinc transporter antibodies undetectable; C-peptide 2.56  10/3/2022 triglycerides 225, HDL cholesterol 67, LDL cholesterol 90, magnesium 1.6 (1.5-2.5), vitamin D level 21, morning cortisol 6.4 (time of collection unavailable), B12 419, ferritin 51, free T4 1.3 (0.8-1.8), TSH 1.72  10/5/2022 urine microalbumin to creatinine ratio 1096  12/6/2022 phosphate 2.9 (2.5-4.5), CEA 1.8, parathyroid hormone 67 (16-77), BUN mildly elevated at 27, creatinine normal at 0.81, sodium 133, potassium 4.1, albumin 4.2, calcium 10.4 (8.6-10.2).  Prior calcium levels were 10.2  "on 11/23/21 and 10.8 on 10/3/2022.  Alkaline phosphatase has been normal.    The chest x-ray from September 2022 showed clear lungs.  Head CT from December 2022 was unremarkable, done for evaluation of headaches.     9/29/2022 calcium 11.1 (normal range 8.4-10.4.)  12/5/2022 calcium 9.6 (normal range 8.5-10.5)  9/29/2022 TSH 0.99     A1c was 6.6 on 2/17/2021, 9.7 on 4/13/2022 12/14/20 C peptide 6.8, glucose 125, MANDY 65 antibodies negative     Thyroid US from 8/28/20 done at [facility]:  \"The right thyroid lobe measures: 1.5 cmx1.4 cmx3.6 cm   The left thyroid lobe measures: 1.1 cmx1.2 cmx3.2 cm   The isthmus measures: 1 mm in AP diameter.     The thyroid parenchyma appears: mildly heterogeneous.     A nodule in the mid left lobe measures 0.7 cm and has low suspicion for malignancy (1-5%). There are   small solid nodules inferior to each lobe of the thyroid gland, the largest on   the right measuring 1.5 cm, likely parathyroid glands. Additionally, there is a   1.2 cm soft tissue mass superior to the left lobe of the thyroid gland, which   may represent a lymph node.\"    Transthoracic echocardiogram from January 2021 was unremarkable, with an ejection fraction of 59%.    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, [id number]   Right proximal tibial and fibular fracture 2017   Post traumatic deformity at the fourth right proximal phalangeal base 2018   Degenerative changes of the knees    Past Surgical History   Past Surgical History:   Procedure Laterality Date     HEAD & NECK SURGERY       ORTHOPEDIC SURGERY         Current Medications    Current Outpatient Medications:      acetaminophen (TYLENOL) 500 MG tablet, Take 1-2 tablets (500-1,000 mg) by mouth every 6 hours as needed for mild pain, Disp: 1 Bottle, Rfl: 0     amLODIPine (NORVASC) 10 MG tablet, " Take 1 tablet (10 mg) by mouth daily, Disp: 90 tablet, Rfl: 1     Benzocaine-Menthol 10-2.1 MG LOZG, Take 1 lozenge by mouth 4 times daily as needed (Cough or sore throat), Disp: 84 lozenge, Rfl: 0     carvedilol (COREG) 25 MG tablet, Take 25 mg by mouth 2 times daily (with meals), Disp: , Rfl:      Continuous Blood Gluc Sensor (FREESTYLE ABHIJIT 14 DAY SENSOR) MISC, Change every 14 days., Disp: 6 each, Rfl: 11     Continuous Blood Gluc Sensor (FREESTYLE ABHIJIT 2 SENSOR) MISC, 1 each every 14 days, Disp: 6 each, Rfl: 3     doxazosin (CARDURA) 8 MG tablet, Take 3 tablets (24 mg) by mouth 2 times daily, Disp: 54 tablet, Rfl: 3     glipiZIDE (GLUCOTROL) 5 MG tablet, Take 5 mg by mouth daily, Disp: , Rfl:      ibuprofen (ADVIL/MOTRIN) 200 MG tablet, Take 3 tablets (600 mg) by mouth every 6 hours as needed for mild pain, Disp: 30 tablet, Rfl: 0     metFORMIN (GLUCOPHAGE) 1000 MG tablet, Take 1,000 mg by mouth 2 times daily (with meals), Disp: , Rfl:      omeprazole (PRILOSEC) 40 MG DR capsule, Take 1 capsule (40 mg) by mouth daily, Disp: 90 capsule, Rfl: 1     aspirin (ASA) 81 MG chewable tablet, Take 1 tablet (81 mg) by mouth daily CHEW AND SWALLOW (Patient not taking: Reported on 2023), Disp: 90 tablet, Rfl: 1    Family History   Father passed away from MI while her mother was pregnant with her (not sure of his age). Paternal grandfather and paternal uncles have HTN.  No family history of diabetes. Mother  of cancer.      Social History  Jess[place] immig[place] to [place] in [place], from a refugee [place] in [place].  She was born in [place].    Single. She has 8 children, 9 to 22 yo. She denies smoking, drinking alcohol or using illicit drugs. Occupation: home care.               Vital Signs     Previous Weights:    Wt Readings from Last 10 Encounters:   23 95.6 kg (210 lb 11.2 oz)   23 97.5 kg (215 lb)   23 97.4 kg (214 lb 11.2 oz)   23 92.5 kg (204 lb)   23 95.4 kg (210 lb 6.4 oz)    02/15/23 93.2 kg (205 lb 6.4 oz)   05/10/22 93.9 kg (207 lb)   11/20/21 102.1 kg (225 lb)   04/20/21 99.8 kg (220 lb)   05/07/20 93.4 kg (206 lb)        Resp 16   Wt 95.6 kg (210 lb 11.2 oz)   LMP 05/09/2023 (Approximate)   SpO2 98%   BMI 33.00 kg/m       BP Readings from Last 6 Encounters:   06/20/23 (!) 146/88   06/07/23 (!) 147/92   05/02/23 126/82   04/05/23 (!) 159/97   02/15/23 (!) 143/96   02/14/23 (!) 137/90       35 minutes spent on the date of the encounter doing chart review, history and exam, documentation and further activities as noted above.                 Again, thank you for allowing me to participate in the care of your patient.        Sincerely,        Deloris Vanessa MD

## 2023-06-28 NOTE — PROGRESS NOTES
=======================================================================================================================================  Assessment     Jessica Kennedy is a 48 year old [place] female seen in follow-up for evaluation of bilateral pheochromocytoma and MEN2A.     1.  Bilateral synchronous pheochromocytoma  The plan is to pursue cortical sparing bilateral adrenalectomy  Controlling the blood pressure preop has been challenging.  I recommended to add phenoxybenzamine 10 mg daily to her current antihypertensive regimen.  If there is no improvement, the plan is to consider treatment with losartan.  We're going to continue to monitor the patient's vitals on a weekly basis.  Close to surgery, I am considering hospitalizing the patient and starting treatment with metyrosine.   Follow-up BMP today    2. Medullary thyroid cancer and primary hyperparathyroidism  Following adrenalectomy we are going to pursue a SPECT-CT to further try to identify the enlarged parathyroid glands and pursue both thyroidectomy and parathyroidectomy simultaneously.  Follow-up calcitonin level    3.  Type 2 diabetes, uncontrolled, currently medicated with glipizide and metformin.  She was not able to tolerate SGLT2 inhibitors.   I recommended treatment with insulin in order to achieve a better glycemic control preop.  Counseled the patient on insulin administration and risk of hypoglycemia.  The plan is to start NPH at 15 units in the morning.  She did meet with the diabetes educator after the visit to learn about insulin administration.  Prescription for sensors placed (either Jabari or Dexcom, based on her insurance coverage).    Orders Placed This Encounter   Procedures     Basic metabolic panel     Calcitonin     Adult Type and Screen     =========================================================================================================================================    History of Present Illness:  Jessica Kennedy is a 48  year old female with a past medical history significant for hypertension, obesity and type 2 diabetes seen in follow-up.  The patient is seen with the help of a [facility]  via iPad.      1. Bilateral pheochromocytoma in the context of MEN2A.    The patient has a history of hypertension formally diagnosed in 2017 and symptoms suggestive of pheochromocytoma: Heat intolerance, tachycardia, headaches, constipation.  The bilateral adrenal masses were initially discovered on abdominal CT in April 2022.  The abdominal MRI from 4/13/2022 showed no signal dropout on out of phase images. The 12/2022 adrenal CT revealed bilateral adrenal masses, measuring 2.5 and 3.4 cm in maximum diameter, with a noncontrast Hounsfield unit density of 30/45 and heterogeneous enhancement with contrast, suggestive of pheochromocytoma.  Plasma metanephrines were elevated..     In preparation for bilateral subtotal adrenalectomy, the patient was started on treatment with doxazosin..  Current antihypertensive medications:  Amlodipine, 10 mg daily   Doxazosin 24 mg BID (3 tablets of 8 mg each BID)  Doxazocin started on 1/30/23.   Carvedilol, 25 mg twice daily (metoprolol was prescribed but the patient reported experiencing fatigue so it was replaced by carvedilol).     2. MEN2A  The calcitonin level was 56.7 in February 2023.  The patient has not had a calcitonin level checked in the past.  Calcium level was normal but at the upper end of normal range, phosphorus was normal at 3.3 and parathyroid hormone level was 64.  Vitamin D level was 24.    The ultrasound images from 2/20/2023 identified a possible parathyroid adenoma in the right inferior thyroid gland and a left thyroid nodule measuring 0.7 cm, poorly delineated, with ?microcalcifications.  The decision was not to pursue biopsy and pursue thyroidectomy following pheochromocytoma surgery.    3. Type 2 diabetes   Current antidiabetic regimen:  Metformin 1 gm BID  Glipizide, 5 mg daily.    She quit Farxiga due to genital fungal infections and dizziness.    A1c today 9%   She ran out of sensors and she is requesting a prescription.    She does have a glucometer at home and she reports checking blood glucose in the morning, with numbers in the 100s.    Pertinent outside labs and imaging:   The blood work done at [facility], revealed the following results:  12/6/2022: Free metanephrines 942 (<57), free normetanephrine 647 (<148), LC/MS assay; chromogranin A 363 (<311)  10/5/2022: Free metanephrine 663, free normetanephrine 414, aldosterone 10 (LC MS), renin activity 0.48 (0.25-5.82), aldosterone to renin ratio 20.8  12/16/2022 24-hour urinary dopamine 147 (), epinephrine 5 (2-24) norepinephrine 54 ().  No 24 hours urinary creatinine or urinary volume was reported.    Other pertinent labs reviewed in the outside records:  6/10/2022 MANDY 65 antibodies, insulin autoantibodies and antiislet cell antibodies, zinc transporter antibodies undetectable; C-peptide 2.56  10/3/2022 triglycerides 225, HDL cholesterol 67, LDL cholesterol 90, magnesium 1.6 (1.5-2.5), vitamin D level 21, morning cortisol 6.4 (time of collection unavailable), B12 419, ferritin 51, free T4 1.3 (0.8-1.8), TSH 1.72  10/5/2022 urine microalbumin to creatinine ratio 1096  12/6/2022 phosphate 2.9 (2.5-4.5), CEA 1.8, parathyroid hormone 67 (16-77), BUN mildly elevated at 27, creatinine normal at 0.81, sodium 133, potassium 4.1, albumin 4.2, calcium 10.4 (8.6-10.2).  Prior calcium levels were 10.2 on 11/23/21 and 10.8 on 10/3/2022.  Alkaline phosphatase has been normal.    The chest x-ray from September 2022 showed clear lungs.  Head CT from December 2022 was unremarkable, done for evaluation of headaches.     9/29/2022 calcium 11.1 (normal range 8.4-10.4.)  12/5/2022 calcium 9.6 (normal range 8.5-10.5)  9/29/2022 TSH 0.99     A1c was 6.6 on 2/17/2021, 9.7 on 4/13/2022 12/14/20 C peptide 6.8, glucose 125, MANDY 65 antibodies  "negative     Thyroid US from 8/28/20 done at [facility]:  \"The right thyroid lobe measures: 1.5 cmx1.4 cmx3.6 cm   The left thyroid lobe measures: 1.1 cmx1.2 cmx3.2 cm   The isthmus measures: 1 mm in AP diameter.     The thyroid parenchyma appears: mildly heterogeneous.     A nodule in the mid left lobe measures 0.7 cm and has low suspicion for malignancy (1-5%). There are   small solid nodules inferior to each lobe of the thyroid gland, the largest on   the right measuring 1.5 cm, likely parathyroid glands. Additionally, there is a   1.2 cm soft tissue mass superior to the left lobe of the thyroid gland, which   may represent a lymph node.\"    Transthoracic echocardiogram from January 2021 was unremarkable, with an ejection fraction of 59%.    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.[id number]), heterozygous, exon 11, [id number]   Right proximal tibial and fibular fracture 2017   Post traumatic deformity at the fourth right proximal phalangeal base 2018   Degenerative changes of the knees    Past Surgical History   Past Surgical History:   Procedure Laterality Date     HEAD & NECK SURGERY       ORTHOPEDIC SURGERY         Current Medications    Current Outpatient Medications:      acetaminophen (TYLENOL) 500 MG tablet, Take 1-2 tablets (500-1,000 mg) by mouth every 6 hours as needed for mild pain, Disp: 1 Bottle, Rfl: 0     amLODIPine (NORVASC) 10 MG tablet, Take 1 tablet (10 mg) by mouth daily, Disp: 90 tablet, Rfl: 1     Benzocaine-Menthol 10-2.1 MG LOZG, Take 1 lozenge by mouth 4 times daily as needed (Cough or sore throat), Disp: 84 lozenge, Rfl: 0     carvedilol (COREG) 25 MG tablet, Take 25 mg by mouth 2 times daily (with meals), Disp: , Rfl:      Continuous Blood Gluc Sensor (FREESTYLE ABHIJIT 14 DAY SENSOR) MISC, Change every 14 days., Disp: 6 each, Rfl: 11     Continuous Blood " Gluc Sensor (FREESTYLE ABHIJIT 2 SENSOR) [facility], 1 each every 14 days, Disp: 6 each, Rfl: 3     doxazosin (CARDURA) 8 MG tablet, Take 3 tablets (24 mg) by mouth 2 times daily, Disp: 54 tablet, Rfl: 3     glipiZIDE (GLUCOTROL) 5 MG tablet, Take 5 mg by mouth daily, Disp: , Rfl:      ibuprofen (ADVIL/MOTRIN) 200 MG tablet, Take 3 tablets (600 mg) by mouth every 6 hours as needed for mild pain, Disp: 30 tablet, Rfl: 0     metFORMIN (GLUCOPHAGE) 1000 MG tablet, Take 1,000 mg by mouth 2 times daily (with meals), Disp: , Rfl:      omeprazole (PRILOSEC) 40 MG DR capsule, Take 1 capsule (40 mg) by mouth daily, Disp: 90 capsule, Rfl: 1     aspirin (ASA) 81 MG chewable tablet, Take 1 tablet (81 mg) by mouth daily CHEW AND SWALLOW (Patient not taking: Reported on 2023), Disp: 90 tablet, Rfl: 1    Family History   Father passed away from MI while her mother was pregnant with her (not sure of his age). Paternal grandfather and paternal uncles have HTN.  No family history of diabetes. Mother  of cancer.      Social History  Jessica immigrated to [place] in [place], from a refugee [place] in [place].  She was born in [place].    Single. She has 8 children, 9 to 24 yo. She denies smoking, drinking alcohol or using illicit drugs. Occupation: home care.               Vital Signs     Previous Weights:    Wt Readings from Last 10 Encounters:   23 95.6 kg (210 lb 11.2 oz)   23 97.5 kg (215 lb)   23 97.4 kg (214 lb 11.2 oz)   23 92.5 kg (204 lb)   23 95.4 kg (210 lb 6.4 oz)   02/15/23 93.2 kg (205 lb 6.4 oz)   05/10/22 93.9 kg (207 lb)   21 102.1 kg (225 lb)   21 99.8 kg (220 lb)   20 93.4 kg (206 lb)        Resp 16   Wt 95.6 kg (210 lb 11.2 oz)   LMP 2023 (Approximate)   SpO2 98%   BMI 33.00 kg/m       BP Readings from Last 6 Encounters:   23 (!) 146/88   23 (!) 147/92   23 126/82   23 (!) 159/97   02/15/23 (!) 143/96   23 (!) 137/90       35  minutes spent on the date of the encounter doing chart review, history and exam, documentation and further activities as noted above.

## 2023-06-28 NOTE — PROGRESS NOTES
"Social Work Intervention  [facility]    Data/Intervention:    Patient Name:  Jessica Kennedy  /Age:  1975 (48 year old)    Visit Type: telephone  Referral Source: Alyssa Lee CNP- PAC  Reason for Referral:  Mattress on floor, post op surgery care    Collaborated With:    -Jessica sommer/Mariza     Psychosocial Information/Concerns:  LM 23 no return call. Called again today and was able to reach Pt.   She indicates that she has only her 15 yr old dtr to help her after surgery. She has 4 children ages 18 (son whom she says needs help himself), 15. 12, 9. She doesn't have anyone else that can help her.   She does sleep on 2 mattresses on the floor- one is a queen, the other smaller. Discussed that it will help her to have a bed frame for the mattress after surgery. She indicated that she would work on getting one but if she has difficulty will let me know.       Intervention/Education/Resources Provided:  She indicated that her 15 yr old dtr can help with meals and housework but if she needs to do more for her she \"would need training\" on how to help her. I indicated that I would ask a nurse to contact her to discuss the expectations for what she would need help with after surgery so she can plan. I sent a message to Sylvia Gay RN to see if she can assist with this.     Assessment/Plan:  Pt with limited support system. If she cannot get a bed frame I can assist with trying to help her get one if that will make the post op period easier for her. Her dtr should be able to help her in the recovery period but more info is needed from nursing about expectations for that.   Remain available.     Provided patient/family with contact information and availability.    Vera Vagras, MOY, [facility]    [facility]  [address] [place] [address], [address] [place] [id number]    [phone]  "

## 2023-06-30 LAB — CALCIT SERPL-MCNC: 55.8 PG/ML

## 2023-07-05 ENCOUNTER — OFFICE VISIT (OUTPATIENT)
Dept: ENDOCRINOLOGY | Facility: CLINIC | Age: 48
End: 2023-07-05
Payer: COMMERCIAL

## 2023-07-05 VITALS
OXYGEN SATURATION: 100 % | DIASTOLIC BLOOD PRESSURE: 85 MMHG | RESPIRATION RATE: 18 BRPM | BODY MASS INDEX: 33.44 KG/M2 | WEIGHT: 213.5 LBS | SYSTOLIC BLOOD PRESSURE: 137 MMHG | HEART RATE: 72 BPM

## 2023-07-05 DIAGNOSIS — D35.00 PHEOCHROMOCYTOMA, UNSPECIFIED LATERALITY: ICD-10-CM

## 2023-07-05 PROCEDURE — 99207 PR NO CHARGE LOS: CPT | Performed by: INTERNAL MEDICINE

## 2023-07-05 RX ORDER — PHENOXYBENZAMINE HYDROCHLORIDE 10 MG/1
10 CAPSULE ORAL 2 TIMES DAILY
Qty: 180 CAPSULE | Refills: 3 | Status: SHIPPED | OUTPATIENT
Start: 2023-07-05 | End: 2023-07-19

## 2023-07-05 ASSESSMENT — PAIN SCALES - GENERAL: PAINLEVEL: NO PAIN (0)

## 2023-07-05 NOTE — PROGRESS NOTES
Patient left before being seen, as she had another appointment with her daughter.  I reviewed her vitals.      BP Readings from Last 3 Encounters:   07/05/23 137/85   06/20/23 (!) 146/88   06/07/23 (!) 147/92     Orthostatics:   128/84, 75  135/85, 74  133/85, 75     According to the CMA, the patient confirmed she has been taking the phenoxybenzamine at 10 mg daily.  We called the pharmacy and she picked up the prescription on March 14, did not refill it yet.  Plan:   We'll call the patient and ask her to increase the dose of phenoxybenzamine to 10 mg twice daily.  Return to clinic to be scheduled next week.

## 2023-07-05 NOTE — NURSING NOTE
Jessica Kennedy's goals for this visit include: NONE  She requests these members of her care team be copied on today's visit information: YES    PCP: Mariela, [facility]    Referring Provider:  No referring provider defined for this encounter.    /85 (BP Location: Left arm, Patient Position: Sitting, Cuff Size: Adult Large)   Pulse 72   Resp 18   Wt 96.8 kg (213 lb 8 oz)   LMP 05/09/2023 (Approximate)   SpO2 100%   BMI 33.44 kg/m      Do you need any medication refills at today's visit? NONE    **PATIENT INFORMED ME THAT SHE HAD AN APPOINTMENT FOR HER SON AT 12:15pm AND HAD TO LEAVE.  DR ADVISED THAT PATIENT SHOULD RESCHEDULE FOR 7/12/23 PT ADVISED**      Bernabe Jeter, EMT

## 2023-07-05 NOTE — Clinical Note
Please ask the patient to increase the phenoxybenzamine to 10 mg BID. She was supposed to get the sensor. Did she? Did she start insulin?

## 2023-07-05 NOTE — LETTER
7/5/2023         RE: Jessica Kennedy  [address]  [address]  [facility] [address]        Dear Colleague,    Thank you for referring your patient, Jessica Kennedy, to the [facility]. Please see a copy of my visit note below.    Patient left before being seen, as she had another appointment with her daughter.  I reviewed her vitals.      BP Readings from Last 3 Encounters:   07/05/23 137/85   06/20/23 (!) 146/88   06/07/23 (!) 147/92     Orthostatics:   128/84, 75  135/85, 74  133/85, 75     According to the [facility], the patient confirmed she has been taking the phenoxybenzamine at 10 mg daily.  We called the pharmacy and she picked up the prescription on March 14, did not refill it yet.  Plan:   We'll call the patient and ask her to increase the dose of phenoxybenzamine to 10 mg twice daily.  Return to clinic to be scheduled next week.     Again, thank you for allowing me to participate in the care of your patient.        Sincerely,        Deloris Vanessa MD

## 2023-07-06 ENCOUNTER — TELEPHONE (OUTPATIENT)
Dept: ENDOCRINOLOGY | Facility: CLINIC | Age: 48
End: 2023-07-06
Payer: COMMERCIAL

## 2023-07-06 ENCOUNTER — CARE COORDINATION (OUTPATIENT)
Dept: ENDOCRINOLOGY | Facility: CLINIC | Age: 48
End: 2023-07-06
Payer: COMMERCIAL

## 2023-07-06 NOTE — TELEPHONE ENCOUNTER
Writer received notice that patient needs a prior authorization on NPH.    insulin  UNIT/ML injection 15 mL 3 6/28/2023  --   Sig: 15  units before breakfast   Sent to pharmacy as: Insulin NPH (Human) (Isophane) 100 UNIT/ML Suspension Pen-injector   Class: E-Prescribe   Order: [id number]   E-Prescribing Status: Receipt confirmed by pharmacy (6/28/2023  1:41 PM CDT)         Writer will send to pharmacy liaison team to review and complete urgent prior authorization.        Missy Patricia RN  Endocrine Care Coordinator  [facility]

## 2023-07-06 NOTE — TELEPHONE ENCOUNTER
Writer received the following message from Dr. Vanessa:      Deloris Vanessa MD Mathis, Ruth RAMIREZ RN    Please ask the patient to increase the phenoxybenzamine to 10 mg BID. She was supposed to get the sensor. Did she? Did she start insulin?         Dr. Vanessa had also noted on patient's recent labs as follows:   Deloris Vanessa MD   6/30/2023  1:00 PM CDT       The lab results are stable. We are going to review them at your next visit.            Patient was not able to stay at 7/5/23 office visit with Dr. Vanessa due to daughter's appointment.      Writer contacted patient on 3 way call with . Advised patient of recommendations from Dr. Vanessa. Patient verbalizes understanding and agrees to plan. Patient states that she has not been able to  her insulin and pharmacy says they need to call her insurance. Patient also states that she got the wrong sensor and wants her previous sensor called in. She does not want to use the new one because she does not understand how to use it. Patient's family member got on phone and said patient needs Frestyle Jabari and not Dexcom (Dr. Vanessa sent both because patient was not sure at time what she was using).     Writer advised patient that we will call her pharmacy to get further details on why insulin was not dispensed and to determine if Freestyle Jabari would be covered rather then Dexcom.     Writer called pharmacy to review. Pharmacy staff member was able to confirm patient was previously getting Jabari Sensors and she can still get that filled. She will close out the Dexcom sensor prescription and only use Fresstyle Jabari sensor Rx.     In regards to the NPH prescription sent, a prior authorization is needed. Writer will open separate encounter for prior authorization encounter.    Missy Patricia, GERSON  Endocrine Care Coordinator  [facility]

## 2023-07-06 NOTE — TELEPHONE ENCOUNTER
PA Initiation    Medication: HUMULIN N KWIKPEN 100 UNIT/ML SC SUPN  Insurance Company: [employer] - Phone [phone] Fax [phone]  [id number] - [place], [place] - [address] AT [facility]  Filling Pharmacy Phone: [phone]  Filling Pharmacy Fax: [phone]  Start Date: 7/5/2023

## 2023-07-06 NOTE — TELEPHONE ENCOUNTER
Prior Authorization Approval    Medication: HUMULIN N KWIKPEN 100 UNIT/ML SC SUPN  Authorization Effective Date: 6/6/2023  Authorization Expiration Date: 7/5/2024  Approved Dose/Quantity:    Reference #: Key: [id number]   Insurance Company: [employer] - Phone [phone] Fax [phone]  Expected CoPay:       CoPay Card Available:      Financial Assistance Needed:    [id number] [place] [address] AT [facility]  Pharmacy Notified:    Patient Notified:

## 2023-07-06 NOTE — PROGRESS NOTES
Attempted to call patient using [place]  services regarding post op adrenalectomy expectations. Line was busy and call unable to go through x2. Will try again at a later date.

## 2023-07-06 NOTE — TELEPHONE ENCOUNTER
Please refer to 7/6/23 prior authorization encounter also.     Writer contacted patient on 3 way call with  to updated that prior authorization needed for NPH insulin and Freestyle Jabari sensors will be filled now and Dexcom sensor prescription canceled. Patient verbalizes understanding and agrees to plan.     Writer will call back to patient when receive update. Patient verbalizes understanding.        Missy Patricia RN  Endocrine Care Coordinator  [facility]

## 2023-07-12 ENCOUNTER — APPOINTMENT (OUTPATIENT)
Dept: INTERPRETER SERVICES | Facility: CLINIC | Age: 48
End: 2023-07-12
Payer: COMMERCIAL

## 2023-07-12 ENCOUNTER — TELEPHONE (OUTPATIENT)
Dept: ENDOCRINOLOGY | Facility: CLINIC | Age: 48
End: 2023-07-12
Payer: COMMERCIAL

## 2023-07-12 NOTE — TELEPHONE ENCOUNTER
[facility] Call Center    Phone Message    May a detailed message be left on voicemail: yes     Reason for Call: Other: patient states she was suppoesd to be seen today or tomorrow, no info found regarding this request, please advise as seen fit thank you     Action Taken: Other: endo    Travel Screening: Not Applicable

## 2023-07-12 NOTE — TELEPHONE ENCOUNTER
Writer spoke with Dr. Vanessa.     Per Dr. Vanessa:     Please schedule her next Wednesday at 12:00pm with me and orthostatic blood pressures at 11:30am.        Writer contacted patient to review. Advised her of the above details. Patient verbalizes understanding and agrees to plan. Patient scheduled.      Writer asked patient if she was able to  her insulin as the PA was approved.      insulin  UNIT/ML injection 15 mL 3 6/28/2023  --   Sig: 15  units before breakfast   Sent to pharmacy as: Insulin NPH (Human) (Isophane) 100 UNIT/ML Suspension Pen-injector   Class: E-Prescribe   Order: [id number]   E-Prescribing Status: Receipt confirmed by pharmacy (6/28/2023  1:41 PM CDT)           Patient states that she did  the insulin but did not start it yet because it needs to be taken in the morning and she has been too busy every day with appointments. Asked patient if she is using her CGM and she is. Patient states that her blood sugars are elevated at around 250 when she eats but otherwise 150, 160, 180 if not eating.     Patient states that she has a nerve pain in her head that has been increasing over the week and last night she even woke up with pain and it has not gone away and she currently has is. Patient states that she took Tylenol and it is not helping. Patient states tat her pain level is at a 9 on scale of 1-10 and 10 being worst. She also states that she is having pain above her kidneys. Patient is very uncomfortable.     Writer asked patient if she has taken her blood pressure today. Patient stated that she has not and she is currently at an appointment with her son getting an MRI.     Writer recommended patient be evaluated in the emergency room today. Patient verbalizes understanding and agrees to plan. Patient will go after her son's MRI appointment.           Writer updated Dr. Vanessa.       Per Dr. Vanessa:  Please have patient give ER doctor my pager number.       Patient  advised of above recommendations. Patient provided pager number for Dr. Vanessa and she will give it to the Emergency Room providers.         Missy Patricia RN  Endocrine Care Coordinator  [facility]

## 2023-07-12 NOTE — TELEPHONE ENCOUNTER
Please refer to 7/12/23 telephone encounter for follow-up.      Missy Patricia, RN  Endocrine Care Coordinator  [facility]

## 2023-07-12 NOTE — TELEPHONE ENCOUNTER
Please refer back to 7/6/23 telephone encounter.     Writer will review with Dr. Vanessa first to determine if patient is supposed to have office visit this week and/or follow-up plan. Currently no future appointments scheduled.       Missy Patricia RN  Endocrine Care Coordinator  [facility]

## 2023-07-18 ENCOUNTER — APPOINTMENT (OUTPATIENT)
Dept: INTERPRETER SERVICES | Facility: CLINIC | Age: 48
End: 2023-07-18
Payer: COMMERCIAL

## 2023-07-18 NOTE — TELEPHONE ENCOUNTER
Per Dr. Vanessa she was not paged for any admission for patient.    Writer called patient on 3 way call with  to check in and make certain coming to appointment tomorrow, 7/19/23.     Patient reports that she did not go to the emergency room and she is doing better then she was before. Patient confirms that she will be coming to the visit with Dr. Vanessa tomorrow. Advised patient 11:30am for Orthostatic blood pressures and then Dr. Vanessa at 12:00pm. Asked patient to allow for 1 hour. Patient verbalizes understanding and agrees to plan.       Will update Dr. Vanessa.        Missy Patricia, RN  Endocrine Care Coordinator  [facility]

## 2023-07-19 ENCOUNTER — OFFICE VISIT (OUTPATIENT)
Dept: ENDOCRINOLOGY | Facility: CLINIC | Age: 48
End: 2023-07-19
Payer: COMMERCIAL

## 2023-07-19 VITALS — WEIGHT: 213.9 LBS | BODY MASS INDEX: 33.5 KG/M2 | RESPIRATION RATE: 16 BRPM | OXYGEN SATURATION: 98 %

## 2023-07-19 DIAGNOSIS — E11.9 TYPE 2 DIABETES MELLITUS NOT AT GOAL (H): Primary | ICD-10-CM

## 2023-07-19 DIAGNOSIS — D35.00 PHEOCHROMOCYTOMA, UNSPECIFIED LATERALITY: ICD-10-CM

## 2023-07-19 DIAGNOSIS — E04.2 MULTIPLE THYROID NODULES: ICD-10-CM

## 2023-07-19 PROCEDURE — 95251 CONT GLUC MNTR ANALYSIS I&R: CPT | Performed by: INTERNAL MEDICINE

## 2023-07-19 PROCEDURE — 99214 OFFICE O/P EST MOD 30 MIN: CPT | Mod: 25 | Performed by: INTERNAL MEDICINE

## 2023-07-19 RX ORDER — PHENOXYBENZAMINE HYDROCHLORIDE 10 MG/1
10 CAPSULE ORAL 2 TIMES DAILY
Qty: 180 CAPSULE | Refills: 3 | Status: SHIPPED | OUTPATIENT
Start: 2023-07-19 | End: 2023-07-21

## 2023-07-19 RX ORDER — CARVEDILOL 25 MG/1
25 TABLET ORAL 2 TIMES DAILY WITH MEALS
Qty: 180 TABLET | Refills: 3 | Status: SHIPPED | OUTPATIENT
Start: 2023-07-19 | End: 2024-02-13

## 2023-07-19 RX ORDER — AMLODIPINE BESYLATE 10 MG/1
10 TABLET ORAL DAILY
Qty: 90 TABLET | Refills: 1 | Status: SHIPPED | OUTPATIENT
Start: 2023-07-19

## 2023-07-19 ASSESSMENT — PAIN SCALES - GENERAL: PAINLEVEL: NO PAIN (0)

## 2023-07-19 NOTE — LETTER
7/19/2023         RE: Jessica Kennedy  [address]  [address]  [facility] [address]        Dear Colleague,    Thank you for referring your patient, Jessica Kennedy, to the [facility]. Please see a copy of my visit note below.    =======================================================================================================================================  Assessment     Jessica Kennedy is a 48 year old [place] female seen in follow-up for evaluation of bilateral pheochromocytoma and MEN2A.     1.  Bilateral synchronous pheochromocytoma, scheduled for cortical sparing bilateral adrenalectomy on 8/17/23.   Controlling the blood pressure remains challenging.  Plan:   Suspect the nasal stuffiness is a side effect of alpha blockers.  For now, I recommended to resume taking doxazosin at 3 tablets twice a day (24 mg twice daily) and decrease the dose of phenoxybenzamine to 10 mg daily.  If the nasal stuffiness does not improve, she was instructed to contact us, so I can place a prescription for Flonase nasal spray.  We will continue to follow-up with the patient every 1 to 2 weeks  Close to surgery, I am considering hospitalizing the patient and starting treatment with metyrosine.     2. Medullary thyroid cancer and primary hyperparathyroidism  Following adrenalectomy we are going to pursue a SPECT-CT to further try to identify the enlarged parathyroid glands and pursue both thyroidectomy and parathyroidectomy simultaneously.    3.  Type 2 diabetes, uncontrolled, currently medicated with glipizide, metformin and morning NPH insulin.  She was not able to tolerate SGLT2 inhibitors.  NPH insulin treatment was started a few days ago.  Plan:  Increase the dose of NPH to 17 units  Counseled the patient on the importance of restricting the overall carbohydrate intake with meals.  Discussed about low-carb food choices.  We might need to add a dose of NPH insulin at bedtime, in the event  the morning blood sugar is persistently around 140-150.    No orders of the defined types were placed in this encounter.    =========================================================================================================================================    History of Present Illness:  Jessica Kennedy is a 48 year old female with a past medical history significant for hypertension, obesity and type 2 diabetes seen in follow-up.  The patient is seen with the help of a [employer]  via iPad.      1. Bilateral pheochromocytoma in the context of MEN2A.    The patient has a history of hypertension formally diagnosed in 2017 and symptoms suggestive of pheochromocytoma: Heat intolerance, tachycardia, headaches, constipation.  The bilateral adrenal masses were initially discovered on abdominal CT in April 2022.  The abdominal MRI from 4/13/2022 showed no signal dropout on out of phase images. The 12/2022 adrenal CT revealed bilateral adrenal masses, measuring 2.5 and 3.4 cm in maximum diameter, with a noncontrast Hounsfield unit density of 30/45 and heterogeneous enhancement with contrast, suggestive of pheochromocytoma.  Plasma metanephrines were elevated.  The MIBG from January 2023 confirmed bilateral uptake.    In preparation for bilateral subtotal adrenalectomy, the patient was started on treatment with alpha blockers.    Currently prescribed antihypertensive medications:  Amlodipine, 10 mg daily   Doxazosin 24 mg BID (3 tablets of 8 mg each BID)  Doxazocin started on 1/30/23. The patient reports only taking 3 tablets of doxazocin in the morning.  Carvedilol, 25 mg twice daily (metoprolol was prescribed but the patient reported experiencing fatigue so it was replaced by carvedilol).     She endorses nasal stuffiness, mainly at night, with some difficulty breathing.  Not so much present during the day.  It started 2 or 3 weeks ago.  At home, she states that she has been checking her blood pressure and the  numbers have been lower, less than 140/80.    2. MEN2A  The calcitonin level was 56.7 in February 2023.  The patient has not had a calcitonin level checked in the past.  Calcium level was normal but at the upper end of normal range, phosphorus was normal at 3.3 and parathyroid hormone level was 64.  Vitamin D level was 24.    The ultrasound images from 2/20/2023 identified a possible parathyroid adenoma in the right inferior thyroid gland and a left thyroid nodule measuring 0.7 cm, poorly delineated, with ?microcalcifications.  The decision was not to pursue biopsy and pursue thyroidectomy/parathyroidectomy following pheochromocytoma surgery.    3. Type 2 diabetes   Current antidiabetic regimen:  Metformin 1 gm BID  Glipizide, 5 mg daily.   She quit Farxiga due to genital fungal infections and dizziness.  NPH 15 units in the morning.  The patient started insulin 4 days ago.    Hemoglobin A1c was 9%, on 6/28/2023.  I reviewed the jennifer records.  She has been experiencing postprandial spikes with carb intake, up to 300s.  Fasting morning blood sugars have been around 150.  There are no hypoglycemic episodes documented by the sensor.  In the clinic, her blood sugar on the sensor was 320 at 12:21 PM.  The prior meal was at 11:20 AM and she did have ananth bread with that meal.    Pertinent outside labs and imaging:   The blood work done at [facility], revealed the following results:  12/6/2022: Free metanephrines 942 (<57), free normetanephrine 647 (<148), LC/MS assay; chromogranin A 363 (<311)  10/5/2022: Free metanephrine 663, free normetanephrine 414, aldosterone 10 (LC MS), renin activity 0.48 (0.25-5.82), aldosterone to renin ratio 20.8  12/16/2022 24-hour urinary dopamine 147 (), epinephrine 5 (2-24) norepinephrine 54 ().  No 24 hours urinary creatinine or urinary volume was reported.    Other pertinent labs reviewed in the outside records:  6/10/2022 MANDY 65 antibodies, insulin autoantibodies  "and antiislet cell antibodies, zinc transporter antibodies undetectable; C-peptide 2.56  10/3/2022 triglycerides 225, HDL cholesterol 67, LDL cholesterol 90, magnesium 1.6 (1.5-2.5), vitamin D level 21, morning cortisol 6.4 (time of collection unavailable), B12 419, ferritin 51, free T4 1.3 (0.8-1.8), TSH 1.72  10/5/2022 urine microalbumin to creatinine ratio 1096  12/6/2022 phosphate 2.9 (2.5-4.5), CEA 1.8, parathyroid hormone 67 (16-77), BUN mildly elevated at 27, creatinine normal at 0.81, sodium 133, potassium 4.1, albumin 4.2, calcium 10.4 (8.6-10.2).  Prior calcium levels were 10.2 on 11/23/21 and 10.8 on 10/3/2022.  Alkaline phosphatase has been normal.    The chest x-ray from September 2022 showed clear lungs.  Head CT from December 2022 was unremarkable, done for evaluation of headaches.     9/29/2022 calcium 11.1 (normal range 8.4-10.4.)  12/5/2022 calcium 9.6 (normal range 8.5-10.5)     12/14/20 C peptide 6.8, glucose 125, MANDY 65 antibodies negative     [facility]  \"The right thyroid lobe measures: 1.5 cmx1.4 cmx3.6 cm   The left thyroid lobe measures: 1.1 cmx1.2 cmx3.2 cm   The isthmus measures: 1 mm in AP diameter.     The thyroid parenchyma appears: mildly heterogeneous.     A nodule in the mid left lobe measures 0.7 cm and has low suspicion for malignancy (1-5%). There are   small solid nodules inferior to each lobe of the thyroid gland, the largest on   the right measuring 1.5 cm, likely parathyroid glands. Additionally, there is a   1.2 cm soft tissue mass superior to the left lobe of the thyroid gland, which   may represent a lymph node.\"    Transthoracic echocardiogram from January 2021 was unremarkable, with an ejection fraction of 59%.    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, [id number]   Right " proximal tibial and fibular fracture 2017   Post traumatic deformity at the fourth right proximal phalangeal base 2018   Degenerative changes of the knees    Past Surgical History   Past Surgical History:   Procedure Laterality Date     HEAD & NECK SURGERY       ORTHOPEDIC SURGERY         Current Medications    Current Outpatient Medications:      acetaminophen (TYLENOL) 500 MG tablet, Take 1-2 tablets (500-1,000 mg) by mouth every 6 hours as needed for mild pain, Disp: 1 Bottle, Rfl: 0     alcohol swab prep pads, Use to swab area of injection/laurie as directed., Disp: 100 each, Rfl: 3     amLODIPine (NORVASC) 10 MG tablet, Take 1 tablet (10 mg) by mouth daily, Disp: 90 tablet, Rfl: 1     aspirin (ASA) 81 MG chewable tablet, Take 1 tablet (81 mg) by mouth daily CHEW AND SWALLOW (Patient not taking: Reported on 6/20/2023), Disp: 90 tablet, Rfl: 1     Benzocaine-Menthol 10-2.1 MG LOZG, Take 1 lozenge by mouth 4 times daily as needed (Cough or sore throat), Disp: 84 lozenge, Rfl: 0     blood glucose (NO BRAND SPECIFIED) test strip, Use to test blood sugar 2 times daily or as directed., Disp: 100 strip, Rfl: 3     carvedilol (COREG) 25 MG tablet, Take 25 mg by mouth 2 times daily (with meals), Disp: , Rfl:      Continuous Blood Gluc  (FREESTYLE ABHIJIT 14 DAY READER) RODERICK, Use to read blood sugars as per 's instructions., Disp: 1 each, Rfl: 0     Continuous Blood Gluc Sensor (FREESTYLE ABHIJIT 14 DAY SENSOR) MIS, Change every 14 days., Disp: 6 each, Rfl: 11     Continuous Blood Gluc Sensor (FREESTYLE ABHIJIT 2 SENSOR) MISC, 1 each every 14 days, Disp: 6 each, Rfl: 3     doxazosin (CARDURA) 8 MG tablet, Take 3 tablets (24 mg) by mouth 2 times daily, Disp: 54 tablet, Rfl: 3     glipiZIDE (GLUCOTROL) 5 MG tablet, Take 5 mg by mouth daily, Disp: , Rfl:      ibuprofen (ADVIL/MOTRIN) 200 MG tablet, Take 3 tablets (600 mg) by mouth every 6 hours as needed for mild pain, Disp: 30 tablet, Rfl: 0     insulin NPH  100 UNIT/ML injection, 15  units before breakfast, Disp: 15 mL, Rfl: 3     insulin pen needle (BD CELINE U/F) 32G X 4 MM miscellaneous, Use 1 pen needles daily or as directed., Disp: 100 each, Rfl: 3     metFORMIN (GLUCOPHAGE) 1000 MG tablet, Take 1,000 mg by mouth 2 times daily (with meals), Disp: , Rfl:      Microlet Lancets MISC, 1 each daily, Disp: 100 each, Rfl: 3     omeprazole (PRILOSEC) 40 MG DR capsule, Take 1 capsule (40 mg) by mouth daily, Disp: 90 capsule, Rfl: 1     phenoxybenzamine (DIBENZYLINE) 10 MG capsule, Take 1 capsule (10 mg) by mouth 2 times daily, Disp: 180 capsule, Rfl: 3    Family History   Father passed away from MI while her mother was pregnant with her (not sure of his age). Paternal grandfather and paternal uncles have HTN.  No family history of diabetes. Mother  of cancer.      Social History  Jessica immig[place] to [place] in , from a refugee camp in [place].  She was born in [facility].    Single. She has 8 children, 9 to 22 yo. She denies smoking, drinking alcohol or using illicit drugs. Occupation: home care.               Vital Signs     Previous Weights:    Wt Readings from Last 10 Encounters:   23 97 kg (213 lb 14.4 oz)   23 96.8 kg (213 lb 8 oz)   23 95.6 kg (210 lb 11.2 oz)   23 97.5 kg (215 lb)   23 97.4 kg (214 lb 11.2 oz)   23 92.5 kg (204 lb)   23 95.4 kg (210 lb 6.4 oz)   02/15/23 93.2 kg (205 lb 6.4 oz)   05/10/22 93.9 kg (207 lb)   21 102.1 kg (225 lb)        Resp 16   Wt 97 kg (213 lb 14.4 oz)   LMP 2023 (Approximate)   SpO2 98%   BMI 33.50 kg/m       BP Readings from Last 6 Encounters:   23 137/85   23 (!) 146/88   23 (!) 147/92   23 126/82   23 (!) 159/97   02/15/23 (!) 143/96       30 minutes spent on the date of the encounter doing chart review, history and exam, documentation and further activities as noted above.                 Again, thank you for allowing me to  participate in the care of your patient.        Sincerely,        Deloris Vanessa MD

## 2023-07-19 NOTE — PROGRESS NOTES
=======================================================================================================================================  Assessment     Jessica Kennedy is a 48 year old [place] female seen in follow-up for evaluation of bilateral pheochromocytoma and MEN2A.     1.  Bilateral synchronous pheochromocytoma, scheduled for cortical sparing bilateral adrenalectomy on 8/17/23.   Controlling the blood pressure remains challenging.  Plan:   Suspect the nasal stuffiness is a side effect of alpha blockers.  For now, I recommended to resume taking doxazosin at 3 tablets twice a day (24 mg twice daily) and decrease the dose of phenoxybenzamine to 10 mg daily.  If the nasal stuffiness does not improve, she was instructed to contact us, so I can place a prescription for Flonase nasal spray.  We will continue to follow-up with the patient every 1 to 2 weeks  Close to surgery, I am considering hospitalizing the patient and starting treatment with metyrosine.     2. Medullary thyroid cancer and primary hyperparathyroidism  Following adrenalectomy we are going to pursue a SPECT-CT to further try to identify the enlarged parathyroid glands and pursue both thyroidectomy and parathyroidectomy simultaneously.    3.  Type 2 diabetes, uncontrolled, currently medicated with glipizide, metformin and morning NPH insulin.  She was not able to tolerate SGLT2 inhibitors.  NPH insulin treatment was started a few days ago.  Plan:  Increase the dose of NPH to 17 units  Counseled the patient on the importance of restricting the overall carbohydrate intake with meals.  Discussed about low-carb food choices.  We might need to add a dose of NPH insulin at bedtime, in the event the morning blood sugar is persistently around 140-150.    No orders of the defined types were placed in this  encounter.    =========================================================================================================================================    History of Present Illness:  Jessica Kennedy is a 48 year old female with a past medical history significant for hypertension, obesity and type 2 diabetes seen in follow-up.  The patient is seen with the help of a [employer]  via iPad.      1. Bilateral pheochromocytoma in the context of MEN2A.    The patient has a history of hypertension formally diagnosed in 2017 and symptoms suggestive of pheochromocytoma: Heat intolerance, tachycardia, headaches, constipation.  The bilateral adrenal masses were initially discovered on abdominal CT in April 2022.  The abdominal MRI from 4/13/2022 showed no signal dropout on out of phase images. The 12/2022 adrenal CT revealed bilateral adrenal masses, measuring 2.5 and 3.4 cm in maximum diameter, with a noncontrast Hounsfield unit density of 30/45 and heterogeneous enhancement with contrast, suggestive of pheochromocytoma.  Plasma metanephrines were elevated.  The MIBG from January 2023 confirmed bilateral uptake.    In preparation for bilateral subtotal adrenalectomy, the patient was started on treatment with alpha blockers.    Currently prescribed antihypertensive medications:  Amlodipine, 10 mg daily   Doxazosin 24 mg BID (3 tablets of 8 mg each BID)  Doxazocin started on 1/30/23. The patient reports only taking 3 tablets of doxazocin in the morning.  Carvedilol, 25 mg twice daily (metoprolol was prescribed but the patient reported experiencing fatigue so it was replaced by carvedilol).     She endorses nasal stuffiness, mainly at night, with some difficulty breathing.  Not so much present during the day.  It started 2 or 3 weeks ago.  At home, she states that she has been checking her blood pressure and the numbers have been lower, less than 140/80.    2. MEN2A  The calcitonin level was 56.7 in February 2023.  The  patient has not had a calcitonin level checked in the past.  Calcium level was normal but at the upper end of normal range, phosphorus was normal at 3.3 and parathyroid hormone level was 64.  Vitamin D level was 24.    The ultrasound images from 2/20/2023 identified a possible parathyroid adenoma in the right inferior thyroid gland and a left thyroid nodule measuring 0.7 cm, poorly delineated, with ?microcalcifications.  The decision was not to pursue biopsy and pursue thyroidectomy/parathyroidectomy following pheochromocytoma surgery.    3. Type 2 diabetes   Current antidiabetic regimen:  Metformin 1 gm BID  Glipizide, 5 mg daily.   She quit Farxiga due to genital fungal infections and dizziness.  NPH 15 units in the morning.  The patient started insulin 4 days ago.    Hemoglobin A1c was 9%, on 6/28/2023.  I reviewed the jennifer records.  She has been experiencing postprandial spikes with carb intake, up to 300s.  Fasting morning blood sugars have been around 150.  There are no hypoglycemic episodes documented by the sensor.  In the clinic, her blood sugar on the sensor was 320 at 12:21 PM.  The prior meal was at 11:20 AM and she did have ananth bread with that meal.    Pertinent outside labs and imaging:   The blood work done at [facility], revealed the following results:  12/6/2022: Free metanephrines 942 (<57), free normetanephrine 647 (<148), LC/MS assay; chromogranin A 363 (<311)  10/5/2022: Free metanephrine 663, free normetanephrine 414, aldosterone 10 (LC MS), renin activity 0.48 (0.25-5.82), aldosterone to renin ratio 20.8  12/16/2022 24-hour urinary dopamine 147 (), epinephrine 5 (2-24) norepinephrine 54 ().  No 24 hours urinary creatinine or urinary volume was reported.    Other pertinent labs reviewed in the outside records:  6/10/2022 MANDY 65 antibodies, insulin autoantibodies and antiislet cell antibodies, zinc transporter antibodies undetectable; C-peptide 2.56  10/3/2022  "triglycerides 225, HDL cholesterol 67, LDL cholesterol 90, magnesium 1.6 (1.5-2.5), vitamin D level 21, morning cortisol 6.4 (time of collection unavailable), B12 419, ferritin 51, free T4 1.3 (0.8-1.8), TSH 1.72  10/5/2022 urine microalbumin to creatinine ratio 1096  12/6/2022 phosphate 2.9 (2.5-4.5), CEA 1.8, parathyroid hormone 67 (16-77), BUN mildly elevated at 27, creatinine normal at 0.81, sodium 133, potassium 4.1, albumin 4.2, calcium 10.4 (8.6-10.2).  Prior calcium levels were 10.2 on 11/23/21 and 10.8 on 10/3/2022.  Alkaline phosphatase has been normal.    The chest x-ray from September 2022 showed clear lungs.  Head CT from December 2022 was unremarkable, done for evaluation of headaches.     9/29/2022 calcium 11.1 (normal range 8.4-10.4.)  12/5/2022 calcium 9.6 (normal range 8.5-10.5)     12/14/20 C peptide 6.8, glucose 125, MANDY 65 antibodies negative     Thyroid US from 8/28/20 done at [facility]:  \"The right thyroid lobe measures: 1.5 cmx1.4 cmx3.6 cm   The left thyroid lobe measures: 1.1 cmx1.2 cmx3.2 cm   The isthmus measures: 1 mm in AP diameter.     The thyroid parenchyma appears: mildly heterogeneous.     A nodule in the mid left lobe measures 0.7 cm and has low suspicion for malignancy (1-5%). There are   small solid nodules inferior to each lobe of the thyroid gland, the largest on   the right measuring 1.5 cm, likely parathyroid glands. Additionally, there is a   1.2 cm soft tissue mass superior to the left lobe of the thyroid gland, which   may represent a lymph node.\"    Transthoracic echocardiogram from January 2021 was unremarkable, with an ejection fraction of 59%.    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, [id number]   Right proximal tibial and fibular fracture 2017   Post traumatic deformity at the fourth right proximal " phalangeal base 2018   Degenerative changes of the knees    Past Surgical History   Past Surgical History:   Procedure Laterality Date     HEAD & NECK SURGERY       ORTHOPEDIC SURGERY         Current Medications    Current Outpatient Medications:      acetaminophen (TYLENOL) 500 MG tablet, Take 1-2 tablets (500-1,000 mg) by mouth every 6 hours as needed for mild pain, Disp: 1 Bottle, Rfl: 0     alcohol swab prep pads, Use to swab area of injection/laurie as directed., Disp: 100 each, Rfl: 3     amLODIPine (NORVASC) 10 MG tablet, Take 1 tablet (10 mg) by mouth daily, Disp: 90 tablet, Rfl: 1     aspirin (ASA) 81 MG chewable tablet, Take 1 tablet (81 mg) by mouth daily CHEW AND SWALLOW (Patient not taking: Reported on 6/20/2023), Disp: 90 tablet, Rfl: 1     Benzocaine-Menthol 10-2.1 MG LOZG, Take 1 lozenge by mouth 4 times daily as needed (Cough or sore throat), Disp: 84 lozenge, Rfl: 0     blood glucose (NO BRAND SPECIFIED) test strip, Use to test blood sugar 2 times daily or as directed., Disp: 100 strip, Rfl: 3     carvedilol (COREG) 25 MG tablet, Take 25 mg by mouth 2 times daily (with meals), Disp: , Rfl:      Continuous Blood Gluc  (FREESTYLE ABHIJIT 14 DAY READER) RODERICK, Use to read blood sugars as per 's instructions., Disp: 1 each, Rfl: 0     Continuous Blood Gluc Sensor (FREESTYLE ABHIJIT 14 DAY SENSOR) [facility], Change every 14 days., Disp: 6 each, Rfl: 11     Continuous Blood Gluc Sensor (FREESTYLE ABHIJIT 2 SENSOR) MISC, 1 each every 14 days, Disp: 6 each, Rfl: 3     doxazosin (CARDURA) 8 MG tablet, Take 3 tablets (24 mg) by mouth 2 times daily, Disp: 54 tablet, Rfl: 3     glipiZIDE (GLUCOTROL) 5 MG tablet, Take 5 mg by mouth daily, Disp: , Rfl:      ibuprofen (ADVIL/MOTRIN) 200 MG tablet, Take 3 tablets (600 mg) by mouth every 6 hours as needed for mild pain, Disp: 30 tablet, Rfl: 0     insulin  UNIT/ML injection, 15  units before breakfast, Disp: 15 mL, Rfl: 3     insulin pen needle (BD  CELINE U/F) 32G X 4 MM miscellaneous, Use 1 pen needles daily or as directed., Disp: 100 each, Rfl: 3     metFORMIN (GLUCOPHAGE) 1000 MG tablet, Take 1,000 mg by mouth 2 times daily (with meals), Disp: , Rfl:      Microlet Lancets MISC, 1 each daily, Disp: 100 each, Rfl: 3     omeprazole (PRILOSEC) 40 MG DR capsule, Take 1 capsule (40 mg) by mouth daily, Disp: 90 capsule, Rfl: 1     phenoxybenzamine (DIBENZYLINE) 10 MG capsule, Take 1 capsule (10 mg) by mouth 2 times daily, Disp: 180 capsule, Rfl: 3    Family History   Father passed away from MI while her mother was pregnant with her (not sure of his age). Paternal grandfather and paternal uncles have HTN.  No family history of diabetes. Mother  of cancer.      Social History  Jessica immigrated to [place] in , from a refugee camp in [place].  She was born in [place].    Single. She has 8 children, 9 to 22 yo. She denies smoking, drinking alcohol or using illicit drugs. Occupation: home care.               Vital Signs     Previous Weights:    Wt Readings from Last 10 Encounters:   23 97 kg (213 lb 14.4 oz)   23 96.8 kg (213 lb 8 oz)   23 95.6 kg (210 lb 11.2 oz)   23 97.5 kg (215 lb)   23 97.4 kg (214 lb 11.2 oz)   23 92.5 kg (204 lb)   23 95.4 kg (210 lb 6.4 oz)   02/15/23 93.2 kg (205 lb 6.4 oz)   05/10/22 93.9 kg (207 lb)   21 102.1 kg (225 lb)        Resp 16   Wt 97 kg (213 lb 14.4 oz)   LMP 2023 (Approximate)   SpO2 98%   BMI 33.50 kg/m       BP Readings from Last 6 Encounters:   23 137/85   23 (!) 146/88   23 (!) 147/92   23 126/82   23 (!) 159/97   02/15/23 (!) 143/96       30 minutes spent on the date of the encounter doing chart review, history and exam, documentation and further activities as noted above.

## 2023-07-19 NOTE — NURSING NOTE
Jessica Kennedy's goals for this visit include: NONE  She requests these members of her care team be copied on today's visit information: YES    PCP: Mariela, [facility]    Referring Provider:  No referring provider defined for this encounter.    Resp 16   Wt 97 kg (213 lb 14.4 oz)   LMP 05/09/2023 (Approximate)   SpO2 98%   BMI 33.50 kg/m      Do you need any medication refills at today's visit? NONE    Bernabe Jeter, EMT

## 2023-07-21 RX ORDER — PHENOXYBENZAMINE HYDROCHLORIDE 10 MG/1
10 CAPSULE ORAL DAILY
Qty: 180 CAPSULE | Refills: 3 | Status: SHIPPED | OUTPATIENT
Start: 2023-07-21 | End: 2023-07-21

## 2023-07-21 RX ORDER — PHENOXYBENZAMINE HYDROCHLORIDE 10 MG/1
10 CAPSULE ORAL DAILY
Qty: 90 CAPSULE | Refills: 3 | Status: ON HOLD | OUTPATIENT
Start: 2023-07-21 | End: 2023-08-23

## 2023-07-26 ENCOUNTER — TELEPHONE (OUTPATIENT)
Dept: ENDOCRINOLOGY | Facility: CLINIC | Age: 48
End: 2023-07-26

## 2023-07-26 NOTE — TELEPHONE ENCOUNTER
Patient missed her appointment today with Dr. Vanessa.    Per Dr. Vanessa:    Please call patient and ask her to come Friday for orthostatic blood pressure readings and to go over her medications. Please also ask patient how her nasal stuffiness is going. Please page/call me.      If patient is unable, she is also scheduled for appointment next Wednesday, 8/2/23.      Will also need to admit patient for observation on 8/10/23, prior to her surgery scheduled for 8/14/23.             Writer attempted to contact patient on 3 way call with . No answer.  left voicemail for patient letting her know clinic will reach out to her again tomorrow.         Missy Patricia, RN  Endocrine Care Coordinator  [facility]

## 2023-07-27 NOTE — TELEPHONE ENCOUNTER
Writer again attempted to contact patient on 3 way call with . Reviewed with patient recommendations from Dr. Vanessa. Patient states that she cannot come tomorrow for blood pressure check and she plans to come next Wednesday as planned. Patient has not checked her blood pressure today but did note that her nasal stuffiness is doing better.       Writer will update Dr. Vanessa.      Missy Patricia RN  Endocrine Care Coordinator  [facility]

## 2023-08-02 ENCOUNTER — TELEPHONE (OUTPATIENT)
Dept: ENDOCRINOLOGY | Facility: CLINIC | Age: 48
End: 2023-08-02

## 2023-08-02 ENCOUNTER — OFFICE VISIT (OUTPATIENT)
Dept: ENDOCRINOLOGY | Facility: CLINIC | Age: 48
End: 2023-08-02
Payer: COMMERCIAL

## 2023-08-02 VITALS — DIASTOLIC BLOOD PRESSURE: 101 MMHG | SYSTOLIC BLOOD PRESSURE: 145 MMHG | HEART RATE: 81 BPM

## 2023-08-02 DIAGNOSIS — D35.00 PHEOCHROMOCYTOMA, UNSPECIFIED LATERALITY: Primary | ICD-10-CM

## 2023-08-02 DIAGNOSIS — E26.09 PRIMARY HYPERALDOSTERONISM (H): ICD-10-CM

## 2023-08-02 DIAGNOSIS — C73 MEDULLARY CARCINOMA OF THYROID (H): ICD-10-CM

## 2023-08-02 DIAGNOSIS — E11.9 TYPE 2 DIABETES MELLITUS NOT AT GOAL (H): ICD-10-CM

## 2023-08-02 DIAGNOSIS — R09.81 STUFFY NOSE: ICD-10-CM

## 2023-08-02 DIAGNOSIS — E21.0 PRIMARY HYPERPARATHYROIDISM (H): ICD-10-CM

## 2023-08-02 LAB
ANION GAP SERPL CALCULATED.3IONS-SCNC: 14 MMOL/L (ref 7–15)
BUN SERPL-MCNC: 16.4 MG/DL (ref 6–20)
CALCIUM SERPL-MCNC: 11.1 MG/DL (ref 8.6–10)
CHLORIDE SERPL-SCNC: 102 MMOL/L (ref 98–107)
CREAT SERPL-MCNC: 0.66 MG/DL (ref 0.51–0.95)
DEPRECATED HCO3 PLAS-SCNC: 20 MMOL/L (ref 22–29)
GFR SERPL CREATININE-BSD FRML MDRD: >90 ML/MIN/1.73M2
GLUCOSE SERPL-MCNC: 281 MG/DL (ref 70–99)
POTASSIUM SERPL-SCNC: 3.8 MMOL/L (ref 3.4–5.3)
SODIUM SERPL-SCNC: 136 MMOL/L (ref 136–145)

## 2023-08-02 PROCEDURE — 36415 COLL VENOUS BLD VENIPUNCTURE: CPT | Performed by: INTERNAL MEDICINE

## 2023-08-02 PROCEDURE — 80048 BASIC METABOLIC PNL TOTAL CA: CPT | Performed by: INTERNAL MEDICINE

## 2023-08-02 PROCEDURE — 95251 CONT GLUC MNTR ANALYSIS I&R: CPT | Performed by: INTERNAL MEDICINE

## 2023-08-02 PROCEDURE — 99215 OFFICE O/P EST HI 40 MIN: CPT | Mod: 25 | Performed by: INTERNAL MEDICINE

## 2023-08-02 RX ORDER — FLUTICASONE PROPIONATE 50 MCG
1 SPRAY, SUSPENSION (ML) NASAL DAILY
Qty: 9.9 ML | Refills: 1 | Status: ON HOLD | OUTPATIENT
Start: 2023-08-02 | End: 2024-02-17

## 2023-08-02 RX ORDER — LOSARTAN POTASSIUM 50 MG/1
50 TABLET ORAL DAILY
Qty: 90 TABLET | Refills: 3 | Status: ON HOLD | OUTPATIENT
Start: 2023-08-02 | End: 2023-08-23

## 2023-08-02 NOTE — NURSING NOTE
Jessica Kennedy's goals for this visit include: NONE  She requests these members of her care team be copied on today's visit information: YES    PCP: Mariela, [facility]    Referring Provider:  No referring provider defined for this encounter.    BP (!) 145/101 (BP Location: Left arm, Patient Position: Standing, Cuff Size: Adult Large)   Pulse 81   LMP 05/09/2023 (Approximate)     Do you need any medication refills at today's visit? NONE    [facility]-Department of Endocrinology  Diabetes Educators:   Manasa Thornton, RN and Sury Capellan RN  Clinic Nurse: GERSON Louis  CMA's: Nadya Aranda and Stewart   EMT: Bernabe  Scheduling/Clinic phone number : [phone]   Clinic Fax: [phone]  On-Call Endocrine at [facility] (after hours/weekends): [phone] option 4    Please call the number below to schedule your labs.  [facility]    General [phone]   [facility] [phone]   [place] [phone]   [facility]  [phone]   [facility] [phone]   [place] [phone]   [facility]) [phone]   [facility] [phone]   [place] [phone]   [place] [phone]   [place] [phone]     Please reach out to the following centers to schedule your imaging appointment:  Imaging (DEXA, CT, MRI, XRAY)    [facility] ([facility], [place]) [phone]   [facility] ([place], [place]) [phone]   [facility] ([facility]) [phone]   [facility] ([facility]) [phone]     Appointment Reminders:  * Please bring meter with for staff to download  * If you are due ONLY for an A1C, it is scheduled with the nurse and will be done in clinic. You do not need to schedule a lab appointment. Fasting is not required for an A1C.  * Refill request should be submitted to your pharmacy. They will contact clinic for approval.      Bernabe Jeter, EMT

## 2023-08-02 NOTE — PATIENT INSTRUCTIONS
Medications for blood pressure:   Amlodipine, 10 mg daily   Doxazosin 24 mg BID (3 tablets of 8 mg each twice daily)  Carvedilol, 25 mg twice daily  Phenoxybenzamine, 10 mg daily   Losartan, 50 mg daily (added today)     For the nasal stuffiness, please try Flonase spray, one spray into each nostril daily      For diabetes:   Increase the dose of insulin to 20 units   Continue metformin   Resume taking glipizide at 5 mg daily

## 2023-08-02 NOTE — PROGRESS NOTES
=======================================================================================================================================  Assessment     Jessica Kennedy is a 48 year old [place]ian female seen in follow-up for evaluation of bilateral pheochromocytoma and MEN2A.     1.  Bilateral synchronous pheochromocytoma, scheduled for cortical sparing bilateral adrenalectomy on 8/14/23.   Her blood pressure remains elevated.  I recommended to be admitted for observation on August 10, so we can closely monitor compliance, adjust antihypertensive meds and start treatment with metyrosine in preparation for surgery.  Meantime, I recommended to start losartan, at 50 g daily and have BMP checked today. Discussed about the option of using a Flonase nasal spray if the nasal stuffiness is persistent, on a trial basis.  Today, the patient has questions regarding the risk of developing adrenal insufficiency following surgery.  She is interested in getting a second opinion from a different surgeon, as she was not told what is the exact risk of developing adrenal insufficiency.  She states she prefers not to take lifelong medications following surgery.  I discussed with her about the overall management of adrenal insufficiency and the overall good prognosis as long as she takes the medications (hydrocortisone and fludrocortisone). In the end, the patient opted to seek a second opinion at [facility], understanding that she might have to cancel the scheduled surgery, as it takes time to schedule an apt at [facility].      2. Medullary thyroid cancer and primary hyperparathyroidism  Following adrenalectomy, we are going to pursue a SPECT-CT to further try to identify the enlarged parathyroid glands and pursue both thyroidectomy and parathyroidectomy simultaneously.    3.  Type 2 diabetes, currently medicated with metformin and morning NPH insulin.  The glycemic control is slightly better.  She was not able to tolerate SGLT2  inhibitors.    Plan:  Increase the dose of NPH to 20 units  Resume taking glipizide at 5 mg daily    Orders Placed This Encounter   Procedures     Continuous Glucose Monitoring >=72 hours PHYS INTERP     Basic metabolic panel     Adult General Surg Referral     =========================================================================================================================================    History of Present Illness:  Jessica Kennedy is a 48 year old female with a past medical history significant for hypertension, obesity and type 2 diabetes seen in follow-up.  The patient is seen with the help of a [employer]  via iPad.      1. Bilateral pheochromocytoma in the context of MEN2A.    The patient has a history of hypertension formally diagnosed in 2017 and symptoms suggestive of pheochromocytoma: Heat intolerance, tachycardia, headaches, constipation.  The bilateral adrenal masses were initially discovered on abdominal CT in April 2022.  The abdominal MRI from 4/13/2022 showed no signal dropout on out of phase images. The 12/2022 adrenal CT revealed bilateral adrenal masses, measuring 2.5 and 3.4 cm in maximum diameter, with a noncontrast Hounsfield unit density of 30/45 and heterogeneous enhancement with contrast, suggestive of pheochromocytoma.  Plasma metanephrines were elevated.  The MIBG from January 2023 confirmed bilateral uptake.    In preparation for bilateral subtotal adrenalectomy, the patient was started on treatment with alpha and beta blockers.  Surgery is scheduled for August 14.    Currently prescribed antihypertensive medications:  Amlodipine, 10 mg daily.   Doxazosin 24 mg BID (3 tablets of 8 mg each BID)  Doxazocin started on 1/30/23.   Carvedilol, 25 mg twice daily  Phenoxybenzamine 10 mg daily.    We have always asked the patient to bring all her prescription medications at the appointment.  Today, she brought the amlodipine, doxazosin and carvedilol.  She confirms she takes the  appropriate dosages.  At her last visit, the phenoxybenzamine was decreased from 10 mg twice a day to 10 mg daily, in view of the nasal stuffiness patient complained of.  The patient states she takes the 10 mg phenoxybenzamine daily but she forgot to bring the pill bottle.  The nasal stuffiness does not bother her much during daytime, mostly at night.    Vitals today          Orthostatic /99 139/93 145/101   BP Location Left arm Left arm Left arm   Patient Position Supine Standing Standing   Cuff Size Adult Large Adult Large Adult Large   Pulse   81   Orthostatic Pulse 72 86    Recent GFR from 6/28/23 >90     2. MEN2A  The calcitonin level was 56.7 in February 2023.  The patient has not had a calcitonin level checked in the past.  Calcium level was normal but at the upper end of normal range, phosphorus was normal at 3.3 and parathyroid hormone level was 64.  Vitamin D level was 24.    The ultrasound images from 2/20/2023 identified a possible parathyroid adenoma in the right inferior thyroid gland and a left thyroid nodule measuring 0.7 cm, poorly delineated, with ?microcalcifications.  The decision was not to pursue biopsy and pursue thyroidectomy/parathyroidectomy following pheochromocytoma surgery.    3. Type 2 diabetes   Current antidiabetic regimen:  Metformin 1 gm BID  Glipizide, 5 mg daily - not taking it.   NPH 17 units in the morning.  The patient started insulin mid July 2023.  She quit Farxiga due to genital fungal infections and dizziness.    Denies expeirencings lows   The jennifer sensor fell off 1 week ago and she has not been wearing the CGM since July 23.  I reviewed the jennifer records.  Average blood glucose is 204 over the last month, with a glucose variability of 25%, corresponding to a GMI of 8.2%.  Only 33% of the numbers are within target of , 15% are above 250.  On the sensor, the blood glucose is consistently elevated around noon time.  There are no hypoglycemic episodes documented  "by the sensor.    Hemoglobin A1c was 9%, on 6/28/2023.    Pertinent outside labs and imaging:   The blood work done at [facility], revealed the following results:  12/6/2022: Free metanephrines 942 (<57), free normetanephrine 647 (<148), LC/MS assay; chromogranin A 363 (<311)  10/5/2022: Free metanephrine 663, free normetanephrine 414, aldosterone 10 (LC MS), renin activity 0.48 (0.25-5.82), aldosterone to renin ratio 20.8  12/16/2022 24-hour urinary dopamine 147 (), epinephrine 5 (2-24) norepinephrine 54 ().  No 24 hours urinary creatinine or urinary volume was reported.    Other pertinent labs reviewed in the outside records:  6/10/2022 MANDY 65 antibodies, insulin autoantibodies and antiislet cell antibodies, zinc transporter antibodies undetectable; C-peptide 2.56  10/3/2022 triglycerides 225, HDL cholesterol 67, LDL cholesterol 90, magnesium 1.6 (1.5-2.5), vitamin D level 21, morning cortisol 6.4 (time of collection unavailable), B12 419, ferritin 51, free T4 1.3 (0.8-1.8), TSH 1.72  10/5/2022 urine microalbumin to creatinine ratio 1096  12/6/2022 phosphate 2.9 (2.5-4.5), CEA 1.8, parathyroid hormone 67 (16-77), BUN mildly elevated at 27, creatinine normal at 0.81, sodium 133, potassium 4.1, albumin 4.2, calcium 10.4 (8.6-10.2).  Prior calcium levels were 10.2 on 11/23/21 and 10.8 on 10/3/2022.  Alkaline phosphatase has been normal.    The chest x-ray from September 2022 showed clear lungs.  Head CT from December 2022 was unremarkable, done for evaluation of headaches.     9/29/2022 calcium 11.1 (normal range 8.4-10.4.)  12/5/2022 calcium 9.6 (normal range 8.5-10.5)     12/14/20 C peptide 6.8, glucose 125, MANDY 65 antibodies negative     Thyroid US from 8/28/20 done at [facility]:  \"The right thyroid lobe measures: 1.5 cmx1.4 cmx3.6 cm   The left thyroid lobe measures: 1.1 cmx1.2 cmx3.2 cm   The isthmus measures: 1 mm in AP diameter.     The thyroid parenchyma appears: mildly heterogeneous. " "    A nodule in the mid left lobe measures 0.7 cm and has low suspicion for malignancy (1-5%). There are   small solid nodules inferior to each lobe of the thyroid gland, the largest on   the right measuring 1.5 cm, likely parathyroid glands. Additionally, there is a   1.2 cm soft tissue mass superior to the left lobe of the thyroid gland, which   may represent a lymph node.\"    Transthoracic echocardiogram from January 2021 was unremarkable, with an ejection fraction of 59%.    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, [id number]   Right proximal tibial and fibular fracture 2017   Post traumatic deformity at the fourth right proximal phalangeal base 2018   Degenerative changes of the knees    Past Surgical History   Past Surgical History:   Procedure Laterality Date     HEAD & NECK SURGERY       ORTHOPEDIC SURGERY         Current Medications    Current Outpatient Medications:      acetaminophen (TYLENOL) 500 MG tablet, Take 1-2 tablets (500-1,000 mg) by mouth every 6 hours as needed for mild pain, Disp: 1 Bottle, Rfl: 0     alcohol swab prep pads, Use to swab area of injection/laurie as directed., Disp: 100 each, Rfl: 3     amLODIPine (NORVASC) 10 MG tablet, Take 1 tablet (10 mg) by mouth daily, Disp: 90 tablet, Rfl: 1     aspirin (ASA) 81 MG chewable tablet, Take 1 tablet (81 mg) by mouth daily CHEW AND SWALLOW (Patient not taking: Reported on 6/20/2023), Disp: 90 tablet, Rfl: 1     Benzocaine-Menthol 10-2.1 MG LOZG, Take 1 lozenge by mouth 4 times daily as needed (Cough or sore throat), Disp: 84 lozenge, Rfl: 0     blood glucose (NO BRAND SPECIFIED) test strip, Use to test blood sugar 2 times daily or as directed., Disp: 100 strip, Rfl: 3     carvedilol (COREG) 25 MG tablet, Take 1 tablet (25 mg) by mouth 2 times daily (with meals), Disp: 180 tablet, " Rfl: 3     Continuous Blood Gluc  (FREESTYLE ABHIJIT 14 DAY READER) RODERICK, Use to read blood sugars as per 's instructions., Disp: 1 each, Rfl: 0     Continuous Blood Gluc Sensor (FREESTYLE ABHIJIT 14 DAY SENSOR) [facility], Change every 14 days., Disp: 6 each, Rfl: 11     Continuous Blood Gluc Sensor (FREESTYLE ABHIJIT 2 SENSOR) MISC, 1 each every 14 days, Disp: 6 each, Rfl: 3     doxazosin (CARDURA) 8 MG tablet, Take 3 tablets (24 mg) by mouth 2 times daily, Disp: 54 tablet, Rfl: 3     glipiZIDE (GLUCOTROL) 5 MG tablet, Take 5 mg by mouth daily, Disp: , Rfl:      ibuprofen (ADVIL/MOTRIN) 200 MG tablet, Take 3 tablets (600 mg) by mouth every 6 hours as needed for mild pain, Disp: 30 tablet, Rfl: 0     insulin  UNIT/ML injection, 15  units before breakfast, Disp: 15 mL, Rfl: 3     insulin pen needle (BD CELINE U/F) 32G X 4 MM miscellaneous, Use 1 pen needles daily or as directed., Disp: 100 each, Rfl: 3     metFORMIN (GLUCOPHAGE) 1000 MG tablet, Take 1,000 mg by mouth 2 times daily (with meals), Disp: , Rfl:      Microlet Lancets MISC, 1 each daily, Disp: 100 each, Rfl: 3     omeprazole (PRILOSEC) 40 MG DR capsule, Take 1 capsule (40 mg) by mouth daily, Disp: 90 capsule, Rfl: 1     phenoxybenzamine (DIBENZYLINE) 10 MG capsule, Take 1 capsule (10 mg) by mouth daily, Disp: 90 capsule, Rfl: 3    Family History   Father passed away from MI while her mother was pregnant with her (not sure of his age). Paternal grandfather and paternal uncles have HTN.  No family history of diabetes. Mother  of cancer.      Social History  Jessica immig[place] to [place] in , from a [place] in [place].  She was born in [place].    Single. She has 8 children, 9 to 22 yo. She denies smoking, drinking alcohol or using illicit drugs. Occupation: home care.               Vital Signs     Previous Weights:    Wt Readings from Last 10 Encounters:   23 97 kg (213 lb 14.4 oz)   23 96.8 kg (213 lb 8 oz)    06/28/23 95.6 kg (210 lb 11.2 oz)   06/20/23 97.5 kg (215 lb)   06/07/23 97.4 kg (214 lb 11.2 oz)   04/05/23 92.5 kg (204 lb)   03/08/23 95.4 kg (210 lb 6.4 oz)   02/15/23 93.2 kg (205 lb 6.4 oz)   05/10/22 93.9 kg (207 lb)   11/20/21 102.1 kg (225 lb)        LMP 05/09/2023 (Approximate)      BP Readings from Last 6 Encounters:   07/05/23 137/85   06/20/23 (!) 146/88   06/07/23 (!) 147/92   05/02/23 126/82   04/05/23 (!) 159/97   02/15/23 (!) 143/96       50 minutes spent on the date of the encounter doing chart review, history and exam, documentation and further activities as noted above.

## 2023-08-02 NOTE — LETTER
8/2/2023         RE: Jessica Kennedy  [address]  [address]  [facility] [address]        Dear Colleague,    Thank you for referring your patient, Jessica Kennedy, to the [facility]. Please see a copy of my visit note below.    =======================================================================================================================================  Assessment     Jessica Kennedy is a 48 year old [place]er female seen in follow-up for evaluation of bilateral pheochromocytoma and MEN2A.     1.  Bilateral synchronous pheochromocytoma, scheduled for cortical sparing bilateral adrenalectomy on 8/14/23.   Her blood pressure remains elevated.  I recommended to be admitted for observation on August 10, so we can closely monitor compliance, adjust antihypertensive meds and start treatment with metyrosine in preparation for surgery.  Meantime, I recommended to start losartan, at 50 g daily and have BMP checked today. Discussed about the option of using a Flonase nasal spray if the nasal stuffiness is persistent, on a trial basis.  Today, the patient has questions regarding the risk of developing adrenal insufficiency following surgery.  She is interested in getting a second opinion from a different surgeon, as she was not told what is the exact risk of developing adrenal insufficiency.  She states she prefers not to take lifelong medications following surgery.  I discussed with her about the overall management of adrenal insufficiency and the overall good prognosis as long as she takes the medications (hydrocortisone and fludrocortisone). In the end, the patient opted to seek a second opinion at [facility], understanding that she might have to cancel the scheduled surgery, as it takes time to schedule an apt at [facility].      2. Medullary thyroid cancer and primary hyperparathyroidism  Following adrenalectomy, we are going to pursue a SPECT-CT to further try to identify the  enlarged parathyroid glands and pursue both thyroidectomy and parathyroidectomy simultaneously.    3.  Type 2 diabetes, currently medicated with metformin and morning NPH insulin.  The glycemic control is slightly better.  She was not able to tolerate SGLT2 inhibitors.    Plan:  Increase the dose of NPH to 20 units  Resume taking glipizide at 5 mg daily    Orders Placed This Encounter   Procedures     Continuous Glucose Monitoring >=72 hours PHYS INTERP     Basic metabolic panel     Adult General Surg Referral     =========================================================================================================================================    History of Present Illness:  Jessica Kennedy is a 48 year old female with a past medical history significant for hypertension, obesity and type 2 diabetes seen in follow-up.  The patient is seen with the help of a [employer]  via iPad.      1. Bilateral pheochromocytoma in the context of MEN2A.    The patient has a history of hypertension formally diagnosed in 2017 and symptoms suggestive of pheochromocytoma: Heat intolerance, tachycardia, headaches, constipation.  The bilateral adrenal masses were initially discovered on abdominal CT in April 2022.  The abdominal MRI from 4/13/2022 showed no signal dropout on out of phase images. The 12/2022 adrenal CT revealed bilateral adrenal masses, measuring 2.5 and 3.4 cm in maximum diameter, with a noncontrast Hounsfield unit density of 30/45 and heterogeneous enhancement with contrast, suggestive of pheochromocytoma.  Plasma metanephrines were elevated.  The MIBG from January 2023 confirmed bilateral uptake.    In preparation for bilateral subtotal adrenalectomy, the patient was started on treatment with alpha and beta blockers.  Surgery is scheduled for August 14.    Currently prescribed antihypertensive medications:  Amlodipine, 10 mg daily.   Doxazosin 24 mg BID (3 tablets of 8 mg each BID)  Doxazocin started on  1/30/23.   Carvedilol, 25 mg twice daily  Phenoxybenzamine 10 mg daily.    We have always asked the patient to bring all her prescription medications at the appointment.  Today, she brought the amlodipine, doxazosin and carvedilol.  She confirms she takes the appropriate dosages.  At her last visit, the phenoxybenzamine was decreased from 10 mg twice a day to 10 mg daily, in view of the nasal stuffiness patient complained of.  The patient states she takes the 10 mg phenoxybenzamine daily but she forgot to bring the pill bottle.  The nasal stuffiness does not bother her much during daytime, mostly at night.    Vitals today          Orthostatic /99 139/93 145/101   BP Location Left arm Left arm Left arm   Patient Position Supine Standing Standing   Cuff Size Adult Large Adult Large Adult Large   Pulse   81   Orthostatic Pulse 72 86    Recent GFR from 6/28/23 >90     2. MEN2A  The calcitonin level was 56.7 in February 2023.  The patient has not had a calcitonin level checked in the past.  Calcium level was normal but at the upper end of normal range, phosphorus was normal at 3.3 and parathyroid hormone level was 64.  Vitamin D level was 24.    The ultrasound images from 2/20/2023 identified a possible parathyroid adenoma in the right inferior thyroid gland and a left thyroid nodule measuring 0.7 cm, poorly delineated, with ?microcalcifications.  The decision was not to pursue biopsy and pursue thyroidectomy/parathyroidectomy following pheochromocytoma surgery.    3. Type 2 diabetes   Current antidiabetic regimen:  Metformin 1 gm BID  Glipizide, 5 mg daily - not taking it.   NPH 17 units in the morning.  The patient started insulin mid July 2023.  She quit Farxiga due to genital fungal infections and dizziness.    Denies expeirencings lows   The jennifer sensor fell off 1 week ago and she has not been wearing the CGM since July 23.  I reviewed the jennifer records.  Average blood glucose is 204 over the last month, with  a glucose variability of 25%, corresponding to a GMI of 8.2%.  Only 33% of the numbers are within target of , 15% are above 250.  On the sensor, the blood glucose is consistently elevated around noon time.  There are no hypoglycemic episodes documented by the sensor.    Hemoglobin A1c was 9%, on 6/28/2023.    Pertinent outside labs and imaging:   The blood work done at [facility], revealed the following results:  12/6/2022: Free metanephrines 942 (<57), free normetanephrine 647 (<148), LC/MS assay; chromogranin A 363 (<311)  10/5/2022: Free metanephrine 663, free normetanephrine 414, aldosterone 10 (LC MS), renin activity 0.48 (0.25-5.82), aldosterone to renin ratio 20.8  12/16/2022 24-hour urinary dopamine 147 (), epinephrine 5 (2-24) norepinephrine 54 ().  No 24 hours urinary creatinine or urinary volume was reported.    Other pertinent labs reviewed in the outside records:  6/10/2022 MANDY 65 antibodies, insulin autoantibodies and antiislet cell antibodies, zinc transporter antibodies undetectable; C-peptide 2.56  10/3/2022 triglycerides 225, HDL cholesterol 67, LDL cholesterol 90, magnesium 1.6 (1.5-2.5), vitamin D level 21, morning cortisol 6.4 (time of collection unavailable), B12 419, ferritin 51, free T4 1.3 (0.8-1.8), TSH 1.72  10/5/2022 urine microalbumin to creatinine ratio 1096  12/6/2022 phosphate 2.9 (2.5-4.5), CEA 1.8, parathyroid hormone 67 (16-77), BUN mildly elevated at 27, creatinine normal at 0.81, sodium 133, potassium 4.1, albumin 4.2, calcium 10.4 (8.6-10.2).  Prior calcium levels were 10.2 on 11/23/21 and 10.8 on 10/3/2022.  Alkaline phosphatase has been normal.    The chest x-ray from September 2022 showed clear lungs.  Head CT from December 2022 was unremarkable, done for evaluation of headaches.     9/29/2022 calcium 11.1 (normal range 8.4-10.4.)  12/5/2022 calcium 9.6 (normal range 8.5-10.5)     12/14/20 C peptide 6.8, glucose 125, MANDY 65 antibodies negative  "    Thyroid US from 8/28/20 done at [facility]:  \"The right thyroid lobe measures: 1.5 cmx1.4 cmx3.6 cm   The left thyroid lobe measures: 1.1 cmx1.2 cmx3.2 cm   The isthmus measures: 1 mm in AP diameter.     The thyroid parenchyma appears: mildly heterogeneous.     A nodule in the mid left lobe measures 0.7 cm and has low suspicion for malignancy (1-5%). There are   small solid nodules inferior to each lobe of the thyroid gland, the largest on   the right measuring 1.5 cm, likely parathyroid glands. Additionally, there is a   1.2 cm soft tissue mass superior to the left lobe of the thyroid gland, which   may represent a lymph node.\"    Transthoracic echocardiogram from January 2021 was unremarkable, with an ejection fraction of 59%.    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, [id number]   Right proximal tibial and fibular fracture 2017   Post traumatic deformity at the fourth right proximal phalangeal base 2018   Degenerative changes of the knees    Past Surgical History   Past Surgical History:   Procedure Laterality Date     HEAD & NECK SURGERY       ORTHOPEDIC SURGERY         Current Medications    Current Outpatient Medications:      acetaminophen (TYLENOL) 500 MG tablet, Take 1-2 tablets (500-1,000 mg) by mouth every 6 hours as needed for mild pain, Disp: 1 Bottle, Rfl: 0     alcohol swab prep pads, Use to swab area of injection/laurie as directed., Disp: 100 each, Rfl: 3     amLODIPine (NORVASC) 10 MG tablet, Take 1 tablet (10 mg) by mouth daily, Disp: 90 tablet, Rfl: 1     aspirin (ASA) 81 MG chewable tablet, Take 1 tablet (81 mg) by mouth daily CHEW AND SWALLOW (Patient not taking: Reported on 6/20/2023), Disp: 90 tablet, Rfl: 1     Benzocaine-Menthol 10-2.1 MG LOZG, Take 1 lozenge by mouth 4 times daily as needed (Cough or sore throat), Disp: " 84 lozenge, Rfl: 0     blood glucose (NO BRAND SPECIFIED) test strip, Use to test blood sugar 2 times daily or as directed., Disp: 100 strip, Rfl: 3     carvedilol (COREG) 25 MG tablet, Take 1 tablet (25 mg) by mouth 2 times daily (with meals), Disp: 180 tablet, Rfl: 3     Continuous Blood Gluc  (FREESTYLE ABHIJIT 14 DAY READER) RODERICK, Use to read blood sugars as per 's instructions., Disp: 1 each, Rfl: 0     Continuous Blood Gluc Sensor (FREESTYLE ABHIJIT 14 DAY SENSOR) MISC, Change every 14 days., Disp: 6 each, Rfl: 11     Continuous Blood Gluc Sensor (FREESTYLE ABHIJIT 2 SENSOR) MISC, 1 each every 14 days, Disp: 6 each, Rfl: 3     doxazosin (CARDURA) 8 MG tablet, Take 3 tablets (24 mg) by mouth 2 times daily, Disp: 54 tablet, Rfl: 3     glipiZIDE (GLUCOTROL) 5 MG tablet, Take 5 mg by mouth daily, Disp: , Rfl:      ibuprofen (ADVIL/MOTRIN) 200 MG tablet, Take 3 tablets (600 mg) by mouth every 6 hours as needed for mild pain, Disp: 30 tablet, Rfl: 0     insulin  UNIT/ML injection, 15  units before breakfast, Disp: 15 mL, Rfl: 3     insulin pen needle (BD CELINE U/F) 32G X 4 MM miscellaneous, Use 1 pen needles daily or as directed., Disp: 100 each, Rfl: 3     metFORMIN (GLUCOPHAGE) 1000 MG tablet, Take 1,000 mg by mouth 2 times daily (with meals), Disp: , Rfl:      Microlet Lancets MISC, 1 each daily, Disp: 100 each, Rfl: 3     omeprazole (PRILOSEC) 40 MG DR capsule, Take 1 capsule (40 mg) by mouth daily, Disp: 90 capsule, Rfl: 1     phenoxybenzamine (DIBENZYLINE) 10 MG capsule, Take 1 capsule (10 mg) by mouth daily, Disp: 90 capsule, Rfl: 3    Family History   Father passed away from MI while her mother was pregnant with her (not sure of his age). Paternal grandfather and paternal uncles have HTN.  No family history of diabetes. Mother  of cancer.      Social History  Jessica immigrated to [place] in , from a [place] in [place].  She was born in [place] [facility].    Single. She has 8  children, 9 to 24 yo. She denies smoking, drinking alcohol or using illicit drugs. Occupation: home care.               Vital Signs     Previous Weights:    Wt Readings from Last 10 Encounters:   07/19/23 97 kg (213 lb 14.4 oz)   07/05/23 96.8 kg (213 lb 8 oz)   06/28/23 95.6 kg (210 lb 11.2 oz)   06/20/23 97.5 kg (215 lb)   06/07/23 97.4 kg (214 lb 11.2 oz)   04/05/23 92.5 kg (204 lb)   03/08/23 95.4 kg (210 lb 6.4 oz)   02/15/23 93.2 kg (205 lb 6.4 oz)   05/10/22 93.9 kg (207 lb)   11/20/21 102.1 kg (225 lb)        LMP 05/09/2023 (Approximate)      BP Readings from Last 6 Encounters:   07/05/23 137/85   06/20/23 (!) 146/88   06/07/23 (!) 147/92   05/02/23 126/82   04/05/23 (!) 159/97   02/15/23 (!) 143/96       50 minutes spent on the date of the encounter doing chart review, history and exam, documentation and further activities as noted above.               Again, thank you for allowing me to participate in the care of your patient.        Sincerely,        Deloris Vanessa MD

## 2023-08-02 NOTE — LETTER
August 3, 2023      Jessica Kennedy  [address]    [facility] [address]              To Whom It May Concern,    I am referring Jessica to [facility] for a second opinion regarding corticosteroid bilateral adrenalectomy for bilateral pheochromocytoma.  The patient carries a diagnosis of MEN2A and she is particularly interested in minimizing the risk of developing adrenal insufficiency postop.     Please see my attached clinic note and feel free to contact me if necessary.      Sincerely,  Deloris Vanessa MD.

## 2023-08-02 NOTE — Clinical Note
Jessica was discharged from the hospital. Please schedule her for a nuclear scan and an apt with ENT (Dr Benton or Dr. Mao).

## 2023-08-02 NOTE — TELEPHONE ENCOUNTER
Spoke with patient via [facility]  services on the phone. Informed patient that she will need help with cooking and cleaning post operatively. She will also not be able to drive while taking pain medication. Also she would have a lifting restriction for a month or so depending on how she does post operatively.   Patient expressed concerns that she will need the hospital to provide help for her as she has no family that can stay with her. She also states she does not have a bed yet and will be sleeping on the floor with one of her children on a mattress. She states she was told she will need to come to the hospital on 8/13 per Dr Caceres. She understands her surgery date is 8/15.   I explained to patient that I would reach out to MELVI Villasenor regarding the above concerns and someone would be calling her again with information closer to her surgery date.     Dr Caceres informed of above.

## 2023-08-04 ENCOUNTER — TELEPHONE (OUTPATIENT)
Dept: ENDOCRINOLOGY | Facility: CLINIC | Age: 48
End: 2023-08-04
Payer: COMMERCIAL

## 2023-08-04 NOTE — TELEPHONE ENCOUNTER
Received results message from Dr. Vanessa as follows:      Please let her know lab results look normal with the exception of an elevated calcium.  For now, I just recommend to maintain a good hydration and drink at least 6 to 8 glasses of water daily.  This should help flush the calcium in the kidney.  The elevated calcium is most likely a consequence of an overactive parathyroid gland and we are going to address this following the adrenal surgery.       Writer reached out to patient via 3 way call with . No answer. Left voicemail for patient to contact our office.       Missy Patricia RN  Endocrine Care Coordinator  [facility]

## 2023-08-07 NOTE — PROGRESS NOTES
Outcome for 08/07/23 2:18 PM: Data uploaded on Jabari/printed.  Rosi Capps CMA  Adult Endocrinology  [facility] [place]

## 2023-08-08 ENCOUNTER — OFFICE VISIT (OUTPATIENT)
Dept: ENDOCRINOLOGY | Facility: CLINIC | Age: 48
End: 2023-08-08
Payer: COMMERCIAL

## 2023-08-08 VITALS — SYSTOLIC BLOOD PRESSURE: 140 MMHG | OXYGEN SATURATION: 95 % | DIASTOLIC BLOOD PRESSURE: 95 MMHG | HEART RATE: 79 BPM

## 2023-08-08 DIAGNOSIS — R09.81 STUFFY NOSE: ICD-10-CM

## 2023-08-08 DIAGNOSIS — E04.2 MULTIPLE THYROID NODULES: ICD-10-CM

## 2023-08-08 DIAGNOSIS — D35.01: ICD-10-CM

## 2023-08-08 DIAGNOSIS — C73 MEDULLARY THYROID CARCINOMA (H): ICD-10-CM

## 2023-08-08 DIAGNOSIS — E11.9 TYPE 2 DIABETES MELLITUS NOT AT GOAL (H): ICD-10-CM

## 2023-08-08 DIAGNOSIS — D35.02: ICD-10-CM

## 2023-08-08 DIAGNOSIS — E31.22 MULTIPLE ENDOCRINE NEOPLASIA TYPE 2A (MEN2A) (H): Primary | ICD-10-CM

## 2023-08-08 PROCEDURE — 99214 OFFICE O/P EST MOD 30 MIN: CPT | Performed by: INTERNAL MEDICINE

## 2023-08-08 PROCEDURE — 95251 CONT GLUC MNTR ANALYSIS I&R: CPT | Performed by: INTERNAL MEDICINE

## 2023-08-08 NOTE — LETTER
8/8/2023         RE: Jessica Kennedy  [address]  [address]  [place] [address]        Dear Colleague,    Thank you for referring your patient, Jessica Kennedy, to the [facility]. Please see a copy of my visit note below.    Outcome for 08/07/23 2:18 PM: Data uploaded on Jabari/printed.  Rosi Capps [facility]  Adult Endocrinology  [facility] [place]      =======================================================================================================================================  Assessment     Jessica Kennedy is a 48 year old [place] female seen in follow-up for evaluation of bilateral pheochromocytoma and MEN2A.     1.  Bilateral synchronous pheochromocytoma, scheduled for cortical sparing bilateral adrenalectomy on 8/14/23.   Her blood pressure remains uncontrolled and compliance with medications is questionable.  Plan:   Increase Losartan to 100 mg daily   Admit for observation on August 10, with the plan to optimize treatment prior to surgery and start treatment with metyrosine (metyrosine [facility] protocol: 250 mg every six hours on day 1, 500 mg every six hours on day 2, 750 mg every six hours on day 3, and 1000 mg every six hours on the day before the surgery, with the last dose the morning of the surgery.      2. Medullary thyroid cancer and primary hyperparathyroidism  Following adrenalectomy, we are going to pursue a SPECT-CT to further try to identify the enlarged parathyroid glands and pursue both thyroidectomy and parathyroidectomy simultaneously.    3.  Type 2 diabetes, uncontrolled.  She was not able to tolerate SGLT2 inhibitors.    Plan:  Increase the dose of NPH to 22 units  Continue Glipizide and metformin until admission  At the time of admission, recommend to start Lantus at 22 units daily, 1 U Novolog per 7 grams CHO and a medium correction scale.     Orders Placed This Encounter   Procedures     Continuous Glucose Monitoring >=72 hours PHYS  INTERP     =========================================================================================================================================    History of Present Illness:  Jessica Kennedy is a 48 year old female with a past medical history significant for hypertension, obesity and type 2 diabetes seen in follow-up.  The patient is seen with the help of a [employer]  via iPad.      1. Bilateral pheochromocytoma in the context of MEN2A.    The patient has a history of hypertension formally diagnosed in 2017 and symptoms suggestive of pheochromocytoma: Heat intolerance, tachycardia, headaches, constipation.  The bilateral adrenal masses were initially discovered on abdominal CT in April 2022.  The abdominal MRI from 4/13/2022 showed no signal dropout on out of phase images. The 12/2022 adrenal CT revealed bilateral adrenal masses, measuring 2.5 and 3.4 cm in maximum diameter, with a noncontrast Hounsfield unit density of 30/45 and heterogeneous enhancement with contrast, suggestive of pheochromocytoma.  Plasma metanephrines were elevated.  The MIBG from January 2023 confirmed bilateral uptake.    In preparation for bilateral subtotal adrenalectomy, the patient was started on treatment with alpha and beta blockers.  Surgery is scheduled for August 14.  She has decided not to seek a second opinion at [facility].    The patient has been sleeping on a mattress at home and there has been concern regarding her ability to appropriately recover postop.  At the time of the visit, she brings up the fact that she has to accompany her son to the clinic this Friday, where he is scheduled to receive injections for his mental condition.  The treatment is absolutely necessary, in order to prevent her son from becoming aggressive.  My staff is going to try to help her after today's visit to schedule the treatments tomorrow, so we can plan for admission and surgery as scheduled.    Currently prescribed antihypertensive  medications:  Amlodipine, 10 mg daily   Doxazosin 24 mg BID (3 tablets of 8 mg each twice daily)  Carvedilol, 25 mg twice daily  Phenoxybenzamine, 10 mg daily   Losartan, 50 mg daily (added 8/2/23)    We have always asked the patient to bring all her prescription medications at the appointment.  Today, she didn't bring any.   She states she takes the appropriate dosages.  She has been using the Flonase spray and the nasal stuffiness is now better.      Vitals today:   8/8/2023 9:03 AM 8/8/2023 9:06 AM 8/8/2023 9:10 AM   BP   140/95   Orthostatic /79 136/92         Most recent BMP from 8/2/2023 revealed normal GFR, sodium and potassium.    2. MEN2A  The calcitonin level was 56.7 in February 2023.  The patient has not had a calcitonin level checked prior.  The calcitonin remained stable at 55.8 on lab work from June 2023.  The lab work from February 2023 revealed high normal calcium level, normal phosphorus, and a parathyroid hormone of 64. Vitamin D level was 24.  On subsequent lab work, calcium level increased, with the most recent value of 11.1, on 8/2/2023.  The ultrasound images from 2/20/2023 identified a possible parathyroid adenoma in the right inferior thyroid gland and a left thyroid nodule measuring 0.7 cm, poorly delineated, with ?microcalcifications.  The decision was not to pursue biopsy and pursue thyroidectomy/parathyroidectomy following pheochromocytoma surgery.    3. Type 2 diabetes   Current antidiabetic regimen:  Metformin 1 gm BID  Glipizide, 5 mg daily   NPH 17 units in the morning (the dose was increased to 20 units at her last visit but the patient continues to take 17 units).  She quit Farxiga due to genital fungal infections and dizziness.    Denies expeirencings lows.   I reviewed the jennifer records.  She continues to experience postprandial hyperglycemia, with a peak blood glucose occurring anywhere between 12 AM and 6 PM.  Fasting blood glucose around 160.     Hemoglobin A1c was 9%,  "on 6/28/2023.    Pertinent outside labs and imaging:   The blood work done at [facility], revealed the following results:  12/6/2022: Free metanephrines 942 (<57), free normetanephrine 647 (<148), LC/MS assay; chromogranin A 363 (<311)  10/5/2022: Free metanephrine 663, free normetanephrine 414, aldosterone 10 (LC MS), renin activity 0.48 (0.25-5.82), aldosterone to renin ratio 20.8  12/16/2022 24-hour urinary dopamine 147 (), epinephrine 5 (2-24) norepinephrine 54 ().  No 24 hours urinary creatinine or urinary volume was reported.    Other pertinent labs reviewed in the outside records:  6/10/2022 MANDY 65 antibodies, insulin autoantibodies and antiislet cell antibodies, zinc transporter antibodies undetectable; C-peptide 2.56  10/3/2022 triglycerides 225, HDL cholesterol 67, LDL cholesterol 90, magnesium 1.6 (1.5-2.5), vitamin D level 21, morning cortisol 6.4 (time of collection unavailable), B12 419, ferritin 51, free T4 1.3 (0.8-1.8), TSH 1.72  10/5/2022 urine microalbumin to creatinine ratio 1096  12/6/2022 phosphate 2.9 (2.5-4.5), CEA 1.8, parathyroid hormone 67 (16-77), BUN mildly elevated at 27, creatinine normal at 0.81, sodium 133, potassium 4.1, albumin 4.2, calcium 10.4 (8.6-10.2).  Prior calcium levels were 10.2 on 11/23/21 and 10.8 on 10/3/2022.  Alkaline phosphatase has been normal.    The chest x-ray from September 2022 showed clear lungs.  Head CT from December 2022 was unremarkable, done for evaluation of headaches.     9/29/2022 calcium 11.1 (normal range 8.4-10.4.)  12/5/2022 calcium 9.6 (normal range 8.5-10.5)     12/14/20 C peptide 6.8, glucose 125, MANDY 65 antibodies negative     Thyroid US from 8/28/20 done at [facility]:  \"The right thyroid lobe measures: 1.5 cmx1.4 cmx3.6 cm   The left thyroid lobe measures: 1.1 cmx1.2 cmx3.2 cm   The isthmus measures: 1 mm in AP diameter.     The thyroid parenchyma appears: mildly heterogeneous.     A nodule in the mid left lobe measures " "0.7 cm and has low suspicion for malignancy (1-5%). There are   small solid nodules inferior to each lobe of the thyroid gland, the largest on   the right measuring 1.5 cm, likely parathyroid glands. Additionally, there is a   1.2 cm soft tissue mass superior to the left lobe of the thyroid gland, which   may represent a lymph node.\"    Transthoracic echocardiogram from January 2021 was unremarkable, with an ejection fraction of 59%.    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, [id number]   Right proximal tibial and fibular fracture 2017   Post traumatic deformity at the fourth right proximal phalangeal base 2018   Degenerative changes of the knees    Past Surgical History   Past Surgical History:   Procedure Laterality Date     HEAD & NECK SURGERY       ORTHOPEDIC SURGERY         Current Medications    Current Outpatient Medications:      acetaminophen (TYLENOL) 500 MG tablet, Take 1-2 tablets (500-1,000 mg) by mouth every 6 hours as needed for mild pain, Disp: 1 Bottle, Rfl: 0     alcohol swab prep pads, Use to swab area of injection/laurie as directed., Disp: 100 each, Rfl: 3     amLODIPine (NORVASC) 10 MG tablet, Take 1 tablet (10 mg) by mouth daily, Disp: 90 tablet, Rfl: 1     aspirin (ASA) 81 MG chewable tablet, Take 1 tablet (81 mg) by mouth daily CHEW AND SWALLOW, Disp: 90 tablet, Rfl: 1     Benzocaine-Menthol 10-2.1 MG LOZG, Take 1 lozenge by mouth 4 times daily as needed (Cough or sore throat), Disp: 84 lozenge, Rfl: 0     blood glucose (NO BRAND SPECIFIED) test strip, 1 strip by In Vitro route every morning (before breakfast), Disp: 350 strip, Rfl: 3     carvedilol (COREG) 25 MG tablet, Take 1 tablet (25 mg) by mouth 2 times daily (with meals), Disp: 180 tablet, Rfl: 3     Continuous Blood Gluc  (FREESTYLE ABHIJIT 14 DAY READER) RODERICK, " Use to read blood sugars as per 's instructions., Disp: 1 each, Rfl: 0     Continuous Blood Gluc Sensor (FREESTYLE ABHIJIT 14 DAY SENSOR) MISC, Change every 14 days., Disp: 6 each, Rfl: 11     Continuous Blood Gluc Sensor (FREESTYLE ABHIJIT 2 SENSOR) MISC, 1 each every 14 days, Disp: 6 each, Rfl: 3     doxazosin (CARDURA) 8 MG tablet, Take 3 tablets (24 mg) by mouth 2 times daily, Disp: 54 tablet, Rfl: 3     fluticasone (FLONASE) 50 MCG/ACT nasal spray, Spray 1 spray into both nostrils daily, Disp: 9.9 mL, Rfl: 1     glipiZIDE (GLUCOTROL) 5 MG tablet, Take 5 mg by mouth daily, Disp: , Rfl:      ibuprofen (ADVIL/MOTRIN) 200 MG tablet, Take 3 tablets (600 mg) by mouth every 6 hours as needed for mild pain, Disp: 30 tablet, Rfl: 0     insulin  UNIT/ML injection, 20 units before breakfast, Disp: 21 mL, Rfl: 3     insulin pen needle (BD CELINE U/F) 32G X 4 MM miscellaneous, Use 1 pen needles daily or as directed., Disp: 100 each, Rfl: 3     losartan (COZAAR) 50 MG tablet, Take 1 tablet (50 mg) by mouth daily, Disp: 90 tablet, Rfl: 3     metFORMIN (GLUCOPHAGE) 1000 MG tablet, Take 1,000 mg by mouth 2 times daily (with meals), Disp: , Rfl:      Microlet Lancets MISC, 1 each daily, Disp: 100 each, Rfl: 3     omeprazole (PRILOSEC) 40 MG DR capsule, Take 1 capsule (40 mg) by mouth daily, Disp: 90 capsule, Rfl: 1     phenoxybenzamine (DIBENZYLINE) 10 MG capsule, Take 1 capsule (10 mg) by mouth daily, Disp: 90 capsule, Rfl: 3    Family History   Father passed away from MI while her mother was pregnant with her (not sure of his age). Paternal grandfather and paternal uncles have HTN.  No family history of diabetes. Mother  of cancer.      Social History  Jessica immigrated to [place] in [place], from a refugee [place] in [place].  She was born in [place].    Single. She has 8 children, 9 to 22 yo. She denies smoking, drinking alcohol or using illicit drugs. Occupation: home care.               Vital Signs      Previous Weights:    Wt Readings from Last 10 Encounters:   07/19/23 97 kg (213 lb 14.4 oz)   07/05/23 96.8 kg (213 lb 8 oz)   06/28/23 95.6 kg (210 lb 11.2 oz)   06/20/23 97.5 kg (215 lb)   06/07/23 97.4 kg (214 lb 11.2 oz)   04/05/23 92.5 kg (204 lb)   03/08/23 95.4 kg (210 lb 6.4 oz)   02/15/23 93.2 kg (205 lb 6.4 oz)   05/10/22 93.9 kg (207 lb)   11/20/21 102.1 kg (225 lb)        LMP 05/09/2023 (Approximate)      BP Readings from Last 6 Encounters:   08/02/23 (!) 145/101   07/05/23 137/85   06/20/23 (!) 146/88   06/07/23 (!) 147/92   05/02/23 126/82   04/05/23 (!) 159/97       34 minutes spent on the date of the encounter doing chart review, history and exam, documentation and further activities as noted above.                 Again, thank you for allowing me to participate in the care of your patient.        Sincerely,        Deloris Vanessa MD

## 2023-08-08 NOTE — Clinical Note
Please ask the nurse (or the patient) to increased the morning dose on NPH by 3 units. According to my notes, she takes 22 units in am and 10 unit(s) with dinner.

## 2023-08-08 NOTE — PROGRESS NOTES
=======================================================================================================================================  Assessment     Jessica Kennedy is a 48 year old Togol[place] female seen in follow-up for evaluation of bilateral pheochromocytoma and MEN2A.     1.  Bilateral synchronous pheochromocytoma, scheduled for cortical sparing bilateral adrenalectomy on 8/14/23.   Her blood pressure remains uncontrolled and compliance with medications is questionable.  Plan:   Increase Losartan to 100 mg daily   Admit for observation on August 10, with the plan to optimize treatment prior to surgery and start treatment with metyrosine (metyrosine [facility] protocol: 250 mg every six hours on day 1, 500 mg every six hours on day 2, 750 mg every six hours on day 3, and 1000 mg every six hours on the day before the surgery, with the last dose the morning of the surgery.      2. Medullary thyroid cancer and primary hyperparathyroidism  Following adrenalectomy, we are going to pursue a SPECT-CT to further try to identify the enlarged parathyroid glands and pursue both thyroidectomy and parathyroidectomy simultaneously.    3.  Type 2 diabetes, uncontrolled.  She was not able to tolerate SGLT2 inhibitors.    Plan:  Increase the dose of NPH to 22 units  Continue Glipizide and metformin until admission  At the time of admission, recommend to start Lantus at 22 units daily, 1 U Novolog per 7 grams CHO and a medium correction scale.     Orders Placed This Encounter   Procedures    Continuous Glucose Monitoring >=72 hours PHYS INTERP     =========================================================================================================================================    History of Present Illness:  Jessica Kennedy is a 48 year old female with a past medical history significant for hypertension, obesity and type 2 diabetes seen in follow-up.  The patient is seen with the help of a Togolese  via iPad.       1. Bilateral pheochromocytoma in the context of MEN2A.    The patient has a history of hypertension formally diagnosed in 2017 and symptoms suggestive of pheochromocytoma: Heat intolerance, tachycardia, headaches, constipation.  The bilateral adrenal masses were initially discovered on abdominal CT in April 2022.  The abdominal MRI from 4/13/2022 showed no signal dropout on out of phase images. The 12/2022 adrenal CT revealed bilateral adrenal masses, measuring 2.5 and 3.4 cm in maximum diameter, with a noncontrast Hounsfield unit density of 30/45 and heterogeneous enhancement with contrast, suggestive of pheochromocytoma.  Plasma metanephrines were elevated.  The MIBG from January 2023 confirmed bilateral uptake.    In preparation for bilateral subtotal adrenalectomy, the patient was started on treatment with alpha and beta blockers.  Surgery is scheduled for August 14.  She has decided not to seek a second opinion at [facility].    The patient has been sleeping on a mattress at home and there has been concern regarding her ability to appropriately recover postop.  At the time of the visit, she brings up the fact that she has to accompany her son to the clinic this Friday, where he is scheduled to receive injections for his mental condition.  The treatment is absolutely necessary, in order to prevent her son from becoming aggressive.  My staff is going to try to help her after today's visit to schedule the treatments tomorrow, so we can plan for admission and surgery as scheduled.    Currently prescribed antihypertensive medications:  Amlodipine, 10 mg daily   Doxazosin 24 mg BID (3 tablets of 8 mg each twice daily)  Carvedilol, 25 mg twice daily  Phenoxybenzamine, 10 mg daily   Losartan, 50 mg daily (added 8/2/23)    We have always asked the patient to bring all her prescription medications at the appointment.  Today, she didn't bring any.   She states she takes the appropriate dosages.  She has been using  the Flonase spray and the nasal stuffiness is now better.      Vitals today:   8/8/2023 9:03 AM 8/8/2023 9:06 AM 8/8/2023 9:10 AM   BP   140/95   Orthostatic /79 136/92         Most recent BMP from 8/2/2023 revealed normal GFR, sodium and potassium.    2. MEN2A  The calcitonin level was 56.7 in February 2023.  The patient has not had a calcitonin level checked prior.  The calcitonin remained stable at 55.8 on lab work from June 2023.  The lab work from February 2023 revealed high normal calcium level, normal phosphorus, and a parathyroid hormone of 64. Vitamin D level was 24.  On subsequent lab work, calcium level increased, with the most recent value of 11.1, on 8/2/2023.  The ultrasound images from 2/20/2023 identified a possible parathyroid adenoma in the right inferior thyroid gland and a left thyroid nodule measuring 0.7 cm, poorly delineated, with ?microcalcifications.  The decision was not to pursue biopsy and pursue thyroidectomy/parathyroidectomy following pheochromocytoma surgery.    3. Type 2 diabetes   Current antidiabetic regimen:  Metformin 1 gm BID  Glipizide, 5 mg daily   NPH 17 units in the morning (the dose was increased to 20 units at her last visit but the patient continues to take 17 units).  She quit Farxiga due to genital fungal infections and dizziness.    Denies expeirencings lows.   I reviewed the jennifer records.  She continues to experience postprandial hyperglycemia, with a peak blood glucose occurring anywhere between 12 AM and 6 PM.  Fasting blood glucose around 160.     Hemoglobin A1c was 9%, on 6/28/2023.    Pertinent outside labs and imaging:   The blood work done at [facility], revealed the following results:  12/6/2022: Free metanephrines 942 (<57), free normetanephrine 647 (<148), LC/MS assay; chromogranin A 363 (<311)  10/5/2022: Free metanephrine 663, free normetanephrine 414, aldosterone 10 (LC MS), renin activity 0.48 (0.25-5.82), aldosterone to renin ratio  "20.8  12/16/2022 24-hour urinary dopamine 147 (), epinephrine 5 (2-24) norepinephrine 54 ().  No 24 hours urinary creatinine or urinary volume was reported.    Other pertinent labs reviewed in the outside records:  6/10/2022 MANDY 65 antibodies, insulin autoantibodies and antiislet cell antibodies, zinc transporter antibodies undetectable; C-peptide 2.56  10/3/2022 triglycerides 225, HDL cholesterol 67, LDL cholesterol 90, magnesium 1.6 (1.5-2.5), vitamin D level 21, morning cortisol 6.4 (time of collection unavailable), B12 419, ferritin 51, free T4 1.3 (0.8-1.8), TSH 1.72  10/5/2022 urine microalbumin to creatinine ratio 1096  12/6/2022 phosphate 2.9 (2.5-4.5), CEA 1.8, parathyroid hormone 67 (16-77), BUN mildly elevated at 27, creatinine normal at 0.81, sodium 133, potassium 4.1, albumin 4.2, calcium 10.4 (8.6-10.2).  Prior calcium levels were 10.2 on 11/23/21 and 10.8 on 10/3/2022.  Alkaline phosphatase has been normal.    The chest x-ray from September 2022 showed clear lungs.  Head CT from December 2022 was unremarkable, done for evaluation of headaches.     9/29/2022 calcium 11.1 (normal range 8.4-10.4.)  12/5/2022 calcium 9.6 (normal range 8.5-10.5)     12/14/20 C peptide 6.8, glucose 125, MANDY 65 antibodies negative     Thyroid US from 8/28/20 done at [facility]:  \"The right thyroid lobe measures: 1.5 cmx1.4 cmx3.6 cm   The left thyroid lobe measures: 1.1 cmx1.2 cmx3.2 cm   The isthmus measures: 1 mm in AP diameter.     The thyroid parenchyma appears: mildly heterogeneous.     A nodule in the mid left lobe measures 0.7 cm and has low suspicion for malignancy (1-5%). There are   small solid nodules inferior to each lobe of the thyroid gland, the largest on   the right measuring 1.5 cm, likely parathyroid glands. Additionally, there is a   1.2 cm soft tissue mass superior to the left lobe of the thyroid gland, which   may represent a lymph node.\"    Transthoracic echocardiogram from January 2021 was " unremarkable, with an ejection fraction of 59%.    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, [id number]   Right proximal tibial and fibular fracture 2017   Post traumatic deformity at the fourth right proximal phalangeal base 2018   Degenerative changes of the knees    Past Surgical History   Past Surgical History:   Procedure Laterality Date    HEAD & NECK SURGERY      ORTHOPEDIC SURGERY         Current Medications    Current Outpatient Medications:     acetaminophen (TYLENOL) 500 MG tablet, Take 1-2 tablets (500-1,000 mg) by mouth every 6 hours as needed for mild pain, Disp: 1 Bottle, Rfl: 0    alcohol swab prep pads, Use to swab area of injection/laurie as directed., Disp: 100 each, Rfl: 3    amLODIPine (NORVASC) 10 MG tablet, Take 1 tablet (10 mg) by mouth daily, Disp: 90 tablet, Rfl: 1    aspirin (ASA) 81 MG chewable tablet, Take 1 tablet (81 mg) by mouth daily CHEW AND SWALLOW, Disp: 90 tablet, Rfl: 1    Benzocaine-Menthol 10-2.1 MG LOZG, Take 1 lozenge by mouth 4 times daily as needed (Cough or sore throat), Disp: 84 lozenge, Rfl: 0    blood glucose (NO BRAND SPECIFIED) test strip, 1 strip by In Vitro route every morning (before breakfast), Disp: 350 strip, Rfl: 3    carvedilol (COREG) 25 MG tablet, Take 1 tablet (25 mg) by mouth 2 times daily (with meals), Disp: 180 tablet, Rfl: 3    Continuous Blood Gluc  (FREESTYLE ABHIJIT 14 DAY READER) RODERICK, Use to read blood sugars as per 's instructions., Disp: 1 each, Rfl: 0    Continuous Blood Gluc Sensor (FREESTYLE ABHIJIT 14 DAY SENSOR) MISC, Change every 14 days., Disp: 6 each, Rfl: 11    Continuous Blood Gluc Sensor (FREESTYLE ABHIJIT 2 SENSOR) MISC, 1 each every 14 days, Disp: 6 each, Rfl: 3    doxazosin (CARDURA) 8 MG tablet, Take 3 tablets (24 mg) by mouth 2 times daily, Disp: 54  tablet, Rfl: 3    fluticasone (FLONASE) 50 MCG/ACT nasal spray, Spray 1 spray into both nostrils daily, Disp: 9.9 mL, Rfl: 1    glipiZIDE (GLUCOTROL) 5 MG tablet, Take 5 mg by mouth daily, Disp: , Rfl:     ibuprofen (ADVIL/MOTRIN) 200 MG tablet, Take 3 tablets (600 mg) by mouth every 6 hours as needed for mild pain, Disp: 30 tablet, Rfl: 0    insulin  UNIT/ML injection, 20 units before breakfast, Disp: 21 mL, Rfl: 3    insulin pen needle (BD CELINE U/F) 32G X 4 MM miscellaneous, Use 1 pen needles daily or as directed., Disp: 100 each, Rfl: 3    losartan (COZAAR) 50 MG tablet, Take 1 tablet (50 mg) by mouth daily, Disp: 90 tablet, Rfl: 3    metFORMIN (GLUCOPHAGE) 1000 MG tablet, Take 1,000 mg by mouth 2 times daily (with meals), Disp: , Rfl:     Microlet Lancets MISC, 1 each daily, Disp: 100 each, Rfl: 3    omeprazole (PRILOSEC) 40 MG DR capsule, Take 1 capsule (40 mg) by mouth daily, Disp: 90 capsule, Rfl: 1    phenoxybenzamine (DIBENZYLINE) 10 MG capsule, Take 1 capsule (10 mg) by mouth daily, Disp: 90 capsule, Rfl: 3    Family History   Father passed away from MI while her mother was pregnant with her (not sure of his age). Paternal grandfather and paternal uncles have HTN.  No family history of diabetes. Mother  of cancer.      Social History  Jessica immigrated to [place] in , from [place] in [place].  She was born in [place].    Single. She has 8 children, 9 to 24 yo. She denies smoking, drinking alcohol or using illicit drugs. Occupation: home care.               Vital Signs     Previous Weights:    Wt Readings from Last 10 Encounters:   23 97 kg (213 lb 14.4 oz)   23 96.8 kg (213 lb 8 oz)   23 95.6 kg (210 lb 11.2 oz)   23 97.5 kg (215 lb)   23 97.4 kg (214 lb 11.2 oz)   23 92.5 kg (204 lb)   23 95.4 kg (210 lb 6.4 oz)   02/15/23 93.2 kg (205 lb 6.4 oz)   05/10/22 93.9 kg (207 lb)   21 102.1 kg (225 lb)        LMP 2023 (Approximate)       BP Readings from Last 6 Encounters:   08/02/23 (!) 145/101   07/05/23 137/85   06/20/23 (!) 146/88   06/07/23 (!) 147/92   05/02/23 126/82   04/05/23 (!) 159/97       34 minutes spent on the date of the encounter doing chart review, history and exam, documentation and further activities as noted above.

## 2023-08-08 NOTE — NURSING NOTE
Jessica Kennedy's goals for this visit include:   Chief Complaint   Patient presents with    Endocrine Problem       She requests these members of her care team be copied on today's visit information: No    PCP: Mariela, [facility]    Referring Provider:  No referring provider defined for this encounter.    BP (!) 140/95   Pulse 79   LMP 05/09/2023 (Approximate)   SpO2 95%   PF 96 L/min     Do you need any medication refills at today's visit? No

## 2023-08-08 NOTE — PATIENT INSTRUCTIONS
Amlodipine, 10 mg daily   Doxazosin 24 mg BID (3 tablets of 8 mg each twice daily)  Carvedilol, 25 mg twice daily  Phenoxybenzamine, 10 mg daily   Losartan,  100 mg daily (you can take 2 tablets of 50 mg daily)    Increase the dose of insulin to 22 units     [facility]   [address] [place] [address]

## 2023-08-11 ENCOUNTER — TELEPHONE (OUTPATIENT)
Dept: ENDOCRINOLOGY | Facility: CLINIC | Age: 48
End: 2023-08-11
Payer: COMMERCIAL

## 2023-08-11 ENCOUNTER — HOSPITAL ENCOUNTER (INPATIENT)
Facility: CLINIC | Age: 48
LOS: 12 days | Discharge: HOME HEALTH | End: 2023-08-23
Attending: STUDENT IN AN ORGANIZED HEALTH CARE EDUCATION/TRAINING PROGRAM | Admitting: SURGERY
Payer: COMMERCIAL

## 2023-08-11 DIAGNOSIS — Z79.4 TYPE 2 DIABETES MELLITUS WITHOUT COMPLICATION, WITH LONG-TERM CURRENT USE OF INSULIN (H): ICD-10-CM

## 2023-08-11 DIAGNOSIS — I10 ESSENTIAL HYPERTENSION: ICD-10-CM

## 2023-08-11 DIAGNOSIS — R10.13 EPIGASTRIC PAIN: ICD-10-CM

## 2023-08-11 DIAGNOSIS — D35.01: Primary | ICD-10-CM

## 2023-08-11 DIAGNOSIS — G89.18 POSTOPERATIVE PAIN: ICD-10-CM

## 2023-08-11 DIAGNOSIS — E27.40 ADRENAL INSUFFICIENCY (H): ICD-10-CM

## 2023-08-11 DIAGNOSIS — D35.02: Primary | ICD-10-CM

## 2023-08-11 DIAGNOSIS — E11.9 TYPE 2 DIABETES MELLITUS WITHOUT COMPLICATION, WITH LONG-TERM CURRENT USE OF INSULIN (H): ICD-10-CM

## 2023-08-11 PROBLEM — D35.00 PHEOCHROMOCYTOMA: Status: ACTIVE | Noted: 2023-08-11

## 2023-08-11 PROBLEM — E31.22: Status: ACTIVE | Noted: 2023-08-11

## 2023-08-11 LAB
ANION GAP SERPL CALCULATED.3IONS-SCNC: 11 MMOL/L (ref 7–15)
BUN SERPL-MCNC: 15.5 MG/DL (ref 6–20)
CALCIUM SERPL-MCNC: 9.8 MG/DL (ref 8.6–10)
CHLORIDE SERPL-SCNC: 102 MMOL/L (ref 98–107)
CREAT SERPL-MCNC: 0.83 MG/DL (ref 0.51–0.95)
DEPRECATED HCO3 PLAS-SCNC: 23 MMOL/L (ref 22–29)
ERYTHROCYTE [DISTWIDTH] IN BLOOD BY AUTOMATED COUNT: 12 % (ref 10–15)
GFR SERPL CREATININE-BSD FRML MDRD: 88 ML/MIN/1.73M2
GLUCOSE BLDC GLUCOMTR-MCNC: 154 MG/DL (ref 70–99)
GLUCOSE BLDC GLUCOMTR-MCNC: 305 MG/DL (ref 70–99)
GLUCOSE SERPL-MCNC: 164 MG/DL (ref 70–99)
HCT VFR BLD AUTO: 37.6 % (ref 35–47)
HGB BLD-MCNC: 12.6 G/DL (ref 11.7–15.7)
MCH RBC QN AUTO: 30.1 PG (ref 26.5–33)
MCHC RBC AUTO-ENTMCNC: 33.5 G/DL (ref 31.5–36.5)
MCV RBC AUTO: 90 FL (ref 78–100)
PLATELET # BLD AUTO: 204 10E3/UL (ref 150–450)
POTASSIUM SERPL-SCNC: 3.6 MMOL/L (ref 3.4–5.3)
RBC # BLD AUTO: 4.19 10E6/UL (ref 3.8–5.2)
SODIUM SERPL-SCNC: 136 MMOL/L (ref 136–145)
WBC # BLD AUTO: 11.3 10E3/UL (ref 4–11)

## 2023-08-11 PROCEDURE — 250N000013 HC RX MED GY IP 250 OP 250 PS 637: Performed by: STUDENT IN AN ORGANIZED HEALTH CARE EDUCATION/TRAINING PROGRAM

## 2023-08-11 PROCEDURE — 99222 1ST HOSP IP/OBS MODERATE 55: CPT | Mod: GC | Performed by: INTERNAL MEDICINE

## 2023-08-11 PROCEDURE — 120N000002 HC R&B MED SURG/OB UMMC

## 2023-08-11 PROCEDURE — 250N000013 HC RX MED GY IP 250 OP 250 PS 637: Performed by: INTERNAL MEDICINE

## 2023-08-11 PROCEDURE — 99207 PR NO CHARGE LOS: CPT | Performed by: STUDENT IN AN ORGANIZED HEALTH CARE EDUCATION/TRAINING PROGRAM

## 2023-08-11 PROCEDURE — 93005 ELECTROCARDIOGRAM TRACING: CPT

## 2023-08-11 PROCEDURE — 80048 BASIC METABOLIC PNL TOTAL CA: CPT

## 2023-08-11 PROCEDURE — 99232 SBSQ HOSP IP/OBS MODERATE 35: CPT | Mod: GC | Performed by: SURGERY

## 2023-08-11 PROCEDURE — 36415 COLL VENOUS BLD VENIPUNCTURE: CPT

## 2023-08-11 PROCEDURE — 99222 1ST HOSP IP/OBS MODERATE 55: CPT | Mod: AI | Performed by: STUDENT IN AN ORGANIZED HEALTH CARE EDUCATION/TRAINING PROGRAM

## 2023-08-11 PROCEDURE — 93010 ELECTROCARDIOGRAM REPORT: CPT | Performed by: INTERNAL MEDICINE

## 2023-08-11 PROCEDURE — 250N000013 HC RX MED GY IP 250 OP 250 PS 637

## 2023-08-11 PROCEDURE — 85014 HEMATOCRIT: CPT

## 2023-08-11 PROCEDURE — 250N000012 HC RX MED GY IP 250 OP 636 PS 637: Performed by: INTERNAL MEDICINE

## 2023-08-11 RX ORDER — ONDANSETRON 2 MG/ML
4 INJECTION INTRAMUSCULAR; INTRAVENOUS EVERY 6 HOURS PRN
Status: DISCONTINUED | OUTPATIENT
Start: 2023-08-11 | End: 2023-08-16

## 2023-08-11 RX ORDER — METYROSINE 250 MG/1
1000 CAPSULE ORAL EVERY 6 HOURS
Status: DISCONTINUED | OUTPATIENT
Start: 2023-08-14 | End: 2023-08-14

## 2023-08-11 RX ORDER — ASPIRIN 81 MG/1
81 TABLET, CHEWABLE ORAL DAILY
Status: DISCONTINUED | OUTPATIENT
Start: 2023-08-11 | End: 2023-08-16

## 2023-08-11 RX ORDER — DOXAZOSIN 8 MG/1
16 TABLET ORAL 2 TIMES DAILY
Status: DISCONTINUED | OUTPATIENT
Start: 2023-08-11 | End: 2023-08-12

## 2023-08-11 RX ORDER — SODIUM CHLORIDE, SODIUM LACTATE, POTASSIUM CHLORIDE, CALCIUM CHLORIDE 600; 310; 30; 20 MG/100ML; MG/100ML; MG/100ML; MG/100ML
INJECTION, SOLUTION INTRAVENOUS CONTINUOUS
Status: CANCELLED | OUTPATIENT
Start: 2023-08-11

## 2023-08-11 RX ORDER — ONDANSETRON 4 MG/1
4 TABLET, ORALLY DISINTEGRATING ORAL EVERY 6 HOURS PRN
Status: DISCONTINUED | OUTPATIENT
Start: 2023-08-11 | End: 2023-08-16

## 2023-08-11 RX ORDER — AMLODIPINE BESYLATE 5 MG/1
10 TABLET ORAL DAILY
Status: DISCONTINUED | OUTPATIENT
Start: 2023-08-11 | End: 2023-08-11

## 2023-08-11 RX ORDER — MAGNESIUM HYDROXIDE/ALUMINUM HYDROXICE/SIMETHICONE 120; 1200; 1200 MG/30ML; MG/30ML; MG/30ML
15 SUSPENSION ORAL ONCE
Status: COMPLETED | OUTPATIENT
Start: 2023-08-11 | End: 2023-08-11

## 2023-08-11 RX ORDER — DOXAZOSIN 8 MG/1
24 TABLET ORAL 2 TIMES DAILY
Status: DISCONTINUED | OUTPATIENT
Start: 2023-08-11 | End: 2023-08-11

## 2023-08-11 RX ORDER — ONDANSETRON 2 MG/ML
4 INJECTION INTRAMUSCULAR; INTRAVENOUS EVERY 30 MIN PRN
Status: CANCELLED | OUTPATIENT
Start: 2023-08-11

## 2023-08-11 RX ORDER — LIDOCAINE 40 MG/G
CREAM TOPICAL
Status: CANCELLED | OUTPATIENT
Start: 2023-08-11

## 2023-08-11 RX ORDER — PROCHLORPERAZINE 25 MG
25 SUPPOSITORY, RECTAL RECTAL EVERY 12 HOURS PRN
Status: DISCONTINUED | OUTPATIENT
Start: 2023-08-11 | End: 2023-08-16

## 2023-08-11 RX ORDER — DEXTROSE MONOHYDRATE 25 G/50ML
25-50 INJECTION, SOLUTION INTRAVENOUS
Status: DISCONTINUED | OUTPATIENT
Start: 2023-08-11 | End: 2023-08-11

## 2023-08-11 RX ORDER — ONDANSETRON 4 MG/1
4 TABLET, ORALLY DISINTEGRATING ORAL EVERY 30 MIN PRN
Status: CANCELLED | OUTPATIENT
Start: 2023-08-11

## 2023-08-11 RX ORDER — DEXTROSE MONOHYDRATE 25 G/50ML
25-50 INJECTION, SOLUTION INTRAVENOUS
Status: DISCONTINUED | OUTPATIENT
Start: 2023-08-11 | End: 2023-08-16

## 2023-08-11 RX ORDER — PANTOPRAZOLE SODIUM 40 MG/1
40 TABLET, DELAYED RELEASE ORAL 2 TIMES DAILY
Status: DISCONTINUED | OUTPATIENT
Start: 2023-08-11 | End: 2023-08-16

## 2023-08-11 RX ORDER — LIDOCAINE 40 MG/G
CREAM TOPICAL
Status: DISCONTINUED | OUTPATIENT
Start: 2023-08-11 | End: 2023-08-16

## 2023-08-11 RX ORDER — PROCHLORPERAZINE MALEATE 5 MG
10 TABLET ORAL EVERY 6 HOURS PRN
Status: DISCONTINUED | OUTPATIENT
Start: 2023-08-11 | End: 2023-08-16

## 2023-08-11 RX ORDER — METYROSINE 250 MG/1
750 CAPSULE ORAL EVERY 6 HOURS
Status: COMPLETED | OUTPATIENT
Start: 2023-08-13 | End: 2023-08-13

## 2023-08-11 RX ORDER — METYROSINE 250 MG/1
250 CAPSULE ORAL EVERY 6 HOURS
Status: COMPLETED | OUTPATIENT
Start: 2023-08-11 | End: 2023-08-12

## 2023-08-11 RX ORDER — POLYETHYLENE GLYCOL 3350 17 G/17G
17 POWDER, FOR SOLUTION ORAL DAILY PRN
Status: DISCONTINUED | OUTPATIENT
Start: 2023-08-11 | End: 2023-08-16

## 2023-08-11 RX ORDER — GLIPIZIDE 5 MG/1
5 TABLET ORAL DAILY
Status: DISCONTINUED | OUTPATIENT
Start: 2023-08-11 | End: 2023-08-11

## 2023-08-11 RX ORDER — FENTANYL CITRATE 50 UG/ML
25 INJECTION, SOLUTION INTRAMUSCULAR; INTRAVENOUS EVERY 5 MIN PRN
Status: CANCELLED | OUTPATIENT
Start: 2023-08-11

## 2023-08-11 RX ORDER — LOSARTAN POTASSIUM 50 MG/1
50 TABLET ORAL DAILY
Status: DISCONTINUED | OUTPATIENT
Start: 2023-08-11 | End: 2023-08-12

## 2023-08-11 RX ORDER — PHENOXYBENZAMINE HYDROCHLORIDE 10 MG/1
10 CAPSULE ORAL DAILY
Status: DISCONTINUED | OUTPATIENT
Start: 2023-08-11 | End: 2023-08-11

## 2023-08-11 RX ORDER — FENTANYL CITRATE 50 UG/ML
50 INJECTION, SOLUTION INTRAMUSCULAR; INTRAVENOUS EVERY 5 MIN PRN
Status: CANCELLED | OUTPATIENT
Start: 2023-08-11

## 2023-08-11 RX ORDER — PHENOXYBENZAMINE HYDROCHLORIDE 10 MG/1
10 CAPSULE ORAL DAILY
Status: DISCONTINUED | OUTPATIENT
Start: 2023-08-12 | End: 2023-08-14

## 2023-08-11 RX ORDER — NICOTINE POLACRILEX 4 MG
15-30 LOZENGE BUCCAL
Status: DISCONTINUED | OUTPATIENT
Start: 2023-08-11 | End: 2023-08-11

## 2023-08-11 RX ORDER — METYROSINE 250 MG/1
500 CAPSULE ORAL EVERY 6 HOURS
Status: COMPLETED | OUTPATIENT
Start: 2023-08-12 | End: 2023-08-12

## 2023-08-11 RX ORDER — NICOTINE POLACRILEX 4 MG
15-30 LOZENGE BUCCAL
Status: DISCONTINUED | OUTPATIENT
Start: 2023-08-11 | End: 2023-08-16

## 2023-08-11 RX ORDER — CARBOXYMETHYLCELLULOSE SODIUM 5 MG/ML
1 SOLUTION/ DROPS OPHTHALMIC
Status: DISCONTINUED | OUTPATIENT
Start: 2023-08-11 | End: 2023-08-16

## 2023-08-11 RX ORDER — DOXAZOSIN 8 MG/1
16 TABLET ORAL ONCE
Status: DISCONTINUED | OUTPATIENT
Start: 2023-08-11 | End: 2023-08-11

## 2023-08-11 RX ORDER — CARVEDILOL 25 MG/1
25 TABLET ORAL 2 TIMES DAILY WITH MEALS
Status: DISCONTINUED | OUTPATIENT
Start: 2023-08-11 | End: 2023-08-14

## 2023-08-11 RX ADMIN — INSULIN GLARGINE 22 UNITS: 100 INJECTION, SOLUTION SUBCUTANEOUS at 23:24

## 2023-08-11 RX ADMIN — LOSARTAN POTASSIUM 50 MG: 50 TABLET, FILM COATED ORAL at 17:53

## 2023-08-11 RX ADMIN — ALUMINUM HYDROXIDE, MAGNESIUM HYDROXIDE, AND SIMETHICONE 15 ML: 200; 200; 20 SUSPENSION ORAL at 17:53

## 2023-08-11 RX ADMIN — METYROSINE 250 MG: 250 CAPSULE ORAL at 17:53

## 2023-08-11 RX ADMIN — INSULIN ASPART 1 UNITS: 100 INJECTION, SOLUTION INTRAVENOUS; SUBCUTANEOUS at 18:04

## 2023-08-11 RX ADMIN — PANTOPRAZOLE SODIUM 40 MG: 40 TABLET, DELAYED RELEASE ORAL at 23:25

## 2023-08-11 RX ADMIN — CARVEDILOL 25 MG: 25 TABLET, FILM COATED ORAL at 17:53

## 2023-08-11 ASSESSMENT — ACTIVITIES OF DAILY LIVING (ADL)
HEARING_DIFFICULTY_OR_DEAF: NO
ADLS_ACUITY_SCORE: 31
CHANGE_IN_FUNCTIONAL_STATUS_SINCE_ONSET_OF_CURRENT_ILLNESS/INJURY: NO
DOING_ERRANDS_INDEPENDENTLY_DIFFICULTY: NO
FALL_HISTORY_WITHIN_LAST_SIX_MONTHS: NO
DIFFICULTY_EATING/SWALLOWING: NO
DIFFICULTY_COMMUNICATING: NO
DRESSING/BATHING_DIFFICULTY: NO
CONCENTRATING,_REMEMBERING_OR_MAKING_DECISIONS_DIFFICULTY: NO
WEAR_GLASSES_OR_BLIND: NO
TOILETING_ISSUES: NO
ADLS_ACUITY_SCORE: 31
WALKING_OR_CLIMBING_STAIRS_DIFFICULTY: NO
ADLS_ACUITY_SCORE: 31

## 2023-08-11 ASSESSMENT — LIFESTYLE VARIABLES: TOBACCO_USE: 0

## 2023-08-11 ASSESSMENT — ENCOUNTER SYMPTOMS
HEADACHES: 1
ORTHOPNEA: 0
ABDOMINAL PAIN: 1

## 2023-08-11 NOTE — PROGRESS NOTES
Brief Note, full note to follow.     Recommendations.   Start Metyrosine according to the following schedule (ordered)  8/11. 250 mg q 6   8/12. 500 mg q 6  8/13. 750 mg q 6  8/14. 1000 mg q 6    Will continue outpatient doses of losartan, carvedilol.     Will start Doxazosin at 16 mg x 1 and evaluate continuing either on this dose or 24 mg BID as she was prescribed as outpatient.     She has already taken one dose of phenoxybenzamine at home, will restart tomorrow.     Full note to follow.     Jayson Wilson MD  Endocrinology Fellow

## 2023-08-11 NOTE — CONSULTS
Endocrinology Consult     Jessica Kennedy MRN:[id number] YOB: 1975  Date of Admission:8/11/2023   Primary care provider: Mariela, [facility]     Reason for visit:  Optimization of Medical Therapy prior to surgical treatment for Pheochromocytoma  Reason for Endocrine consult: Pheochromocytoma    HPI:  Jessica Kennedy is a 48 year old female with PMHx HTN and Diabetes.     This year she has been found to have bilateral synchronous pheochromocytomas in the background of MEN 2A.     She has been started on alpha and beta blockade as outpatient. She is currently being admitted for optimization of medical therapy prior to planned bilateral cortical sparing adrenalectomy.     Today she reports she is experiencing a headache and abdominal discomfort.     When asked about her medications, she reports she has been taking them regularly, sometimes missing 1-2 doses of a medication in a week.     Today in the morning as she was coming to the hospital she only took 2 of her antihypertensives, amlodipine 10 mg and phenoxybenzamine 10 mg. She has not taken carvedilol, doxazosin, losartan today. She did take all her prescriptions last night.     She was able to confirm she is currently taking 5 anti hypertensive medications,     For diabetes, she is currently taking Lantus 22 units daily at night along with metformin 1000 mg BID and glipizide 5 mg daily.     ROS:  Review of Systems   Gastrointestinal: Positive for abdominal pain.   Neurological: Positive for headaches.   All other systems reviewed and are negative.         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 Auth Provider Long Term End Date   acetaminophen (TYLENOL) 500 MG tablet Take 1-2 tablets (500-1,000 mg) by mouth every 6 hours as needed for mild pain   Fatuma Pena MD     alcohol swab prep pads Use to swab area of injection/laurie as directed.   Deloris Vanessa MD     amLODIPine (NORVASC) 10 MG tablet Take 1 tablet (10 mg) by mouth daily   Deloris Vanessa MD Yes    aspirin (ASA) 81 MG chewable tablet Take 1 tablet (81 mg) by mouth daily CHEW AND SWALLOW   Deloris Vanessa MD     Benzocaine-Menthol 10-2.1 MG LOZG Take 1 lozenge by mouth 4 times daily as needed (Cough or sore throat)   Erwin Saldaña MD     blood glucose (NO BRAND SPECIFIED) test strip 1 strip by In Vitro route every morning (before breakfast)   Deloris Vanessa MD     carvedilol (COREG) 25 MG tablet Take 1 tablet (25 mg) by mouth 2 times daily (with meals)   Deloris Vanessa MD Yes    Continuous Blood Gluc  (FREESTYLE ABHIJIT 14 DAY READER) RODERICK Use to read blood sugars as per 's instructions.   Deloris Vanessa MD     Continuous Blood Gluc Sensor (FREESTYLE ABHIJIT 14 DAY SENSOR) MISC Change every 14 days.   Deloris Vanessa MD     Continuous Blood Gluc Sensor (FREESTYLE ABHIJIT 2 SENSOR) MISC 1 each every 14 days   Deloris Vanessa MD     doxazosin (CARDURA) 8 MG tablet Take 3 tablets (24 mg) by mouth 2 times daily   Deloris Vanessa MD Yes    fluticasone (FLONASE) 50 MCG/ACT nasal spray Spray 1 spray into both nostrils daily   Deloris Vanessa MD     glipiZIDE (GLUCOTROL) 5 MG tablet Take 5 mg by mouth daily   Reported, Patient Yes    ibuprofen (ADVIL/MOTRIN) 200 MG tablet Take 3 tablets (600 mg)  by mouth every 6 hours as needed for mild pain   Erwin Saldaña MD     insulin  UNIT/ML injection 20 units before breakfast  Patient taking differently: 17 units before breakfast   Deloris Vanessa MD Yes    insulin pen needle (BD CELINE U/F) 32G X 4 MM miscellaneous Use 1 pen needles daily or as directed.   Deloris Vanessa MD     losartan (COZAAR) 50 MG tablet Take 1 tablet (50 mg) by mouth daily   Deloris Vanessa MD Yes    metFORMIN (GLUCOPHAGE) 1000 MG tablet Take 1,000 mg by mouth 2 times daily (with meals)   Reported, Patient Yes    Microlet Lancets MISC 1 each daily   Deloris Vanessa MD     omeprazole (PRILOSEC) 40 MG DR capsule Take 1 capsule (40 mg) by mouth daily   Deloris Vanessa MD     phenoxybenzamine (DIBENZYLINE) 10 MG capsule Take 1 capsule (10 mg) by mouth daily   Deloris Vanessa MD Yes         Current Medications:   Current Facility-Administered Medications   Medication     alum & mag hydroxide-simethicone (MAALOX) suspension 15 mL     [Held by provider] aspirin (ASA) chewable tablet 81 mg     carboxymethylcellulose PF (REFRESH PLUS) 0.5 % ophthalmic solution 1 drop     carvedilol (COREG) tablet 25 mg     glucose gel 15-30 g    Or     dextrose 50 % injection 25-50 mL    Or     glucagon injection 1 mg     [Held by provider] doxazosin (CARDURA) tablet 16 mg     insulin aspart (NovoLOG) injection (RAPID ACTING)     insulin aspart (NovoLOG) injection (RAPID ACTING)     insulin aspart (NovoLOG) injection (RAPID ACTING)     insulin glargine (LANTUS PEN) injection 22 Units     lidocaine (LMX4) cream     lidocaine 1 % 0.1-1 mL     losartan (COZAAR) tablet 50 mg     [START ON 8/14/2023] metyroSINE (DEMSER) capsule 1,000 mg     metyroSINE (DEMSER) capsule 250 mg     [START ON 8/12/2023] metyroSINE (DEMSER) capsule 500 mg     [START ON 8/13/2023] metyroSINE (DEMSER) capsule 750 mg     naphazoline-pheniramine (NAPHCON-A) ophthalmic solution 1 drop      ondansetron (ZOFRAN ODT) ODT tab 4 mg    Or     ondansetron (ZOFRAN) injection 4 mg     pantoprazole (PROTONIX) EC tablet 40 mg     [START ON 8/12/2023] phenoxybenzamine (DIBENZYLINE) capsule 10 mg     polyethylene glycol (MIRALAX) Packet 17 g     prochlorperazine (COMPAZINE) injection 10 mg    Or     prochlorperazine (COMPAZINE) tablet 10 mg    Or     prochlorperazine (COMPAZINE) suppository 25 mg     sodium chloride (PF) 0.9% PF flush 3 mL     sodium chloride (PF) 0.9% PF flush 3 mL       Family History:  No family history on file.    Social History:  Social History     Tobacco Use     Smoking status: Never     Passive exposure: Never     Smokeless tobacco: Never   Substance Use Topics     Alcohol use: Never         Physical Examination:  Physical Exam  Constitutional:       General: She is not in acute distress.     Appearance: She is not ill-appearing.   Neck:      Comments: No thyromegaly  Cardiovascular:      Rate and Rhythm: Normal rate.      Heart sounds: Normal heart sounds. No murmur heard.  Pulmonary:      Effort: Pulmonary effort is normal.      Breath sounds: Normal breath sounds. No wheezing or rales.   Abdominal:      Palpations: Abdomen is soft.      Tenderness: There is no abdominal tenderness.   Musculoskeletal:      Right lower leg: No edema.      Left lower leg: No edema.   Neurological:      Mental Status: She is alert.          Endocrine Labs      Imaging.            Assessment and Plan:   Jessica Kennedy is a 48 year old female with PMHx of MEN 2A with bilateral synchronous pheochromocytomas, HTN, T2DM who is being admitted for optimization of blood pressure control / alpha blockade prior to bilateral cortical sparing adrenalectomy.     # Pheochromocytoma, bilateral  She is currently being admitted for optimization of alpha blockade prior to surgical treatment.     # Type 2 Diabetes Mellitus  Inadequate control as outpatient. Will hold metformin and glipizide while inpatient and start  basal bolus insulin regimen.     # Primary Hyperparathyroidism  Will plan for SPECT-CT in the immediate post op for determining extent of parathyroidectomy    # MEN 2A  She is planned to undergo thyroidectomy during this admission after her adrenalectomy.     Recommendations  - Start Metyrosine at 250 mg Q 6 hours, with gradual daily titration to 500 mg Q6, 750 mg Q6, 1000 mg Q6 on the morning of surgery  - Will continue with Phenoxybenzamine 10 mg daily  - Will continue with Carvedilol 25 mg BID  - Will continue with Losartan 50 mg daily  - She missed doxazosin today in AM, will give a 1x dose of 16 mg today and evaluate PM and tomorrow dosing based on BP measurements here  - Will try to target BP of 110/70 or lower along with evidence of orthostatic hypotension prior to surgery    - Order entered for orthostatic vitals to be checked every shift    - Start Lantus 22 Units at night with ICR of 1 unit per 10 grams of carbs with medium correction    Endocrinology service will follow.     Jayson Wilson MD  Endocrinology Fellow

## 2023-08-11 NOTE — ANESTHESIA PREPROCEDURE EVALUATION
Pre-Operative H & P     CC:  Preoperative exam to assess for increased cardiopulmonary risk while undergoing surgery and anesthesia.    Date of Encounter: 6/20/2023  Primary Care Physician:  Mariela, [facility]     Reason for visit: Pre-operative evaluation    HPI  Jessica Kennedy is a 48 year old female who presents for pre-operative H & P in preparation for  Procedure Information       Case: [id number] Date/Time: 08/14/23 0730    Procedure: ADRENALECTOMY, LAPAROSCOPIC with cortical sparing (Bilateral: Abdomen)    Anesthesia type: General    Diagnosis: Pheochromocytoma, unspecified laterality [D35.00]    Pre-op diagnosis: Pheochromocytoma, unspecified laterality [D35.00]    Location:  OR  /  OR    Providers: Domenico Caceres MD            Brent Lopez is a 48 year old male with a PMH significant for HTN, DM2, bilateral synchronous pheochromocytoma initially noted on abdominal CT in April 2022 in the context of Medullary thyroid cancer and primary hyperparathyroidism and GERD. She has consulted with Dr. Cacrees and presents today in preparation for the above recommended procedure.      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       Allergies  Allergies   Allergen Reactions    Hydromorphone Hives     Other reaction(s): Hives    Ace Inhibitors      Other reaction(s): Unknown       Social History  Social History     Socioeconomic History    Marital status:       Spouse name: Not on file    Number of children: Not on file    Years of education: Not on file    Highest education level: Not on file   Occupational History    Not on file   Tobacco Use    Smoking status: Never     Passive exposure: Never    Smokeless tobacco: Never   Substance and Sexual Activity    Alcohol use: Never    Drug use: Never    Sexual activity: Not on file   Other Topics Concern    Not on file   Social History Narrative    Not on file     Social Determinants of Health     Financial Resource Strain: Not on file   Food Insecurity: Not on file   Transportation Needs: Not on file   Physical Activity: Not on file   Stress: Not on file   Social Connections: Not on file   Intimate Partner Violence: Not on file   Housing Stability: Not on file       Family History  No family history on file.    Review of Systems  The complete review of systems is negative other than noted in the HPI or here.   Anesthesia Evaluation   Pt has had prior anesthetic. Type: General.        ROS/MED HX  ENT/Pulmonary:     (+) sleep apnea, doesn't use CPAP,                                  (-) tobacco use, asthma and HARLEY risk factors   Neurologic:  - neg neurologic ROS     Cardiovascular:     (+)  hypertension-range: systolic 135-140/ -   -  - -                                 Previous cardiac testing   Echo: Date: 1/14/2021 Results:  Final Impressions   1. Normal left ventricular chamber size.   2. Calculated 2-D biplane volumetric left ventricular ejection fraction 59 %.   3. Normal cardiac valves.   4. Normal inferior vena cava size with normal inspiratory collapse (>50%).   5. Normal ascending aorta diameter.   6. Normal abdominal aorta Doppler flow pattern ( no coarctation).     Stress Test:  Date: Results:    ECG Reviewed:  Date: Results:    Cath:  Date: Results:   (-) MERCER, orthopnea/PND and irregular heartbeat/palpitations   METS/Exercise Tolerance: 3 - Able to walk 1-2 blocks without stopping    Hematologic:  - neg  hematologic  ROS     Musculoskeletal:  - neg musculoskeletal ROS     GI/Hepatic:     (+) GERD, Asymptomatic on medication,                  Renal/Genitourinary: Comment: Pheochromocytoma      Endo:     (+)  type II DM, Last HgA1c: 9.0, date: 6/28/2023, Not using insulin,     thyroid problem, Thyroid disease - Other Medullary thyroid cancer and primary hyperparathyroidism ,    Obesity,       Psychiatric/Substance Use:  - neg psychiatric ROS     Infectious Disease:  - neg infectious disease ROS     Malignancy:  - neg malignancy ROS     Other:            There were no vitals taken for this visit.    Physical Exam   Constitutional: Awake, alert, cooperative, no apparent distress, and appears stated age.  Eyes: Pupils equal, round and reactive to light, extra ocular muscles intact, sclera clear, conjunctiva normal.  HENT: Normocephalic, oral pharynx with moist mucus membranes, good dentition. No goiter appreciated.   Respiratory: Clear to auscultation bilaterally, no crackles or wheezing.  Cardiovascular: Regular rate and rhythm, normal S1 and S2, and no murmur noted.  Carotids +2, no bruits. No edema. Palpable pulses to radial  DP and PT arteries.   GI: Normal bowel sounds, soft, slightly-distended, non-tender, no masses palpated, no hepatosplenomegaly.  Surgical scars: deferred  Lymph/Hematologic: No cervical lymphadenopathy and no supraclavicular lymphadenopathy.  Genitourinary:  deferred  Skin: Warm and dry.  No rashes at anticipated surgical site.   Musculoskeletal: Full ROM of neck. There is no redness, warmth, or swelling of the joints. Gross motor strength is normal.    Neurologic: Awake, alert, oriented to name, place and time. Cranial nerves II-XII are grossly intact. Gait is normal.   Neuropsychiatric: Calm, cooperative. Normal affect.     Prior Labs/Diagnostic Studies   All labs and imaging personally reviewed     Echocardiogram 5/12/2023  For the complete report, see the Order-Level Documents.   Final  "Impressions   1. Normal left ventricular chamber size.   2. Calculated 2-D biplane volumetric left ventricular ejection fraction 59 %.   3. Normal cardiac valves.   4. Normal inferior vena cava size with normal inspiratory collapse (>50%).   5. Normal ascending aorta diameter.   6. Normal abdominal aorta Doppler flow pattern ( no coarctation).        EKG/ stress test - if available please see in ROS above   Echo result w/o MOPS:      The patient's records and results personally reviewed by this provider.     Outside records reviewed from: Care Everywhere    LAB/DIAGNOSTIC STUDIES TODAY:        Assessment      Jessica Kennedy is a 48 year old female seen as a PAC referral for risk assessment and optimization for anesthesia.    Plan/Recommendations  Pt will be optimized for the proposed procedure.  See below for details on the assessment, risk, and preoperative recommendations    NEUROLOGY  - No history of TIA, CVA or seizure  -Post Op delirium risk factors:  No risk identified    ENT  - No current airway concerns.  Will need to be reassessed day of surgery.  Mallampati: I  TM: > 3    CARDIAC  - No history of CAD and Afib   No anginal symptoms, Denies palpitations, PND, dizziness or syncope.   - Hypertension- difficult to control in setting of pheochromocytoma on multiple meds. Including amlodipine, doxazosin, cardura and carvedilol. Follows with Dr. Vanessa.  In his note BP at home and today ranges 130-145/80's   In Dr. Elizabeth note 5/16/2023  \" I will message the surgeon re: when they are planning surgery and depending on the plan we might need to add metyrosine just a few days prior to surgery so that Jessica will not have significant side effects unnecessarily ahead of scheduled date. \"    - METS (Metabolic Equivalents)  Able to walk 1-2 blocks without stopping.   Patient CANNOT perform 4 METS exercise without symptoms            Total Score: 1    Functional Capacity: Unable to complete 4 METS      RCRI-Very " "low risk: Class 1 0.4% complication rate            Total Score: 0        PULMONARY  - Obstructive Sleep Apnea  HARLEY without home CPAP use.  Unable to tolerate mask    - Denies asthma or inhaler use  - Tobacco History      History   Smoking Status    Never   Smokeless Tobacco    Never       GI  - GERD  symptoms improving on recent iniation of PPI  PONV High Risk  Total Score: 3           1 AN PONV: Pt is Female    1 AN PONV: Patient is not a current smoker    1 AN PONV: Intended Post Op Opioids        /RENAL  - Baseline Creatinine  WNL  Pheochromocytoma- Procedure scheduled as above.     ENDOCRINE    - BMI: Estimated body mass index is 33.5 kg/m  as calculated from the following:    Height as of 6/20/23: 1.702 m (5' 7\").    Weight as of 7/19/23: 97 kg (213 lb 14.4 oz).  + Obesity BMI 33  Medullary thyroid cancer and primary hyperparathyroidism   - Diabetes  Diabetes Mellitus, Type 2, non-insulin dependent.  Hold morning oral hypoglycemic medications. Recommend close monitoring of the patient's blood glucose levels throughout the perioperative period.  Component Ref Range & Units 3 mo ago     Hemoglobin A1C POCT 4.3 - <5.7 % 8.4 Abnormal                Specimen Collected: 03/08/23 11:59 AM Last Resulted: 03/08/23 11:59              HEME  VTE Low Risk 0.26%            Total Score: 0      - No history of abnormal bleeding or antiplatelet use.    Of Note  Patient is wishing to post-pone surgery by 10 days to care for her son who just sustained a spinal injury.  She is a single mother of 5 with no outside help.   She currently is sleeping on a mattress on her floor. This would be unsuitable for her post-operative period.   I have put in a consult for  to contact her and evaluate her needs for herself pot-operatively and care of her children while she is hospitalized and re- cooperating.   I will contact the surgical team regarding our conversation today and ask them to reach out to her for scheduling " details.   - Patient is also aware that if her surgical date is past 30 days of today she will need to have an updated H & P.        Anesthesia Plan    ASA Status:  3       Anesthesia Type: General.     - Airway: ETT   Induction: Intravenous.   Maintenance: Balanced.   Techniques and Equipment:     - Lines/Monitors: 2nd IV, Arterial Line, Central Line, CVP, BIS     - Blood: T&S     - Drips/Meds: Remifentanil, Norepi, Vasopressin, Nicardipine, Nitroprusside     Consents            Postoperative Care    Pain management: IV analgesics, Oral pain medications, Multi-modal analgesia.   PONV prophylaxis: Ondansetron (or other 5HT-3), Dexamethasone or Solumedrol     Comments:

## 2023-08-11 NOTE — TELEPHONE ENCOUNTER
Per Dr. Vanessa, patient did not show up for admission on 8/10/23. Dr. Whyte is still expecting the patient for surgery. Writer attempted to contact the patient, left voicemail to call the clinic, or go to the hospital for admission. Writer will attempt to call the patient again.    Rosi Capps, [facility]  Adult Endocrinology  [facility], [place]

## 2023-08-11 NOTE — H&P
[facility]    History and Physical - [facility] Medicine Service       Date of Admission:  8/11/2023    Assessment & Plan      Jessica Kennedy is a 48 year old female admitted on 8/11/2023. She has a history of MEN2A and T2DM (A1c 9.0) and is admitted for BP control (goal <110/70) prior to bilateral pheochromocytoma removal on 8/14.     #MEN2A  #Pheochromocytoma  #Hypertension  #Primary Hyperparathyroidism   #Medullary Thyroid CA  Chronic, Stable   During her hospital stay endocrinology recommended blood pressure control of less than 110/70.  The goal for her is to have maximal alpha receptor blockade.  Please see endocrinology note for more details.  -Continue PTA losartan 50 mg daily.  -Continue PTA carvedilol 25 mg daily.  -Continue PTA phenoxybenzamine 10 mg daily.  - She missed doxazosin today in AM, will give a 1x dose of 16 mg today and evaluate PM and tomorrow dosing based on BP measurements here   - start Metyrosine according to the following schedule   8/11. 250 mg q 6   8/12. 500 mg q 6  8/13. 750 mg q 6  8/14. 1000 mg q 6  -Hold PTA amlodipine 10 mg.  -Hold PTA aspirin 81 mg daily.  - Orthostatic vitals       #T2DM   Chronic Stable - last A1c is 9.0  See Endocrinology note for more details.   Start Lantus 22 Units at night with ICR of 1 unit per 10 grams of carbs with medium correction    - HOLD PTA Metformin   - HOLD PTA glipizide    #Abdominal Pain   #Flank Pain  Acute  Patient is complaining of left lower quadrant, right lower quadrant, periumbilical, bilateral flank, midline abdominal pain.  She has tenderness to palpation in her lower abdominal fields and periumbilical area.  She rates the pain as an 8 out of 10 dull pain.  On physical exam she has CVA tenderness on her left side.  She states that her last bowel movement was in the morning.  She is able to urinate well.  My differentials at this point is a possible urinary tract infection,  constipation, functional abdominal pain.  If patient has elevated white count we will consider adding additional labs such as liver function, lipase, CRP.  - CBC   - UA with reflex to culture  - One-time GI cocktail.    #Palpitations   Acute  Patient has been complaining of increased heart rate and feeling like her heart is beating out of her chest.  She states that that this is started recently.  She does not feel like her heart is skipping a beat but more like her heart is beating very quickly and making her a little short of breath.  On physical exam peripheral pulses are +2/4 and regular.  - EKG     #Headache  Acute  Patient is complaining of a dull achy headache.  This is normal for her.  She gets headaches very frequently.  In the setting of her known bilateral adrenal pheochromocytoma and elevated blood pressure this is expected. Patient already took 500 mg tylenol 30 minutes before presentation.  - Tylenol 500-1000 mg PRN Q6H       #Irritated Eyes  Patient is complaining of a gritty and itchy sensation in both of her eyes. Her conjunctiva look clear and she denies pain or loss of vision. This is most likely an allergic conjunctivitis vs irritated eyes from environmental air quality.   Cold compresses to eye  - Artificial tear drops q1hr PRN (keep refrigerated)  - Naphcon-A (allergy relief eye drops for 1-2 days) 4x/day PRN  - Space drops a few (three to five) minutes apart if possible, so that instillation of a second drop does not wash out the first  - Basic Eye Care              - don't rub eyes              - cold compresses              - frequent use of refrigerated artificial tars              - NO contact lens use     Diet: Combination Diet Regular Diet Adult    DVT Prophylaxis: Low Risk/Ambulatory with no VTE prophylaxis indicated  Rodriguez Catheter: Not present  Fluids: PO  Lines: None     Cardiac Monitoring: ACTIVE order. Indication: Pheochromocytoma  Code Status: Full Code      Clinically Significant  Risk Factors Present on Admission                # Drug Induced Platelet Defect: home medication list includes an antiplatelet medication   # Hypertension: Home medication list includes antihypertensive(s)               Disposition Plan      Expected Discharge Date: 08/13/2023                The patient's care was discussed with the Attending Physician, Dr. Trevino .      DO [facility]  [facility]  Securely message with [employer] (more info)  Text page via [employer] Paging/Directory   See signed in provider for up to date coverage information  ______________________________________________________________________    Chief Complaint   Preoperative optimization of BP for bilateral pheochromocytoma resection.     History is obtained from the patient    History of Present Illness   Jessica Kennedy is a 48 year old female who has a history of MEN2A with bilateral pheochromocytoma, medullary thyroid ca, primary hyperparathyroidism, and T2DM  is admitted for preoperative optimization of BP control with goal of <110/70.  She has not been able to be admitted due to social barriers patient is a single mom to 5 kids, 2 adults and 3 children.  She has difficulty managing blood pressure and other medications as an outpatient.   She is scheduled with surgery for a bilateral pheochromocytoma removal on 8/14 and needs preadmission under medicine and endocrinology for blood pressure monitoring and medication titration prior to surgery.   Today she is feeling well overall.  She states that she has a mild migraine for which she has taken Tylenol before she presented to the inpatient medical service.  She also states that she has 8 out of 10 abdominal pain and her lower abdominal fields, flank, periumbilical and midline.  She states that she has been having this pain for the past month but its been getting worse today. blood in the urine, painful  urination, increased urge to urinate.  She denies blood in her stool and black tarry stools.  She denies She denies any diarrhea, constipation, vomiting vomiting, diarrhea but she endorses feelings of some nausea.    Patient denies recent fevers, chills, night sweats, chest pain, shortness of breath, back pain, lightheadedness, dizziness, vision changes.  She has a CGM in place and her readings have mostly been in the 180s to 240s ranges representing about 46% of the time and 70s to 180s 39% of the time.  Her highs are usually less than 300.  She has not had any lows in the past 1 month.  She usually takes 22 units of long-acting insulin 2 days a week, and 17 units of long-acting insulin 5 days a week.During medication reconciliation she states that she took amlodipine 10 mg twice a day. She does not take Prilosec 40 mg daily. She takes the rest of the medications on her medication list as prescribed.     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 Taking?   Benzocaine-Menthol 10-2.1 MG LOZG   No No   Sig: Take 1 lozenge by mouth 4 times daily as needed (Cough or sore throat)   Continuous Blood Gluc  (FREESTYLE ABHIJIT 14 DAY READER) [facility]   No No   Sig: Use to read blood sugars as per 's instructions.   Continuous Blood Gluc Sensor (FREESTYLE ABHIJIT 14 DAY SENSOR) [facility]   No No   Sig: Change every 14 days.   Continuous Blood Gluc Sensor (FREESTYLE ABHIJIT 2 SENSOR) [facility]   No No   Si each every 14 days   Microlet Lancets MISC   No No   Si each daily   acetaminophen (TYLENOL)  500 MG tablet   No No   Sig: Take 1-2 tablets (500-1,000 mg) by mouth every 6 hours as needed for mild pain   alcohol swab prep pads   No No   Sig: Use to swab area of injection/laurie as directed.   amLODIPine (NORVASC) 10 MG tablet   No No   Sig: Take 1 tablet (10 mg) by mouth daily   aspirin (ASA) 81 MG chewable tablet   No No   Sig: Take 1 tablet (81 mg) by mouth daily CHEW AND SWALLOW   blood glucose (NO BRAND SPECIFIED) test strip   No No   Si strip by In Vitro route every morning (before breakfast)   carvedilol (COREG) 25 MG tablet   No No   Sig: Take 1 tablet (25 mg) by mouth 2 times daily (with meals)   doxazosin (CARDURA) 8 MG tablet   No No   Sig: Take 3 tablets (24 mg) by mouth 2 times daily   fluticasone (FLONASE) 50 MCG/ACT nasal spray   No No   Sig: Spray 1 spray into both nostrils daily   glipiZIDE (GLUCOTROL) 5 MG tablet   Yes No   Sig: Take 5 mg by mouth daily   ibuprofen (ADVIL/MOTRIN) 200 MG tablet   No No   Sig: Take 3 tablets (600 mg) by mouth every 6 hours as needed for mild pain   insulin  UNIT/ML injection   No No   Si units before breakfast   Patient taking differently: 17 units before breakfast   insulin pen needle (BD CELINE U/F) 32G X 4 MM miscellaneous   No No   Sig: Use 1 pen needles daily or as directed.   losartan (COZAAR) 50 MG tablet   No No   Sig: Take 1 tablet (50 mg) by mouth daily   metFORMIN (GLUCOPHAGE) 1000 MG tablet   Yes No   Sig: Take 1,000 mg by mouth 2 times daily (with meals)   omeprazole (PRILOSEC) 40 MG DR capsule   No No   Sig: Take 1 capsule (40 mg) by mouth daily   phenoxybenzamine (DIBENZYLINE) 10 MG capsule   No No   Sig: Take 1 capsule (10 mg) by mouth daily      Facility-Administered Medications: None        Review of Systems    The 10 point Review of Systems is negative other than noted in the HPI or here. Patient endorses itchy eyes, palpitations/fast heart rate, LLQ, RLQ, midline abdominal pain, and flank pain.     Social History   I have  reviewed this patient's social history and updated it with pertinent information if needed.  Social History     Tobacco Use    Smoking status: Never     Passive exposure: Never    Smokeless tobacco: Never   Substance Use Topics    Alcohol use: Never    Drug use: Never         Family History     No significant family history      Allergies   Allergies   Allergen Reactions    Hydromorphone Hives     Other reaction(s): Hives    Ace Inhibitors      Other reaction(s): Unknown        Physical Exam   Vital Signs: Temp: 97.9  F (36.6  C) Temp src: Oral BP: (!) 142/100 Pulse: 91   Resp: 22 SpO2: 98 % O2 Device: None (Room air)    Weight: 0 lbs 0 oz    Constitutional: awake, alert, cooperative, no apparent distress, and appears stated age  Eyes: Lids and lashes normal, pupils equal, round and reactive to light, extra ocular muscles intact, sclera clear, conjunctiva normal  Hematologic / Lymphatic: no supraclavicular lymphadenopathy  Respiratory: No increased work of breathing, good air exchange, clear to auscultation bilaterally, no crackles or wheezing  Cardiovascular: Normal apical impulse, regular rate and rhythm, normal S1 and S2, no S3 or S4, and no murmur noted  GI: No scars, normal bowel sounds, soft, non-distended, tenderness to palpation in lower quadrant, midline, periumbilical, no masses palpated, no hepatosplenomegaly. Negative vizcarra, Rovsing, psoas, and obturator sign.   Musculoskeletal: There is no redness, warmth, or swelling of the joints.  Full range of motion noted.  Motor strength is 5 out of 5 all extremities bilaterally.  Tone is normal.  Neurologic: Mental Status Exam:  Level of Alertness:   awake  Orientation:   person, place, time  Attention/Concentration:  normal  Sensory:  Sensory intact  Touch:  Right Lower Extremity:  normal  Left Lower Extremity:  normal  Deep Tendon Reflexes:  Right Knee:  2+  Left Knee:  2+  Right Ankle:  2+  Left Ankle:  2+    Medical Decision Making       Please see A&P for  additional details of medical decision making.      Data

## 2023-08-11 NOTE — TELEPHONE ENCOUNTER
Rosi Capps CMA     SS    8/11/23  8:14 AM  Note  Per Dr. Vanessa, patient did not show up for admission on 8/10/23. Dr. Whyte is still expecting the patient for surgery. Writer attempted to contact the patient, left voicemail to call the clinic, or go to the hospital for admission. Writer will attempt to call the patient again.     Rosi Capps CMA  Adult Endocrinology  [facility], [place]

## 2023-08-11 NOTE — CONSULTS
[facility]    Consult Note - MIS Service  Date of Admission:  8/11/2023  Consult Requested by: Francoise  Reason for Consult: Pheochromocytoma    Assessment & Plan: Surgery   Jessica Kennedy is a 48 year old female with MEN2A, bilateral pheochromocytoma, medullary thyroid cancer and primary hyperparathyroidism and admitted on 8/11/2023 for preoperative blood pressure optimization.    - Appreciate endocrine management  - Appreciate hospital medicine primary  - Tentative plan for OR Monday pending endocrine optimization  - NPO MN Sunday night into Monday morning       Drains: None     Code Status: Full Code      Clinically Significant Risk Factors Present on Admission                # Drug Induced Platelet Defect: home medication list includes an antiplatelet medication   # Hypertension: Home medication list includes antihypertensive(s)             The patient's care was discussed with the Attending Physician, Dr. Caceres .    Leora Lambert MD  [facility]  Non-urgent messages: Securely message with [employer] (more info)  Text page via [employer] Paging/Directory     ______________________________________________________________________    Chief Complaint   MEN2A    History is obtained from the patient    History of Present Illness   Jessica Kennedy is a 48 year old female with insulin dependent diabetes and 5 med hypertension who was diagnosed with pheochromocytoma upon work-up for medically refractory hypertension associated with heat intolerance, tachycardia and bilateral adrenal masses noted on CT (Apr 2022). Work-up significant for MEN 2A, medullary thyroid cancer, and elevated plasma metanephrines.       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,  [id number]       Past Surgical History   Past Surgical History:   Procedure Laterality Date    HEAD & NECK SURGERY      ORTHOPEDIC SURGERY         Prior to Admission Medications   I have reviewed this patient's current medications     ------------------------------------------------------------------------     Physical Exam   Vital Signs: Temp: 97.9  F (36.6  C) Temp src: Oral BP: (!) 142/100 Pulse: 91   Resp: 22 SpO2: 98 % O2 Device: None (Room air)    Weight: 0 lbs 0 ozNo intake or output data in the 24 hours ending 08/11/23 1641  General Appearance: Appears comfortable  Respiratory: NLB on RA  Cardiovascular: RRR  GI: Abdomen soft, obese, mildly tender throughout, ND. No surgical scars  Skin: No rashes, normal color and turgor  Other: Ext WWP           Data     I have personally reviewed the following data over the past 24 hrs:    11.3 (H)  \   12.6   / 204     136 102 15.5 /  305 (H)   3.6 23 0.83 \

## 2023-08-11 NOTE — PROGRESS NOTES
Transfer Type: [facility]  Transfer Triage Note    Date of call: 08/11/23  Time of call: 8:49 AM    Current Patient Location:  Home  Current Level of Care: Outpatient    Vitals: N/A  Diagnosis: Pheochromocytoma   Is COVID-19 a concern? No  Reason for requested transfer: Procedure can be done here and not at referring hospital  Further diagnostic work up, management, and consultation for specialized care   Isolation Needs: None    Outside Records: Available  Additional records may be faxed to [phone].    Transfer accepted: Yes  Stability of Patient: Patient is vitally stable, with no critical labs, and will likely remain stable throughout the transfer process  Level of Care Needed: Med Surg  Telemetry Needed:  None  Expected Time of Arrival for Transfer: TBD pending bed availability   Arrival Location:  [facility]    Recommendations for Management and Stabilization: Not needed    Additional Comments:   49 y/o woman following with Endocrinology for bilateral pheochromocytoma found to have MEN 2A with medullary thyroid cancer and primary hyperparathyroidism. Seen since Feb/March, but has not been able to be admitted due to social barriers--patient is single parent to 9 kids. Difficulty managing BP and other medications as an outpatient. Scheduled for surgery on Monday 8/14 and needs pre-admission under medicine with Endocrinology consult for blood pressure monitoring and medication titration prior to surgery. Ideally patient would be admitted to [facility] bed, however currently there are no beds and the hospital is closed to in and out of system admissions. Will place on list for [facility] bed with Endocrinology consult and transfer to [facility] at time of surgery.     Nanci Ernst MD

## 2023-08-11 NOTE — PROGRESS NOTES
6MS ADMISSION  6MS ADMISSION    D: Patient admitted/transferred from via cart.   I: Upon arrival to the unit patient was oriented to room, unit, and call light. Patient s height, weight, and vital signs were obtained. Allergies reviewed and allergy band applied. Adult AVS completed. Head to toe assessment completed. Education assessment completed. Care plan initiated.  A: Vital signs stable upon arrival Temp: 97.9  F (36.6  C) Temp src: Oral BP: (!) 142/100 Pulse: 91   Resp: 22 SpO2: 98 % O2 Device: None (Room air). Pt denied pain. Two RN skin assessment completed, no skin problem. Second RN was GERSON Alvarado.  P: Continue to monitor patient s chronic pain and intervene as needed. Continue with plan of care. Notify provider with any concerns or changes in patient status.

## 2023-08-11 NOTE — TELEPHONE ENCOUNTER
[address] [address]    [phone] admission - adult surgery    Patient has been called and given this information. She is to go to the hospital for admission.    Rosi Capps CMA  Adult Endocrinology  [facility] [place]

## 2023-08-12 LAB
ALBUMIN UR-MCNC: 30 MG/DL
APPEARANCE UR: CLEAR
BACTERIA #/AREA URNS HPF: ABNORMAL /HPF
BILIRUB UR QL STRIP: NEGATIVE
COLOR UR AUTO: ABNORMAL
GLUCOSE BLDC GLUCOMTR-MCNC: 149 MG/DL (ref 70–99)
GLUCOSE BLDC GLUCOMTR-MCNC: 178 MG/DL (ref 70–99)
GLUCOSE BLDC GLUCOMTR-MCNC: 236 MG/DL (ref 70–99)
GLUCOSE BLDC GLUCOMTR-MCNC: 243 MG/DL (ref 70–99)
GLUCOSE UR STRIP-MCNC: NEGATIVE MG/DL
HGB UR QL STRIP: NEGATIVE
KETONES UR STRIP-MCNC: NEGATIVE MG/DL
LEUKOCYTE ESTERASE UR QL STRIP: NEGATIVE
MUCOUS THREADS #/AREA URNS LPF: PRESENT /LPF
NITRATE UR QL: NEGATIVE
PH UR STRIP: 5.5 [PH] (ref 5–7)
RBC URINE: 1 /HPF
SP GR UR STRIP: 1.01 (ref 1–1.03)
SQUAMOUS EPITHELIAL: 1 /HPF
UROBILINOGEN UR STRIP-MCNC: NORMAL MG/DL
WBC URINE: 1 /HPF

## 2023-08-12 PROCEDURE — 250N000011 HC RX IP 250 OP 636

## 2023-08-12 PROCEDURE — 250N000013 HC RX MED GY IP 250 OP 250 PS 637: Performed by: STUDENT IN AN ORGANIZED HEALTH CARE EDUCATION/TRAINING PROGRAM

## 2023-08-12 PROCEDURE — 250N000013 HC RX MED GY IP 250 OP 250 PS 637

## 2023-08-12 PROCEDURE — 120N000002 HC R&B MED SURG/OB UMMC

## 2023-08-12 PROCEDURE — 99232 SBSQ HOSP IP/OBS MODERATE 35: CPT | Mod: GC | Performed by: STUDENT IN AN ORGANIZED HEALTH CARE EDUCATION/TRAINING PROGRAM

## 2023-08-12 PROCEDURE — 81001 URINALYSIS AUTO W/SCOPE: CPT

## 2023-08-12 PROCEDURE — 250N000013 HC RX MED GY IP 250 OP 250 PS 637: Performed by: INTERNAL MEDICINE

## 2023-08-12 RX ORDER — LOSARTAN POTASSIUM 50 MG/1
50 TABLET ORAL ONCE
Status: COMPLETED | OUTPATIENT
Start: 2023-08-12 | End: 2023-08-12

## 2023-08-12 RX ORDER — METYROSINE 250 MG/1
500 CAPSULE ORAL ONCE
Status: COMPLETED | OUTPATIENT
Start: 2023-08-12 | End: 2023-08-12

## 2023-08-12 RX ORDER — MAGNESIUM HYDROXIDE/ALUMINUM HYDROXICE/SIMETHICONE 120; 1200; 1200 MG/30ML; MG/30ML; MG/30ML
15 SUSPENSION ORAL ONCE
Status: COMPLETED | OUTPATIENT
Start: 2023-08-12 | End: 2023-08-12

## 2023-08-12 RX ORDER — LACTOBACILLUS RHAMNOSUS GG 10B CELL
1 CAPSULE ORAL 2 TIMES DAILY
Status: DISCONTINUED | OUTPATIENT
Start: 2023-08-12 | End: 2023-08-16

## 2023-08-12 RX ORDER — LOSARTAN POTASSIUM 25 MG/1
100 TABLET ORAL DAILY
Status: DISCONTINUED | OUTPATIENT
Start: 2023-08-13 | End: 2023-08-16

## 2023-08-12 RX ORDER — DOXAZOSIN 8 MG/1
24 TABLET ORAL 2 TIMES DAILY
Status: DISCONTINUED | OUTPATIENT
Start: 2023-08-12 | End: 2023-08-16

## 2023-08-12 RX ORDER — FLUTICASONE PROPIONATE 50 MCG
2 SPRAY, SUSPENSION (ML) NASAL DAILY
Status: DISCONTINUED | OUTPATIENT
Start: 2023-08-12 | End: 2023-08-16

## 2023-08-12 RX ORDER — DOXAZOSIN 8 MG/1
8 TABLET ORAL ONCE
Status: COMPLETED | OUTPATIENT
Start: 2023-08-12 | End: 2023-08-12

## 2023-08-12 RX ADMIN — INSULIN ASPART 1 UNITS: 100 INJECTION, SOLUTION INTRAVENOUS; SUBCUTANEOUS at 18:39

## 2023-08-12 RX ADMIN — PANTOPRAZOLE SODIUM 40 MG: 40 TABLET, DELAYED RELEASE ORAL at 10:50

## 2023-08-12 RX ADMIN — FLUTICASONE PROPIONATE 2 SPRAY: 50 SPRAY, METERED NASAL at 11:07

## 2023-08-12 RX ADMIN — METYROSINE 500 MG: 250 CAPSULE ORAL at 10:39

## 2023-08-12 RX ADMIN — CARBOXYMETHYLCELLULOSE SODIUM 1 DROP: 5 SOLUTION/ DROPS OPHTHALMIC at 15:39

## 2023-08-12 RX ADMIN — DOXAZOSIN MESYLATE 16 MG: 8 TABLET ORAL at 01:21

## 2023-08-12 RX ADMIN — DOXAZOSIN MESYLATE 8 MG: 8 TABLET ORAL at 15:38

## 2023-08-12 RX ADMIN — LOSARTAN POTASSIUM 50 MG: 50 TABLET, FILM COATED ORAL at 15:39

## 2023-08-12 RX ADMIN — DOXAZOSIN MESYLATE 24 MG: 8 TABLET ORAL at 22:49

## 2023-08-12 RX ADMIN — METYROSINE 500 MG: 250 CAPSULE ORAL at 22:00

## 2023-08-12 RX ADMIN — LOSARTAN POTASSIUM 50 MG: 50 TABLET, FILM COATED ORAL at 10:50

## 2023-08-12 RX ADMIN — Medication 1 CAPSULE: at 10:50

## 2023-08-12 RX ADMIN — METYROSINE 250 MG: 250 CAPSULE ORAL at 01:21

## 2023-08-12 RX ADMIN — CARVEDILOL 25 MG: 25 TABLET, FILM COATED ORAL at 18:38

## 2023-08-12 RX ADMIN — Medication 1 CAPSULE: at 22:49

## 2023-08-12 RX ADMIN — METYROSINE 500 MG: 250 CAPSULE ORAL at 18:38

## 2023-08-12 RX ADMIN — PHENOXYBENZAMINE HYDROCHLORIDE 10 MG: 10 CAPSULE ORAL at 10:58

## 2023-08-12 RX ADMIN — CARVEDILOL 25 MG: 25 TABLET, FILM COATED ORAL at 10:50

## 2023-08-12 RX ADMIN — ONDANSETRON 4 MG: 4 TABLET, ORALLY DISINTEGRATING ORAL at 11:07

## 2023-08-12 RX ADMIN — PANTOPRAZOLE SODIUM 40 MG: 40 TABLET, DELAYED RELEASE ORAL at 22:50

## 2023-08-12 RX ADMIN — METYROSINE 500 MG: 250 CAPSULE ORAL at 15:39

## 2023-08-12 RX ADMIN — DOXAZOSIN MESYLATE 16 MG: 8 TABLET ORAL at 10:50

## 2023-08-12 ASSESSMENT — ACTIVITIES OF DAILY LIVING (ADL)
ADLS_ACUITY_SCORE: 18

## 2023-08-12 NOTE — PLAN OF CARE
Goal Outcome Evaluation:           Overall Patient Progress: improvingOverall Patient Progress: improving        VS:    O2: Sating >95% on RA,   Output: Voids spontaneously in bathroom   Last BM: 08/11/23   Activity: Up independently in room   Up for meals? Yes   Skin: All visible skin intact   Pain: Pain was managed with PRN    CMS: AO x4, Denies N/V   Dressing: None   Diet: Regular diet, carb count    LDA: No access    Equipment:  IV pole, personal belongings   Plan: Call light within reach, bed in a low position. Able to make needs known. Continue to monitor with POC.   Additional Info:  pt daughter  is to come get medication 08/12/23 in morning.

## 2023-08-12 NOTE — PROGRESS NOTES
[facility]    Progress Note - [facility] [place] [facility]       Date of Admission:  8/11/2023    Assessment & Plan   Assessment & Plan  Jessica Kennedy is a 48 year old female admitted on 8/11/2023. She has a history of MEN2A and T2DM (A1c 9.0) and is admitted for BP control (goal <110/70) prior to bilateral pheochromocytoma removal on 8/14.      #MEN2A  #Pheochromocytoma  #Hypertension  #Primary Hyperparathyroidism   #Medullary Thyroid CA  Endocrinology rec blood pressure less than 110/70.  The goal for her is to have maximal alpha receptor blockade.  Please see endocrinology note for more details.  -Continue PTA losartan 50 mg daily.  -Continue PTA carvedilol 25 mg daily.  -Continue PTA phenoxybenzamine 10 mg daily.  - She missed doxazosin today in AM, will give a 1x dose of 16 mg today and evaluate PM and tomorrow dosing based on BP measurements here   - start Metyrosine according to the following schedule   8/12. 500 mg q 6  8/13. 750 mg q 6  8/14. 1000 mg q 6  -Hold PTA amlodipine 10 mg.  -Hold PTA aspirin 81 mg daily.  - Orthostatic vitals         #T2DM   Chronic Stable - last A1c is 9.0  See Endocrinology note for more details.   Start Lantus 22 Units at night with ICR of 1 unit per 10 grams of carbs with medium correction    - HOLD PTA Metformin   - HOLD PTA glipizide     #Abdominal Pain   #Flank Pain  Acute  Patient is complaining of left lower quadrant, right lower quadrant, periumbilical, bilateral flank, midline abdominal pain.  She has tenderness to palpation in her lower abdominal fields and periumbilical area.  She rates the pain as an 8 out of 10 dull pain.  On physical exam she has CVA tenderness on her left side.  She states that her last bowel movement was in the morning.  She is able to urinate well.  My differentials at this point is a possible urinary tract infection, constipation, functional abdominal pain.  If patient has elevated white  count we will consider adding additional labs such as liver function, lipase, CRP.  - Probiotic   - UA with reflex to culture  - GI cocktail.     #Palpitations   Acute  Patient has been complaining of increased heart rate and feeling like her heart is beating out of her chest.  She states that that this is started recently.  She does not feel like her heart is skipping a beat but more like her heart is beating very quickly and making her a little short of breath.  On physical exam peripheral pulses are +2/4 and regular.  - EKG      #Headache  Acute  Patient is complaining of a dull achy headache.  This is normal for her. She gets headaches very frequently.  In the setting of her known bilateral adrenal pheochromocytoma and elevated blood pressure this is expected.   - Tylenol 500-1000 mg PRN Q6H         #Irritated Eyes  Patient is complaining of a gritty and itchy sensation in both of her eyes. Her conjunctiva look clear and she denies pain or loss of vision. This is most likely an allergic conjunctivitis vs irritated eyes from environmental air quality.   Cold compresses to eye  - Artificial tear drops q1hr PRN (keep refrigerated)  - Naphcon-A (allergy relief eye drops for 1-2 days) 4x/day PRN  - Space drops a few (three to five) minutes apart if possible, so that instillation of a second drop does not wash out the first  - Basic Eye Care              - don't rub eyes              - cold compresses              - frequent use of refrigerated artificial tars              - NO contact lens use     #Congested Nose  Acute   Patient is complaining of a congested nose that she gets frequently.   - PTA Flonase.      Diet: Combination Diet Regular Diet Adult    DVT Prophylaxis: Low Risk/Ambulatory with no VTE prophylaxis indicated  Rodriguez Catheter: Not present  Fluids: PO  Lines: None     Cardiac Monitoring: ACTIVE order. Indication: Pheochromocytoma  Code Status: Full Code            Clinically Significant Risk Factors  Present on Admission                # Drug Induced Platelet Defect: home medication list includes an antiplatelet medication   # Hypertension: Home medication list includes antihypertensive(s)               Disposition Plan     Expected Discharge Date: 08/13/2023      Destination: home with family         [facility]  [facility]  Securely message with [employer] (more info)  Text page via [employer] Paging/Directory   See signed in provider for up to date coverage information  ______________________________________________________________________    Interval History   No overnight events   Patient is resting comfortably at bedside. Interactive. Phone  used.     She states that she was not given any artifical tears overnight.     Physical Exam   Vital Signs: Temp: 97.5  F (36.4  C) Temp src: Oral BP: 138/89 Pulse: 73   Resp: 16 SpO2: 98 % O2 Device: None (Room air)    Weight: 0 lbs 0 oz    Constitutional: awake, alert, cooperative, no apparent distress, and appears stated age  Eyes: Lids and lashes normal, pupils equal, round and reactive to light, extra ocular muscles intact, sclera clear, conjunctiva normal  Respiratory: No increased work of breathing, good air exchange, clear to auscultation bilaterally, no crackles or wheezing  Cardiovascular: Normal apical impulse, regular rate and rhythm, normal S1 and S2, no S3 or S4, and no murmur noted  GI: No scars, normal bowel sounds, soft, non-distended, non-tender, no masses palpated, left CVA tendernes    Medical Decision Making       Please see A&P for additional details of medical decision making.      Data     I have personally reviewed the following data over the past 24 hrs:    11.3 (H)  \   12.6   / 204     136 102 15.5 /  236 (H)   3.6 23 0.83 \

## 2023-08-12 NOTE — PROGRESS NOTES
Endocrine Progress Note  Patient: Jessica Kennedy   MRN: [id number]  Date of Service: 08/12/2023    Subjective:  No more headaches. Does feel some nasal congestion.       Physical Examination:  BP (!) 142/85 (BP Location: Right arm)   Pulse 78   Temp 97.9  F (36.6  C) (Oral)   Resp 20   LMP 05/09/2023 (Approximate)   SpO2 98%     Physical Exam  Constitutional:       General: She is not in acute distress.     Appearance: She is not ill-appearing.   Neck:      Comments: No thyromegaly  Cardiovascular:      Rate and Rhythm: Normal rate.      Heart sounds: Normal heart sounds. No murmur heard.  Pulmonary:      Effort: Pulmonary effort is normal.      Breath sounds: Normal breath sounds. No wheezing or rales.   Abdominal:      Palpations: Abdomen is soft.      Tenderness: There is no abdominal tenderness.   Musculoskeletal:      Right lower leg: No edema.      Left lower leg: No edema.   Neurological:      Mental Status: She is alert.          Medications:  Reviewed    Endocrine Labs  Recent Labs   Lab Test 08/12/23  1230 08/12/23  0928 08/11/23  1751 08/11/23  1626 08/02/23  1233   NA  --   --   --  136 136   POTASSIUM  --   --   --  3.6 3.8   CHLORIDE  --   --   --  102 102   CO2  --   --   --  23 20*   ANIONGAP  --   --   --  11 14   * 178*   < > 164* 281*   BUN  --   --   --  15.5 16.4   CR  --   --   --  0.83 0.66   MASSIMO  --   --   --  9.8 11.1*    < > = values in this interval not displayed.            Assessment and plan:  Jessica Kennedy is a 48 year old  female with PMHx of MEN 2A with bilateral synchronous pheochromocytomas, HTN, T2DM who is being admitted for optimization of blood pressure control / alpha blockade prior to bilateral cortical sparing adrenalectomy.      # Pheochromocytoma, bilateral  She is currently being admitted for optimization of alpha blockade prior to surgical treatment.      # Type 2 Diabetes Mellitus  Inadequate control as outpatient. Will hold metformin and glipizide  while inpatient and start basal bolus insulin regimen.      # Primary Hyperparathyroidism  Per primary Endocrinologist, Dr. Vanessa, treatment for this will be set up as outpatient, not during this admission.      # MEN 2A      Recommendations.   - Increase Doxazosin to 24 mg BID  - Increase Losartan to 100 mg daily  - Continue up titration of Metyrosine as previously ordered      - No changes to insulin regimen.     Jayson Wilson MD  Endocrinology Fellow

## 2023-08-12 NOTE — PROGRESS NOTES
2300- 0700  Pt is AO x4, Denies N/V, sob and chest pain  Regular diet, carb count    Patient denied pain  Visible skin intact, up independently in room  On RA, No PIV access    No acute event

## 2023-08-12 NOTE — PLAN OF CARE
"Goal Outcome Evaluation:    Care plan reviewed with: patient    Patient is A&O x4. Call light within reach. Able to make needs known effectively. Independent in the room, voids spontaneously to the bathroom. Pt assessment completed through  Services, providers present, pt c/o dizziness stated \"because no sleep last night,\" pt declined blood sugar check early this morning, then ate breakfast before notifying staff, she allowed BS to be taken after eating. Pharmacy notified, advised to cover her carb counts for meal. Pts family later came in with a huge container of hot oatmeal from home, and packaged items from store which are in the pt frig. It was advised not to eat the oatmeal without have a way to count carbs. Pt ate oatmeal after staff left the room. Pt allowed blood sugar check after she had eaten but refused coverage. Endocrine provider notified, \"make sure to document her refusal of insulin\" provider will follow up tomorrow. Urine sample collected this shift. Pt is up walking to the bathroom, slept most of shift. Pt allowed BS check at dinner time and accepted sliding scale insulin coverage but refused carb meal coverage. Pt is on heart monitor, pulled off lead this morning and then the leads fell off while she moved about in bed. She appears to be leaving the leads on at this time.      Problem: Plan of Care - These are the overarching goals to be used throughout the patient stay.    Goal: Plan of Care Review  Description: The Plan of Care Review/Shift note should be completed every shift.  The Outcome Evaluation is a brief statement about your assessment that the patient is improving, declining, or no change.  This information will be displayed automatically on your shift note.  Outcome: Progressing     Problem: Plan of Care - These are the overarching goals to be used throughout the patient stay.    Goal: Optimal Comfort and Wellbeing  Outcome: Progressing     Problem: Plan of Care - These are " the overarching goals to be used throughout the patient stay.    Goal: Readiness for Transition of Care  Outcome: Progressing     Problem: Fall Injury Risk  Goal: Absence of Fall and Fall-Related Injury  Outcome: Progressing

## 2023-08-12 NOTE — PHARMACY-CONSULT NOTE
Writer spoke with pharmacy they are going to add two doses of metyrosine one at 1800 and one at 2230.

## 2023-08-13 ENCOUNTER — APPOINTMENT (OUTPATIENT)
Dept: INTERPRETER SERVICES | Facility: CLINIC | Age: 48
End: 2023-08-13
Attending: STUDENT IN AN ORGANIZED HEALTH CARE EDUCATION/TRAINING PROGRAM
Payer: COMMERCIAL

## 2023-08-13 LAB
ALBUMIN SERPL BCG-MCNC: 3.8 G/DL (ref 3.5–5.2)
ALP SERPL-CCNC: 109 U/L (ref 35–104)
ALT SERPL W P-5'-P-CCNC: 27 U/L (ref 0–50)
ANION GAP SERPL CALCULATED.3IONS-SCNC: 8 MMOL/L (ref 7–15)
AST SERPL W P-5'-P-CCNC: 31 U/L (ref 0–45)
BILIRUB SERPL-MCNC: 0.3 MG/DL
BUN SERPL-MCNC: 13 MG/DL (ref 6–20)
CALCIUM SERPL-MCNC: 10.1 MG/DL (ref 8.6–10)
CHLORIDE SERPL-SCNC: 105 MMOL/L (ref 98–107)
CREAT SERPL-MCNC: 0.61 MG/DL (ref 0.51–0.95)
DEPRECATED HCO3 PLAS-SCNC: 24 MMOL/L (ref 22–29)
ERYTHROCYTE [DISTWIDTH] IN BLOOD BY AUTOMATED COUNT: 11.9 % (ref 10–15)
GFR SERPL CREATININE-BSD FRML MDRD: >90 ML/MIN/1.73M2
GLUCOSE BLDC GLUCOMTR-MCNC: 167 MG/DL (ref 70–99)
GLUCOSE BLDC GLUCOMTR-MCNC: 178 MG/DL (ref 70–99)
GLUCOSE BLDC GLUCOMTR-MCNC: 200 MG/DL (ref 70–99)
GLUCOSE BLDC GLUCOMTR-MCNC: 218 MG/DL (ref 70–99)
GLUCOSE BLDC GLUCOMTR-MCNC: 234 MG/DL (ref 70–99)
GLUCOSE SERPL-MCNC: 235 MG/DL (ref 70–99)
HCT VFR BLD AUTO: 40.4 % (ref 35–47)
HGB BLD-MCNC: 13.4 G/DL (ref 11.7–15.7)
LIPASE SERPL-CCNC: 25 U/L (ref 13–60)
MCH RBC QN AUTO: 30.3 PG (ref 26.5–33)
MCHC RBC AUTO-ENTMCNC: 33.2 G/DL (ref 31.5–36.5)
MCV RBC AUTO: 91 FL (ref 78–100)
PLATELET # BLD AUTO: 188 10E3/UL (ref 150–450)
POTASSIUM SERPL-SCNC: 4 MMOL/L (ref 3.4–5.3)
PROT SERPL-MCNC: 6.4 G/DL (ref 6.4–8.3)
RBC # BLD AUTO: 4.42 10E6/UL (ref 3.8–5.2)
SODIUM SERPL-SCNC: 137 MMOL/L (ref 136–145)
WBC # BLD AUTO: 7.7 10E3/UL (ref 4–11)

## 2023-08-13 PROCEDURE — 80053 COMPREHEN METABOLIC PANEL: CPT

## 2023-08-13 PROCEDURE — 99232 SBSQ HOSP IP/OBS MODERATE 35: CPT | Mod: VID | Performed by: STUDENT IN AN ORGANIZED HEALTH CARE EDUCATION/TRAINING PROGRAM

## 2023-08-13 PROCEDURE — 250N000013 HC RX MED GY IP 250 OP 250 PS 637: Performed by: STUDENT IN AN ORGANIZED HEALTH CARE EDUCATION/TRAINING PROGRAM

## 2023-08-13 PROCEDURE — 120N000003 HC R&B IMCU UMMC

## 2023-08-13 PROCEDURE — 999N000007 HC SITE CHECK

## 2023-08-13 PROCEDURE — 36415 COLL VENOUS BLD VENIPUNCTURE: CPT

## 2023-08-13 PROCEDURE — 99207 PR NO CHARGE LOS: CPT | Performed by: STUDENT IN AN ORGANIZED HEALTH CARE EDUCATION/TRAINING PROGRAM

## 2023-08-13 PROCEDURE — 999N000128 HC STATISTIC PERIPHERAL IV START W/O US GUIDANCE

## 2023-08-13 PROCEDURE — 250N000013 HC RX MED GY IP 250 OP 250 PS 637

## 2023-08-13 PROCEDURE — 83690 ASSAY OF LIPASE: CPT

## 2023-08-13 PROCEDURE — 85027 COMPLETE CBC AUTOMATED: CPT

## 2023-08-13 PROCEDURE — 250N000013 HC RX MED GY IP 250 OP 250 PS 637: Performed by: INTERNAL MEDICINE

## 2023-08-13 PROCEDURE — 99232 SBSQ HOSP IP/OBS MODERATE 35: CPT | Mod: GC | Performed by: STUDENT IN AN ORGANIZED HEALTH CARE EDUCATION/TRAINING PROGRAM

## 2023-08-13 RX ORDER — OXYCODONE HYDROCHLORIDE 5 MG/1
5 TABLET ORAL ONCE
Status: COMPLETED | OUTPATIENT
Start: 2023-08-13 | End: 2023-08-13

## 2023-08-13 RX ORDER — MAGNESIUM HYDROXIDE/ALUMINUM HYDROXICE/SIMETHICONE 120; 1200; 1200 MG/30ML; MG/30ML; MG/30ML
15 SUSPENSION ORAL DAILY
Status: DISCONTINUED | OUTPATIENT
Start: 2023-08-13 | End: 2023-08-16

## 2023-08-13 RX ORDER — MAGNESIUM HYDROXIDE/ALUMINUM HYDROXICE/SIMETHICONE 120; 1200; 1200 MG/30ML; MG/30ML; MG/30ML
15 SUSPENSION ORAL DAILY PRN
Status: DISCONTINUED | OUTPATIENT
Start: 2023-08-13 | End: 2023-08-13

## 2023-08-13 RX ORDER — ACETAMINOPHEN 650 MG/1
650 SUPPOSITORY RECTAL EVERY 6 HOURS
Status: DISCONTINUED | OUTPATIENT
Start: 2023-08-13 | End: 2023-08-16

## 2023-08-13 RX ORDER — AMLODIPINE BESYLATE 10 MG/1
10 TABLET ORAL DAILY
Status: DISCONTINUED | OUTPATIENT
Start: 2023-08-13 | End: 2023-08-16

## 2023-08-13 RX ORDER — ACETAMINOPHEN 325 MG/1
650 TABLET ORAL EVERY 6 HOURS
Status: DISCONTINUED | OUTPATIENT
Start: 2023-08-13 | End: 2023-08-16

## 2023-08-13 RX ADMIN — PANTOPRAZOLE SODIUM 40 MG: 40 TABLET, DELAYED RELEASE ORAL at 09:06

## 2023-08-13 RX ADMIN — AMLODIPINE BESYLATE 10 MG: 10 TABLET ORAL at 12:04

## 2023-08-13 RX ADMIN — Medication 1 CAPSULE: at 21:00

## 2023-08-13 RX ADMIN — DOXAZOSIN MESYLATE 24 MG: 8 TABLET ORAL at 09:06

## 2023-08-13 RX ADMIN — OXYCODONE HYDROCHLORIDE 5 MG: 5 TABLET ORAL at 12:48

## 2023-08-13 RX ADMIN — INSULIN ASPART 1 UNITS: 100 INJECTION, SOLUTION INTRAVENOUS; SUBCUTANEOUS at 09:07

## 2023-08-13 RX ADMIN — CARVEDILOL 25 MG: 25 TABLET, FILM COATED ORAL at 18:24

## 2023-08-13 RX ADMIN — INSULIN GLARGINE 22 UNITS: 100 INJECTION, SOLUTION SUBCUTANEOUS at 22:17

## 2023-08-13 RX ADMIN — METYROSINE 750 MG: 250 CAPSULE ORAL at 15:16

## 2023-08-13 RX ADMIN — INSULIN GLARGINE 22 UNITS: 100 INJECTION, SOLUTION SUBCUTANEOUS at 01:42

## 2023-08-13 RX ADMIN — CARVEDILOL 25 MG: 25 TABLET, FILM COATED ORAL at 09:05

## 2023-08-13 RX ADMIN — INSULIN ASPART 2 UNITS: 100 INJECTION, SOLUTION INTRAVENOUS; SUBCUTANEOUS at 15:55

## 2023-08-13 RX ADMIN — CARBOXYMETHYLCELLULOSE SODIUM 1 DROP: 5 SOLUTION/ DROPS OPHTHALMIC at 10:47

## 2023-08-13 RX ADMIN — PANTOPRAZOLE SODIUM 40 MG: 40 TABLET, DELAYED RELEASE ORAL at 21:00

## 2023-08-13 RX ADMIN — INSULIN ASPART 2 UNITS: 100 INJECTION, SOLUTION INTRAVENOUS; SUBCUTANEOUS at 12:51

## 2023-08-13 RX ADMIN — PHENOXYBENZAMINE HYDROCHLORIDE 10 MG: 10 CAPSULE ORAL at 09:06

## 2023-08-13 RX ADMIN — FLUTICASONE PROPIONATE 2 SPRAY: 50 SPRAY, METERED NASAL at 09:00

## 2023-08-13 RX ADMIN — METYROSINE 750 MG: 250 CAPSULE ORAL at 21:00

## 2023-08-13 RX ADMIN — Medication 1 CAPSULE: at 09:05

## 2023-08-13 RX ADMIN — ALUMINUM HYDROXIDE, MAGNESIUM HYDROXIDE, AND SIMETHICONE 15 ML: 200; 200; 20 SUSPENSION ORAL at 15:16

## 2023-08-13 RX ADMIN — METYROSINE 750 MG: 250 CAPSULE ORAL at 09:06

## 2023-08-13 RX ADMIN — LOSARTAN POTASSIUM 100 MG: 100 TABLET, FILM COATED ORAL at 09:06

## 2023-08-13 RX ADMIN — DOXAZOSIN MESYLATE 24 MG: 8 TABLET ORAL at 21:00

## 2023-08-13 RX ADMIN — ACETAMINOPHEN 650 MG: 325 TABLET, FILM COATED ORAL at 21:00

## 2023-08-13 RX ADMIN — ACETAMINOPHEN 650 MG: 325 TABLET, FILM COATED ORAL at 15:16

## 2023-08-13 ASSESSMENT — ACTIVITIES OF DAILY LIVING (ADL)
ADLS_ACUITY_SCORE: 18

## 2023-08-13 NOTE — PROGRESS NOTES
Patient seen and examined by me.  Blood pressure seems to be responding but is not at goal at this time.  I reexamined her abdomen today and think that she may be better approached when fully blocked using bilateral transabdominal approach laparoscopically.  We will better understand in the morning where she is.  Greatly appreciate the management by the medical team as well as guidance and direction from endocrinology.

## 2023-08-13 NOTE — PLAN OF CARE
Goal Outcome Evaluation:    No acute changes or concerns during NOC shift. Patient alert and oriented x4, VSS and CMS intact. Patient denied pain, no SOB or cough noted. Patient slept well through the night, lantus given late d/t difficulty waking pt. Pt continues to be on cardiac monitoring, no concerns. Continue with POC.     Patient's most recent vitals:  /85 (BP Location: Left arm)   Pulse 93   Temp 98  F (36.7  C) (Oral)   Resp 18   LMP 05/09/2023 (Approximate)   SpO2 96%

## 2023-08-13 NOTE — PROGRESS NOTES
[facility]   BRIEF PROGRESS NOTE    48 year old with MEN2A and bilateral pheochromocytoma admitted for preoperative optimization.  BP goal < 110/70 and not yet optimized. Endocrine and Surgery following for management.    Currently, OR has been postponed from Monday to later in week.    Per Surgery recommendations, pt should transfer to [facility] as soon as able.     Called Triage Dr. Cao. In discussion, pt may need IMC status on arrival as perioperative control includes antihypertensive infusions that are not available on med/surg floors. Accepted for [facility] [place] transfer.    Transfer order placed for [facility] and cardiac monitoring.     Miko Trevino DO, MA, FRANCK-C  Pronouns: he/him/his    kelly [facility] - [facility]/ [facility]    Department of Family Medicine and [facility]

## 2023-08-13 NOTE — PROGRESS NOTES
Endocrine Progress Note  Patient: Jessica Kennedy   MRN: [id number]  Date of Service: 08/13/2023    Subjective:    Seen patient via video with [place]  over the phone    She reports no concerns right now.     Physical Examination:  /80 (BP Location: Right arm, Patient Position: Left side)   Pulse 69   Temp 97.8  F (36.6  C) (Oral)   Resp 16   LMP 05/09/2023 (Approximate)   SpO2 96%      GENERAL: Healthy, alert and no distress  EYES: Eyes grossly normal to inspection.  No discharge or erythema, or obvious scleral/conjunctival abnormalities.  RESP: No audible wheeze, cough, or visible cyanosis.  No visible retractions or increased work of breathing.    SKIN: Visible skin clear. No significant rash, abnormal pigmentation or lesions.  NEURO: Cranial nerves grossly intact.  Mentation and speech appropriate for age.  PSYCH: Mentation appears normal, affect normal/bright, judgement and insight intact, normal speech and appearance well-groomed.      Medications:  Reviewed    Endocrine Labs  Recent Labs   Lab Test 08/13/23  1215 08/13/23  0855 08/11/23  1751 08/11/23  1626 08/02/23  1233   NA  --   --   --  136 136   POTASSIUM  --   --   --  3.6 3.8   CHLORIDE  --   --   --  102 102   CO2  --   --   --  23 20*   ANIONGAP  --   --   --  11 14   * 167*   < > 164* 281*   BUN  --   --   --  15.5 16.4   CR  --   --   --  0.83 0.66   MASSIMO  --   --   --  9.8 11.1*    < > = values in this interval not displayed.          Assessment and plan:  Jessica Kennedy is a 48 year old  female with PMHx of MEN 2A with bilateral synchronous pheochromocytomas, HTN, T2DM who is being admitted for optimization of blood pressure control / alpha blockade prior to bilateral cortical sparing adrenalectomy.      # Pheochromocytoma, bilateral  She is currently being admitted for optimization of alpha blockade prior to surgical treatment.      # Type 2 Diabetes Mellitus  Inadequate control as outpatient. Will hold metformin  and glipizide while inpatient and start basal bolus insulin regimen.      # Primary Hyperparathyroidism  Per primary Endocrinologist, Dr. Vanessa, treatment for this will be set up as outpatient, not during this admission.      # MEN 2A      Recommendations.   - Add amlodipine 10 mg today  - Continue Metyrosine titration  - No changes to insulin regimen.   - Reinforced with patient to receive meal and correction insulin with every meal and snack    - Will talk with surgery in regards to timing of Surgery to allow full titration of Metyrosine, anticipate better BP control for surgery on Tuesday or Wednesday.       Jayson Wilson MD  Endocrinology Fellow

## 2023-08-13 NOTE — PROGRESS NOTES
[facility]    Progress Note - [facility] Family Medicine Service       Date of Admission:  8/11/2023    Assessment & Plan   Jessica Kennedy is a 48 year old female admitted on 8/11/2023. She has a history of MEN2A and T2DM (A1c 9.0) and is admitted for BP control (goal <110/70) prior to bilateral pheochromocytoma removal on 8/14.     Plan for today:  Follow up labs for abdominal pain: lipase, CBC, CMP  Follow up patient's abdominal pain  Follow up endo and surgery recs     #MEN2A  #Pheochromocytoma  #Hypertension  #Primary Hyperparathyroidism   #Medullary Thyroid CA  Endocrinology rec blood pressure less than 110/70.  The goal for her is to have maximal alpha receptor blockade. Still not at goal as of 8/13 but as of now plan is to proceed with surgery. Please see endocrinology note for more details.  -Continue PTA losartan 100 mg daily.  -Continue PTA carvedilol 25 mg daily.  -Continue PTA phenoxybenzamine 10 mg daily.  -Per endo, changed Doxazosin to 24mg bid instituted 8PM 8/12   - start Metyrosine according to the following schedule   8/12. 500 mg q 6  8/13. 750 mg q 6  8/14. 1000 mg q 6  -Hold PTA amlodipine 10 mg.  -Hold PTA aspirin 81 mg daily.  - Orthostatic vitals  - NPO midnight       #T2DM   Chronic Stable - last A1c is 9.0  See Endocrinology note for more details. Unsure if Endo will decrease Lantus dose tonight, night of 8/13, before surgery to avoid hypoglycemia   Continue Lantus 22 Units at night with ICR of 1 unit per 10 grams of carbs with medium correction.  - HOLD PTA Metformin   - HOLD PTA glipizide     #Abdominal Pain   #Flank Pain  Patient is complaining of left lower quadrant, right lower quadrant, periumbilical, bilateral flank, midline abdominal pain.  She rates the pain as an 8 out of 10 dull pain.  She states that her last bowel movement was in the morning and nurse stated it was dull.  She is able to urinate well and denies urinary symptoms;  nurse did note that her urine was dark yellow.  On physical exam she has CVA tenderness bilaterally as well as tenderness in the areas described above. She was not tender to palpation in the suprapubic region despite the fact that she reported bladder pain. UA did not show leukocyte esterase, nitrates, or WBCs. She has experienced this pain throughout much of her admission but stated that it worsened today 8/13 (although she did rate her prior pain as 8/10 as well). Differential at this point includes pain due to pheochromocytoma (as abdominal and flank pain is a common symptom of the illness), a possible urinary tract infection (less likely due to UA inconsistent with UTI), constipation, functional abdominal pain. Due to reported worsening of pain and overall discomfort that she expresses, ordered CBC, CMP, lipase. May follow up with abdominal imaging if labs are concerning for abdominal pathology.  - Probiotic   - UA with reflex to culture  - GI cocktail.     #Palpitations   Acute  Patient has been complaining of increased heart rate and feeling like her heart is beating out of her chest.  She states that that this is started recently.  She does not feel like her heart is skipping a beat but more like her heart is beating very quickly and making her a little short of breath.  On physical exam peripheral pulses are +2/4 and regular.  - EKG      #Headache  Acute  Patient was complaining of a dull achy headache, which was her baseline, but today 8/13 she reports that it feels better. In the setting of her known bilateral adrenal pheochromocytoma and elevated blood pressure this is expected.   - Tylenol 500-1000 mg PRN Q6H         #Irritated Eyes  Patient is complaining of a gritty and itchy sensation in both of her eyes. Her conjunctiva look clear and she denies pain or loss of vision. This is most likely an allergic conjunctivitis vs irritated eyes from environmental air quality.   Cold compresses to eye  -  Artificial tear drops q1hr PRN (keep refrigerated)  - Naphcon-A (allergy relief eye drops for 1-2 days) 4x/day PRN  - Space drops a few (three to five) minutes apart if possible, so that instillation of a second drop does not wash out the first  - Basic Eye Care              - don't rub eyes              - cold compresses              - frequent use of refrigerated artificial tars              - NO contact lens use     #Congested Nose  Acute   Patient is complaining of a congested nose that she gets frequently.   - PTA Flonase.      Diet: Combination Diet Regular Diet Adult    DVT Prophylaxis: Low Risk/Ambulatory with no VTE prophylaxis indicated  Rodriguez Catheter: Not present  Fluids: PO  Lines: None     Cardiac Monitoring: ACTIVE order. Indication: Pheochromocytoma  Code Status: Full Code            Clinically Significant Risk Factors                                      Disposition Plan     Expected Discharge Date: 08/14/2023      Destination: home with family  Discharge Comments: To [place] OR Monday 8/14 AM- then [facility]       Damon Ngo MD  [facility]  [facility]  Securely message with [employer] (more info)  Text page via [facility] Paging/Directory   See signed in provider for up to date coverage information  ______________________________________________________________________    Interval History   No acute events overnight. Vital signs stable. BP range 120/86 to 131/85 but they were not charted after 8PM despite the fact that they were ordered to be taken q4h.    Per nursing, the patient complained of dizziness because she didn't sleep last night; declined blood sugar check yesterday morning, ate breakfast before notifying staff but allowed BS to be taken after eating; endo advised to cover carb counts. Family brought in oatmeal and other food from home; RN advised not to eat oatmeal without counting carbs but patient ate oatmeal after  staff left room. She allowed blood sugar check after eating but refused coverage; endo notified. Dinner accepted sliding scale insulin but refused carb meal coverage. Tele leads fell off in bed (she did pull off one) and leads back on.     This morning, lantus given late due to difficulty waking the patient per nursing.     Today, she was sleeping so only asked her about her headache and performed abdominal exam. She did not express as much pain as she did later in the day when the team came to see her (see #abdominal pain in assessment and plan).    Physical Exam   Vital Signs: Temp: 97.8  F (36.6  C) Temp src: Oral BP: 126/80 Pulse: 69   Resp: 16 SpO2: 96 % O2 Device: None (Room air)    Weight: 0 lbs 0 oz    Constitutional: drowsy (awoken from slumber), cooperative, no apparent distress, and appears stated age  Eyes: Lids and lashes normal, pupils equal, round and reactive to light, extra ocular muscles intact, sclera clear, conjunctiva normal  Respiratory: No increased work of breathing, good air exchange, clear to auscultation bilaterally, no crackles or wheezing  Cardiovascular: Normal apical impulse, regular rate and rhythm, normal S1 and S2, no S3 or S4, and no murmur noted  GI: No scars, normal bowel sounds, soft, non-distended, tender in: left lower quadrant, right lower quadrant, periumbilical, bilateral flank, midline abdominal pain, bilateral CVA tendernes, no masses palpated    Medical Decision Making       Please see A&P for additional details of medical decision making.      Data     I have personally reviewed the following data over the past 24 hrs:    7.7  \   13.4   / 188     N/A N/A N/A /  234 (H)   N/A N/A N/A \

## 2023-08-13 NOTE — PLAN OF CARE
Goal Outcome Evaluation:    Care plan reviewed with: patient    Patient is A&O x4. Call light within reach. Able to make needs known effectively with use of language line. Voids spontaneously to the bathroom. LBM: 08/13, loose, brown. Pt seen by provider and endocrine team (meeting via ipad with  regarding surgery and transfer to [place]). Pt reports pain 9/10 through , order received for pain medication and given and effective as pt observed sleeping, breathing non-labored, normal rhythm and rate. Pt compliant with insulin coverage today. Pts daughters present this afternoon. Pts temperature 0854 97.8, 1312 99.3, provider notified - no sign of infection from labs. Surgery delayed until tues or wed d\t pt blood pressure. Pt will transfer to [facility] when a bed opens, transfer order received. Please notify daughter Radha [phone] before pt transfers to [place]. Pt continues to report feeling dizzy, falls precautions initiated, bed alarm on for help to bathroom. Pts daughter left at 1730, pt observed sleeping, breathing non-labored, normal rhythm and rate. Pt continues on heart monitor. 1900 pt observed sleeping, breathing non-labored, normal rhythm and rate.       Problem: Plan of Care - These are the overarching goals to be used throughout the patient stay.    Goal: Plan of Care Review  Description: The Plan of Care Review/Shift note should be completed every shift.  The Outcome Evaluation is a brief statement about your assessment that the patient is improving, declining, or no change.  This information will be displayed automatically on your shift note.  Outcome: Progressing     Problem: Plan of Care - These are the overarching goals to be used throughout the patient stay.    Goal: Optimal Comfort and Wellbeing  Outcome: Progressing     Problem: Fall Injury Risk  Goal: Absence of Fall and Fall-Related Injury  Outcome: Progressing        Problem: Pain Acute  Goal: Optimal Pain Control  and Function  Outcome: Progressing

## 2023-08-13 NOTE — PROGRESS NOTES
Surgery progress note  8/13/2023    S: Starting to feel dizzy upon standing. Feels less apprehensive and more comfortable with surgery.     O:  /78 (BP Location: Left arm, Patient Position: Sitting)   Pulse 78   Temp 99.3  F (37.4  C) (Oral)   Resp 20   LMP 05/09/2023 (Approximate)   SpO2 97%   Appears comfortable sitting in bed  NLB on RA  RRR  Abdomen soft, NT  Ext WWP  Calm and cooperative    A/P: 48 year old woman with MEN2A and bilateral pheochromocytoma admitted for preoperative optimization    - Greatly appreciate medicine and endocrine teams management  - Will postpone surgery to later in the week  - Please transfer pt to [place] as soon as possible  - Surgery will follow    Pt seen and discussed with staff, Dr. Morris Lambert MD  Surgery PGY-5

## 2023-08-14 ENCOUNTER — APPOINTMENT (OUTPATIENT)
Dept: INTERPRETER SERVICES | Facility: CLINIC | Age: 48
End: 2023-08-14
Attending: STUDENT IN AN ORGANIZED HEALTH CARE EDUCATION/TRAINING PROGRAM
Payer: COMMERCIAL

## 2023-08-14 ENCOUNTER — ANESTHESIA (OUTPATIENT)
Dept: MEDSURG UNIT | Facility: CLINIC | Age: 48
End: 2023-08-14
Payer: COMMERCIAL

## 2023-08-14 DIAGNOSIS — D35.01: Primary | ICD-10-CM

## 2023-08-14 DIAGNOSIS — D35.02: Primary | ICD-10-CM

## 2023-08-14 LAB
ATRIAL RATE - MUSE: 84 BPM
DIASTOLIC BLOOD PRESSURE - MUSE: NORMAL MMHG
GLUCOSE BLDC GLUCOMTR-MCNC: 138 MG/DL (ref 70–99)
GLUCOSE BLDC GLUCOMTR-MCNC: 159 MG/DL (ref 70–99)
GLUCOSE BLDC GLUCOMTR-MCNC: 174 MG/DL (ref 70–99)
GLUCOSE BLDC GLUCOMTR-MCNC: 251 MG/DL (ref 70–99)
GLUCOSE BLDC GLUCOMTR-MCNC: 302 MG/DL (ref 70–99)
INTERPRETATION ECG - MUSE: NORMAL
P AXIS - MUSE: 62 DEGREES
PR INTERVAL - MUSE: 164 MS
QRS DURATION - MUSE: 102 MS
QT - MUSE: 380 MS
QTC - MUSE: 449 MS
R AXIS - MUSE: 16 DEGREES
SYSTOLIC BLOOD PRESSURE - MUSE: NORMAL MMHG
T AXIS - MUSE: 11 DEGREES
VENTRICULAR RATE- MUSE: 84 BPM

## 2023-08-14 PROCEDURE — 250N000013 HC RX MED GY IP 250 OP 250 PS 637

## 2023-08-14 PROCEDURE — 999N000067 HC STATISTIC FAILED PERIPHERAL IV START

## 2023-08-14 PROCEDURE — 999N000127 HC STATISTIC PERIPHERAL IV START W US GUIDANCE

## 2023-08-14 PROCEDURE — 99232 SBSQ HOSP IP/OBS MODERATE 35: CPT | Mod: GC | Performed by: INTERNAL MEDICINE

## 2023-08-14 PROCEDURE — 120N000003 HC R&B IMCU UMMC

## 2023-08-14 PROCEDURE — 250N000013 HC RX MED GY IP 250 OP 250 PS 637: Performed by: INTERNAL MEDICINE

## 2023-08-14 PROCEDURE — 99233 SBSQ HOSP IP/OBS HIGH 50: CPT | Performed by: STUDENT IN AN ORGANIZED HEALTH CARE EDUCATION/TRAINING PROGRAM

## 2023-08-14 RX ORDER — PHENOXYBENZAMINE HYDROCHLORIDE 10 MG/1
10 CAPSULE ORAL 2 TIMES DAILY
Status: DISCONTINUED | OUTPATIENT
Start: 2023-08-14 | End: 2023-08-15

## 2023-08-14 RX ORDER — CEFAZOLIN SODIUM 2 G/50ML
2 SOLUTION INTRAVENOUS
Status: CANCELLED | OUTPATIENT
Start: 2023-08-14

## 2023-08-14 RX ORDER — CEFAZOLIN SODIUM 2 G/50ML
2 SOLUTION INTRAVENOUS SEE ADMIN INSTRUCTIONS
Status: CANCELLED | OUTPATIENT
Start: 2023-08-14

## 2023-08-14 RX ORDER — CARVEDILOL 12.5 MG/1
37.5 TABLET ORAL 2 TIMES DAILY WITH MEALS
Status: DISCONTINUED | OUTPATIENT
Start: 2023-08-14 | End: 2023-08-16

## 2023-08-14 RX ORDER — CARVEDILOL 12.5 MG/1
12.5 TABLET ORAL ONCE
Status: COMPLETED | OUTPATIENT
Start: 2023-08-14 | End: 2023-08-14

## 2023-08-14 RX ORDER — METYROSINE 250 MG/1
1000 CAPSULE ORAL EVERY 6 HOURS
Status: DISCONTINUED | OUTPATIENT
Start: 2023-08-14 | End: 2023-08-16

## 2023-08-14 RX ADMIN — CARVEDILOL 12.5 MG: 12.5 TABLET, FILM COATED ORAL at 12:44

## 2023-08-14 RX ADMIN — DOXAZOSIN MESYLATE 24 MG: 8 TABLET ORAL at 09:27

## 2023-08-14 RX ADMIN — Medication 1 DROP: at 22:11

## 2023-08-14 RX ADMIN — FLUTICASONE PROPIONATE 2 SPRAY: 50 SPRAY, METERED NASAL at 09:31

## 2023-08-14 RX ADMIN — ALUMINUM HYDROXIDE, MAGNESIUM HYDROXIDE, AND SIMETHICONE 15 ML: 200; 200; 20 SUSPENSION ORAL at 09:32

## 2023-08-14 RX ADMIN — CARVEDILOL 25 MG: 25 TABLET, FILM COATED ORAL at 09:26

## 2023-08-14 RX ADMIN — METYROSINE 1000 MG: 250 CAPSULE ORAL at 16:29

## 2023-08-14 RX ADMIN — PHENOXYBENZAMINE HYDROCHLORIDE 10 MG: 10 CAPSULE ORAL at 20:06

## 2023-08-14 RX ADMIN — INSULIN ASPART 4 UNITS: 100 INJECTION, SOLUTION INTRAVENOUS; SUBCUTANEOUS at 12:47

## 2023-08-14 RX ADMIN — METYROSINE 1000 MG: 250 CAPSULE ORAL at 04:00

## 2023-08-14 RX ADMIN — METYROSINE 1000 MG: 250 CAPSULE ORAL at 22:09

## 2023-08-14 RX ADMIN — LOSARTAN POTASSIUM 100 MG: 100 TABLET, FILM COATED ORAL at 09:25

## 2023-08-14 RX ADMIN — CARVEDILOL 37.5 MG: 25 TABLET, FILM COATED ORAL at 18:44

## 2023-08-14 RX ADMIN — ACETAMINOPHEN 650 MG: 325 TABLET, FILM COATED ORAL at 09:26

## 2023-08-14 RX ADMIN — INSULIN ASPART 1 UNITS: 100 INJECTION, SOLUTION INTRAVENOUS; SUBCUTANEOUS at 09:33

## 2023-08-14 RX ADMIN — ACETAMINOPHEN 650 MG: 325 TABLET, FILM COATED ORAL at 04:02

## 2023-08-14 RX ADMIN — ACETAMINOPHEN 650 MG: 325 TABLET, FILM COATED ORAL at 16:29

## 2023-08-14 RX ADMIN — PANTOPRAZOLE SODIUM 40 MG: 40 TABLET, DELAYED RELEASE ORAL at 20:06

## 2023-08-14 RX ADMIN — PANTOPRAZOLE SODIUM 40 MG: 40 TABLET, DELAYED RELEASE ORAL at 09:26

## 2023-08-14 RX ADMIN — INSULIN GLARGINE 22 UNITS: 100 INJECTION, SOLUTION SUBCUTANEOUS at 22:11

## 2023-08-14 RX ADMIN — Medication 1 CAPSULE: at 20:06

## 2023-08-14 RX ADMIN — AMLODIPINE BESYLATE 10 MG: 10 TABLET ORAL at 09:27

## 2023-08-14 RX ADMIN — ACETAMINOPHEN 650 MG: 325 TABLET, FILM COATED ORAL at 22:09

## 2023-08-14 RX ADMIN — PHENOXYBENZAMINE HYDROCHLORIDE 10 MG: 10 CAPSULE ORAL at 09:26

## 2023-08-14 RX ADMIN — Medication 1 CAPSULE: at 09:25

## 2023-08-14 RX ADMIN — DOXAZOSIN MESYLATE 24 MG: 8 TABLET ORAL at 20:06

## 2023-08-14 RX ADMIN — METYROSINE 1000 MG: 250 CAPSULE ORAL at 10:57

## 2023-08-14 ASSESSMENT — ACTIVITIES OF DAILY LIVING (ADL)
ADLS_ACUITY_SCORE: 21
ADLS_ACUITY_SCORE: 21
ADLS_ACUITY_SCORE: 18
ADLS_ACUITY_SCORE: 21
ADLS_ACUITY_SCORE: 21
ADLS_ACUITY_SCORE: 18
ADLS_ACUITY_SCORE: 21
ADLS_ACUITY_SCORE: 22

## 2023-08-14 NOTE — PROGRESS NOTES
[facility]    Progress Note - Medicine Service, [facility] TEAM 3       Date of Admission:  8/11/2023    Assessment & Plan   Jessica Kennedy is a 48 year old female admitted on 8/11/2023. She has a history of MEN2A and T2DM (A1c 9.0) and is admitted for BP control (goal <110/70) prior to bilateral pheochromocytoma removal.     Changes today:  - Blood pressure medication changes: Goal BP <110/70  - Increase metyrosine 1000 mg q6hr  - Increase carvedilol to 37.5mg BID  - Increase phenoxybenzamine to 10mg BID  - Orthostats x2   - Renal panel 8/15  - Plan for surgery 8/16 if BP controlled   - F/up with endocrine regarding holding amlodipine prior to surgery      #MEN2A  #Pheochromocytoma  #Hypertension  #Primary Hyperparathyroidism   #Medullary Thyroid CA  Endocrinology rec blood pressure less than 110/70 with positive orthostats.  The goal for her is to have maximal alpha receptor blockade.  Please see endocrinology note for more details.  -Continue PTA losartan 100 mg daily.  -Increase PTA carvedilol 37.5 mg BID.  -Increase PTA phenoxybenzamine 10 mg BID.  -Per endo, changed Doxazosin to 24mg bid instituted 8PM 8/12   - start Metyrosine according to the following schedule   - 8/12. 500 mg q 6  - 8/13. 750 mg q 6  - 8/14. 1000 mg q 6  -PTA amlodipine 10 mg.  -Hold PTA aspirin 81 mg daily.  - Orthostatic vitals    #T2DM   Chronic Stable - last A1c is 9.0. See Endocrinology note for more details.   - Continue Lantus 22 Units at night with ICR of 1 unit per 10 grams of carbs with medium correction.  - HOLD PTA Metformin   - HOLD PTA glipizide     #Palpitations   Patient reports increased heart rate and feeling like her heart is beating out of her chest.  She states that that this is started recently.  She does not feel like her heart is skipping a beat but more like her heart is beating very quickly and making her short of breath. EKG 8/11 with no acute findings. Likely 2/2  pheochromocytoma  - Continue to monitor      #Headache  Patient reports intermittent dull achy headache, which was her baseline. In the setting of her known bilateral adrenal pheochromocytoma and elevated blood pressure this is expected.   - Tylenol 500-1000 mg PRN Q6H     #Abdominal Pain, improving   #Flank Pain, improving  Patient reported 8/10, dull left lower quadrant, right lower quadrant, periumbilical, bilateral flank, midline abdominal pain. No urinary symptoms. Progress note from 8/13 notes CVA tenderness bilaterally as well as tenderness in the areas described above, no tenderness to palpation in suprapubic region. She was not tender to palpation in the suprapubic region despite the fact that she reported bladder pain. UA on 8/12 did not show leukocyte esterase, nitrates, or WBCs. Lipase, CBC, CMP on 8/4 wnl. On 8/14 patient reported pain had improved significantly. Differential includes pain due to pheochromocytoma (as abdominal and flank pain is a common symptom of the illness), a possible urinary tract infection (less likely due to UA inconsistent with UTI), constipation, functional abdominal pain.   - Probiotic   - GI cocktail     #Irritated Eyes, resolved  Patient reported gritty and itchy sensation in both of her eyes. Her conjunctiva look clear and she denies pain or loss of vision. This is most likely an allergic conjunctivitis vs irritated eyes from environmental air quality.   Cold compresses to eye  - Artificial tear drops q1hr PRN (keep refrigerated)  - Naphcon-A (allergy relief eye drops for 1-2 days) 4x/day PRN  - Space drops a few (three to five) minutes apart if possible, so that instillation of a second drop does not wash out the first  - Basic Eye Care              - don't rub eyes              - cold compresses              - frequent use of refrigerated artificial tars              - NO contact lens use     #Congested Nose, resolved  Patient reported congested nose that she gets  frequently.   - PTA Flonase.      Diet: Combination Diet Regular Diet Adult    DVT Prophylaxis: Low Risk/Ambulatory with no VTE prophylaxis indicated  Rodriguez Catheter: Not present  Fluids: PO  Lines: None     Cardiac Monitoring: ACTIVE order. Indication: Pheochromocytoma  Code Status: Full Code            Clinically Significant Risk Factors                                      Disposition Plan      Expected Discharge Date: 08/18/2023      Destination: home with family  Discharge Comments: To [place] OR Tues/Wed pending BP control (stay on EB in SICU)       Nupur Jasso  Medicine Service, [facility] TEAM 3  [facility]  Securely message with [employer] (more info)  Text page via [facility] Paging/Directory   See signed in provider for up to date coverage information    Physician Attestation   I, Dana Ann MD, was present with the medical/KA[employer] student who participated in the service and in the documentation of the note.  I have verified the history and personally performed the physical exam and medical decision making.  I agree with the assessment and plan of care as documented in the note.      55 MINUTES SPENT BY ME on the date of service doing chart review, history, exam, documentation & further activities per the note.  MANAGEMENT DISCUSSED with the following over the past 24 hours: Endocrine, Surgery       Dana Ann MD  Date of Service (when I saw the patient): 08/14/23    ______________________________________________________________________    Interval History   No acute events overnight. Jessica is very sleepy this morning. Disappointed to not have her surgery today, discussed importance of reducing BP prior to surgery and she is understanding. Reports improved headache, abdominal pain, eye irritation. Feels dizzy when sitting and standing.     Physical Exam   Vital Signs: Temp: 98  F (36.7  C) Temp src: Oral BP: (!) 144/77 Pulse: 82   Resp: 23 SpO2: 97 %  O2 Device: None (Room air)    Weight: 213 lbs 0 oz    Constitutional: drowsy, NAD  Eyes: Normal conjunctiva, no scleral icterus  Respiratory: CTAB, breathing comfortably on RA  Cardiovascular: RRR, no murmurs, no peripheral edema  GI: Soft, non-distended, non-tender, no rebound or guarding. Bowel sounds present.     Medical Decision Making       Please see A&P for additional details of medical decision making.      Data     I have personally reviewed the following data over the past 24 hrs:    7.7  \   13.4   / 188     137 105 13.0 /  159 (H)   4.0 24 0.61 \     ALT: 27 AST: 31 AP: 109 (H) TBILI: 0.3   ALB: 3.8 TOT PROTEIN: 6.4 LIPASE: 25

## 2023-08-14 NOTE — PLAN OF CARE
/72   Pulse 68   Temp 97.8  F (36.6  C) (Oral)   Resp 16   Wt 96.6 kg (213 lb)   LMP 05/09/2023 (Approximate)   SpO2 96%   BMI 33.36 kg/m      Neuro: A&Ox4. Maldivian speaking, understands minimal english.   Cardiac: SR. HR 60-70s. VSS.   Respiratory: Sating >95% on RA. Lungs clear.   GI/: Unmeasured UOP x2. No BM   Diet/appetite: Regular diet.   Activity:  SBA, up to chair and in halls.  Pain: At acceptable level on current regimen.   Skin: No new deficits noted.  LDA's: R PIV, SL.     Plan: Continue with POC. BP goal <110/70. Pheochromocytoma removal sometime this week.

## 2023-08-14 NOTE — PROGRESS NOTES
Admission          8/13/2023  10:20 PM  -----------------------------------------------------------  Reason for admission: Pheochromocytoma Removal and BP control  Primary team notified of pt arrival.  Admitted from: [facility]  Via: stretcher  Belongings: Placed in closet.  Admission Profile: completed  Teaching: orientation to unit and call light- call light within reach, call don't fall, use of console, meal times, when to call for the RN, and enforced importance of safety   Access: PIV ordered  Telemetry:Placed on pt  Ht./Wt.: complete  Code Status verified on armband: yes  2 RN Skin Assessment Completed By: Lillian NIETO and Jerica NIETO  Med Rec completed: yes  Suction/Ambu bag/Flowmeter at bedside: yes  Is patient having diarrhea upon admission- if YES fill out testing algorithm : no    Pt status:    Temp:  [97.7  F (36.5  C)-99.3  F (37.4  C)] 97.7  F (36.5  C)  Pulse:  [66-83] 83  Resp:  [16-20] 18  BP: (112-139)/(62-89) 139/89  SpO2:  [96 %-99 %] 98 %

## 2023-08-14 NOTE — PHARMACY-ADMISSION MEDICATION HISTORY
Pharmacy Intern Admission Medication History    Admission medication history is complete. The information provided in this note is only as accurate as the sources available at the time of the update.    Medication reconciliation/reorder completed by provider prior to medication history? Yes    Information Source(s): Patient, Family member, Patient's pharmacy, Clinic records, Hospital records, and CareEverywhere/SureScripts via in-person and phone    Pertinent Information: Spoke with patient and patient's daughter - however patient stated she doesn't know her medications and was very tired. Tried to ask about insulin dose but patient was unsure. Per the 8/11 endocrine consult, patient reported taking 17 units daily. Able to confirm preferred pharmacy and allergies.   Called pt's pharmacy ([facility]) to confirm fill history. Patient has recent fill history for all medications marked as 'taking'. Patient has older fill history (30 d.s.) from 6/4 for metformin, 5/9 for doxazosin, and 5/17 for glipizide. Patient has no fill history for benzocaine or ibuprofen.      Changes made to PTA medication list:  Added: per dispense report and pt's pharmacy  Refresh Optive eye drops  Deleted: None  Changed: None      Allergies reviewed with patient and updates made in EHR: yes    Medication History Completed By: Linda Ariza 8/14/2023 6:53 PM    Prior to Admission medications    Medication Sig Last Dose Taking? Auth Provider Long Term End Date   amLODIPine (NORVASC) 10 MG tablet Take 1 tablet (10 mg) by mouth daily  Yes Deloris Vanessa MD Yes    aspirin (ASA) 81 MG chewable tablet Take 1 tablet (81 mg) by mouth daily CHEW AND SWALLOW  Yes Deloris Vanessa MD     carboxymethylcellulose-glycerin (REFRESH OPTIVE) 0.5-0.9 % ophthalmic solution Place 1 drop into both eyes 3-4 TIMES DAILY  Yes Unknown, Entered By History     carvedilol (COREG) 25 MG tablet Take 1 tablet (25 mg) by mouth 2 times daily (with  meals)  Yes Deloris Vanessa MD Yes    fluticasone (FLONASE) 50 MCG/ACT nasal spray Spray 1 spray into both nostrils daily  Yes Deloris Vanessa MD     insulin  UNIT/ML injection 20 units before breakfast  Patient taking differently: 17 units before breakfast  Yes Deloris Vanessa MD Yes    losartan (COZAAR) 50 MG tablet Take 1 tablet (50 mg) by mouth daily  Yes Deloris Vanessa MD Yes    omeprazole (PRILOSEC) 40 MG DR capsule Take 1 capsule (40 mg) by mouth daily  Yes Deloris Vanessa MD     phenoxybenzamine (DIBENZYLINE) 10 MG capsule Take 1 capsule (10 mg) by mouth daily  Yes Deloris Vanessa MD Yes    alcohol swab prep pads Use to swab area of injection/laurie as directed.   Deloris Vanessa MD     Benzocaine-Menthol 10-2.1 MG LOZG Take 1 lozenge by mouth 4 times daily as needed (Cough or sore throat)   Erwin Saldaña MD     blood glucose (NO BRAND SPECIFIED) test strip 1 strip by In Vitro route every morning (before breakfast)   Deloris Vanessa MD     Continuous Blood Gluc  (FREESTYLE ABHIJIT 14 DAY READER) RODERICK Use to read blood sugars as per 's instructions.   Deloris Vanessa MD     Continuous Blood Gluc Sensor (FREESTYLE ABHIJIT 14 DAY SENSOR) MISC Change every 14 days.   Deloris Vanessa MD     doxazosin (CARDURA) 8 MG tablet Take 3 tablets (24 mg) by mouth 2 times daily   Deloris Vanessa MD Yes    glipiZIDE (GLUCOTROL) 5 MG tablet Take 5 mg by mouth daily   Reported, Patient Yes    ibuprofen (ADVIL/MOTRIN) 200 MG tablet Take 3 tablets (600 mg) by mouth every 6 hours as needed for mild pain   Erwin Saldaña MD     insulin pen needle (BD CELINE U/F) 32G X 4 MM miscellaneous Use 1 pen needles daily or as directed.   Deloris Vanessa MD     metFORMIN (GLUCOPHAGE) 1000 MG tablet Take 1,000 mg by mouth 2 times daily (with meals)   Reported, Patient Yes    Microlet Lancets MISC 1  bob daily   Deloris Vanessa MD

## 2023-08-14 NOTE — PROGRESS NOTES
[facility]   BRIEF PROGRESS NOTE    Called patient's daughter utilizing  services informing her that her mother will be transferring to [place] this evening and that her new room will be room 23 on floor 6B. Patient's daughter did not answer and a voicemail was left communicating this information.

## 2023-08-14 NOTE — PROGRESS NOTES
SPIRITUAL HEALTH SERVICES  SPIRITUAL ASSESSMENT Progress Note  [facility]B       REFERRAL SOURCE: Self initiated  visit, Hoahaoism specific.     DEMOGRAPHIC: Pt Jessica Kennedy identifies as Hoahaoism and is of [place] descent.        ILLNESS CIRCUMSTANCE: I introduced myself to Jessica Kennedy as the [facility] [employer]  and oriented her to [facility].  Assessed emotional/spiritual needs and resources while offering reflective conversation, which integrated elements of illness and family narratives.     Jessica was tired during our encounter and wished to sleep but welcomes visits from [facility].     SPIRITUAL INTERVENTIONS: I provided her with an Islamic prayer booklet for Hoahaoism patients.      PLAN: I will follow up with Jessica for the duration of her stay. [facility] is available to [facility] per request.     Mitzi Avila  [facility]   Pager [phone]    [facility] remains available 24/7 for emergent requests/referrals, either by having the switchboard page the on-call  or by entering an ASAP/STAT consult in Epic (this will also page the on-call ).

## 2023-08-14 NOTE — PROGRESS NOTES
Brief medicine and acceptance of transfer note:    Accepted handoff/transfer from Dr. Trevino earlier today. Pt is transferring to [place] for pheochromocytoma with endo and gen surg following with plans for surgical intervention in near future. Appears pt has been on robust beta and alpha blockage with BP goal of <110/70 which has not been met and per handoff this will postpone surgery which was slotted for tomorrow 8/14. I had pt transferred to [facility] bed as I have concern she will need more aggressive BP control with intentionally positive orthostatics and this may require titratable BP drips, will hold off on initiating this overnight and defer to day team and endo in AM on how to proceed. Otherwise, no changes on my behalf and agree with assessment and plan per [facility] family medicine progress note of the day, please refer to that for full plan.  -Notably, FM progress note says to hold amlodipine 10mg however this was restarted today. It appears it was restarted by endo provider but would confirm with them in AM regarding this.    On my interview and exam pt is resting comfortably, no distress, lungs clear, heart RRR. She feels clinically unchanged otherwise with no new sx.     Shaheen Cao, DO 10:22 PM 08/13/23

## 2023-08-14 NOTE — PROGRESS NOTES
Endocrine Progress Note  Patient: Jessica Kennedy   MRN: [id number]  Date of Service: 08/14/2023    Subjective:    Daughter at bedside  Used professional  over the phone  She reports no concerns right now. Starting to feel sleepy but no lightheadedness she reports.   She has not been given meal coverage insulin regularly  Eating food brought by the family  Orthostasis checked in room     Physical Examination:  BP (!) 144/77   Pulse 82   Temp 97.5  F (36.4  C) (Oral)   Resp 23   Wt 96.6 kg (213 lb)   LMP 05/09/2023 (Approximate)   SpO2 97%   BMI 33.36 kg/m       Physical Exam  Constitutional:       General: She is not in acute distress.     Appearance: She is not ill-appearing.   Cardiovascular:      Rate and Rhythm: Normal rate.      Heart sounds: Normal heart sounds. No murmur heard.  Pulmonary:      Effort: Pulmonary effort is normal.      Breath sounds: Normal breath sounds. No wheezing or rales.   Abdominal:      Palpations: Abdomen is soft.      Tenderness: There is no abdominal tenderness.   Musculoskeletal:      Right lower leg: No edema.      Left lower leg: No edema.   Neurological:      Mental Status: She is alert.       Medications:  Reviewed    Endocrine Labs  Recent Labs   Lab Test 08/13/23  1215 08/13/23  0855 08/11/23  1751 08/11/23  1626 08/02/23  1233   NA  --   --   --  136 136   POTASSIUM  --   --   --  3.6 3.8   CHLORIDE  --   --   --  102 102   CO2  --   --   --  23 20*   ANIONGAP  --   --   --  11 14   * 167*   < > 164* 281*   BUN  --   --   --  15.5 16.4   CR  --   --   --  0.83 0.66   MASSIMO  --   --   --  9.8 11.1*    < > = values in this interval not displayed.          Assessment and plan:  Jessica Kennedy is a 48 year old  female with PMHx of MEN 2A with bilateral synchronous pheochromocytomas, HTN, T2DM who is being admitted for optimization of blood pressure control / alpha blockade prior to bilateral cortical sparing adrenalectomy.      # Pheochromocytoma,  bilateral  She is currently being admitted for optimization of alpha and beta blockade prior to surgical treatment.     Yesterday's afternoons blood pressure were close to goal but today again hypertensive.  Reporting increase sleepiness but overall tolerating metyrosine  Heart rate increased on standing up without drop in blood pressure during orthostatic BP check today    # Type 2 Diabetes Mellitus  Inadequate control as outpatient. Will hold metformin and glipizide while inpatient and start basal bolus insulin regimen.      # Primary Hyperparathyroidism  Per primary Endocrinologist, Dr. Vanessa, treatment for this will be set up as outpatient, not during this admission.      # MEN 2A      Recommendations.   - Increase Carvedilol to 37.5 mg BID (extra dose of 12.5 mg entered now, will change to 37.5 mg BID as long as heart rate allows for up titration  - Increase phenoxybenzamine to 10 mg  BID    - Continue Metyrosine 1000 mg every 6 hours (may decrease to 750 mg for today with plans to increase back 1000 mg closer to time of surgery depending on availability at central pharmacy)    - Continue Amlodipine 10 mg daily  - Continue losartan 100 mg daily  - Continue doxazosin 24 mg BID    - Surgery is planned for Wednesday at 12:00 PM    - Continue on current diabetes regimen    Care discussed with surgery team    Jayson Wilson MD  Endocrinology Fellow     I have seen and examined the patient, reviewed and edited the fellow's note, and agree with the plan of care.  BUCK Chakraborty

## 2023-08-14 NOTE — PROGRESS NOTES
Surgery progress note  8/14/2023    S: Very sleepy. Request to be left a for rest. Seen with [employer] telephone .      O:  BP (!) 140/84   Pulse 64   Temp 98  F (36.7  C) (Oral)   Resp 23   Wt 96.6 kg (213 lb)   LMP 05/09/2023 (Approximate)   SpO2 97%   BMI 33.36 kg/m    Appears comfortable in bed  NLB on RA  RRR  Abdomen soft, NT  Ext WWP  Calm and cooperative    A/P: 48 year old woman with MEN2A and bilateral pheochromocytoma admitted for preoperative optimization    - Greatly appreciate medicine and endocrine teams management  - Plan for OR Wednesday.  - NPO MN Tuesday into wednesday  - Surgery will follow    Pt discussed with staff, Dr. Morris Lambert MD  Surgery PGY-5

## 2023-08-14 NOTE — PLAN OF CARE
Neuro: A&Ox4. Beninese speaking, frustrated with cares at times. Afebrile.   Cardiac: Off tele, HR 70-90's VSS. SBP goal less than 110, DBP goal less than 70. Denies SOB and dizziness.   Respiratory: Sating >95% on RA.  GI/: Adequate urine output, up to bathroom, no BM during shift.   Diet/appetite: Tolerating regular diet. Eating well. Carb coverage with meals. ACHS BG checks.   Activity: Assist of SB, up to bathroom, showered today.   Pain: At acceptable level on current regimen.   Skin: No new deficits noted.  LDA's: Refused PIV, MD aware.     Plan: Transfer to med-surg when bed becomes available. Surgery planned for 8/16, Wednesday at 12 pm. Continue with POC. Notify primary team with changes.

## 2023-08-15 ENCOUNTER — ANESTHESIA EVENT (OUTPATIENT)
Dept: SURGERY | Facility: CLINIC | Age: 48
End: 2023-08-15
Payer: COMMERCIAL

## 2023-08-15 ENCOUNTER — APPOINTMENT (OUTPATIENT)
Dept: INTERPRETER SERVICES | Facility: CLINIC | Age: 48
End: 2023-08-15
Attending: STUDENT IN AN ORGANIZED HEALTH CARE EDUCATION/TRAINING PROGRAM
Payer: COMMERCIAL

## 2023-08-15 LAB
ALBUMIN SERPL BCG-MCNC: 3.8 G/DL (ref 3.5–5.2)
ALP SERPL-CCNC: 105 U/L (ref 35–104)
ALT SERPL W P-5'-P-CCNC: 21 U/L (ref 0–50)
ANION GAP SERPL CALCULATED.3IONS-SCNC: 10 MMOL/L (ref 7–15)
AST SERPL W P-5'-P-CCNC: 20 U/L (ref 0–45)
BILIRUB SERPL-MCNC: 0.2 MG/DL
BUN SERPL-MCNC: 23.6 MG/DL (ref 6–20)
CALCIUM SERPL-MCNC: 9.8 MG/DL (ref 8.6–10)
CHLORIDE SERPL-SCNC: 103 MMOL/L (ref 98–107)
CREAT SERPL-MCNC: 0.76 MG/DL (ref 0.51–0.95)
DEPRECATED HCO3 PLAS-SCNC: 23 MMOL/L (ref 22–29)
ERYTHROCYTE [DISTWIDTH] IN BLOOD BY AUTOMATED COUNT: 12.3 % (ref 10–15)
GFR SERPL CREATININE-BSD FRML MDRD: >90 ML/MIN/1.73M2
GLUCOSE BLDC GLUCOMTR-MCNC: 169 MG/DL (ref 70–99)
GLUCOSE BLDC GLUCOMTR-MCNC: 175 MG/DL (ref 70–99)
GLUCOSE BLDC GLUCOMTR-MCNC: 181 MG/DL (ref 70–99)
GLUCOSE BLDC GLUCOMTR-MCNC: 199 MG/DL (ref 70–99)
GLUCOSE BLDC GLUCOMTR-MCNC: 269 MG/DL (ref 70–99)
GLUCOSE SERPL-MCNC: 182 MG/DL (ref 70–99)
HCT VFR BLD AUTO: 38.2 % (ref 35–47)
HGB BLD-MCNC: 12.4 G/DL (ref 11.7–15.7)
MCH RBC QN AUTO: 29.6 PG (ref 26.5–33)
MCHC RBC AUTO-ENTMCNC: 32.5 G/DL (ref 31.5–36.5)
MCV RBC AUTO: 91 FL (ref 78–100)
PLATELET # BLD AUTO: 206 10E3/UL (ref 150–450)
POTASSIUM SERPL-SCNC: 3.5 MMOL/L (ref 3.4–5.3)
PROT SERPL-MCNC: 6.3 G/DL (ref 6.4–8.3)
RBC # BLD AUTO: 4.19 10E6/UL (ref 3.8–5.2)
SODIUM SERPL-SCNC: 136 MMOL/L (ref 136–145)
WBC # BLD AUTO: 9.8 10E3/UL (ref 4–11)

## 2023-08-15 PROCEDURE — 250N000013 HC RX MED GY IP 250 OP 250 PS 637

## 2023-08-15 PROCEDURE — 99232 SBSQ HOSP IP/OBS MODERATE 35: CPT | Mod: GC | Performed by: INTERNAL MEDICINE

## 2023-08-15 PROCEDURE — 36415 COLL VENOUS BLD VENIPUNCTURE: CPT

## 2023-08-15 PROCEDURE — 85014 HEMATOCRIT: CPT

## 2023-08-15 PROCEDURE — 250N000013 HC RX MED GY IP 250 OP 250 PS 637: Performed by: INTERNAL MEDICINE

## 2023-08-15 PROCEDURE — 999N000128 HC STATISTIC PERIPHERAL IV START W/O US GUIDANCE

## 2023-08-15 PROCEDURE — 258N000003 HC RX IP 258 OP 636

## 2023-08-15 PROCEDURE — 120N000003 HC R&B IMCU UMMC

## 2023-08-15 PROCEDURE — 80053 COMPREHEN METABOLIC PANEL: CPT

## 2023-08-15 PROCEDURE — 99232 SBSQ HOSP IP/OBS MODERATE 35: CPT | Performed by: STUDENT IN AN ORGANIZED HEALTH CARE EDUCATION/TRAINING PROGRAM

## 2023-08-15 RX ORDER — CEFAZOLIN SODIUM 2 G/100ML
2 INJECTION, SOLUTION INTRAVENOUS SEE ADMIN INSTRUCTIONS
Status: DISCONTINUED | OUTPATIENT
Start: 2023-08-15 | End: 2023-08-16 | Stop reason: HOSPADM

## 2023-08-15 RX ORDER — CEFAZOLIN SODIUM 2 G/100ML
2 INJECTION, SOLUTION INTRAVENOUS
Status: COMPLETED | OUTPATIENT
Start: 2023-08-15 | End: 2023-08-16

## 2023-08-15 RX ORDER — PHENOXYBENZAMINE HYDROCHLORIDE 10 MG/1
20 CAPSULE ORAL 2 TIMES DAILY
Status: DISCONTINUED | OUTPATIENT
Start: 2023-08-15 | End: 2023-08-16

## 2023-08-15 RX ORDER — SODIUM CHLORIDE, SODIUM LACTATE, POTASSIUM CHLORIDE, CALCIUM CHLORIDE 600; 310; 30; 20 MG/100ML; MG/100ML; MG/100ML; MG/100ML
INJECTION, SOLUTION INTRAVENOUS CONTINUOUS
Status: DISCONTINUED | OUTPATIENT
Start: 2023-08-16 | End: 2023-08-16

## 2023-08-15 RX ADMIN — INSULIN ASPART 2 UNITS: 100 INJECTION, SOLUTION INTRAVENOUS; SUBCUTANEOUS at 13:22

## 2023-08-15 RX ADMIN — DOXAZOSIN MESYLATE 24 MG: 8 TABLET ORAL at 19:41

## 2023-08-15 RX ADMIN — ACETAMINOPHEN 650 MG: 325 TABLET, FILM COATED ORAL at 04:44

## 2023-08-15 RX ADMIN — METYROSINE 1000 MG: 250 CAPSULE ORAL at 10:30

## 2023-08-15 RX ADMIN — INSULIN ASPART 1 UNITS: 100 INJECTION, SOLUTION INTRAVENOUS; SUBCUTANEOUS at 09:22

## 2023-08-15 RX ADMIN — ACETAMINOPHEN 650 MG: 325 TABLET, FILM COATED ORAL at 22:10

## 2023-08-15 RX ADMIN — INSULIN ASPART 3 UNITS: 100 INJECTION, SOLUTION INTRAVENOUS; SUBCUTANEOUS at 17:54

## 2023-08-15 RX ADMIN — ACETAMINOPHEN 650 MG: 325 TABLET, FILM COATED ORAL at 10:30

## 2023-08-15 RX ADMIN — SODIUM CHLORIDE, POTASSIUM CHLORIDE, SODIUM LACTATE AND CALCIUM CHLORIDE: 600; 310; 30; 20 INJECTION, SOLUTION INTRAVENOUS at 23:58

## 2023-08-15 RX ADMIN — ALUMINUM HYDROXIDE, MAGNESIUM HYDROXIDE, AND SIMETHICONE 15 ML: 200; 200; 20 SUSPENSION ORAL at 09:22

## 2023-08-15 RX ADMIN — METYROSINE 1000 MG: 250 CAPSULE ORAL at 04:44

## 2023-08-15 RX ADMIN — ACETAMINOPHEN 650 MG: 325 TABLET, FILM COATED ORAL at 16:28

## 2023-08-15 RX ADMIN — LOSARTAN POTASSIUM 100 MG: 100 TABLET, FILM COATED ORAL at 09:22

## 2023-08-15 RX ADMIN — METYROSINE 1000 MG: 250 CAPSULE ORAL at 16:27

## 2023-08-15 RX ADMIN — PANTOPRAZOLE SODIUM 40 MG: 40 TABLET, DELAYED RELEASE ORAL at 19:41

## 2023-08-15 RX ADMIN — PHENOXYBENZAMINE HYDROCHLORIDE 10 MG: 10 CAPSULE ORAL at 09:22

## 2023-08-15 RX ADMIN — METYROSINE 1000 MG: 250 CAPSULE ORAL at 22:09

## 2023-08-15 RX ADMIN — AMLODIPINE BESYLATE 10 MG: 10 TABLET ORAL at 09:22

## 2023-08-15 RX ADMIN — CARVEDILOL 37.5 MG: 25 TABLET, FILM COATED ORAL at 17:56

## 2023-08-15 RX ADMIN — Medication 1 CAPSULE: at 19:41

## 2023-08-15 RX ADMIN — Medication 1 DROP: at 22:14

## 2023-08-15 RX ADMIN — Medication 1 CAPSULE: at 09:22

## 2023-08-15 RX ADMIN — PHENOXYBENZAMINE HYDROCHLORIDE 20 MG: 10 CAPSULE ORAL at 19:41

## 2023-08-15 RX ADMIN — PANTOPRAZOLE SODIUM 40 MG: 40 TABLET, DELAYED RELEASE ORAL at 09:22

## 2023-08-15 RX ADMIN — FLUTICASONE PROPIONATE 2 SPRAY: 50 SPRAY, METERED NASAL at 09:28

## 2023-08-15 RX ADMIN — CARVEDILOL 37.5 MG: 25 TABLET, FILM COATED ORAL at 09:22

## 2023-08-15 RX ADMIN — DOXAZOSIN MESYLATE 24 MG: 8 TABLET ORAL at 09:22

## 2023-08-15 ASSESSMENT — ACTIVITIES OF DAILY LIVING (ADL)
ADLS_ACUITY_SCORE: 22
ADLS_ACUITY_SCORE: 24
ADLS_ACUITY_SCORE: 22

## 2023-08-15 NOTE — TELEPHONE ENCOUNTER
Patient had 8/8/23 office visit with Dr. Vanessa.      Missy Patricia, RN  Endocrine Care Coordinator  [facility]

## 2023-08-15 NOTE — PROGRESS NOTES
Endocrine Progress Note  Patient: Jessica Kennedy   MRN: [id number]  Date of Service: 08/15/2023    Subjective:    Used professional  over the phone    She continues to feel sleepy. According to daughter she is requiring more assistance when getting up to get to the restroom, but not feeling clearly lightheaded.    C/o nasal stuffiness     She had one elevated glucose yesterday prior to lunch. She has not received insulin coverage for meals.     Physical Examination:  /69 (BP Location: Right arm, Cuff Size: Adult Large)   Pulse 64   Temp 98.5  F (36.9  C) (Oral)   Resp 15   Wt 96.6 kg (213 lb)   LMP 05/09/2023 (Approximate)   SpO2 96%   BMI 33.36 kg/m       Physical Exam  Constitutional:       General: She is not in acute distress.     Appearance: She is not ill-appearing.   Cardiovascular:      Rate and Rhythm: Normal rate.      Heart sounds: Normal heart sounds. No murmur heard.  Pulmonary:      Effort: Pulmonary effort is normal.      Breath sounds: Normal breath sounds. No wheezing or rales.   Abdominal:      Palpations: Abdomen is soft.      Tenderness: There is no abdominal tenderness.   Musculoskeletal:      Right lower leg: No edema.      Left lower leg: No edema.   Neurological:      Mental Status: She is alert.       Medications:  Reviewed    Endocrine Labs  Recent Labs   Lab Test 08/13/23  1215 08/13/23  0855 08/11/23  1751 08/11/23  1626 08/02/23  1233   NA  --   --   --  136 136   POTASSIUM  --   --   --  3.6 3.8   CHLORIDE  --   --   --  102 102   CO2  --   --   --  23 20*   ANIONGAP  --   --   --  11 14   * 167*   < > 164* 281*   BUN  --   --   --  15.5 16.4   CR  --   --   --  0.83 0.66   MASSIMO  --   --   --  9.8 11.1*    < > = values in this interval not displayed.          Assessment and plan:  Jessica Kennedy is a 48 year old  female with PMHx of MEN 2A with bilateral synchronous pheochromocytomas, HTN, T2DM who is being admitted for optimization of blood pressure  control / alpha blockade prior to bilateral cortical sparing adrenalectomy.      # Pheochromocytoma, bilateral  She is currently being admitted for optimization of alpha and beta blockade prior to surgical treatment.     BP control has improved, orthostatic vitals show increase in heart rate but no drop in BP.     Medical therapy has been optimized in anticipation of adrenalectomy, on high doses of  alpha and beta blockade.     Given that she is planned to undergo left total adrenalectomy and right cortical sparing adrenalectomy, risk for primary adrenal insufficiency is high. Would recommend to start hydrocortisone preemptively with post operative assessment of adrenal function when clinically stable.     # Type 2 Diabetes Mellitus  Inadequate control as outpatient. Will hold metformin and glipizide while inpatient and start basal bolus insulin regimen.      # Primary Hyperparathyroidism  Per primary Endocrinologist, Dr. Vanessa, treatment for this will be set up as outpatient, not during this admission.      # MEN 2A    Recommendations.   - Continue Carvedilol  37.5 mg BID   - Continue phenoxybenzamine 10 mg  BID  - Continue doxazosin 24 mg BID  - Continue Metyrosine 1000 mg every 6 hours (pharmacy has enough medication, so will continue with current dose). She should have one dose just prior going to the OR and then stopped  - Continue Amlodipine 10 mg daily  - Continue losartan 100 mg daily  She should be given all her medications on the morning of surgery (tomorrow)   - Encouraged increasing hydration, salt intake.    - Given she is going to be on prolonged NPO status, will reduce Lantus dose to 12 units tonight  - Start IVF  - Due to risk of adrenal insufficiency    - Start Hydrocortisone 100 mg IV at time of induction and continue hydrocortisone 50 mg IV Q 6 hours   - Assessment of adrenal function will be done post operatively when clinically stable      Jayson Wilson MD  Endocrinology Fellow     I have  seen and examined the patient, reviewed and edited the fellow's note, and agree with the plan of care.  BUCK Chakraborty    Addendum. 16:45.   BP control has been at goal, but when orthostatic vitals signs were checked earlier, while she had an increase in heart rate, BP did not drop. Given that she is close to surgery which is scheduled for tomorrow, will increase phenoxybenzamine further to 20 mg BID.   Jayson Wilson MD  Endocrinology Fellow   Care discussed with the surgical team regarding preoperative blockage.  Patient is on high-dose of alpha adrenoreceptor antagonist doxazosin and additionally also on phenoxybenzamine.  Patient did not tolerate higher doses of phenoxybenzamine as outpatient.  Patient is also high doses of  ACE inhibitor, calcium channel blocker and beta adrenoreceptor blocker.  Additionally she is also being treated with Metyrosine.  Patient's blood pressure control has much improved and both blood pressure and heart rate are in the target range.  Dose of phenoxybenzamine has been further increased to 20 mg bid today. Complete prevention of intraoperative hypertension and tachycardia cannot be achieved with any doses or combination of antihypertensive or other drugs. patient is on optimal regimen of preoperative blockage.  will need blood pressure monitoring with arterial line during the surgery.  Postoperatively will be monitored in ICU for hypotension and hypoglycemia.     BUCK Chakraborty

## 2023-08-15 NOTE — PROGRESS NOTES
Minimally invasive surgery progress note:    Please see resident note. Mildly orthostatic today.  Dose of medications to be optimized by endocrinology team.  Surgery scheduled for Surgery scheduled for Wednesday.  She is aware of the we will do lateral approach given her habitus.  Hopefully we can perform a cortical sparing procedures in the right adrenal.

## 2023-08-15 NOTE — PROVIDER NOTIFICATION
Gold 3 notified at 09:41 for blood pressure 124/99, no new orders.     Gold 3 notified at 12:40 for blood pressure 140/84 standing, orthostatics done with endocrine MD at bedside.     Gold 3 notified around 16:00 for blood pressure 134/78, no new orders.

## 2023-08-15 NOTE — PROGRESS NOTES
[facility]    Progress Note - Medicine Service, [facility] TEAM 3       Date of Admission:  8/11/2023    Assessment & Plan   Jessica Kennedy is a 48 year old female admitted on 8/11/2023. She has a history of MEN2A and T2DM (A1c 9.0) and is admitted for BP control (goal <110/70) prior to bilateral pheochromocytoma removal.     Changes today:  - Continue blood pressure medications, goal BP <110/70. Was at goal this AM but subsequently increased. Is mildly orthostatically symptomatic   - Plan for surgery 8/16 at 12:30 if BP controlled   - NPO, mIVF LR at midnight  - Decrease Lantus to 12u  due to surgery tomorrow   - Should get all meds in AM prior to surgery, including dose of metyrosine on way to OR  - Appreciate endocrine assistance. Anesthesiology to order recommended hydrocortisone at time of induction.      #MEN2A  #Pheochromocytoma  #Hypertension  #Primary Hyperparathyroidism   #Medullary Thyroid CA  Endocrinology rec blood pressure less than 110/70 with positive orthostats.  The goal for her is to have maximal alpha receptor blockade.  Please see endocrinology note for more details.  -Continue PTA losartan 100 mg daily.  -Increase PTA carvedilol 37.5 mg BID.  -Increase PTA phenoxybenzamine 10 mg BID.  -Doxazosin 24mg bid (instituted 8PM 8/12)   - Metyrosine according to the following schedule   - 8/12. 500 mg q 6  - 8/13. 750 mg q 6  - 8/14. 1000 mg q 6  -PTA amlodipine 10 mg.  -Hold PTA aspirin 81 mg daily.  - Orthostatic vitals  - Plan for surgery 8/16 if BP at goal   - Will receive stress dose steroids chayo-operatively, anaesthesia will manage     #T2DM   Chronic Stable - last A1c is 9.0. See Endocrinology note for more details.   - Continue through 8/14 - Lantus 22 Units at night with ICR of 1 unit per 10 grams of carbs with medium correction   - On 8/14 - Lantus 12 unit(s) due to surgery planned for 8/16  - HOLD PTA Metformin   - HOLD PTA glipizide      #Palpitations, improving  Patient reports increased heart rate and feeling like her heart is beating out of her chest.  She states that that this is started recently.  She does not feel like her heart is skipping a beat but more like her heart is beating very quickly and making her short of breath. EKG 8/11 with no acute findings. Likely due to pheochromocytoma  - Continue to monitor. No current symptoms.      #Headache  Patient reports intermittent dull achy headache, which was her baseline. In the setting of her known bilateral adrenal pheochromocytoma and elevated blood pressure this is expected.   - Tylenol 500-1000 mg PRN Q6H     #Abdominal Pain, improving   #Flank Pain, improving  Patient reported 8/10, dull left lower quadrant, right lower quadrant, periumbilical, bilateral flank, midline abdominal pain. No urinary symptoms. Progress note from 8/13 notes CVA tenderness bilaterally as well as tenderness in the areas described above, no tenderness to palpation in suprapubic region. She was not tender to palpation in the suprapubic region despite the fact that she reported bladder pain. UA on 8/12 did not show leukocyte esterase, nitrates, or WBCs. Lipase, CBC, CMP on 8/4 wnl. On 8/14 patient reported pain had improved significantly. Differential includes pain due to pheochromocytoma (as abdominal and flank pain is a common symptom of the illness), a possible urinary tract infection (less likely due to UA inconsistent with UTI), constipation, functional abdominal pain.   - Probiotic   - GI cocktail     #Irritated Eyes, resolved  Patient reported gritty and itchy sensation in both of her eyes. Her conjunctiva look clear and she denies pain or loss of vision. This is most likely an allergic conjunctivitis vs irritated eyes from environmental air quality.   Cold compresses to eye  - Artificial tear drops q1hr PRN (keep refrigerated)  - Naphcon-A (allergy relief eye drops for 1-2 days) 4x/day PRN  - Space drops a  few (three to five) minutes apart if possible, so that instillation of a second drop does not wash out the first  - Basic Eye Care              - don't rub eyes              - cold compresses              - frequent use of refrigerated artificial tars              - NO contact lens use     #Congested Nose, resolved  Patient reported congested nose that she gets frequently.   - PTA Flonase.      Diet: Combination Diet Regular Diet Adult    DVT Prophylaxis: Low Risk/Ambulatory with no VTE prophylaxis indicated  Rodriguez Catheter: Not present  Fluids: PO  Lines: None     Cardiac Monitoring: ACTIVE order. Indication: Pheochromocytoma  Code Status: Full Code            Clinically Significant Risk Factors                                      Disposition Plan        Nupur Jasso  Medicine Service, [facility] TEAM 3  [facility]  Securely message with [employer] (more info)  Text page via [employer] Paging/Directory   See signed in provider for up to date coverage information    ______________________________________________________________________    Interval History   No acute events overnight. Jessica is very sleepy this morning. Reports feeling dizzy when sitting or standing with use of . No headache or abdominal pain.       Physical Exam   Vital Signs: Temp: 97.6  F (36.4  C) Temp src: Oral BP: 119/68 Pulse: 65   Resp: 18 SpO2: 97 % O2 Device: None (Room air)    Weight: 213 lbs 0 oz    Constitutional: drowsy but awoke appropriately to voice, NAD  Eyes: Normal conjunctiva, no scleral icterus  Respiratory: CTAB, breathing comfortably on RA  Cardiovascular: RRR, no murmurs, no peripheral edema  GI: Soft, non-distended, non-tender, no rebound or guarding. Bowel sounds present.   Neuro: Speaks in sentences via . Able to move all extremities in bed.         Physician Attestation   I, Dina Mcduffie MD, was present with the medical student who participated in the  service and in the documentation of the note.  I have verified the history and personally performed the physical exam and medical decision making.  I agree with the assessment and plan of care as documented in the note.      Key findings: 48-year-old woman awaiting surgery for pheochromocytoma. Some headache and orthostatic symptoms endorsed on our interview but apparently better by the time of endocrinology interview. Appreciate surgical, endocrinology management. NPO at midnight, fluids ordered, insulin adjusted for NPO, planning for perioperative stress dose steroids as recommended by endocrine to be ordered by anesthesiology.     Please see A&P for additional details of medical decision making.    I have personally reviewed the following data over the past 24 hrs:    9.8  \   12.4   / 206     136 103 23.6 (H) /  269 (H)   3.5 23 0.76 \       ALT: 21 AST: 20 AP: 105 (H) TBILI: 0.2   ALB: 3.8 TOT PROTEIN: 6.3 (L) LIPASE: N/A         Dina Mcduffie MD  Date of Service (when I saw the patient): 08/15/23

## 2023-08-15 NOTE — PROGRESS NOTES
Major Shift Events:  slept between cares . Denied any pain , nausea . Headache. 's-140 . DBP 50s-80s . Not on tele . Voided spontanoulsy . No BM during the shift .   Plan: OR tomorrow for bilateral pheochromocytoma removal .  For vital signs and complete assessments, please see documentation flowsheets.

## 2023-08-15 NOTE — PROGRESS NOTES
Surgery progress note  8/15/2023    S: Remains very sleepy today. Very eager for surgery tomorrow. She denies any lightheadedness or dizziness.    O:  /68 (BP Location: Right arm)   Pulse 65   Temp 97.6  F (36.4  C) (Oral)   Resp 18   Wt 96.6 kg (213 lb)   LMP 05/09/2023 (Approximate)   SpO2 97%   BMI 33.36 kg/m    Appears comfortable in bed  NLB on RA  RRR  Abdomen soft, NT  Ext WWP  Calm and cooperative    A/P: 48 year old woman with MEN2A and bilateral pheochromocytoma admitted for preoperative optimization    - Greatly appreciate medicine and endocrine teams management  - Plan for OR tomorrow afternoon  - NPO tonight, MIVF  - Surgery will follow    Pt discussed with staff, Dr. Morris Sosa, PGY-5  General Surgery

## 2023-08-16 ENCOUNTER — APPOINTMENT (OUTPATIENT)
Dept: GENERAL RADIOLOGY | Facility: CLINIC | Age: 48
End: 2023-08-16
Attending: STUDENT IN AN ORGANIZED HEALTH CARE EDUCATION/TRAINING PROGRAM
Payer: COMMERCIAL

## 2023-08-16 ENCOUNTER — ANESTHESIA (OUTPATIENT)
Dept: SURGERY | Facility: CLINIC | Age: 48
End: 2023-08-16
Payer: COMMERCIAL

## 2023-08-16 PROBLEM — D35.01: Status: ACTIVE | Noted: 2023-08-16

## 2023-08-16 PROBLEM — D35.02: Status: ACTIVE | Noted: 2023-08-16

## 2023-08-16 LAB
ABO/RH(D): NORMAL
ALLEN'S TEST: ABNORMAL
ANION GAP SERPL CALCULATED.3IONS-SCNC: 12 MMOL/L (ref 7–15)
ANION GAP SERPL CALCULATED.3IONS-SCNC: 15 MMOL/L (ref 7–15)
ANTIBODY SCREEN: NEGATIVE
BASE EXCESS BLDA CALC-SCNC: -0.7 MMOL/L (ref -9.6–2)
BASE EXCESS BLDA CALC-SCNC: -1.1 MMOL/L (ref -9.6–2)
BASE EXCESS BLDA CALC-SCNC: -1.7 MMOL/L (ref -9.6–2)
BASE EXCESS BLDA CALC-SCNC: -1.7 MMOL/L (ref -9–1.8)
BASE EXCESS BLDA CALC-SCNC: -6.5 MMOL/L (ref -9.6–2)
BASOPHILS # BLD AUTO: 0 10E3/UL (ref 0–0.2)
BASOPHILS NFR BLD AUTO: 0 %
BUN SERPL-MCNC: 18 MG/DL (ref 6–20)
BUN SERPL-MCNC: 23.3 MG/DL (ref 6–20)
CA-I BLD-MCNC: 4.6 MG/DL (ref 4.4–5.2)
CA-I BLD-MCNC: 4.8 MG/DL (ref 4.4–5.2)
CA-I BLD-MCNC: 4.9 MG/DL (ref 4.4–5.2)
CALCIUM SERPL-MCNC: 10 MG/DL (ref 8.6–10)
CALCIUM SERPL-MCNC: 9.1 MG/DL (ref 8.6–10)
CHLORIDE SERPL-SCNC: 104 MMOL/L (ref 98–107)
CHLORIDE SERPL-SCNC: 104 MMOL/L (ref 98–107)
CREAT SERPL-MCNC: 0.64 MG/DL (ref 0.51–0.95)
CREAT SERPL-MCNC: 0.98 MG/DL (ref 0.51–0.95)
DEPRECATED HCO3 PLAS-SCNC: 21 MMOL/L (ref 22–29)
DEPRECATED HCO3 PLAS-SCNC: 22 MMOL/L (ref 22–29)
EOSINOPHIL # BLD AUTO: 0.3 10E3/UL (ref 0–0.7)
EOSINOPHIL NFR BLD AUTO: 3 %
ERYTHROCYTE [DISTWIDTH] IN BLOOD BY AUTOMATED COUNT: 12.3 % (ref 10–15)
ERYTHROCYTE [DISTWIDTH] IN BLOOD BY AUTOMATED COUNT: 12.4 % (ref 10–15)
GFR SERPL CREATININE-BSD FRML MDRD: 72 ML/MIN/1.73M2
GFR SERPL CREATININE-BSD FRML MDRD: >90 ML/MIN/1.73M2
GLUCOSE BLD-MCNC: 328 MG/DL (ref 70–99)
GLUCOSE BLD-MCNC: 349 MG/DL (ref 70–99)
GLUCOSE BLD-MCNC: 370 MG/DL (ref 70–99)
GLUCOSE BLD-MCNC: 432 MG/DL (ref 70–99)
GLUCOSE BLDC GLUCOMTR-MCNC: 166 MG/DL (ref 70–99)
GLUCOSE BLDC GLUCOMTR-MCNC: 167 MG/DL (ref 70–99)
GLUCOSE BLDC GLUCOMTR-MCNC: 186 MG/DL (ref 70–99)
GLUCOSE BLDC GLUCOMTR-MCNC: 188 MG/DL (ref 70–99)
GLUCOSE BLDC GLUCOMTR-MCNC: 214 MG/DL (ref 70–99)
GLUCOSE BLDC GLUCOMTR-MCNC: 217 MG/DL (ref 70–99)
GLUCOSE BLDC GLUCOMTR-MCNC: 246 MG/DL (ref 70–99)
GLUCOSE BLDC GLUCOMTR-MCNC: 266 MG/DL (ref 70–99)
GLUCOSE SERPL-MCNC: 151 MG/DL (ref 70–99)
GLUCOSE SERPL-MCNC: 243 MG/DL (ref 70–99)
HCO3 BLD-SCNC: 23 MMOL/L (ref 21–28)
HCO3 BLDA-SCNC: 20 MMOL/L (ref 21–28)
HCO3 BLDA-SCNC: 24 MMOL/L (ref 21–28)
HCT VFR BLD AUTO: 38.2 % (ref 35–47)
HCT VFR BLD AUTO: 38.7 % (ref 35–47)
HGB BLD-MCNC: 12.2 G/DL (ref 11.7–15.7)
HGB BLD-MCNC: 12.4 G/DL (ref 11.7–15.7)
HGB BLD-MCNC: 12.4 G/DL (ref 11.7–15.7)
HGB BLD-MCNC: 12.7 G/DL (ref 11.7–15.7)
HGB BLD-MCNC: 12.9 G/DL (ref 11.7–15.7)
HGB BLD-MCNC: 13 G/DL (ref 11.7–15.7)
IMM GRANULOCYTES # BLD: 0 10E3/UL
IMM GRANULOCYTES NFR BLD: 0 %
LACTATE BLD-SCNC: 2.3 MMOL/L
LACTATE BLD-SCNC: 2.3 MMOL/L
LACTATE BLD-SCNC: 2.4 MMOL/L
LACTATE BLD-SCNC: 2.5 MMOL/L
LACTATE SERPL-SCNC: 2.4 MMOL/L (ref 0.7–2)
LYMPHOCYTES # BLD AUTO: 3.5 10E3/UL (ref 0.8–5.3)
LYMPHOCYTES NFR BLD AUTO: 35 %
MAGNESIUM SERPL-MCNC: 1.4 MG/DL (ref 1.7–2.3)
MCH RBC QN AUTO: 30.2 PG (ref 26.5–33)
MCH RBC QN AUTO: 30.5 PG (ref 26.5–33)
MCHC RBC AUTO-ENTMCNC: 33.2 G/DL (ref 31.5–36.5)
MCHC RBC AUTO-ENTMCNC: 33.6 G/DL (ref 31.5–36.5)
MCV RBC AUTO: 91 FL (ref 78–100)
MCV RBC AUTO: 91 FL (ref 78–100)
MONOCYTES # BLD AUTO: 0.6 10E3/UL (ref 0–1.3)
MONOCYTES NFR BLD AUTO: 6 %
NEUTROPHILS # BLD AUTO: 5.5 10E3/UL (ref 1.6–8.3)
NEUTROPHILS NFR BLD AUTO: 56 %
NRBC # BLD AUTO: 0 10E3/UL
NRBC BLD AUTO-RTO: 0 /100
O2/TOTAL GAS SETTING VFR VENT: 100 %
O2/TOTAL GAS SETTING VFR VENT: 60 %
O2/TOTAL GAS SETTING VFR VENT: 60 %
PCO2 BLD: 37 MM HG (ref 35–45)
PCO2 BLDA: 38 MM HG (ref 35–45)
PCO2 BLDA: 40 MM HG (ref 35–45)
PCO2 BLDA: 41 MM HG (ref 35–45)
PCO2 BLDA: 43 MM HG (ref 35–45)
PH BLD: 7.4 [PH] (ref 7.35–7.45)
PH BLDA: 7.29 [PH] (ref 7.35–7.45)
PH BLDA: 7.35 [PH] (ref 7.35–7.45)
PH BLDA: 7.39 [PH] (ref 7.35–7.45)
PH BLDA: 7.41 [PH] (ref 7.35–7.45)
PHOSPHATE SERPL-MCNC: 3.6 MG/DL (ref 2.5–4.5)
PLATELET # BLD AUTO: 183 10E3/UL (ref 150–450)
PLATELET # BLD AUTO: 202 10E3/UL (ref 150–450)
PO2 BLD: 79 MM HG (ref 80–105)
PO2 BLDA: 102 MM HG (ref 80–105)
PO2 BLDA: 61 MM HG (ref 80–105)
PO2 BLDA: 68 MM HG (ref 80–105)
PO2 BLDA: 73 MM HG (ref 80–105)
POTASSIUM BLD-SCNC: 3.9 MMOL/L (ref 3.5–5)
POTASSIUM BLD-SCNC: 4 MMOL/L (ref 3.5–5)
POTASSIUM BLD-SCNC: 4.2 MMOL/L (ref 3.5–5)
POTASSIUM BLD-SCNC: 4.9 MMOL/L (ref 3.5–5)
POTASSIUM SERPL-SCNC: 3.7 MMOL/L (ref 3.4–5.3)
POTASSIUM SERPL-SCNC: 3.8 MMOL/L (ref 3.4–5.3)
RBC # BLD AUTO: 4.2 10E6/UL (ref 3.8–5.2)
RBC # BLD AUTO: 4.26 10E6/UL (ref 3.8–5.2)
SODIUM BLD-SCNC: 135 MMOL/L (ref 133–144)
SODIUM BLD-SCNC: 138 MMOL/L (ref 133–144)
SODIUM SERPL-SCNC: 138 MMOL/L (ref 136–145)
SODIUM SERPL-SCNC: 140 MMOL/L (ref 136–145)
SPECIMEN EXPIRATION DATE: NORMAL
WBC # BLD AUTO: 15.1 10E3/UL (ref 4–11)
WBC # BLD AUTO: 9.9 10E3/UL (ref 4–11)

## 2023-08-16 PROCEDURE — 88307 TISSUE EXAM BY PATHOLOGIST: CPT | Mod: TC | Performed by: SURGERY

## 2023-08-16 PROCEDURE — 250N000011 HC RX IP 250 OP 636

## 2023-08-16 PROCEDURE — 85025 COMPLETE CBC W/AUTO DIFF WBC: CPT

## 2023-08-16 PROCEDURE — 88307 TISSUE EXAM BY PATHOLOGIST: CPT | Mod: 26 | Performed by: PATHOLOGY

## 2023-08-16 PROCEDURE — 86850 RBC ANTIBODY SCREEN: CPT

## 2023-08-16 PROCEDURE — 0GT44ZZ RESECTION OF BILATERAL ADRENAL GLANDS, PERCUTANEOUS ENDOSCOPIC APPROACH: ICD-10-PCS | Performed by: SURGERY

## 2023-08-16 PROCEDURE — 250N000012 HC RX MED GY IP 250 OP 636 PS 637: Performed by: ANESTHESIOLOGY

## 2023-08-16 PROCEDURE — 250N000025 HC SEVOFLURANE, PER MIN: Performed by: SURGERY

## 2023-08-16 PROCEDURE — 71045 X-RAY EXAM CHEST 1 VIEW: CPT | Mod: 26 | Performed by: RADIOLOGY

## 2023-08-16 PROCEDURE — 999N000141 HC STATISTIC PRE-PROCEDURE NURSING ASSESSMENT: Performed by: SURGERY

## 2023-08-16 PROCEDURE — 370N000017 HC ANESTHESIA TECHNICAL FEE, PER MIN: Performed by: SURGERY

## 2023-08-16 PROCEDURE — 200N000002 HC R&B ICU UMMC

## 2023-08-16 PROCEDURE — 60650 LAPAROSCOPY ADRENALECTOMY: CPT | Mod: 50 | Performed by: STUDENT IN AN ORGANIZED HEALTH CARE EDUCATION/TRAINING PROGRAM

## 2023-08-16 PROCEDURE — 82803 BLOOD GASES ANY COMBINATION: CPT | Performed by: STUDENT IN AN ORGANIZED HEALTH CARE EDUCATION/TRAINING PROGRAM

## 2023-08-16 PROCEDURE — 94002 VENT MGMT INPAT INIT DAY: CPT

## 2023-08-16 PROCEDURE — 83735 ASSAY OF MAGNESIUM: CPT

## 2023-08-16 PROCEDURE — 85027 COMPLETE CBC AUTOMATED: CPT

## 2023-08-16 PROCEDURE — 250N000009 HC RX 250: Performed by: ANESTHESIOLOGY

## 2023-08-16 PROCEDURE — 258N000003 HC RX IP 258 OP 636: Performed by: STUDENT IN AN ORGANIZED HEALTH CARE EDUCATION/TRAINING PROGRAM

## 2023-08-16 PROCEDURE — 250N000009 HC RX 250: Performed by: STUDENT IN AN ORGANIZED HEALTH CARE EDUCATION/TRAINING PROGRAM

## 2023-08-16 PROCEDURE — 250N000011 HC RX IP 250 OP 636: Mod: JZ | Performed by: SURGERY

## 2023-08-16 PROCEDURE — 250N000011 HC RX IP 250 OP 636: Mod: JZ | Performed by: ANESTHESIOLOGY

## 2023-08-16 PROCEDURE — 250N000013 HC RX MED GY IP 250 OP 250 PS 637: Performed by: ANESTHESIOLOGY

## 2023-08-16 PROCEDURE — 83605 ASSAY OF LACTIC ACID: CPT

## 2023-08-16 PROCEDURE — 80048 BASIC METABOLIC PNL TOTAL CA: CPT

## 2023-08-16 PROCEDURE — 999N000065 XR CHEST PORT 1 VIEW

## 2023-08-16 PROCEDURE — 250N000013 HC RX MED GY IP 250 OP 250 PS 637

## 2023-08-16 PROCEDURE — 36415 COLL VENOUS BLD VENIPUNCTURE: CPT

## 2023-08-16 PROCEDURE — 99291 CRITICAL CARE FIRST HOUR: CPT | Mod: 24 | Performed by: SURGERY

## 2023-08-16 PROCEDURE — 272N000001 HC OR GENERAL SUPPLY STERILE: Performed by: SURGERY

## 2023-08-16 PROCEDURE — 258N000003 HC RX IP 258 OP 636: Performed by: ANESTHESIOLOGY

## 2023-08-16 PROCEDURE — 250N000024 HC ISOFLURANE, PER MIN: Performed by: SURGERY

## 2023-08-16 PROCEDURE — 360N000077 HC SURGERY LEVEL 4, PER MIN: Performed by: SURGERY

## 2023-08-16 PROCEDURE — 250N000009 HC RX 250

## 2023-08-16 PROCEDURE — 84132 ASSAY OF SERUM POTASSIUM: CPT

## 2023-08-16 PROCEDURE — 258N000003 HC RX IP 258 OP 636

## 2023-08-16 PROCEDURE — 84100 ASSAY OF PHOSPHORUS: CPT

## 2023-08-16 PROCEDURE — 250N000013 HC RX MED GY IP 250 OP 250 PS 637: Performed by: INTERNAL MEDICINE

## 2023-08-16 PROCEDURE — 82330 ASSAY OF CALCIUM: CPT

## 2023-08-16 PROCEDURE — 999N000157 HC STATISTIC RCP TIME EA 10 MIN

## 2023-08-16 RX ORDER — DEXTROSE MONOHYDRATE 100 MG/ML
INJECTION, SOLUTION INTRAVENOUS CONTINUOUS PRN
Status: DISCONTINUED | OUTPATIENT
Start: 2023-08-16 | End: 2023-08-23 | Stop reason: HOSPADM

## 2023-08-16 RX ORDER — FENTANYL CITRATE 50 UG/ML
INJECTION, SOLUTION INTRAMUSCULAR; INTRAVENOUS PRN
Status: DISCONTINUED | OUTPATIENT
Start: 2023-08-16 | End: 2023-08-16

## 2023-08-16 RX ORDER — ENOXAPARIN SODIUM 100 MG/ML
40 INJECTION SUBCUTANEOUS EVERY 24 HOURS
Status: DISCONTINUED | OUTPATIENT
Start: 2023-08-17 | End: 2023-08-23 | Stop reason: HOSPADM

## 2023-08-16 RX ORDER — SODIUM CHLORIDE, SODIUM LACTATE, POTASSIUM CHLORIDE, CALCIUM CHLORIDE 600; 310; 30; 20 MG/100ML; MG/100ML; MG/100ML; MG/100ML
INJECTION, SOLUTION INTRAVENOUS CONTINUOUS
Status: DISCONTINUED | OUTPATIENT
Start: 2023-08-16 | End: 2023-08-16

## 2023-08-16 RX ORDER — ONDANSETRON 4 MG/1
4 TABLET, ORALLY DISINTEGRATING ORAL EVERY 6 HOURS PRN
Status: DISCONTINUED | OUTPATIENT
Start: 2023-08-16 | End: 2023-08-23 | Stop reason: HOSPADM

## 2023-08-16 RX ORDER — BUPIVACAINE HYDROCHLORIDE 2.5 MG/ML
INJECTION, SOLUTION INFILTRATION; PERINEURAL PRN
Status: DISCONTINUED | OUTPATIENT
Start: 2023-08-16 | End: 2023-08-16 | Stop reason: HOSPADM

## 2023-08-16 RX ORDER — DEXTROSE MONOHYDRATE 25 G/50ML
25-50 INJECTION, SOLUTION INTRAVENOUS
Status: DISCONTINUED | OUTPATIENT
Start: 2023-08-16 | End: 2023-08-23 | Stop reason: HOSPADM

## 2023-08-16 RX ORDER — NALOXONE HYDROCHLORIDE 0.4 MG/ML
0.4 INJECTION, SOLUTION INTRAMUSCULAR; INTRAVENOUS; SUBCUTANEOUS
Status: DISCONTINUED | OUTPATIENT
Start: 2023-08-16 | End: 2023-08-23 | Stop reason: HOSPADM

## 2023-08-16 RX ORDER — FENTANYL CITRATE 50 UG/ML
25 INJECTION, SOLUTION INTRAMUSCULAR; INTRAVENOUS EVERY 5 MIN PRN
Status: DISCONTINUED | OUTPATIENT
Start: 2023-08-16 | End: 2023-08-16

## 2023-08-16 RX ORDER — ALBUTEROL SULFATE 90 UG/1
AEROSOL, METERED RESPIRATORY (INHALATION) PRN
Status: DISCONTINUED | OUTPATIENT
Start: 2023-08-16 | End: 2023-08-16

## 2023-08-16 RX ORDER — PROCHLORPERAZINE MALEATE 5 MG
10 TABLET ORAL EVERY 6 HOURS PRN
Status: DISCONTINUED | OUTPATIENT
Start: 2023-08-16 | End: 2023-08-23 | Stop reason: HOSPADM

## 2023-08-16 RX ORDER — POTASSIUM CHLORIDE 7.45 MG/ML
10 INJECTION INTRAVENOUS
Status: COMPLETED | OUTPATIENT
Start: 2023-08-16 | End: 2023-08-17

## 2023-08-16 RX ORDER — LIDOCAINE HYDROCHLORIDE 20 MG/ML
INJECTION, SOLUTION INFILTRATION; PERINEURAL PRN
Status: DISCONTINUED | OUTPATIENT
Start: 2023-08-16 | End: 2023-08-16

## 2023-08-16 RX ORDER — PROPOFOL 10 MG/ML
5-75 INJECTION, EMULSION INTRAVENOUS CONTINUOUS
Status: DISCONTINUED | OUTPATIENT
Start: 2023-08-16 | End: 2023-08-17

## 2023-08-16 RX ORDER — MAGNESIUM SULFATE HEPTAHYDRATE 40 MG/ML
4 INJECTION, SOLUTION INTRAVENOUS ONCE
Status: COMPLETED | OUTPATIENT
Start: 2023-08-16 | End: 2023-08-17

## 2023-08-16 RX ORDER — NALOXONE HYDROCHLORIDE 0.4 MG/ML
0.2 INJECTION, SOLUTION INTRAMUSCULAR; INTRAVENOUS; SUBCUTANEOUS
Status: DISCONTINUED | OUTPATIENT
Start: 2023-08-16 | End: 2023-08-23 | Stop reason: HOSPADM

## 2023-08-16 RX ORDER — SODIUM CHLORIDE, SODIUM LACTATE, POTASSIUM CHLORIDE, CALCIUM CHLORIDE 600; 310; 30; 20 MG/100ML; MG/100ML; MG/100ML; MG/100ML
INJECTION, SOLUTION INTRAVENOUS CONTINUOUS PRN
Status: DISCONTINUED | OUTPATIENT
Start: 2023-08-16 | End: 2023-08-16

## 2023-08-16 RX ORDER — NOREPINEPHRINE BITARTRATE 0.06 MG/ML
.01-.6 INJECTION, SOLUTION INTRAVENOUS CONTINUOUS
Status: DISCONTINUED | OUTPATIENT
Start: 2023-08-16 | End: 2023-08-16

## 2023-08-16 RX ORDER — PROPOFOL 10 MG/ML
INJECTION, EMULSION INTRAVENOUS CONTINUOUS PRN
Status: DISCONTINUED | OUTPATIENT
Start: 2023-08-16 | End: 2023-08-16

## 2023-08-16 RX ORDER — CHLORHEXIDINE GLUCONATE ORAL RINSE 1.2 MG/ML
15 SOLUTION DENTAL EVERY 12 HOURS
Status: DISCONTINUED | OUTPATIENT
Start: 2023-08-16 | End: 2023-08-17

## 2023-08-16 RX ORDER — PHENYLEPHRINE HCL IN 0.9% NACL 50MG/250ML
.5-1.25 PLASTIC BAG, INJECTION (ML) INTRAVENOUS CONTINUOUS
Status: DISCONTINUED | OUTPATIENT
Start: 2023-08-16 | End: 2023-08-16 | Stop reason: HOSPADM

## 2023-08-16 RX ORDER — LIDOCAINE 40 MG/G
CREAM TOPICAL
Status: DISCONTINUED | OUTPATIENT
Start: 2023-08-16 | End: 2023-08-23 | Stop reason: HOSPADM

## 2023-08-16 RX ORDER — DEXTROSE MONOHYDRATE, SODIUM CHLORIDE, AND POTASSIUM CHLORIDE 50; 1.49; 4.5 G/1000ML; G/1000ML; G/1000ML
INJECTION, SOLUTION INTRAVENOUS CONTINUOUS
Status: DISCONTINUED | OUTPATIENT
Start: 2023-08-16 | End: 2023-08-18

## 2023-08-16 RX ORDER — ONDANSETRON 2 MG/ML
INJECTION INTRAMUSCULAR; INTRAVENOUS PRN
Status: DISCONTINUED | OUTPATIENT
Start: 2023-08-16 | End: 2023-08-16

## 2023-08-16 RX ORDER — PROCHLORPERAZINE 25 MG
25 SUPPOSITORY, RECTAL RECTAL EVERY 12 HOURS PRN
Status: DISCONTINUED | OUTPATIENT
Start: 2023-08-16 | End: 2023-08-23 | Stop reason: HOSPADM

## 2023-08-16 RX ORDER — FENTANYL CITRATE 50 UG/ML
50 INJECTION, SOLUTION INTRAMUSCULAR; INTRAVENOUS EVERY 5 MIN PRN
Status: DISCONTINUED | OUTPATIENT
Start: 2023-08-16 | End: 2023-08-16

## 2023-08-16 RX ORDER — NICOTINE POLACRILEX 4 MG
15-30 LOZENGE BUCCAL
Status: DISCONTINUED | OUTPATIENT
Start: 2023-08-16 | End: 2023-08-23 | Stop reason: HOSPADM

## 2023-08-16 RX ORDER — NITROGLYCERIN 10 MG/100ML
INJECTION INTRAVENOUS PRN
Status: DISCONTINUED | OUTPATIENT
Start: 2023-08-16 | End: 2023-08-16

## 2023-08-16 RX ORDER — ONDANSETRON 4 MG/1
4 TABLET, ORALLY DISINTEGRATING ORAL EVERY 30 MIN PRN
Status: DISCONTINUED | OUTPATIENT
Start: 2023-08-16 | End: 2023-08-16

## 2023-08-16 RX ORDER — ONDANSETRON 2 MG/ML
4 INJECTION INTRAMUSCULAR; INTRAVENOUS EVERY 6 HOURS PRN
Status: DISCONTINUED | OUTPATIENT
Start: 2023-08-16 | End: 2023-08-23 | Stop reason: HOSPADM

## 2023-08-16 RX ORDER — EPHEDRINE SULFATE 50 MG/ML
INJECTION, SOLUTION INTRAMUSCULAR; INTRAVENOUS; SUBCUTANEOUS PRN
Status: DISCONTINUED | OUTPATIENT
Start: 2023-08-16 | End: 2023-08-16

## 2023-08-16 RX ORDER — FAMOTIDINE 20 MG/1
20 TABLET, FILM COATED ORAL 2 TIMES DAILY
Status: DISCONTINUED | OUTPATIENT
Start: 2023-08-16 | End: 2023-08-16

## 2023-08-16 RX ORDER — PROPOFOL 10 MG/ML
INJECTION, EMULSION INTRAVENOUS PRN
Status: DISCONTINUED | OUTPATIENT
Start: 2023-08-16 | End: 2023-08-16

## 2023-08-16 RX ORDER — ONDANSETRON 2 MG/ML
4 INJECTION INTRAMUSCULAR; INTRAVENOUS EVERY 30 MIN PRN
Status: DISCONTINUED | OUTPATIENT
Start: 2023-08-16 | End: 2023-08-16

## 2023-08-16 RX ADMIN — PHENYLEPHRINE HYDROCHLORIDE 100 MCG: 10 INJECTION INTRAVENOUS at 16:16

## 2023-08-16 RX ADMIN — DOXAZOSIN MESYLATE 24 MG: 8 TABLET ORAL at 08:12

## 2023-08-16 RX ADMIN — SODIUM CHLORIDE, POTASSIUM CHLORIDE, SODIUM LACTATE AND CALCIUM CHLORIDE: 600; 310; 30; 20 INJECTION, SOLUTION INTRAVENOUS at 05:26

## 2023-08-16 RX ADMIN — FENTANYL CITRATE 50 MCG: 50 INJECTION, SOLUTION INTRAMUSCULAR; INTRAVENOUS at 15:27

## 2023-08-16 RX ADMIN — EPHEDRINE SULFATE 5 MG: 5 INJECTION INTRAVENOUS at 12:06

## 2023-08-16 RX ADMIN — NITROGLYCERIN 100 MCG: 10 INJECTION INTRAVENOUS at 17:52

## 2023-08-16 RX ADMIN — NITROGLYCERIN 5 MCG/MIN: 20 INJECTION INTRAVENOUS at 13:40

## 2023-08-16 RX ADMIN — NITROGLYCERIN 100 MCG: 10 INJECTION INTRAVENOUS at 17:12

## 2023-08-16 RX ADMIN — PHENYLEPHRINE HYDROCHLORIDE 100 MCG: 10 INJECTION INTRAVENOUS at 11:56

## 2023-08-16 RX ADMIN — Medication 20 MG: at 13:16

## 2023-08-16 RX ADMIN — NOREPINEPHRINE BITARTRATE 12.8 MCG: 1 INJECTION, SOLUTION, CONCENTRATE INTRAVENOUS at 18:25

## 2023-08-16 RX ADMIN — EPHEDRINE SULFATE 5 MG: 5 INJECTION INTRAVENOUS at 16:18

## 2023-08-16 RX ADMIN — NOREPINEPHRINE BITARTRATE 12.8 MCG: 1 INJECTION, SOLUTION, CONCENTRATE INTRAVENOUS at 18:29

## 2023-08-16 RX ADMIN — NOREPINEPHRINE BITARTRATE 0.03 MCG/KG/MIN: 1 INJECTION, SOLUTION, CONCENTRATE INTRAVENOUS at 18:26

## 2023-08-16 RX ADMIN — HYDROCORTISONE SODIUM SUCCINATE 50 MG: 100 INJECTION, POWDER, FOR SOLUTION INTRAMUSCULAR; INTRAVENOUS at 17:59

## 2023-08-16 RX ADMIN — Medication 1 CAPSULE: at 08:11

## 2023-08-16 RX ADMIN — Medication 50 MG: at 12:12

## 2023-08-16 RX ADMIN — PHENYLEPHRINE HYDROCHLORIDE 250 MCG: 10 INJECTION INTRAVENOUS at 18:08

## 2023-08-16 RX ADMIN — LIDOCAINE HYDROCHLORIDE 100 MG: 20 INJECTION, SOLUTION INFILTRATION; PERINEURAL at 11:37

## 2023-08-16 RX ADMIN — SODIUM CHLORIDE 6 UNITS/HR: 0.9 INJECTION, SOLUTION INTRAVENOUS at 18:23

## 2023-08-16 RX ADMIN — Medication 20 MG: at 17:32

## 2023-08-16 RX ADMIN — SODIUM BICARBONATE 50 MEQ: 84 INJECTION, SOLUTION INTRAVENOUS at 18:20

## 2023-08-16 RX ADMIN — Medication 20 MG: at 16:42

## 2023-08-16 RX ADMIN — SODIUM CHLORIDE, POTASSIUM CHLORIDE, SODIUM LACTATE AND CALCIUM CHLORIDE: 600; 310; 30; 20 INJECTION, SOLUTION INTRAVENOUS at 15:36

## 2023-08-16 RX ADMIN — EPHEDRINE SULFATE 5 MG: 5 INJECTION INTRAVENOUS at 12:03

## 2023-08-16 RX ADMIN — POTASSIUM CHLORIDE, DEXTROSE MONOHYDRATE AND SODIUM CHLORIDE: 150; 5; 450 INJECTION, SOLUTION INTRAVENOUS at 21:12

## 2023-08-16 RX ADMIN — CARVEDILOL 37.5 MG: 25 TABLET, FILM COATED ORAL at 08:10

## 2023-08-16 RX ADMIN — PROPOFOL 50 MCG/KG/MIN: 10 INJECTION, EMULSION INTRAVENOUS at 19:26

## 2023-08-16 RX ADMIN — NITROGLYCERIN 150 MCG: 10 INJECTION INTRAVENOUS at 14:37

## 2023-08-16 RX ADMIN — PHENYLEPHRINE HYDROCHLORIDE 50 MCG: 10 INJECTION INTRAVENOUS at 11:53

## 2023-08-16 RX ADMIN — PHENYLEPHRINE HYDROCHLORIDE 100 MCG: 10 INJECTION INTRAVENOUS at 18:02

## 2023-08-16 RX ADMIN — FENTANYL CITRATE 50 MCG: 50 INJECTION, SOLUTION INTRAMUSCULAR; INTRAVENOUS at 14:35

## 2023-08-16 RX ADMIN — FLUTICASONE PROPIONATE 2 SPRAY: 50 SPRAY, METERED NASAL at 08:08

## 2023-08-16 RX ADMIN — INSULIN ASPART 1 UNITS: 100 INJECTION, SOLUTION INTRAVENOUS; SUBCUTANEOUS at 08:09

## 2023-08-16 RX ADMIN — FENTANYL CITRATE 100 MCG: 50 INJECTION, SOLUTION INTRAMUSCULAR; INTRAVENOUS at 17:51

## 2023-08-16 RX ADMIN — HYDROMORPHONE HYDROCHLORIDE 0.5 MG: 1 INJECTION, SOLUTION INTRAMUSCULAR; INTRAVENOUS; SUBCUTANEOUS at 17:29

## 2023-08-16 RX ADMIN — NITROGLYCERIN 150 MCG: 10 INJECTION INTRAVENOUS at 17:23

## 2023-08-16 RX ADMIN — SODIUM CHLORIDE, SODIUM LACTATE, POTASSIUM CHLORIDE, CALCIUM CHLORIDE: 600; 310; 30; 20 INJECTION, SOLUTION INTRAVENOUS at 12:00

## 2023-08-16 RX ADMIN — Medication 2 G: at 15:45

## 2023-08-16 RX ADMIN — NITROGLYCERIN 150 MCG: 10 INJECTION INTRAVENOUS at 14:29

## 2023-08-16 RX ADMIN — Medication 2 G: at 11:45

## 2023-08-16 RX ADMIN — ACETAMINOPHEN 650 MG: 325 TABLET, FILM COATED ORAL at 09:58

## 2023-08-16 RX ADMIN — PHENYLEPHRINE HYDROCHLORIDE 100 MCG: 10 INJECTION INTRAVENOUS at 16:18

## 2023-08-16 RX ADMIN — PROPOFOL 50 MCG/KG/MIN: 10 INJECTION, EMULSION INTRAVENOUS at 22:53

## 2023-08-16 RX ADMIN — PHENYLEPHRINE HYDROCHLORIDE 50 MCG: 10 INJECTION INTRAVENOUS at 16:40

## 2023-08-16 RX ADMIN — NITROGLYCERIN 100 MCG: 10 INJECTION INTRAVENOUS at 17:35

## 2023-08-16 RX ADMIN — NITROGLYCERIN 50 MCG: 10 INJECTION INTRAVENOUS at 13:39

## 2023-08-16 RX ADMIN — PROPOFOL 200 MG: 10 INJECTION, EMULSION INTRAVENOUS at 11:37

## 2023-08-16 RX ADMIN — PROPOFOL 50 MCG/KG/MIN: 10 INJECTION, EMULSION INTRAVENOUS at 21:05

## 2023-08-16 RX ADMIN — LOSARTAN POTASSIUM 100 MG: 100 TABLET, FILM COATED ORAL at 08:11

## 2023-08-16 RX ADMIN — NITROGLYCERIN 150 MCG: 10 INJECTION INTRAVENOUS at 17:49

## 2023-08-16 RX ADMIN — NITROGLYCERIN 150 MCG: 10 INJECTION INTRAVENOUS at 17:50

## 2023-08-16 RX ADMIN — ONDANSETRON 4 MG: 2 INJECTION INTRAMUSCULAR; INTRAVENOUS at 18:25

## 2023-08-16 RX ADMIN — SODIUM BICARBONATE 50 MEQ: 84 INJECTION, SOLUTION INTRAVENOUS at 18:24

## 2023-08-16 RX ADMIN — INSULIN HUMAN 6 UNITS/HR: 1 INJECTION, SOLUTION INTRAVENOUS at 21:03

## 2023-08-16 RX ADMIN — PANTOPRAZOLE SODIUM 40 MG: 40 TABLET, DELAYED RELEASE ORAL at 08:11

## 2023-08-16 RX ADMIN — NOREPINEPHRINE BITARTRATE 6.4 MCG: 1 INJECTION, SOLUTION, CONCENTRATE INTRAVENOUS at 17:49

## 2023-08-16 RX ADMIN — POTASSIUM CHLORIDE 10 MEQ: 7.46 INJECTION, SOLUTION INTRAVENOUS at 23:59

## 2023-08-16 RX ADMIN — HYDROCORTISONE SODIUM SUCCINATE 50 MG: 100 INJECTION, POWDER, FOR SOLUTION INTRAMUSCULAR; INTRAVENOUS at 21:33

## 2023-08-16 RX ADMIN — Medication 25 MCG/HR: at 21:10

## 2023-08-16 RX ADMIN — ALUMINUM HYDROXIDE, MAGNESIUM HYDROXIDE, AND SIMETHICONE 15 ML: 200; 200; 20 SUSPENSION ORAL at 08:08

## 2023-08-16 RX ADMIN — PHENYLEPHRINE HYDROCHLORIDE 100 MCG: 10 INJECTION INTRAVENOUS at 16:13

## 2023-08-16 RX ADMIN — FENTANYL CITRATE 50 MCG: 50 INJECTION, SOLUTION INTRAMUSCULAR; INTRAVENOUS at 16:48

## 2023-08-16 RX ADMIN — POTASSIUM CHLORIDE 10 MEQ: 7.46 INJECTION, SOLUTION INTRAVENOUS at 23:02

## 2023-08-16 RX ADMIN — PHENYLEPHRINE HYDROCHLORIDE 50 MCG: 10 INJECTION INTRAVENOUS at 18:00

## 2023-08-16 RX ADMIN — METYROSINE 1000 MG: 250 CAPSULE ORAL at 04:15

## 2023-08-16 RX ADMIN — NITROGLYCERIN 50 MCG: 10 INJECTION INTRAVENOUS at 16:48

## 2023-08-16 RX ADMIN — EPHEDRINE SULFATE 5 MG: 5 INJECTION INTRAVENOUS at 16:13

## 2023-08-16 RX ADMIN — FENTANYL CITRATE 50 MCG: 50 INJECTION, SOLUTION INTRAMUSCULAR; INTRAVENOUS at 17:37

## 2023-08-16 RX ADMIN — PHENOXYBENZAMINE HYDROCHLORIDE 20 MG: 10 CAPSULE ORAL at 08:11

## 2023-08-16 RX ADMIN — Medication 20 MG: at 14:11

## 2023-08-16 RX ADMIN — SODIUM CHLORIDE, POTASSIUM CHLORIDE, SODIUM LACTATE AND CALCIUM CHLORIDE: 600; 310; 30; 20 INJECTION, SOLUTION INTRAVENOUS at 13:28

## 2023-08-16 RX ADMIN — AMLODIPINE BESYLATE 10 MG: 10 TABLET ORAL at 08:12

## 2023-08-16 RX ADMIN — FENTANYL CITRATE 100 MCG: 50 INJECTION, SOLUTION INTRAMUSCULAR; INTRAVENOUS at 11:37

## 2023-08-16 RX ADMIN — NITROGLYCERIN 150 MCG: 10 INJECTION INTRAVENOUS at 14:22

## 2023-08-16 RX ADMIN — METYROSINE 1000 MG: 250 CAPSULE ORAL at 09:58

## 2023-08-16 RX ADMIN — HYDROCORTISONE SODIUM SUCCINATE 100 MG: 100 INJECTION, POWDER, FOR SOLUTION INTRAMUSCULAR; INTRAVENOUS at 12:00

## 2023-08-16 RX ADMIN — SODIUM CHLORIDE, SODIUM LACTATE, POTASSIUM CHLORIDE, CALCIUM CHLORIDE: 600; 310; 30; 20 INJECTION, SOLUTION INTRAVENOUS at 17:00

## 2023-08-16 RX ADMIN — CHLORHEXIDINE GLUCONATE 15 ML: 1.2 SOLUTION ORAL at 21:26

## 2023-08-16 RX ADMIN — ACETAMINOPHEN 650 MG: 325 TABLET, FILM COATED ORAL at 04:15

## 2023-08-16 RX ADMIN — ALBUTEROL SULFATE 6 PUFF: 108 INHALANT RESPIRATORY (INHALATION) at 18:50

## 2023-08-16 RX ADMIN — Medication 50 MG: at 11:38

## 2023-08-16 RX ADMIN — NOREPINEPHRINE BITARTRATE 12.8 MCG: 1 INJECTION, SOLUTION, CONCENTRATE INTRAVENOUS at 18:49

## 2023-08-16 RX ADMIN — Medication 20 MG: at 15:24

## 2023-08-16 RX ADMIN — NITROGLYCERIN 150 MCG: 10 INJECTION INTRAVENOUS at 17:38

## 2023-08-16 RX ADMIN — NITROGLYCERIN 100 MCG: 10 INJECTION INTRAVENOUS at 17:29

## 2023-08-16 RX ADMIN — EPHEDRINE SULFATE 5 MG: 5 INJECTION INTRAVENOUS at 18:03

## 2023-08-16 RX ADMIN — SODIUM CHLORIDE, POTASSIUM CHLORIDE, SODIUM LACTATE AND CALCIUM CHLORIDE: 600; 310; 30; 20 INJECTION, SOLUTION INTRAVENOUS at 11:35

## 2023-08-16 RX ADMIN — PHENYLEPHRINE HYDROCHLORIDE 100 MCG: 10 INJECTION INTRAVENOUS at 12:05

## 2023-08-16 ASSESSMENT — ACTIVITIES OF DAILY LIVING (ADL)
ADLS_ACUITY_SCORE: 24
ADLS_ACUITY_SCORE: 34
ADLS_ACUITY_SCORE: 24

## 2023-08-16 ASSESSMENT — LIFESTYLE VARIABLES: TOBACCO_USE: 0

## 2023-08-16 NOTE — ANESTHESIA PROCEDURE NOTES
Airway       Patient location during procedure: OR       Procedure Start/Stop Times: 8/16/2023 11:42 AM  Staff -        CRNA: Marco Antonio Garcia APRN CRNA       Performed By: CRNA  Consent for Airway        Urgency: elective  Indications and Patient Condition       Indications for airway management: chayo-procedural       Induction type:intravenous       Mask difficulty assessment: 1 - vent by mask    Final Airway Details       Final airway type: endotracheal airway       Successful airway: ETT - single  Endotracheal Airway Details        ETT size (mm): 7.0       Cuffed: yes       Successful intubation technique: video laryngoscopy       VL Blade Size: MAC 3       Grade View of Cords: 1       Adjucts: stylet       Position: Right       Measured from: gums/teeth       Bite block used: None    Post intubation assessment        Placement verified by: capnometry, equal breath sounds and chest rise        Number of attempts at approach: 1       Number of other approaches attempted: 0       Secured with: pink tape       Ease of procedure: easy       Dentition: Intact and Unchanged    Medication(s) Administered   Medication Administration Time: 8/16/2023 11:42 AM

## 2023-08-16 NOTE — PLAN OF CARE
Major Shift Event: Slept between cares . Denied any pain . 's-120's . DBP 50's-70's . Not on Tele . Afebrile . Able to maintain her sat > 94% on RA . Voided spontaneously . Had a shower at 10 pm .NPO after midnight .   Plan: OR for bilateral pheochromocytoma removal at noon . Please give schedule BP medications. Needs CHG wipes before the surgery .   For vital signs and complete assessments, please see documentation flowsheets.

## 2023-08-16 NOTE — OR NURSING
"Pt brought to 3C pre-op area with in-person , pt stated she was worried about her belongings in her room upstairs being taken by staff, informed patient her belongings would be safe in her room but insisted they be brought down to the pre-op area, she is worried about her jewlery being \"out in the open\". This RN went to 6B and brought down all belongings, one camo-print duffel bag, one white purse, one pair of brown sandals, and one small toiletry bag. Belongings tagged with patient label.     Pt also expressed concern with surgery and \"do I even have to sign the consent, do I have a choice not to do the surgery\", in-person  present, offered emotional support for patient and informed patient she is capable of making her own medical decisions independently but she has had multiple discussions with her surgical teams of the risks of not having her surgery today. Patient insists on wanting to talk with her surgeon before surgery. Paged surgical team to see patient in pre-op, resident called back and informed they will see patient in pre-op area before surgery.     Consent signed by patient in pre-op area before surgery.   "

## 2023-08-16 NOTE — PLAN OF CARE
Neuro: A&Ox4.   Cardiac: VSS.   Respiratory: Sating  on RA.  GI/: Adequate urine output. BM X1  Diet/appetite: Tolerating Reg diet. Eating well.  Activity:  SBA/ I up to chair and in halls.  Pain: At acceptable level on current regimen.   Skin: No new deficits noted.  LDA's: PIVx 1 SL'd     Plan: Continue with POC. Notify primary team with changes.

## 2023-08-16 NOTE — BRIEF OP NOTE
[facility]    Brief Operative Note    Pre-operative diagnosis: Benign pheochromocytoma of both adrenal glands [D35.01, D35.02]  Post-operative diagnosis Same as pre-operative diagnosis    Procedure: Procedure(s):  ADRENALECTOMY, LAPAROSCOPIC BILATERAL  Surgeon: Surgeon(s) and Role:     * Domenico Caceres MD - Primary     * Leora Lambert MD - Resident - Assisting     * Amanda Dye MD - Resident - Assisting  Anesthesia: General   Estimated Blood Loss: 25 c    Drains: None  Specimens:   ID Type Source Tests Collected by Time Destination   1 : Right adrenal Tissue Adrenal Gland, Right SURGICAL PATHOLOGY EXAM Domenico Caceres MD 8/16/2023  3:28 PM    2 : left adrenal Tissue Adrenal Gland, Left SURGICAL PATHOLOGY EXAM Domenico Caceres MD 8/16/2023  6:16 PM      Findings:   Tumor replaced bilateral adrenal glands prohibiting cortical sparing adrenalectomy bilaterally verified by gross examination as well as intraoperative ultrasound .  Complications: None.  Implants: * No implants in log *    Leora Lambert MD  Surgery PGY-5

## 2023-08-16 NOTE — ANESTHESIA PREPROCEDURE EVALUATION
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, [id number]    Pheochromocytoma     Type 2 diabetes mellitus (H)       Past Surgical History:   Procedure Laterality Date    HEAD & NECK SURGERY      ORTHOPEDIC SURGERY        Allergies   Allergen Reactions    Hydromorphone Hives     Other reaction(s): Hives    Ace Inhibitors      Other reaction(s): Unknown    Adhesive Tape Blisters      Social History     Tobacco Use    Smoking status: Never     Passive exposure: Never    Smokeless tobacco: Never   Substance Use Topics    Alcohol use: Never      Wt Readings from Last 1 Encounters:   23 95.6 kg (210 lb 12.2 oz)        Anesthesia Evaluation            ROS/MED HX  ENT/Pulmonary:    (-) tobacco use   Neurologic:       Cardiovascular:     (+)  hypertension- -   -  - -                                      METS/Exercise Tolerance:     Hematologic:       Musculoskeletal:       GI/Hepatic:       Renal/Genitourinary:       Endo: Comment: MEN2A  Pheochromocytoma    (+)  type II DM,                    Psychiatric/Substance Use:       Infectious Disease:       Malignancy:       Other:            Physical Exam    Airway        Mallampati: III   TM distance: > 3 FB   Neck ROM: full   Mouth opening: > 3 cm    Respiratory Devices and Support         Dental       (+) Minor Abnormalities - some fillings, tiny chips      Cardiovascular   cardiovascular exam normal          Pulmonary   pulmonary exam normal                OUTSIDE LABS:  CBC:   Lab Results   Component Value Date    WBC 9.9 2023    WBC 9.8 08/15/2023    HGB 13.0 2023    HGB 12.4 08/15/2023    HCT 38.7 2023    HCT 38.2 08/15/2023      08/16/2023     08/15/2023     BMP:   Lab Results   Component Value Date     08/16/2023     08/15/2023    POTASSIUM 3.8 08/16/2023    POTASSIUM 3.5 08/15/2023    CHLORIDE 104 08/16/2023    CHLORIDE 103 08/15/2023    CO2 22 08/16/2023    CO2 23 08/15/2023    BUN 18.0 08/16/2023    BUN 23.6 (H) 08/15/2023    CR 0.64 08/16/2023    CR 0.76 08/15/2023     (H) 08/16/2023     (H) 08/16/2023     COAGS:   Lab Results   Component Value Date    INR 1.07 05/07/2020     POC:   Lab Results   Component Value Date    HCG Negative 05/07/2020     HEPATIC:   Lab Results   Component Value Date    ALBUMIN 3.8 08/15/2023    PROTTOTAL 6.3 (L) 08/15/2023    ALT 21 08/15/2023    AST 20 08/15/2023    ALKPHOS 105 (H) 08/15/2023    BILITOTAL 0.2 08/15/2023     OTHER:   Lab Results   Component Value Date    MASSIMO 10.0 08/16/2023    PHOS 3.3 02/20/2023    LIPASE 25 08/13/2023       Anesthesia Plan    ASA Status:  4    NPO Status:  NPO Appropriate    Anesthesia Type: General.     - Airway: ETT   Induction: Intravenous.   Maintenance: Balanced.   Techniques and Equipment:     - Lines/Monitors: Arterial Line, Central Line, 2nd IV     - Drips/Meds: Phenylephrine, Norepi, Nicardipine     Consents    Anesthesia Plan(s) and associated risks, benefits, and realistic alternatives discussed. Questions answered and patient/representative(s) expressed understanding.     - Discussed: Risks, Benefits and Alternatives for BOTH SEDATION and the PROCEDURE were discussed     - Discussed with:  Patient,       - Extended Intubation/Ventilatory Support Discussed: Yes.      - Patient is DNR/DNI Status: No     Use of blood products discussed: Yes.     Postoperative Care    Pain management: IV analgesics, Multi-modal analgesia.   PONV prophylaxis: Ondansetron (or other 5HT-3), Dexamethasone or Solumedrol     Comments:    Other Comments: Allergy: HIVES with hydromorphone             Brent Kennedy MD

## 2023-08-16 NOTE — PROGRESS NOTES
Endocrine Progress Note  Patient: Jessica Kennedy   MRN: [id number]  Date of Service: 08/16/2023    Subjective:    Used professional  over the phone  She is experiencing more nasal congestion, still feeling sleepy. Upon checking orthostatic vital signs    Physical Examination:  /83 (BP Location: Left arm)   Pulse 63   Temp 97.5  F (36.4  C) (Oral)   Resp 20   Wt 95.6 kg (210 lb 12.2 oz)   LMP 05/09/2023 (Approximate)   SpO2 96%   BMI 33.01 kg/m       Physical Exam  Constitutional:       General: She is not in acute distress.     Appearance: She is not ill-appearing.   Cardiovascular:      Rate and Rhythm: Normal rate.      Heart sounds: Normal heart sounds. No murmur heard.  Pulmonary:      Effort: Pulmonary effort is normal.      Breath sounds: Normal breath sounds. No wheezing or rales.   Abdominal:      Palpations: Abdomen is soft.      Tenderness: There is no abdominal tenderness.   Musculoskeletal:      Right lower leg: No edema.      Left lower leg: No edema.   Neurological:      Mental Status: She is alert.       Medications:  Reviewed    Endocrine Labs  Recent Labs   Lab Test 08/13/23  1215 08/13/23  0855 08/11/23  1751 08/11/23  1626 08/02/23  1233   NA  --   --   --  136 136   POTASSIUM  --   --   --  3.6 3.8   CHLORIDE  --   --   --  102 102   CO2  --   --   --  23 20*   ANIONGAP  --   --   --  11 14   * 167*   < > 164* 281*   BUN  --   --   --  15.5 16.4   CR  --   --   --  0.83 0.66   MASSIMO  --   --   --  9.8 11.1*    < > = values in this interval not displayed.        Assessment and plan:  Jessica Kennedy is a 48 year old  female with PMHx of MEN 2A with bilateral synchronous pheochromocytomas, HTN, T2DM who is being admitted for optimization of blood pressure control / alpha blockade prior to bilateral cortical sparing adrenalectomy.      # Pheochromocytoma, bilateral  She is currently being admitted for optimization of alpha and beta blockade prior to surgical  treatment.     BP control has improved, orthostatic vitals show increase in heart rate but no drop in BP.     Medical therapy has been optimized in anticipation of adrenalectomy, on high doses of  alpha and beta blockade.     Given that she is planned to undergo left total adrenalectomy and right cortical sparing adrenalectomy, risk for primary adrenal insufficiency is high. Would recommend to start hydrocortisone preemptively with post operative assessment of adrenal function when clinically stable.     I checked orthostatic vital signs today. Her initial supine blood pressure was 160/98 with HR of 78. Standing blood pressure was 121/92 with a heart rate of 90.     # Type 2 Diabetes Mellitus  Inadequate control as outpatient. Will hold metformin and glipizide while inpatient and start basal bolus insulin regimen.      # Primary Hyperparathyroidism  Per primary Endocrinologist, Dr. Vanessa, treatment for this will be set up as outpatient, not during this admission.      # MEN 2A    Recommendations.   - Continue Carvedilol  37.5 mg BID   - Continue phenoxybenzamine 10 mg  BID  - Continue doxazosin 24 mg BID  - Continue Metyrosine 1000 mg every 6 hours (pharmacy has enough medication, so will continue with current dose). She should have one dose just prior going to the OR and then stopped  - Continue Amlodipine 10 mg daily  - Continue losartan 100 mg daily  She should be given all her medications on the morning of surgery (tomorrow)   - Encouraged increasing hydration, salt intake.    - Patient was hypertensive today when I checked vital signs but she did have evidence of orthostatic hypotension with a 40 point drop in systolic blood pressure. Pervious BP readings over the last 24 hours were mostly within range. She is on maximally tolerated alpha and beta blockade and has been on metyrosine at goal dose for > 48 hours. She is now having a positive orthostatic response.  She has been on IVF. She is medically  optimized for upcoming adrenalectomy.     She has received all anti hypertensives today in the morning, except for Metyrosine which will be given at 10 AM today.     Discussed with Dr. Landers, Dr. Caceres and Dr. Lambert.     - Due to risk of adrenal insufficiency    - Start Hydrocortisone 100 mg IV at time of induction and continue hydrocortisone 50 mg IV Q 6 hours   - Assessment of adrenal function will be done post operatively when clinically stable    Jayson Wilson MD  Endocrinology Fellow    I have reviewed and edited the fellow's note, and agree with the plan of care. Care reviewed with surgery team. I did not see the patient in-person today as she was in the OR.   BUCK Chakraborty

## 2023-08-16 NOTE — PROGRESS NOTES
Surgery progress note  8/16/2023    S: Patient requested to speak again about surgery. I reviewed again the risks and benefits of this operation given she did not have any specific questions rather just wanted to hear this discussion again.     Professional  - Saida Muhumed ([id number]) - assisted this communication.    I reviewed the risks of surgery with Jessica Kennedy.    These include, but are not limited to, death, myocardial infarction, pneumonia, urinary tract infection, deep venous thrombosis with or without pulmonary embolus, abdominal infection from bowel injury or abscess, bowel obstruction, wound infection, and bleeding; furthermore, there is risk that the intended approach of the surgery (for example, laparoscopic) would need to be changed to open (laparotomy) if laparoscopy does not continue to be safe. We also discussed risk of bleeding, need for transfusion, infection, damage to surrounding organs as well as the potential for additional operations or procedures in the future.       O:  /83 (BP Location: Left arm)   Pulse 63   Temp 97.5  F (36.4  C) (Oral)   Resp 20   Wt 95.6 kg (210 lb 12.2 oz)   LMP 05/09/2023 (Approximate)   SpO2 96%   BMI 33.01 kg/m    Appears comfortable in bed  NLB on RA  RRR  Abdomen soft, NT  Ext WWP  Calm and cooperative    A/P: 48 year old woman with MEN2A and bilateral pheochromocytoma admitted for preoperative optimization    - Greatly appreciate medicine and endocrine teams management  - Discussed with endocrine fellow this morning who preformed orthostatic vital signs. Per our discussion, this patient is demonstrating s/sx of orthostatics. Additionally, pt is on maximal doses of both alpha and beta blockers in addition to max dose metyrosine. In cooperation with endocrine, we believe she is medically optimized for operation today.   - NPO  - Plan for OR later this AM      Pt discussed with staff, Dr. Morris Lambert, PGY-5  General  Surgery

## 2023-08-17 LAB
ALLEN'S TEST: ABNORMAL
ANION GAP SERPL CALCULATED.3IONS-SCNC: 11 MMOL/L (ref 7–15)
BASE EXCESS BLDA CALC-SCNC: 0.8 MMOL/L (ref -9–1.8)
BUN SERPL-MCNC: 21 MG/DL (ref 6–20)
CA-I BLD-MCNC: 4.8 MG/DL (ref 4.4–5.2)
CALCIUM SERPL-MCNC: 9.1 MG/DL (ref 8.6–10)
CHLORIDE SERPL-SCNC: 106 MMOL/L (ref 98–107)
CREAT SERPL-MCNC: 0.88 MG/DL (ref 0.51–0.95)
DEPRECATED HCO3 PLAS-SCNC: 22 MMOL/L (ref 22–29)
ERYTHROCYTE [DISTWIDTH] IN BLOOD BY AUTOMATED COUNT: 12.5 % (ref 10–15)
GFR SERPL CREATININE-BSD FRML MDRD: 82 ML/MIN/1.73M2
GLUCOSE BLDC GLUCOMTR-MCNC: 121 MG/DL (ref 70–99)
GLUCOSE BLDC GLUCOMTR-MCNC: 121 MG/DL (ref 70–99)
GLUCOSE BLDC GLUCOMTR-MCNC: 124 MG/DL (ref 70–99)
GLUCOSE BLDC GLUCOMTR-MCNC: 125 MG/DL (ref 70–99)
GLUCOSE BLDC GLUCOMTR-MCNC: 125 MG/DL (ref 70–99)
GLUCOSE BLDC GLUCOMTR-MCNC: 130 MG/DL (ref 70–99)
GLUCOSE BLDC GLUCOMTR-MCNC: 130 MG/DL (ref 70–99)
GLUCOSE BLDC GLUCOMTR-MCNC: 132 MG/DL (ref 70–99)
GLUCOSE BLDC GLUCOMTR-MCNC: 132 MG/DL (ref 70–99)
GLUCOSE BLDC GLUCOMTR-MCNC: 134 MG/DL (ref 70–99)
GLUCOSE BLDC GLUCOMTR-MCNC: 138 MG/DL (ref 70–99)
GLUCOSE BLDC GLUCOMTR-MCNC: 140 MG/DL (ref 70–99)
GLUCOSE BLDC GLUCOMTR-MCNC: 143 MG/DL (ref 70–99)
GLUCOSE BLDC GLUCOMTR-MCNC: 148 MG/DL (ref 70–99)
GLUCOSE BLDC GLUCOMTR-MCNC: 150 MG/DL (ref 70–99)
GLUCOSE BLDC GLUCOMTR-MCNC: 161 MG/DL (ref 70–99)
GLUCOSE SERPL-MCNC: 139 MG/DL (ref 70–99)
HCO3 BLD-SCNC: 25 MMOL/L (ref 21–28)
HCT VFR BLD AUTO: 35.5 % (ref 35–47)
HGB BLD-MCNC: 11.5 G/DL (ref 11.7–15.7)
MAGNESIUM SERPL-MCNC: 2.5 MG/DL (ref 1.7–2.3)
MCH RBC QN AUTO: 29.6 PG (ref 26.5–33)
MCHC RBC AUTO-ENTMCNC: 32.4 G/DL (ref 31.5–36.5)
MCV RBC AUTO: 91 FL (ref 78–100)
O2/TOTAL GAS SETTING VFR VENT: 40 %
PCO2 BLD: 36 MM HG (ref 35–45)
PH BLD: 7.45 [PH] (ref 7.35–7.45)
PHOSPHATE SERPL-MCNC: 3.4 MG/DL (ref 2.5–4.5)
PLATELET # BLD AUTO: 201 10E3/UL (ref 150–450)
PO2 BLD: 73 MM HG (ref 80–105)
POTASSIUM SERPL-SCNC: 3.9 MMOL/L (ref 3.4–5.3)
RBC # BLD AUTO: 3.89 10E6/UL (ref 3.8–5.2)
SODIUM SERPL-SCNC: 139 MMOL/L (ref 136–145)
WBC # BLD AUTO: 14.6 10E3/UL (ref 4–11)

## 2023-08-17 PROCEDURE — 250N000011 HC RX IP 250 OP 636: Performed by: SURGERY

## 2023-08-17 PROCEDURE — C9113 INJ PANTOPRAZOLE SODIUM, VIA: HCPCS | Mod: JZ | Performed by: SURGERY

## 2023-08-17 PROCEDURE — 250N000013 HC RX MED GY IP 250 OP 250 PS 637: Performed by: PHARMACIST

## 2023-08-17 PROCEDURE — 250N000011 HC RX IP 250 OP 636: Mod: JZ | Performed by: STUDENT IN AN ORGANIZED HEALTH CARE EDUCATION/TRAINING PROGRAM

## 2023-08-17 PROCEDURE — 83735 ASSAY OF MAGNESIUM: CPT | Performed by: SURGERY

## 2023-08-17 PROCEDURE — 99232 SBSQ HOSP IP/OBS MODERATE 35: CPT | Mod: GC | Performed by: INTERNAL MEDICINE

## 2023-08-17 PROCEDURE — 999N000127 HC STATISTIC PERIPHERAL IV START W US GUIDANCE

## 2023-08-17 PROCEDURE — 82330 ASSAY OF CALCIUM: CPT

## 2023-08-17 PROCEDURE — 250N000011 HC RX IP 250 OP 636: Mod: JZ

## 2023-08-17 PROCEDURE — 94003 VENT MGMT INPAT SUBQ DAY: CPT

## 2023-08-17 PROCEDURE — 84100 ASSAY OF PHOSPHORUS: CPT

## 2023-08-17 PROCEDURE — 82803 BLOOD GASES ANY COMBINATION: CPT

## 2023-08-17 PROCEDURE — 99291 CRITICAL CARE FIRST HOUR: CPT | Mod: GC | Performed by: EMERGENCY MEDICINE

## 2023-08-17 PROCEDURE — 200N000002 HC R&B ICU UMMC

## 2023-08-17 PROCEDURE — 999N000253 HC STATISTIC WEANING TRIALS

## 2023-08-17 PROCEDURE — 250N000009 HC RX 250: Performed by: PHARMACIST

## 2023-08-17 PROCEDURE — 999N000157 HC STATISTIC RCP TIME EA 10 MIN

## 2023-08-17 PROCEDURE — 250N000013 HC RX MED GY IP 250 OP 250 PS 637

## 2023-08-17 PROCEDURE — 80048 BASIC METABOLIC PNL TOTAL CA: CPT

## 2023-08-17 PROCEDURE — 258N000003 HC RX IP 258 OP 636

## 2023-08-17 PROCEDURE — 85027 COMPLETE CBC AUTOMATED: CPT

## 2023-08-17 RX ORDER — HYDRALAZINE HYDROCHLORIDE 20 MG/ML
10 INJECTION INTRAMUSCULAR; INTRAVENOUS EVERY 4 HOURS PRN
Status: DISCONTINUED | OUTPATIENT
Start: 2023-08-17 | End: 2023-08-21

## 2023-08-17 RX ORDER — AMOXICILLIN 250 MG
1 CAPSULE ORAL 2 TIMES DAILY
Status: DISCONTINUED | OUTPATIENT
Start: 2023-08-17 | End: 2023-08-23 | Stop reason: HOSPADM

## 2023-08-17 RX ORDER — OXYCODONE HYDROCHLORIDE 5 MG/1
5 TABLET ORAL EVERY 4 HOURS PRN
Status: DISCONTINUED | OUTPATIENT
Start: 2023-08-17 | End: 2023-08-20

## 2023-08-17 RX ORDER — LABETALOL HYDROCHLORIDE 5 MG/ML
20 INJECTION, SOLUTION INTRAVENOUS EVERY 4 HOURS PRN
Status: DISCONTINUED | OUTPATIENT
Start: 2023-08-17 | End: 2023-08-21

## 2023-08-17 RX ORDER — DEXMEDETOMIDINE HYDROCHLORIDE 4 UG/ML
.1-1.2 INJECTION, SOLUTION INTRAVENOUS CONTINUOUS
Status: DISCONTINUED | OUTPATIENT
Start: 2023-08-17 | End: 2023-08-17

## 2023-08-17 RX ORDER — ACETAMINOPHEN 325 MG/1
975 TABLET ORAL 3 TIMES DAILY
Status: DISCONTINUED | OUTPATIENT
Start: 2023-08-17 | End: 2023-08-21

## 2023-08-17 RX ADMIN — MAGNESIUM SULFATE 4 G: 4 INJECTION INTRAVENOUS at 01:47

## 2023-08-17 RX ADMIN — HYDROCORTISONE SODIUM SUCCINATE 50 MG: 100 INJECTION, POWDER, FOR SOLUTION INTRAMUSCULAR; INTRAVENOUS at 16:16

## 2023-08-17 RX ADMIN — PROPOFOL 40 MCG/KG/MIN: 10 INJECTION, EMULSION INTRAVENOUS at 05:26

## 2023-08-17 RX ADMIN — Medication 25 MCG: at 08:18

## 2023-08-17 RX ADMIN — HYDROCORTISONE SODIUM SUCCINATE 50 MG: 100 INJECTION, POWDER, FOR SOLUTION INTRAMUSCULAR; INTRAVENOUS at 03:52

## 2023-08-17 RX ADMIN — SENNOSIDES AND DOCUSATE SODIUM 1 TABLET: 8.6; 5 TABLET ORAL at 16:06

## 2023-08-17 RX ADMIN — ENOXAPARIN SODIUM 40 MG: 40 INJECTION SUBCUTANEOUS at 13:37

## 2023-08-17 RX ADMIN — HYDROCORTISONE SODIUM SUCCINATE 50 MG: 100 INJECTION, POWDER, FOR SOLUTION INTRAMUSCULAR; INTRAVENOUS at 20:45

## 2023-08-17 RX ADMIN — POTASSIUM CHLORIDE, DEXTROSE MONOHYDRATE AND SODIUM CHLORIDE: 150; 5; 450 INJECTION, SOLUTION INTRAVENOUS at 07:04

## 2023-08-17 RX ADMIN — HYDROCORTISONE SODIUM SUCCINATE 50 MG: 100 INJECTION, POWDER, FOR SOLUTION INTRAMUSCULAR; INTRAVENOUS at 10:01

## 2023-08-17 RX ADMIN — DEXMEDETOMIDINE HYDROCHLORIDE 0.3 MCG/KG/HR: 400 INJECTION INTRAVENOUS at 10:02

## 2023-08-17 RX ADMIN — PROPOFOL 50 MCG/KG/MIN: 10 INJECTION, EMULSION INTRAVENOUS at 01:49

## 2023-08-17 RX ADMIN — SENNOSIDES AND DOCUSATE SODIUM 1 TABLET: 8.6; 5 TABLET ORAL at 20:44

## 2023-08-17 RX ADMIN — PANTOPRAZOLE SODIUM 40 MG: 40 INJECTION, POWDER, FOR SOLUTION INTRAVENOUS at 16:06

## 2023-08-17 RX ADMIN — CHLORHEXIDINE GLUCONATE 15 ML: 1.2 SOLUTION ORAL at 08:03

## 2023-08-17 RX ADMIN — ACETAMINOPHEN 975 MG: 325 TABLET, FILM COATED ORAL at 16:06

## 2023-08-17 RX ADMIN — ACETAMINOPHEN 975 MG: 325 TABLET, FILM COATED ORAL at 20:44

## 2023-08-17 RX ADMIN — PANTOPRAZOLE SODIUM 40 MG: 40 INJECTION, POWDER, FOR SOLUTION INTRAVENOUS at 08:03

## 2023-08-17 RX ADMIN — Medication 10 MG: at 07:55

## 2023-08-17 RX ADMIN — POTASSIUM CHLORIDE, DEXTROSE MONOHYDRATE AND SODIUM CHLORIDE: 150; 5; 450 INJECTION, SOLUTION INTRAVENOUS at 16:29

## 2023-08-17 ASSESSMENT — ACTIVITIES OF DAILY LIVING (ADL)
ADLS_ACUITY_SCORE: 24
ADLS_ACUITY_SCORE: 24
ADLS_ACUITY_SCORE: 34
ADLS_ACUITY_SCORE: 24
ADLS_ACUITY_SCORE: 24
ADLS_ACUITY_SCORE: 34
ADLS_ACUITY_SCORE: 24
ADLS_ACUITY_SCORE: 34
ADLS_ACUITY_SCORE: 27
ADLS_ACUITY_SCORE: 34
ADLS_ACUITY_SCORE: 27
ADLS_ACUITY_SCORE: 24

## 2023-08-17 NOTE — ANESTHESIA POSTPROCEDURE EVALUATION
Patient: Jessica Kennedy    Procedure: Procedure(s):  ADRENALECTOMY, LAPAROSCOPIC BILATERAL       Anesthesia Type:  General    Note:  Disposition: Admission   Postop Pain Control: Uneventful            Sign Out: Well controlled pain   PONV: No   Neuro/Psych: Uneventful            Sign Out: Acceptable/Baseline neuro status   Airway/Respiratory: Uneventful            Sign Out: AIRWAY IN SITU/Resp. Support   CV/Hemodynamics: Uneventful            Sign Out: Acceptable CV status; No obvious hypovolemia; No obvious fluid overload   Other NRE: NONE   DID A NON-ROUTINE EVENT OCCUR? No    Event details/Postop Comments:  No complications.           Last vitals:  Vitals:    08/17/23 1000 08/17/23 1100 08/17/23 1200   BP: 108/69 133/87    Pulse: 71 63    Resp: 16 15 16   Temp:   37.1  C (98.8  F)   SpO2: 96% 98%        Electronically Signed By: Rigoberto Casanova MD  August 17, 2023  12:15 PM   What Type Of Note Output Would You Prefer (Optional)?: Standard Output How Severe Is Your Acne?: mild Is This A New Presentation, Or A Follow-Up?: Acne

## 2023-08-17 NOTE — CONSULTS
SURGICAL ICU ADMISSION NOTE  08/17/2023    PRIMARY TEAM: General Surgery  PRIMARY PHYSICIAN: Dr. Caceres  REASON FOR CRITICAL CARE ADMISSION: Invasive Blood Pressure Monitoring   ADMITTING PHYSICIAN: Dr. Sharp    ASSESSMENT: Jessica Kennedy is a 48 year old female who was admitted to the SICU for invasive BP monitoring on 8/11/2023 s/p BL adrenalectomy for BL Pheochromocytomas. PMH notable for MEN2A and T2DM (A1c 9.0).     PLAN:    Neurological:  # Acute pain   # Agitation   # Encephalopathy   - Monitor neurological status. Delirium preventions and precautions.  - Pain: Fentanyl gtt    - Sedation: propofol    Pulmonary:   Vital Signs: Most Recent  Ranges (24 hours)   Resp: 15  SpO2: 99 % Resp  Min: 10  Max: 20  SpO2  Min: 96 %  Max: 100 %   O2 Device:   Vent  Resp: 15    # Post operative ventilatory support  # Acute hypoxic respiratory support   - VENT: Intubated. Continue full vent support. PST when meets criteria.  Ventilatory bundle. HOB elevation   - Supplemental O2, goal SpO2 > 90%.    Cardiovascular:    Vital Signs: Most Recent  Ranges (24 hours)   Pulse: 75  BP: 133/84  Arterial Line BP: 125/67  Arterial Line MAP (mmHg): 90 mmHg Pulse  Min: 63  Max: 86  BP  Min: 118/71  Max: 141/93  Arterial Line BP  Min: 125/67  Max: 168/92  Arterial Line MAP (mmHg)  Min: 90 mmHg  Max: 241 mmHg     # HTN (PTA)  # Hemodynamic Monitoring s/p OR  - Arterial line in place (Exchanged tonight d/t OR arterial line failure)  - No needs for pressors at this point. If pressors are needed, vasopressin should be used preferentially d/t alpha and beta blockade already induced in the patient.  - Hold PTA antihypertensive for tonight, re-evaluate tomorrow regarding when to re-introduce them.    Gastroenterology/Nutrition:  # Protein calorie deficit malnutrition   - NPO for Medical/Clinical Reasons Except for: No Exceptions  - No indication for parenteral nutrition. Can start CLD once extubated and passing bedside swallow  eval.    Renal/Fluids/Electrolytes:   I/O last 3 completed shifts:  In: 4532.92 [P.O.:30; I.V.:4502.92]  Out: 560 [Urine:535; Blood:25]  I/O this shift:  In: 267.4 [I.V.:267.4]  Out: 240 [Urine:240]  Recent Labs   Lab 23  0525 08/15/23  0535 23  1212    138 138 138   < > 138 136 137   POTASSIUM 3.7 3.9 4.0 4.2   < > 3.8 3.5 4.0   CHLORIDE 104  --   --   --   --  104 103 105   MAG 1.4*  --   --   --   --   --   --   --    PHOS 3.6  --   --   --   --   --   --   --    MASSIMO 9.1  --   --   --   --  10.0 9.8 10.1*   CO2 21*  --   --   --   --  22 23 24   BUN 23.3*  --   --   --   --  18.0 23.6* 13.0   CR 0.98*  --   --   --   --  0.64 0.76 0.61    < > = values in this interval not displayed.       # Volume Status  - mIVF of D5-1/2NS-20K @ 100 ml/hr  - Continue to monitor I/Os  - Rodriguez in place. Eval removal on POD1    Endocrine:  Recent Labs   Lab 23  0017 23  1120   * 186* 214* 217* 243* 246* 266* 328* 349* 370* 432* 166*     # MEN2A  # S/P BL total adrenalectomy for BL pheochromocytomas  - Endocrine following  - Hydrocortisone 50 mg IV Q6H    # Stress Hyperglycemia   # T2DM   - Continue insulin gtt  - Goal to keep B-180 for optimal wound healing  - Hold PTA metformin, glipizide    ID:  Recent Labs   Lab 23  0525 08/15/23  0535 23  1212   WBC 15.1* 9.9 9.8 7.7     Positive cultures:  No results found for: CULT    # Leukocytosis 2/2 Post-Operative Inflammation  -  no indications for antibiotics  -  Trend CBCs QAM.    Heme:  Recent Labs   Lab 23  1934 23  1913 23  1853 23  1812 23  0525 08/15/23  0535 23  1212   HGB 12.7 12.4 12.4 12.2   < > 13.0 12.4 13.4     --   --   --   --   202 206 188    < > = values in this interval not displayed.     - Hemoglobin stable  - Transfuse if hgb <7.0. Trend CBCs QAM.  - DVT PPX to start tomorrow    MSK:  # Weakness and deconditioning of critical illness   - Physical and occupational therapy consult    General Cares/Prophylaxis:    DVT Prophylaxis: SCDs, LVX to start POD1  GI Prophylaxis: H2 Blocker  Restraints: Restraints for medical healing needed: NO  LDAs:  - ETT  - CVC (note: it terminates prematurely, should be treated as more of a peripheral or midline)  - PIVx1  - Rodriguez  - R radial arterial line    Disposition:  - SICU.   - Barriers to floor status: HD stability    Patient seen, findings and plan discussed with surgical ICU staff, Dr. Sharp.    Samson De La Cruz MD   General Surgery Resident    - - - - - - - - - - - - - - - - - - - - - - - - - - - - - - - - - - - - - - - - - - - - - -   HISTORY PRESENTING ILLNESS: Jessica Kennedy is a 48 year old female with insulin dependent diabetes and 5 med hypertension who was diagnosed with pheochromocytoma upon work-up for medically refractory hypertension associated with heat intolerance, tachycardia and bilateral adrenal masses noted on CT (Apr 2022). Work-up significant for MEN 2A, medullary thyroid cancer, and elevated plasma metanephrines.  Now S/p bilateral total adrenalectomy.    REVIEW OF SYSTEMS: 10 point ROS neg other than the symptoms noted above in the HPI.    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 No pertinent family history.    ALLERGIES:   Allergies   Allergen Reactions    Hydromorphone Hives     Other reaction(s): Hives    Ace Inhibitors      Other reaction(s): Unknown    Adhesive Tape Blisters       MEDICATIONS:  No current facility-administered medications on file prior to encounter.  amLODIPine (NORVASC) 10 MG tablet, Take 1 tablet (10 mg) by mouth daily  aspirin (ASA) 81 MG chewable tablet, Take 1 tablet (81 mg) by mouth daily CHEW AND SWALLOW  carboxymethylcellulose-glycerin (REFRESH OPTIVE) 0.5-0.9 % ophthalmic solution, Place 1 drop into both eyes 3-4 TIMES DAILY  carvedilol (COREG) 25 MG tablet, Take 1 tablet (25 mg) by mouth 2 times daily (with meals)  omeprazole (PRILOSEC) 40 MG DR capsule, Take 1 capsule (40 mg) by mouth daily  phenoxybenzamine (DIBENZYLINE) 10 MG capsule, Take 1 capsule (10 mg) by mouth daily  alcohol swab prep pads, Use to swab area of injection/laurie as directed.  Benzocaine-Menthol 10-2.1 MG LOZG, Take 1 lozenge by mouth 4 times daily as needed (Cough or sore throat)  Continuous Blood Gluc  (FREESTYLE ABHIJIT 14 DAY READER) RODERICK, Use to read blood sugars as per 's instructions.  Continuous Blood Gluc Sensor (FREESTYLE ABHIJIT 14 DAY SENSOR) [facility] [place], Change every 14 days.  doxazosin (CARDURA) 8 MG tablet, Take 3 tablets (24 mg) by mouth 2 times daily  glipiZIDE (GLUCOTROL) 5 MG tablet, Take 5 mg by mouth daily  ibuprofen (ADVIL/MOTRIN) 200 MG tablet, Take 3 tablets (600 mg) by mouth every 6 hours as needed for mild pain  insulin pen needle (BD CELINE U/F) 32G X 4 MM miscellaneous, Use 1 pen needles daily or as directed.  metFORMIN (GLUCOPHAGE) 1000 MG tablet, Take 1,000 mg by mouth 2 times daily (with meals)  Microlet Lancets MISC, 1 each daily        PHYSICAL EXAMINATION:  Temp:  [97.5  F (36.4  C)-98.5  F (36.9  C)] 98.5  F (36.9  C)  Pulse:  [63-86]  75  Resp:  [10-20] 15  BP: (118-141)/(71-93) 133/84  MAP:  [90 mmHg-241 mmHg] 90 mmHg  Arterial Line BP: (125-168)/(67-92) 125/67  SpO2:  [96 %-100 %] 99 %  Physical Exam:  Gen: Appears stated age, NAD  HEENT: NC/AT, Sclera Anicteric, ETT in place  Neuro: Sedated  Psych: Unable to assess d/t sedation  CV: Normocardic, Hypertensive  Pulm: Intubated on ventilator  ABD: ABD ND, NT, Soft. Incisions CDI  Skin: No obvious rashes, jaundice, erythema      LABS:   Arterial Blood Gases   Recent Labs   Lab 08/16/23 2126 08/16/23 1934 08/16/23 1913 08/16/23 1853   PH 7.40 7.41 7.39 7.35   PCO2 37 38 40 43   PO2 79* 68* 73* 61*   HCO3 23 24 24 24     Complete Blood Count   Recent Labs   Lab 08/16/23 2126 08/16/23 1934 08/16/23 1913 08/16/23 1853 08/16/23 1812 08/16/23 0525 08/15/23  0535 08/13/23  1212   WBC 15.1*  --   --   --   --  9.9 9.8 7.7   HGB 12.7 12.4 12.4 12.2   < > 13.0 12.4 13.4     --   --   --   --  202 206 188    < > = values in this interval not displayed.     Basic Metabolic Panel  Recent Labs   Lab 08/17/23  0017 08/16/23 2311 08/16/23 2212 08/16/23 2128 08/16/23 2126 08/16/23 2014 08/16/23 1934 08/16/23 1913 08/16/23 1853 08/16/23  0750 08/16/23  0525 08/15/23  0918 08/15/23  0535 08/13/23  1215 08/13/23  1212   NA  --   --   --   --  140  --  138 138 138   < > 138  --  136  --  137   POTASSIUM  --   --   --   --  3.7  --  3.9 4.0 4.2   < > 3.8  --  3.5  --  4.0   CHLORIDE  --   --   --   --  104  --   --   --   --   --  104  --  103  --  105   CO2  --   --   --   --  21*  --   --   --   --   --  22  --  23  --  24   BUN  --   --   --   --  23.3*  --   --   --   --   --  18.0  --  23.6*  --  13.0   CR  --   --   --   --  0.98*  --   --   --   --   --  0.64  --  0.76  --  0.61   * 186* 214* 217* 243*   < > 328* 349* 370*   < > 151*   < > 182*   < > 235*    < > = values in this interval not displayed.     Liver Function Tests  Recent Labs   Lab 08/15/23  0535 08/13/23  1212   AST  20 31   ALT 21 27   ALKPHOS 105* 109*   BILITOTAL 0.2 0.3   ALBUMIN 3.8 3.8     Pancreatic Enzymes  Recent Labs   Lab 08/13/23  1212   LIPASE 25     Coagulation Profile  No lab results found in last 7 days.  Cultures  No lab results found in last 7 days.    IMAGING:   Recent Results (from the past 24 hour(s))   XR Chest Port 1 View    Narrative    EXAM: XR Chest 1 view 8/16/2023 7:17 PM      HISTORY: Doctor request.    COMPARISON: Chest radiograph 5/7/2020.     TECHNIQUE: Frontal view of the chest.    FINDINGS: ET tube is in the midthoracic trachea. Right IJ CVC with tip  in the upper SVC near the brachiocephalic confluence.  Trachea is midline. Cardiac and mediastinal silhouette is within  normal limits. Streaky perihilar pulmonary opacities. No pleural  effusions. No pneumothoraces. Low lung volumes. No acute osseous  abnormalities.      Impression    IMPRESSION:   1. ET tube is in the mid thoracic trachea.  2. Streaky perihilar pulmonary opacities, which may be seen with  atelectasis, edema, or infection.    I have personally reviewed the examination and initial interpretation  and I agree with the findings.    LINDA OLIVERA,          SYSTEM ID:  [id number]       CURRENT MEDICATIONS:   dextrose      dextrose 5% and 0.45% NaCl + KCl 20 mEq/L 100 mL/hr at 08/17/23 0000    fentaNYL 25 mcg/hr (08/17/23 0000)    insulin regular 3 Units/hr (08/17/23 0000)    propofol 50 mcg/kg/min (08/17/23 0000)    And    - MEDICATION INSTRUCTIONS -      vasopressin        chlorhexidine  15 mL Mouth/Throat Q12H    enoxaparin ANTICOAGULANT  40 mg Subcutaneous Q24H    hydrocortisone sodium succinate PF  50 mg Intravenous Q6H    magnesium sulfate  4 g Intravenous Once    pantoprazole  40 mg Per Feeding Tube QAM AC    Or    pantoprazole  40 mg Intravenous QAM AC    potassium chloride  10 mEq Intravenous Q1H    sodium chloride (PF)  3 mL Intracatheter Q8H     dextrose, glucose **OR** dextrose **OR** glucagon, fentaNYL, lidocaine 4%,  lidocaine (buffered or not buffered), propofol **AND** propofol **AND** CK total **AND** Triglycerides **AND** - MEDICATION INSTRUCTIONS - **AND** Notify Physician, naloxone **OR** naloxone **OR** naloxone **OR** naloxone, ondansetron **OR** ondansetron, prochlorperazine **OR** prochlorperazine **OR** prochlorperazine, sodium chloride (PF)

## 2023-08-17 NOTE — ANESTHESIA CARE TRANSFER NOTE
Patient: Jessica Kennedy    Procedure: Procedure(s):  ADRENALECTOMY, LAPAROSCOPIC BILATERAL       Diagnosis: Benign pheochromocytoma of both adrenal glands [D35.01, D35.02]  Diagnosis Additional Information: No value filed.    Anesthesia Type:   General     Note:    Oropharynx: endotracheal tube in place and ventilatory support  Level of Consciousness: iatrogenic sedation      Independent Airway: airway patency not satisfactory and stable  Dentition: dentition unchanged  Vital Signs Stable: post-procedure vital signs reviewed and stable  Report to RN Given: handoff report given  Patient transferred to: ICU  Comments: Pt transported to ICU without complication. Report given to RN. Sugammadex held per MDA.   ICU Handoff: Call for PAUSE to initiate/utilize ICU HANDOFF, Identified Patient, Identified Responsible Provider, Reviewed the Pertinent Medical History, Discussed Surgical Course, Reviewed Intra-OP Anesthesia Management and Issues during Anesthesia, Set Expectations for Post Procedure Period and Allowed Opportunity for Questions and Acknowledgement of Understanding      Vitals:  Vitals Value Taken Time   BP     Temp     Pulse 74 08/16/23 2019   Resp 15 08/16/23 2019   SpO2 99 % 08/16/23 2019   Vitals shown include unvalidated device data.    Electronically Signed By: ARTIE Cage CRNA  August 16, 2023  8:20 PM

## 2023-08-17 NOTE — CONSULTS
Care Management Initial Consult    General Information  Assessment completed with: Family, Children, Daughter Deena, patient's sister and other daughters  Type of CM/SW Visit: Initial Assessment    Primary Care Provider verified and updated as needed: Yes   Readmission within the last 30 days: no previous admission in last 30 days      Reason for Consult: care coordination/care conference  Advance Care Planning: Advance Care Planning Reviewed: no concerns identified          Communication Assessment  Patient's communication style: spoken language (English or Bilingual)    Hearing Difficulty or Deaf: no   Wear Glasses or Blind: no    Cognitive  Cognitive/Neuro/Behavioral: .WDL except  Level of Consciousness: sedated  Arousal Level: arouses to voice  Orientation: other (see comments)  Mood/Behavior: other (see comments) (intermittently restless)  Best Language:  (isac)  Speech: ISAC (unable to assess)    Living Environment:   People in home: child(steph), dependent, child(steph), adult  patient has 5 children that live with her at home ages 19 to 8.  Current living Arrangements: apartment      Able to return to prior arrangements: yes       Family/Social Support:  Care provided by: self, child(steph)  Provides care for: child(steph)  Marital Status: Single  Children, Sibling(s)          Description of Support System: Supportive, Involved    Support Assessment: Adequate family and caregiver support, Adequate social supports    Current Resources:   Patient receiving home care services: No     Community Resources:    Equipment currently used at home: none  Supplies currently used at home:      Employment/Financial:  Employment Status: homemaker        Financial Concerns: unable to afford rent/mortgage, unable to afford food   Referral to Financial Worker: No  Finance Comments: requested MSW to see for resources for food and housing payment assistance    Does the patient's insurance plan have a 3 day qualifying hospital stay  waiver?  No    Lifestyle & Psychosocial Needs:  Social Determinants of Health     Tobacco Use: Low Risk  (8/17/2023)    Patient History     Smoking Tobacco Use: Never     Smokeless Tobacco Use: Never     Passive Exposure: Never   Alcohol Use: Not on file   Financial Resource Strain: Not on file   Food Insecurity: Not on file   Transportation Needs: Not on file   Physical Activity: Not on file   Stress: Not on file   Social Connections: Not on file   Intimate Partner Violence: Not on file   Depression: Not at risk (1/4/2023)    PHQ-2     PHQ-2 Score: 0   Housing Stability: Not on file       Functional Status:  Prior to admission patient needed assistance:   Dependent ADLs:: Independent, Bathing, Dressing, Transfers, Eating, Positioning, Toileting  Dependent IADLs:: Cleaning, Shopping, Meal Preparation, Cooking, Medication Management (Daughters assisted with AIDLS)  Assesssment of Functional Status: Not at  functional baseline    Mental Health Status:  Mental Health Status: No Current Concerns       Chemical Dependency Status:  Chemical Dependency Status: No Current Concerns             Values/Beliefs:  Spiritual, Cultural Beliefs, [employer] Practices, Values that affect care: no          Values/Beliefs Comment: Family requested [employer] to see the patient    Additional Information:  Patient is admitted for ICU s/p BL adrenalectomy for BL Pheochromocytomas on 8/11/2023. PMH notable for MEN2A and T2DM (A1c 9.0).    RNCC met with patient's 19 years-old daughter Deena ([phone]) and other 2 daughters and 3 patient's relatives and explained care management role, confirmed demographics. Patient does not have any HCD.    Daughter report that patient lived at home with her 5 children of ages 19-8.  There is an elevator in the apartment building. Patient was independent with some of her ADLS. Daughters assisted with AIDLs. Patient did not have any HHC. Patient drove herself to medical appointments.     Daughters  reported that they have difficulties with paying for their rent (Section 8 housing) and securing food.   They reported that since Deena started to work full time food assistance program became not available for them.     RNCC requested MSW to provide food and rent assistance resources.   Family is open for Child life services and Yarsanism  visit.   Care Management team will continue to follow for discharge needs during this hospitalization.       Referrals made for Child life and for Spiritual care.   Letty Quintero MSN, MA, CCM, RN  4C/4A ICU Care Coordinator  Phone:[phone]  Pager: [phone]    For Weekend & Holiday on call RN Care Coordinator:  [facility] ([phone]) Saturday & Sunday; ([phone]) [facility] Recognized Holidays   Pager: [phone] Units: 4A, 4C, 4E, 5A & 5B

## 2023-08-17 NOTE — PROGRESS NOTES
SURGICAL ICU PROGRESS NOTE  08/17/2023      PRIMARY TEAM: Minimally invasive surgery  PRIMARY PHYSICIAN: Dr. Caceres  REASON FOR CRITICAL CARE ADMISSION: Invasive BP monitoring   ADMITTING PHYSICIAN: Dr. Sharp      ASSESSMENT: Jessica Kennedy is a 48F who was admitted to the SICU for invasive BP monitoring s/p BL adrenalectomy for BL Pheochromocytomas on 8/16/2023. PMH notable for MEN2A and T2DM (A1c 9.0).      Interval Events:  - NAOE    Plan/Today's Changes  - Per RN, pt becoming more agitated with weaning of propofol -> will add Precedex gtt to bridge sedation as prop is weaned for pressure support trial  - Schedule APAP, PRN oxy for pain management post-extubation  - CLD post-extubation  - Schedule senna-docusate BID  - Stop mIVF if able to start CLD  - Pull high post-extubation     Neurological:  # Acute pain   # Agitation   # Encephalopathy   - Monitor neurological status. Delirium preventions and precautions.  - Pain: Fentanyl gtt, will schedule APAP 975 mg TID, oxycodone 5 mg Q4H PRN for pain management post-extubation  - Sedation: propofol -> will bridge sedation with Precedex for PST    Pulmonary:   Vent Mode: CMV/AC  (Continuous Mandatory Ventilation/ Assist Control)  FiO2 (%): 40 %  Resp Rate (Set): 15 breaths/min  Tidal Volume (Set, mL): 450 mL  PEEP (cm H2O): (S) 7 cmH2O  Resp: 15    Arterial Blood Gases   Recent Labs   Lab 08/17/23  0402 08/16/23  2126 08/16/23  1934 08/16/23  1913   PH 7.45 7.40 7.41 7.39   PCO2 36 37 38 40   PO2 73* 79* 68* 73*   HCO3 25 23 24 24     # Post operative ventilatory support  # Acute hypoxic respiratory support  - VENT: Intubated. Continue full vent support -> plan for PST this AM. Work towards extubation as able. Ventilatory bundle. HOB elevation   - Supplemental O2, goal SpO2 > 90%.    Cardiovascular:    # HTN (PTA)  # Hemodynamic Monitoring s/p OR  - MAPs  via arterial line  - Off pressors since 8/16 1600   - If pressors are needed, use vasopressin  d/t alpha and beta blockade induced pre-operatively   - PTA antihypertensives held post-operatively: amlodipine 10 mg daily, carvedilol 25 mg BID, doxazosin 24 mg BID, losartan 50 mg daily, phenoxybenzamine 10 mg daily  - Continue to closely monitor hemodynamic status, reintroduce PTA anti-hypertensive(s) if needed.     Gastroenterology/Nutrition:  Liver Function Tests  Recent Labs   Lab 08/15/23  0535 08/13/23  1212   AST 20 31   ALT 21 27   ALKPHOS 105* 109*   BILITOTAL 0.2 0.3   ALBUMIN 3.8 3.8       Fluids/Electrolytes/Renal:   I/O last 3 completed shifts:  In: 4538.92 [P.O.:30; I.V.:4508.92]  Out: 560 [Urine:535; Blood:25]  Basic Metabolic Panel  Recent Labs   Lab 08/17/23  0555 08/17/23  0501 08/17/23  0406 08/17/23  0402 08/16/23 2128 08/16/23 2126 08/16/23 2014 08/16/23  1934 08/16/23  1913 08/16/23  0750 08/16/23  0525 08/15/23  0918 08/15/23  0535   NA  --   --   --  139  --  140  --  138 138   < > 138  --  136   POTASSIUM  --   --   --  3.9  --  3.7  --  3.9 4.0   < > 3.8  --  3.5   CHLORIDE  --   --   --  106  --  104  --   --   --   --  104  --  103   CO2  --   --   --  22  --  21*  --   --   --   --  22  --  23   BUN  --   --   --  21.0*  --  23.3*  --   --   --   --  18.0  --  23.6*   CR  --   --   --  0.88  --  0.98*  --   --   --   --  0.64  --  0.76   * 121* 125* 139*   < > 243*   < > 328* 349*   < > 151*   < > 182*    < > = values in this interval not displayed.     # Volume Status  - +4L 8/16, +0.3L since midnight  - UOP: 1.07 ml/kg/hr since midnight, this is adequate  - Stop mIVF in setting of resuming PO intake post-extubation   - Will pull Michael post-extubation    # Electrolytes  - Balanced, LEATHA    Endocrine:  # MEN2A  # S/P BL total adrenalectomy for BL pheochromocytomas  - Endocrine following  - Continue IV hydrocortisone 50 mg Q8H, can titrate down to 25 mg Q8H tomorrow 8/18  - Will require exogenous steroids indefinitely in setting of non-cortical sparing b/l adrenalectomy      #  Stress Hyperglycemia   # Steroid-induced Hyperglycemia  # T2DM   - Last A1C 9.0  - Glucose 121-349/24 hrs, euglycemic this AM. BG goal 140-180 for optimal wound healing  - Continue insulin gtt, can switch to basal bolus regimen once extubated and eating   - Hold PTA metformin, glipizide    ID:  # Leukocytosis 2/2 Post-Operative Inflammation  -  No indications for antibiotics  -  Trend CBCs QAM.     Heme:     Complete Blood Count   Recent Labs   Lab 08/17/23  0402 08/16/23  2126 08/16/23  1934 08/16/23  1913 08/16/23  1812 08/16/23  0525 08/15/23  0535   WBC 14.6* 15.1*  --   --   --  9.9 9.8   HGB 11.5* 12.7 12.4 12.4   < > 13.0 12.4    183  --   --   --  202 206    < > = values in this interval not displayed.     # Acute blood loss anemia   # Anemia of critical illness   - Hemoglobin stable  - Transfuse if hgb <7.0. Trend CBCs QAM.  - DVT ppx: start Lovenox 40 mg daily today    MSK:  # Weakness and deconditioning of critical illness  - Physical and occupational therapy consult     General Cares/Prophylaxis:    DVT Prophylaxis: Mechanical, Lovenox to start today  GI Prophylaxis: PPI  Restraints: Restraints for medical healing needed: NO  LDAs:  - ETT  - CVC (note: it terminates prematurely, should be treated as more of a peripheral or midline)   - R radial arterial line - discontinue   - Will pull CVC and radial art line this afternoon if she continues to be hemodynamically stable  - PIVx1  - Rodriguez, will remove post-extubation    Disposition:  - SICU; barriers to floor status are HD stability    Patient seen and discussed with staff Dr. Jimenez.     Coni Peoples, MS4  [employer]      --------------------------------------------------------------------------------------------------------------------    I saw and evaluated the patient in an independent visit and agree with the student's assessment and plan as written above. I was present at all times for any mentioned procedures.       Sabra Garsia MD, PharmD  General Surgery, PGY2  Pager [phone]    ====================================  SUBJECTIVE:  Jessica is lying in bed this morning, able to open eyes to verbal stimuli. Moving all 4 extremities spontaneously but not on command.     OBJECTIVE:     Temp:  [97.5  F (36.4  C)-98.5  F (36.9  C)] 98.1  F (36.7  C)  Pulse:  [63-86] 68  Resp:  [10-20] 15  BP: (121-141)/(73-93) 133/84  MAP:  [71 mmHg-241 mmHg] 102 mmHg  Arterial Line BP: (100-168)/(53-92) 141/76  FiO2 (%):  [40 %-60 %] 40 %  SpO2:  [96 %-100 %] 98 %  Vent Mode: CMV/AC  (Continuous Mandatory Ventilation/ Assist Control)  FiO2 (%): 40 %  Resp Rate (Set): 15 breaths/min  Tidal Volume (Set, mL): 450 mL  PEEP (cm H2O): (S) 7 cmH2O  Resp: 15      I/O last 3 completed shifts:  In: 4538.92 [P.O.:30; I.V.:4508.92]  Out: 560 [Urine:535; Blood:25]    General/Neuro: Intubated and sedated. PERRL. Moving all 4 extremities spontaneously but not to command. Unable to assess EOM.   CV: RRR, no r/g/m  Pulm: Intubated on ventilator. 500/15/40%/7  Abd: soft; NT, ND. Abdominal incisions without strikethrough.   Extremities: no edema  Skin: No obvious rashes, jaundice, erythema; wwp     LABS:   Arterial Blood Gases   Recent Labs   Lab 08/17/23  0402 08/16/23 2126 08/16/23 1934 08/16/23 1913   PH 7.45 7.40 7.41 7.39   PCO2 36 37 38 40   PO2 73* 79* 68* 73*   HCO3 25 23 24 24     Complete Blood Count   Recent Labs   Lab 08/17/23  0402 08/16/23 2126 08/16/23 1934 08/16/23 1913 08/16/23 1812 08/16/23  0525 08/15/23  0535   WBC 14.6* 15.1*  --   --   --  9.9 9.8   HGB 11.5* 12.7 12.4 12.4   < > 13.0 12.4    183  --   --   --  202 206    < > = values in this interval not displayed.     Basic Metabolic Panel  Recent Labs   Lab 08/17/23  0501 08/17/23  0406 08/17/23  0402 08/17/23  0301 08/16/23 2128 08/16/23 2126 08/16/23 2014 08/16/23  1934 08/16/23  1913 08/16/23  0750 08/16/23  0525 08/15/23  0918 08/15/23  0535   NA  --   --  139  --   --   140  --  138 138   < > 138  --  136   POTASSIUM  --   --  3.9  --   --  3.7  --  3.9 4.0   < > 3.8  --  3.5   CHLORIDE  --   --  106  --   --  104  --   --   --   --  104  --  103   CO2  --   --  22  --   --  21*  --   --   --   --  22  --  23   BUN  --   --  21.0*  --   --  23.3*  --   --   --   --  18.0  --  23.6*   CR  --   --  0.88  --   --  0.98*  --   --   --   --  0.64  --  0.76   * 125* 139* 132*   < > 243*   < > 328* 349*   < > 151*   < > 182*    < > = values in this interval not displayed.     Liver Function Tests  Recent Labs   Lab 08/15/23  0535 08/13/23  1212   AST 20 31   ALT 21 27   ALKPHOS 105* 109*   BILITOTAL 0.2 0.3   ALBUMIN 3.8 3.8     Pancreatic Enzymes  Recent Labs   Lab 08/13/23  1212   LIPASE 25     Coagulation Profile  No lab results found in last 7 days.      IMAGING:   Recent Results (from the past 24 hour(s))   XR Chest Port 1 View    Narrative    EXAM: XR Chest 1 view 8/16/2023 7:17 PM      HISTORY: Doctor request.    COMPARISON: Chest radiograph 5/7/2020.     TECHNIQUE: Frontal view of the chest.    FINDINGS: ET tube is in the midthoracic trachea. Right IJ CVC with tip  in the upper SVC near the brachiocephalic confluence.  Trachea is midline. Cardiac and mediastinal silhouette is within  normal limits. Streaky perihilar pulmonary opacities. No pleural  effusions. No pneumothoraces. Low lung volumes. No acute osseous  abnormalities.      Impression    IMPRESSION:   1. ET tube is in the mid thoracic trachea.  2. Streaky perihilar pulmonary opacities, which may be seen with  atelectasis, edema, or infection.    I have personally reviewed the examination and initial interpretation  and I agree with the findings.    LINDA OLIVERA DO         SYSTEM ID:  [id number]     Critical Care Services Progress Note:     Patient is a 48 year old y/o female admitted on 8/11/2023  1:29 PM, remains critically ill with hemodynamic instability requiring monitoring      ICU length of stay 15h    Code  Status: Full Code    Recent Events:  Admitted overnight from OR     Key findings and decisions made today included     #Bilateral adrenalectomy for BL pheochromocytoma  -continue to monitor cardiopulmonary status    #mechanical ventilation post op  -plan to wean and extubate today  -wean sedation and add precedex for assisted weaning of vent    #HTN  -gradually restart HTN    #Endo  -Endocrine is managing new endocrine replacement    Exam:  Temp:  [98  F (36.7  C)-98.5  F (36.9  C)] 98.4  F (36.9  C)  Pulse:  [63-93] 63  Resp:  [10-17] 15  BP: (108-141)/(69-93) 133/87  MAP:  [71 mmHg-241 mmHg] 98 mmHg  Arterial Line BP: (100-168)/(53-92) 136/74  FiO2 (%):  [40 %-60 %] 40 %  SpO2:  [96 %-100 %] 98 %    Intake/Output Summary (Last 24 hours) at 8/17/2023 1127  Last data filed at 8/17/2023 1100  Gross per 24 hour   Intake 6004.39 ml   Output 1900 ml   Net 4104.39 ml     Vent Mode: CMV/AC  (Continuous Mandatory Ventilation/ Assist Control)  FiO2 (%): 40 %  Resp Rate (Set): 15 breaths/min  Tidal Volume (Set, mL): 450 mL  PEEP (cm H2O): 7 cmH2O  Resp: 15        Results:  ABG   Recent Labs   Lab 08/17/23 0402 08/16/23 2126 08/16/23 1934 08/16/23 1913   PH 7.45 7.40 7.41 7.39   PCO2 36 37 38 40   PO2 73* 79* 68* 73*   HCO3 25 23 24 24     CBC  Recent Labs   Lab 08/17/23 0402 08/16/23 2126 08/16/23 1934 08/16/23 1913 08/16/23  1812 08/16/23  0525 08/15/23  0535   WBC 14.6* 15.1*  --   --   --  9.9 9.8   HGB 11.5* 12.7 12.4 12.4   < > 13.0 12.4   HCT 35.5 38.2  --   --   --  38.7 38.2    183  --   --   --  202 206    < > = values in this interval not displayed.     BMP  Recent Labs   Lab 08/17/23  1014 08/17/23  0808 08/17/23  0650 08/17/23  0555 08/17/23  0406 08/17/23  0402 08/16/23 2128 08/16/23 2126 08/16/23 2014 08/16/23  1934 08/16/23  1913 08/16/23  0750 08/16/23  0525 08/15/23  0918 08/15/23  0535   NA  --   --   --   --   --  139  --  140  --  138 138   < > 138  --  136   POTASSIUM  --   --   --   --    --  3.9  --  3.7  --  3.9 4.0   < > 3.8  --  3.5   CHLORIDE  --   --   --   --   --  106  --  104  --   --   --   --  104  --  103   CO2  --   --   --   --   --  22  --  21*  --   --   --   --  22  --  23   BUN  --   --   --   --   --  21.0*  --  23.3*  --   --   --   --  18.0  --  23.6*   CR  --   --   --   --   --  0.88  --  0.98*  --   --   --   --  0.64  --  0.76   * 143* 138* 121*   < > 139*   < > 243*   < > 328* 349*   < > 151*   < > 182*    < > = values in this interval not displayed.     LFT  Recent Labs   Lab 08/15/23  0535 08/13/23  1212   AST 20 31   ALT 21 27   ALKPHOS 105* 109*   BILITOTAL 0.2 0.3   ALBUMIN 3.8 3.8     Pancreas  Recent Labs   Lab 08/13/23  1212   LIPASE 25     INR  Lab Results   Component Value Date    INR 1.07 05/07/2020       ICU standards of care:  DVT PPx: Lovenox, SCD  GI PPx: PPI  FENGI: NPO for Medical/Clinical Reasons Except for: No Exceptions      I personally examined and evaluated the patient today.   The patient s prognosis today is improving  I have evaluated all laboratory values and imaging studies from the past 24 hours.  I personally managed the ventilator, sedation, pain control and analgesia, and vasoactive medications.   Consults ongoing and ordered are Endocrinology and Surgery  Procedures that will happen today are: extubation  All treatments were placed at my direction.  I formulated today s plan with the house staff team or resident(s) and agree with the findings and plan in the associated note.       The above plans and care have been discussed with no one and all questions and concerns were addressed.     I spent a total of 30 minutes (excluding procedure time) personally providing and directing critical care services at the bedside and on the critical care unit for Jessica Jimenez MD    Critical Care Medicine

## 2023-08-17 NOTE — OP NOTE
INDICATIONS FOR PROCEDURE  Jessica Kennedy is a 48 year old female who has history of MEN2A with work-up consistent with synchronous pheochromocytoma as well as medullary thyroid cancer and primary hyperparathyroidism. After appropriate preoperative optimization with our multidisciplinary team, adrenalectomy was recommended. Risks, benefits and alternative was discussed and the patient elected to undergo surgical intervention.   General risks related to abdominal surgery were reviewed with the patient. These include, but are not limited to, bleeding, infection, damage to other organs, death, myocardial infarction, deep venous thrombosis with or without pulmonary embolus, and risk of needing additional procedures.      OPERATIVE PROCEDURE:  After induction of general anesthesia the patient was positioned in the left lateral decubitus position.  The patient was then prepped and draped in the standard sterile fashion and a preoperative timeout was performed.  Under the benefits of general endotracheal anesthesia, an incision was made in the left upper quadrant. This was carried down to the level of the fascia. The anterior and posterior sheaths were incised and a Raza trochar was inserted. Pneumoperitoneum to maximum pressure of 15mmHg was used.  A total of 4 ports were placed along the right subcostal margin.      A liver retractor was placed and the liver was retracted cranially exposing the peritoneum overlying the adrenal gland. Adhesions near the hepatic flexure of the colon were divided with harmonic scalpel allowing the colon to be rotated medially out of the operative field. The IVC was identified. Dissection began lateral to IVC along the interface between the liver and the retroperitoneum. This was continued along the diaphragm. All fibrofatty attachments were dissected free with a combination of blunt, electrocautery and harmonic dissection with the following margins: Kidney caudally, diaphragm cranially,  peritoneum superficially, psoas deep, IVC medially and lateral abdominal wall laterally. Compete dissection revealed adrenal vein at the superior-medial border of the gland as well as a posterior vessel laterally and a vascular pedicle inferomedial traveling directly to the adrenal gland. The renal vein was clearly identified and kept safe from our dissection. Each of these pedicles were preserved in an attempt to perform a cortical sparing adrenalectomy on the right. Laparoscopic ultrasound was then performed on the right adrenal gland which appeared to be completely tumor replaced prohibiting an adrenal sparing approach on the right. The adrenal vein was ligated with 45 mm grey load  Ethicon Brownell flex linear cutter stapler device. The remaining two vascular pedicles were controlled with harmonic scalpel. The adrenal capsule was not violated during the dissection. The entire gland was lifted from the peritoneum en block with surrounding fat and fibrous tissue, and placed in an endocatch bag then removed from the abdomen via an extended 12 port incision. The area of dissection was inspected for hemostasis revealing a small venous bleed near the IVC. This was too short to clip and too close to IVC to use electrocautery therefor hemostatic agents were applied with surgiflo and surgicel. Insufflation was released and 5 minutes were allowed to pass following which the abdomen was reinsufflated to inspect again the dissection bed as well as the area of concern. Hemostasis was confirmed.     The fascia of the extended 12 port incision was closed with a running 0 Vicryl suture. Skin was closed with staples and sterile dressings were applied.     The sterile set up was maintained.     Next the patient was repositioned into right lateral decubitus position. The patient was then prepped and draped in the standard sterile fashion and a preoperative timeout was performed.  Under the benefits of general endotracheal  anesthesia, an incision was made in the left upper quadrant. This was carried down to the level of the fascia. The anterior and posterior sheaths were incised and a Raza trochar was inserted. Pneumoperitoneum to maximum pressure of 15mmHg was used.  A total of 4 ports were placed along the left subcostal margin.    The splenic flexure of the colon was mobilized to allow the colon to drop away from the working area. Lateral attachments to the spleen and colon allowing medial rotation of the spleen, pancreatic tail and the colon using harmonic scalpel to expose the adrenal gland. Dissection followed the contour of the diaphragm and extended along the psoas on the left. Dissection continued along the lateral abdominal wall. Next, the avascular  plane between the kidney and the adrenal gland was developed. The adrenal gland was then rotated superiomedially, and dissection continued identifying the adrenal vein. Again, laparoscopic ultrasound was used to identify any normal appearing adrenal tissue for salvage. Again ultrasound demonstrated tumor replaced adrenal gland prohibiting a cortical sparing approach. The adrenal vein was quite small and controlled with x3 10 mm clips on the stay side and x1 on the specimen side. Additional small vessels were controlled with harmonic scalpel. The entire gland as well as fibrofatty tissue surrounding the gland was placed in an endocatch bag and removed from the abdomen from a stretched 12mm port. The adrenal capsule was not violated during the dissection. The fascia at this site was closed with a running 0 Vicryl suture. The abdomen was reinsuflated to inspect for hemostasis which was confirmed. The patient was tilted to a semi-supine position with OR table tilt allowing another inspection at the right sided dissection which appeared hemostatic.      All sponge and needle counts were correct x 2 at the end of the procedure.    Dr. Caceres was present and scrubbed for all  critical portions of the operation.     Operative report initially prepared by:   Leora Lambert MD  Surgery PGY-4

## 2023-08-17 NOTE — PROCEDURES
BEDSIDE PROCEDURE - ARTERIAL LINE    Date of Procedure: 08/17/2023     Patient: Jessica Kennedy   MRN: [id number]     RESIDENT: Samson De La Cruz MD, PGY2    FELLOW: Kishor Rodas    STAFF SURGEON: Dr. Sharp      SEDATION: Propofol (already intubated)    ESTIMATED BLOOD LOSS: 50ml       COMPLICATIONS: None.        INDICATIONS FOR PROCEDURE: Hemodynamic monitoring    DESCRIPTION OF PROCEDURE:  A time-out was completed verifying correct patient, procedure, site, positioning, and special equipment if applicable. The patient s left wrist was prepped and draped in sterile fashion. The left radial artery was punctured with ultrasound guidance with appropriate pulsatile blood flow. A guidewire was introduced. The needle was removed and a catheter was threaded over the guidewire, which was then removed. The catheter was then connected to the transducer and a good waveform was confirmed. The catheter was sutured in place to the skin and a sterile dressing applied. Dr. Rodas and Dr. Sharp were both immediately available for the entire procedure.    - - - - - - - - - - - - - - - - - -  Samson De La Cruz MD  General Surgery PGY-2    See [facility] for on-call pager information.

## 2023-08-17 NOTE — PROGRESS NOTES
Admitted/transferred from: PACU  Reason for admission/transfer: Unable to extubate  Patient status upon admission/transfer: Intubated and sedated. L radial artline, R internal jugular. Insulin gtt, propofol gtt, maintenance fluids. Pt hypertensive with systolic's in 180s  Interventions: Artline replaced, LR changed to D5 1/2NC +20KCl. Fentanyl gtt started  Plan: Monitor respiratory status, BG's , BP.   2 RN skin assessment: completed by Akua RAMIREZ And Nadya ABEBE   Result of skin assessment and interventions/actions: No concerns  Height, weight, drug calc weight: already completed  Patient belongings (see Flowsheet - Adult Profile for details): With pt  MDRO education (if applicable):  N/A

## 2023-08-17 NOTE — PROGRESS NOTES
Right wrist and Left wrist restraints initiated on patient on 8/17/2023 at 8:00 AM    Clinical Justification: Pulling lines, pulling tubes, and pulling equipment  Less Restrictive Alternative: Repositioning, Re-evaluate equipment, Alarm, Reorientation  Attending Physician Notified: Yes, Attending Physician's Name: Sun   Order received: Yes         Criteria explained to Patient  Patient's Response: No evidence of learning  Restraint care Plan initiated: Yes    Faisal Chase RN

## 2023-08-17 NOTE — PROGRESS NOTES
Endocrine Progress Note  Patient: Jessica Kennedy   MRN: [id number]  Date of Service: 08/17/2023    Chart reviewed  S/p bilateral adrenalectomy for bilateral pheochromocytomas.  All antihypertensive medications have been stopped in the postop setting  So far no issues with postop hypotension or hypertension  Currently sedated and intubated, on IV fluids, good urine output  On insulin drip    Physical Examination:  /70   Pulse 75   Temp 98.4  F (36.9  C) (Axillary)   Resp (P) 16   Wt 100.5 kg (221 lb 9 oz)   LMP 05/09/2023 (Approximate)   SpO2 96%   BMI 34.70 kg/m    Sedated/intubated    Medications:  Reviewed    Assessment and plan:  Jessica Kennedy is a 48 year old  female with PMHx of MEN 2A with bilateral synchronous pheochromocytomas, HTN, T2DM who was admitted for optimization of blood pressure control / alpha blockade prior to bilateral adrenalectomy. Now s/p bilateral adrenalectomy     # Pheochromocytoma, bilateral  S/p bilateral adrenalectomy POD 1  No issues so far with postop hypotension or hypertension  All antihypertensive medication has been discontinued  Continue to monitor BP    #Adrenal insufficiency  On stress dose IV hydrocortisone  Continue with 50 mg IV every 8 hours today and can titrate down to 25g every 8 hours tomorrow.   will add fludrocortisone once on lower/ physiological doses of  hydrocortisone    # Type 2 Diabetes Mellitus  On insulin drip currently.  Can be changed to basal bolus regimen once extubated and eating     # Primary Hyperparathyroidism  Per primary Endocrinologist, Dr. Vanessa, treatment for this will be set up as outpatient, not during this admission.      # MEN 2A    BUCK Chakraborty     Note: Chart documentation done in part with Dragon Voice Recognition software. Although reviewed after completion, some word and grammatical errors may remain.  Please consider this when interpreting information in this chart

## 2023-08-17 NOTE — PROGRESS NOTES
[facility]    Progress Note - MIS Service       Date of Admission:  8/11/2023    Assessment & Plan: Surgery   48 year old woman with DM2, MEN2A with primary hyperparathyroidism, medullary thyroid cancer and bilateral synchronous pheochromocytoma now POD 1 bilateral total thyroidectomy. Intubated with decreasing vent settings. No current pressors with hydrocortisone.     - Appreciate excellent ICU cares  - Okay to start DVT ppx today from surgery perspective  - Once extubated okay for diet       Diet: NPO for Medical/Clinical Reasons Except for: No Exceptions    DVT Prophylaxis: Defer to primary service  Rodriguez Catheter: PRESENT, indication: Strict 1-2 Hour I&O  Lines: PRESENT      CVC Triple Lumen Right Internal jugular-Site Assessment: WDL  Arterial Line 08/16/23 Radial-Site Assessment: WDL      Drains: None     Cardiac Monitoring: ACTIVE order. Indication: ICU  Code Status: Full Code      Clinically Significant Risk Factors            # Hypomagnesemia: Lowest Mg = 1.4 mg/dL in last 2 days, will replace as needed                         Disposition Plan      Expected Discharge Date: 08/18/2023      Destination: home with family  Discharge Comments: surgery 8/16 followed by a few days in SICU for BP control.         The patient's care was discussed with the Attending Physician, Dr. Caceres .    Leora Lambert MD  [facility]  Non-urgent messages: Securely message with [employer] (more info)  Text page via [employer] Paging/Directory     ______________________________________________________________________    Interval History   Restless, intubated, sedated.    Physical Exam   Vital Signs: Temp: 98.1  F (36.7  C) Temp src: Axillary BP: 133/84 Pulse: 71   Resp: 15 SpO2: 98 % O2 Device: Mechanical Ventilator    Weight: 221 lbs 9 oz  Intake/Output Summary (Last 24 hours) at 8/17/2023 0749  Last data filed at 8/17/2023  0700  Gross per 24 hour   Intake 5523.87 ml   Output 1480 ml   Net 4043.87 ml     General Appearance: Appears comfortable  Respiratory: Synchronous with vent  Cardiovascular: RRR  GI: Abdomen soft, approprately tender, incisions with bandages that are clean and dry  Skin: Normal color and turgor  Other: Ext WWP           Data     I have personally reviewed the following data over the past 24 hrs:    14.6 (H)  \   11.5 (L)   / 201     139 106 21.0 (H) /  138 (H)   3.9 22 0.88 \     Procal: N/A CRP: N/A Lactic Acid: 2.4 (H)

## 2023-08-17 NOTE — PROGRESS NOTES
Right wrist and Left wrist & Bilateral Mitts restraints discontinued at 1:14 PM on 8/17/2023.    Restraint discontinue criteria met, patient is calm, cooperative and safe. Restraints removed.     Patient's Response: No evidence of learning     Attending Physician Notified: Yes, Attending Physician's Name: Sun Chase RN

## 2023-08-17 NOTE — PROGRESS NOTES
[facility], Procedure Note          Extubation:       Jessica Kennedy  MRN# [id number]   August 17, 2023, 1:07 PM         Patient extubated at: August 17, 2023, 1:07 PM   Supplemental Oxygen: Via room air    Cough: The cough is good and non-productive   Secretion Mode: Able to clear   Secretion Amount: Scant amount, moderately thick and tan in color   Respiratory Exam:: Breath sounds: equal and clear and good aeration     Location: all lobes and bilaterally   Skin Exam:: Patient color: natural   Patient Status: Currently appears comfortable   Arterial Blood Gasses: pH Arterial (no units)   Date Value   08/17/2023 7.45     pO2 Arterial (mm Hg)   Date Value   08/17/2023 73 (L)     pCO2 Arterial (mm Hg)   Date Value   08/17/2023 36     Bicarbonate Arterial (mmol/L)   Date Value   08/17/2023 25            Recorded by Walt Leach, RT

## 2023-08-17 NOTE — PLAN OF CARE
ICU End of Shift Summary. See flowsheets for vital signs and detailed assessment.    Changes this shift: Pt opens eyes to voice, not following commands. Pupils equal and reactive. Afebrile, NS 60s-80s. BP stable w/o pressors. Vent settings weaned to peep of 7 and 40%, still on full vent support. No BM, no TF. Rodriguez in place with good UOP. Fent and prop gtt, insulin gtt ALG 2. K and Mag replaced.     Plan: Pressure support, possible extubation.         Goal Outcome Evaluation:      Plan of Care Reviewed With: patient, child    Overall Patient Progress: improvingOverall Patient Progress: improving    Outcome Evaluation: Decresed peep, weaned O2. BP stable w/o pressors, BG controled with gtt

## 2023-08-18 ENCOUNTER — APPOINTMENT (OUTPATIENT)
Dept: INTERPRETER SERVICES | Facility: CLINIC | Age: 48
End: 2023-08-18
Attending: STUDENT IN AN ORGANIZED HEALTH CARE EDUCATION/TRAINING PROGRAM
Payer: COMMERCIAL

## 2023-08-18 ENCOUNTER — APPOINTMENT (OUTPATIENT)
Dept: OCCUPATIONAL THERAPY | Facility: CLINIC | Age: 48
End: 2023-08-18
Attending: STUDENT IN AN ORGANIZED HEALTH CARE EDUCATION/TRAINING PROGRAM
Payer: COMMERCIAL

## 2023-08-18 LAB
ANION GAP SERPL CALCULATED.3IONS-SCNC: 11 MMOL/L (ref 7–15)
BUN SERPL-MCNC: 9.9 MG/DL (ref 6–20)
CA-I BLD-MCNC: 4.7 MG/DL (ref 4.4–5.2)
CALCIUM SERPL-MCNC: 9.1 MG/DL (ref 8.6–10)
CHLORIDE SERPL-SCNC: 109 MMOL/L (ref 98–107)
CREAT SERPL-MCNC: 0.62 MG/DL (ref 0.51–0.95)
DEPRECATED HCO3 PLAS-SCNC: 22 MMOL/L (ref 22–29)
ERYTHROCYTE [DISTWIDTH] IN BLOOD BY AUTOMATED COUNT: 13 % (ref 10–15)
GFR SERPL CREATININE-BSD FRML MDRD: >90 ML/MIN/1.73M2
GLUCOSE BLDC GLUCOMTR-MCNC: 113 MG/DL (ref 70–99)
GLUCOSE BLDC GLUCOMTR-MCNC: 118 MG/DL (ref 70–99)
GLUCOSE BLDC GLUCOMTR-MCNC: 118 MG/DL (ref 70–99)
GLUCOSE BLDC GLUCOMTR-MCNC: 122 MG/DL (ref 70–99)
GLUCOSE BLDC GLUCOMTR-MCNC: 132 MG/DL (ref 70–99)
GLUCOSE BLDC GLUCOMTR-MCNC: 134 MG/DL (ref 70–99)
GLUCOSE BLDC GLUCOMTR-MCNC: 143 MG/DL (ref 70–99)
GLUCOSE BLDC GLUCOMTR-MCNC: 148 MG/DL (ref 70–99)
GLUCOSE BLDC GLUCOMTR-MCNC: 151 MG/DL (ref 70–99)
GLUCOSE BLDC GLUCOMTR-MCNC: 168 MG/DL (ref 70–99)
GLUCOSE SERPL-MCNC: 147 MG/DL (ref 70–99)
HCT VFR BLD AUTO: 37.6 % (ref 35–47)
HGB BLD-MCNC: 11.9 G/DL (ref 11.7–15.7)
MAGNESIUM SERPL-MCNC: 1.8 MG/DL (ref 1.7–2.3)
MCH RBC QN AUTO: 29.6 PG (ref 26.5–33)
MCHC RBC AUTO-ENTMCNC: 31.6 G/DL (ref 31.5–36.5)
MCV RBC AUTO: 94 FL (ref 78–100)
PHOSPHATE SERPL-MCNC: 2.4 MG/DL (ref 2.5–4.5)
PLATELET # BLD AUTO: 186 10E3/UL (ref 150–450)
POTASSIUM SERPL-SCNC: 3.5 MMOL/L (ref 3.4–5.3)
RBC # BLD AUTO: 4.02 10E6/UL (ref 3.8–5.2)
SODIUM SERPL-SCNC: 142 MMOL/L (ref 136–145)
WBC # BLD AUTO: 12 10E3/UL (ref 4–11)

## 2023-08-18 PROCEDURE — 250N000013 HC RX MED GY IP 250 OP 250 PS 637: Performed by: PHARMACIST

## 2023-08-18 PROCEDURE — 250N000009 HC RX 250

## 2023-08-18 PROCEDURE — 97165 OT EVAL LOW COMPLEX 30 MIN: CPT | Mod: GO

## 2023-08-18 PROCEDURE — 120N000002 HC R&B MED SURG/OB UMMC

## 2023-08-18 PROCEDURE — 83735 ASSAY OF MAGNESIUM: CPT

## 2023-08-18 PROCEDURE — 250N000011 HC RX IP 250 OP 636: Mod: JZ | Performed by: INTERNAL MEDICINE

## 2023-08-18 PROCEDURE — 258N000003 HC RX IP 258 OP 636

## 2023-08-18 PROCEDURE — 250N000013 HC RX MED GY IP 250 OP 250 PS 637

## 2023-08-18 PROCEDURE — 99291 CRITICAL CARE FIRST HOUR: CPT | Mod: GC | Performed by: EMERGENCY MEDICINE

## 2023-08-18 PROCEDURE — 82330 ASSAY OF CALCIUM: CPT

## 2023-08-18 PROCEDURE — 84100 ASSAY OF PHOSPHORUS: CPT

## 2023-08-18 PROCEDURE — 99232 SBSQ HOSP IP/OBS MODERATE 35: CPT | Mod: GC | Performed by: INTERNAL MEDICINE

## 2023-08-18 PROCEDURE — 97530 THERAPEUTIC ACTIVITIES: CPT | Mod: GO

## 2023-08-18 PROCEDURE — 85027 COMPLETE CBC AUTOMATED: CPT

## 2023-08-18 PROCEDURE — 82310 ASSAY OF CALCIUM: CPT

## 2023-08-18 PROCEDURE — 250N000012 HC RX MED GY IP 250 OP 636 PS 637: Performed by: INTERNAL MEDICINE

## 2023-08-18 PROCEDURE — 250N000011 HC RX IP 250 OP 636: Mod: JZ | Performed by: STUDENT IN AN ORGANIZED HEALTH CARE EDUCATION/TRAINING PROGRAM

## 2023-08-18 PROCEDURE — 36415 COLL VENOUS BLD VENIPUNCTURE: CPT

## 2023-08-18 PROCEDURE — 250N000013 HC RX MED GY IP 250 OP 250 PS 637: Performed by: INTERNAL MEDICINE

## 2023-08-18 PROCEDURE — 250N000013 HC RX MED GY IP 250 OP 250 PS 637: Performed by: STUDENT IN AN ORGANIZED HEALTH CARE EDUCATION/TRAINING PROGRAM

## 2023-08-18 PROCEDURE — 250N000011 HC RX IP 250 OP 636: Mod: JZ

## 2023-08-18 PROCEDURE — 250N000011 HC RX IP 250 OP 636: Mod: JZ | Performed by: PHARMACIST

## 2023-08-18 RX ORDER — LANOLIN ALCOHOL/MO/W.PET/CERES
3 CREAM (GRAM) TOPICAL
Status: DISCONTINUED | OUTPATIENT
Start: 2023-08-18 | End: 2023-08-19

## 2023-08-18 RX ORDER — POTASSIUM CHLORIDE 750 MG/1
20 TABLET, EXTENDED RELEASE ORAL ONCE
Status: COMPLETED | OUTPATIENT
Start: 2023-08-18 | End: 2023-08-18

## 2023-08-18 RX ORDER — LOSARTAN POTASSIUM 25 MG/1
50 TABLET ORAL DAILY
Status: DISCONTINUED | OUTPATIENT
Start: 2023-08-18 | End: 2023-08-19

## 2023-08-18 RX ORDER — NICOTINE POLACRILEX 4 MG
15-30 LOZENGE BUCCAL
Status: DISCONTINUED | OUTPATIENT
Start: 2023-08-18 | End: 2023-08-18

## 2023-08-18 RX ORDER — OXYCODONE HYDROCHLORIDE 10 MG/1
10 TABLET ORAL EVERY 6 HOURS PRN
Status: DISCONTINUED | OUTPATIENT
Start: 2023-08-18 | End: 2023-08-19

## 2023-08-18 RX ORDER — DEXTROSE MONOHYDRATE 25 G/50ML
25-50 INJECTION, SOLUTION INTRAVENOUS
Status: DISCONTINUED | OUTPATIENT
Start: 2023-08-18 | End: 2023-08-18

## 2023-08-18 RX ORDER — PANTOPRAZOLE SODIUM 40 MG/1
40 TABLET, DELAYED RELEASE ORAL
Status: DISCONTINUED | OUTPATIENT
Start: 2023-08-18 | End: 2023-08-23 | Stop reason: HOSPADM

## 2023-08-18 RX ADMIN — ACETAMINOPHEN 975 MG: 325 TABLET, FILM COATED ORAL at 20:07

## 2023-08-18 RX ADMIN — HYDROCORTISONE SODIUM SUCCINATE 50 MG: 100 INJECTION, POWDER, FOR SOLUTION INTRAMUSCULAR; INTRAVENOUS at 04:07

## 2023-08-18 RX ADMIN — OXYCODONE HYDROCHLORIDE 5 MG: 5 TABLET ORAL at 06:37

## 2023-08-18 RX ADMIN — HYDROCORTISONE SODIUM SUCCINATE 25 MG: 100 INJECTION, POWDER, FOR SOLUTION INTRAMUSCULAR; INTRAVENOUS at 12:09

## 2023-08-18 RX ADMIN — OXYCODONE HYDROCHLORIDE 10 MG: 10 TABLET ORAL at 20:07

## 2023-08-18 RX ADMIN — INSULIN GLARGINE 10 UNITS: 100 INJECTION, SOLUTION SUBCUTANEOUS at 11:24

## 2023-08-18 RX ADMIN — OXYCODONE HYDROCHLORIDE 5 MG: 5 TABLET ORAL at 15:08

## 2023-08-18 RX ADMIN — HYDROCORTISONE SODIUM SUCCINATE 25 MG: 100 INJECTION, POWDER, FOR SOLUTION INTRAMUSCULAR; INTRAVENOUS at 20:08

## 2023-08-18 RX ADMIN — ENOXAPARIN SODIUM 40 MG: 40 INJECTION SUBCUTANEOUS at 12:09

## 2023-08-18 RX ADMIN — SENNOSIDES AND DOCUSATE SODIUM 1 TABLET: 8.6; 5 TABLET ORAL at 20:08

## 2023-08-18 RX ADMIN — POTASSIUM CHLORIDE, DEXTROSE MONOHYDRATE AND SODIUM CHLORIDE: 150; 5; 450 INJECTION, SOLUTION INTRAVENOUS at 02:41

## 2023-08-18 RX ADMIN — HYDRALAZINE HYDROCHLORIDE 10 MG: 20 INJECTION INTRAMUSCULAR; INTRAVENOUS at 15:08

## 2023-08-18 RX ADMIN — INSULIN HUMAN 2 UNITS/HR: 1 INJECTION, SOLUTION INTRAVENOUS at 01:02

## 2023-08-18 RX ADMIN — LOSARTAN POTASSIUM 50 MG: 25 TABLET, FILM COATED ORAL at 10:10

## 2023-08-18 RX ADMIN — ACETAMINOPHEN 975 MG: 325 TABLET, FILM COATED ORAL at 14:10

## 2023-08-18 RX ADMIN — INSULIN ASPART 2 UNITS: 100 INJECTION, SOLUTION INTRAVENOUS; SUBCUTANEOUS at 12:03

## 2023-08-18 RX ADMIN — INSULIN ASPART 1 UNITS: 100 INJECTION, SOLUTION INTRAVENOUS; SUBCUTANEOUS at 20:10

## 2023-08-18 RX ADMIN — ACETAMINOPHEN 975 MG: 325 TABLET, FILM COATED ORAL at 08:30

## 2023-08-18 RX ADMIN — HYDRALAZINE HYDROCHLORIDE 10 MG: 20 INJECTION INTRAMUSCULAR; INTRAVENOUS at 00:13

## 2023-08-18 RX ADMIN — SENNOSIDES AND DOCUSATE SODIUM 1 TABLET: 8.6; 5 TABLET ORAL at 16:25

## 2023-08-18 RX ADMIN — POTASSIUM CHLORIDE 20 MEQ: 750 TABLET, EXTENDED RELEASE ORAL at 10:10

## 2023-08-18 RX ADMIN — OXYCODONE HYDROCHLORIDE 5 MG: 5 TABLET ORAL at 11:23

## 2023-08-18 RX ADMIN — Medication 3 MG: at 20:34

## 2023-08-18 RX ADMIN — PANTOPRAZOLE SODIUM 40 MG: 40 TABLET, DELAYED RELEASE ORAL at 20:07

## 2023-08-18 RX ADMIN — SODIUM PHOSPHATE, MONOBASIC, MONOHYDRATE AND SODIUM PHOSPHATE, DIBASIC, ANHYDROUS 15 MMOL: 142; 276 INJECTION, SOLUTION INTRAVENOUS at 12:10

## 2023-08-18 ASSESSMENT — ACTIVITIES OF DAILY LIVING (ADL)
ADLS_ACUITY_SCORE: 24

## 2023-08-18 NOTE — PROGRESS NOTES
08/18/23 1311   Appointment Info   Signing Clinician's Name / Credentials (OT) JARRETT Norton       Present yes   Language [place]   Living Environment   People in Home child(steph), adult;child(setph), dependent   Current Living Arrangements apartment   Home Accessibility no concerns   Transportation Anticipated family or friend will provide   Living Environment Comments Pt lives in an apartment with her 5 children. Pt reports difficulty managing caring for her children and herself.   Self-Care   Usual Activity Tolerance moderate   Current Activity Tolerance fair   Regular Exercise No   Equipment Currently Used at Home none   Fall history within last six months no   Activity/Exercise/Self-Care Comment Pt reports IND at baseline with ADLs and some IADLs, pt daughters help with IADLs as needed.   General Information   Onset of Illness/Injury or Date of Surgery 08/16/23   Referring Physician Samson De La Cruz MD   Patient/Family Therapy Goal Statement (OT) to go home   Additional Occupational Profile Info/Pertinent History of Current Problem PEr chart 48 year old woman with DM2, MEN2A with primary hyperparathyroidism, medullary thyroid cancer and bilateral synchronous pheochromocytoma now POD 2 bilateral total thyroidectomy. Intubated with decreasing vent settings. No current pressors with hydrocortisone.   Existing Precautions/Restrictions fall;abdominal   General Observations and Info bending, lifting, twisting restrictions   Cognitive Status Examination   Orientation Status orientation to person, place and time   Visual Perception   Visual Impairment/Limitations WNL   Sensory   Sensory Quick Adds sensation intact   Posture   Posture not impaired   Range of Motion Comprehensive   General Range of Motion no range of motion deficits identified   Strength Comprehensive (MMT)   General Manual Muscle Testing (MMT) Assessment no strength deficits identified   Coordination   Upper Extremity Coordination  No deficits were identified   Bed Mobility   Bed Mobility supine-sit   Supine-Sit [place] (Bed Mobility) moderate assist (50% patient effort);2 person assist   Transfers   Transfers sit-stand transfer;toilet transfer;shower transfer   Sit-Stand Transfer   Sit-Stand [place] (Transfers) contact guard   Shower Transfer   Type (Shower Transfer) lateral   [place] Level (Shower Transfer) minimum assist (75% patient effort)   Shower Transfer Comments per clinical judgement   Toilet Transfer   Type (Toilet Transfer) sit-stand   [place] Level (Toilet Transfer) contact guard   Toilet Transfer Comments per clinical judgement   Balance   Balance Comments good seated balance, standing balance with CGA   Activities of Daily Living   BADL Assessment/Intervention bathing;lower body dressing;toileting   Bathing Assessment/Intervention   [place] Level (Bathing) moderate assist (50% patient effort)   Comment, (Bathing) per clinical judgement   Lower Body Dressing Assessment/Training   Comment, (Lower Body Dressing) per clinical judgement   [place] Level (Lower Body Dressing) minimum assist (75% patient effort)   Toileting   Comment, (Toileting) per clinical judgement   [place] Level (Toileting) minimum assist (75% patient effort)   Clinical Impression   Criteria for Skilled Therapeutic Interventions Met (OT) Yes, treatment indicated   OT Diagnosis Pt is below baseline for ADLs   OT Problem List-Impairments impacting ADL problems related to;activity tolerance impaired;pain;post-surgical precautions   Assessment of Occupational Performance 3-5 Performance Deficits   Identified Performance Deficits dressing, bathing, toileting, home management   Planned Therapy Interventions (OT) ADL retraining;IADL retraining;bed mobility training;progressive activity/exercise;transfer training   Clinical Decision Making Complexity (OT) low complexity   Risk & Benefits of therapy have been explained  evaluation/treatment results reviewed;care plan/treatment goals reviewed;risks/benefits reviewed;current/potential barriers reviewed;participants voiced agreement with care plan;participants included;patient   Clinical Impression Comments Pt is below baseline for ADLs, limited by activity tolerance, pain, post-surgical precautions. Pt will benefit from skilled OT to progress toward PLOF   OT Total Evaluation Time   OT Eval, Low Complexity Minutes (58953) 7   OT Goals   Therapy Frequency (OT) 6 times/wk   OT Predicted Duration/Target Date for Goal Attainment 08/31/23   OT Goals Lower Body Dressing;Lower Body Bathing;Toilet Transfer/Toileting;Home Management   OT: Lower Body Dressing Modified independent;within precautions   OT: Lower Body Bathing Modified independent;using adaptive equipment;with precautions   OT: Toilet Transfer/Toileting Independent;within precautions;cleaning and garment management;toilet transfer   OT: Home Management Independent;with light demand household tasks;ambulatory level   OT Discharge Planning   OT Plan screen PT, standing ADLS, toilet transfers   OT Discharge Recommendation (DC Rec) home with assist;home with home care occupational therapy   OT Rationale for DC Rec Anticipate will progress to be safe to d/c home with family A, pt may benefit from HH therapies to A with home safety/mods.   OT Brief overview of current status Ax2 bed mobility, CGA STS   Total Session Time   Total Session Time (sum of timed and untimed services) 7

## 2023-08-18 NOTE — PLAN OF CARE
ICU End of Shift Summary. See flowsheets for vital signs and detailed assessment.    Changes this shift: A/Ox4, neuro's intact. C/O pain on bilateral abdomen, controled with PRN oxy, scheduled tylenol and heat packs. PRN hydralazine given x1 for sys>160. On RA. Clear liquid diet. Purewick in place with good UOP. Insulin gtt Alg2. D5 1/2 NS at 100.     Plan: Transfer to floor when bed available, encourage movement by pt.       Goal Outcome Evaluation:      Plan of Care Reviewed With: patient    Overall Patient Progress: improvingOverall Patient Progress: improving    Outcome Evaluation: VSS, on RA

## 2023-08-18 NOTE — PROGRESS NOTES
Endocrine Progress Note  Patient: Jessica Kennedy   MRN: [id number]  Date of Service: 08/18/2023    Chart reviewed    She has had one episode of hypertension requiring one dose of hydralazine. She has post op abdominal pain. Passing gas    She is grateful with the surgery team. Would like to see pictures of the tumor when available.     Reviewed with her that no cortex was spared and that she will need glucocorticoid replacement therapy lifelong, which she was aware of and was anticipating.     Physical Examination:  BP (!) 149/90   Pulse 80   Temp 98.3  F (36.8  C) (Oral)   Resp 24   Wt 100 kg (220 lb 7.4 oz)   LMP 05/09/2023 (Approximate)   SpO2 93%   BMI 34.53 kg/m      Physical Exam  Constitutional:       General: She is not in acute distress.     Appearance: She is not ill-appearing.   Cardiovascular:      Rate and Rhythm: Normal rate.      Heart sounds: Normal heart sounds. No murmur heard.  Pulmonary:      Effort: Pulmonary effort is normal.      Breath sounds: Normal breath sounds. No wheezing or rales.   Abdominal:      Palpations: Abdomen is soft.      Tenderness: There is no abdominal tenderness.      Comments: Surgical wounds   Musculoskeletal:      Right lower leg: No edema.      Left lower leg: No edema.   Neurological:      Mental Status: She is alert.          Medications:  Reviewed    Assessment and plan:  Jessica Kennedy is a 48 year old  female with PMHx of MEN 2A with bilateral synchronous pheochromocytomas, HTN, T2DM who was admitted for optimization of blood pressure control / alpha blockade prior to bilateral adrenalectomy. Now s/p bilateral adrenalectomy on 8/16     # Pheochromocytoma, bilateral  s/p bilateral adrenalectomy on 8/16.    She is starting to become hypertensive. No need for alpha blockers at this point. Suspect there is a component of essential hypertension.    # Primary Adrenal Insufficiency secondary to Bilateral Adrenalectomy  8/18 Discussed with patient that during  surgery bilateral adrenalectomy was necessary and no adrenal cortex was spared. She was previously aware that there was risk of bilateral adrenalectomy with need for hormone replacement therapy    Once she is more awake and daughter is here, will review sick day management and need for medical alert bracelet.     # Type 2 Diabetes Mellitus  On insulin drip currently.  Can be changed to basal bolus regimen once eating     # Primary Hyperparathyroidism  Per primary Endocrinologist, Dr. Vanessa, treatment for this will be set up as outpatient, not during this admission.      # MEN 2A    Recommendations  - Decrease hydrocortisone to 25 mg Q8, will transition to maintenance dose hydrocortisone and fludrocortisone over the next days    - Start Losartan 50 mg daily    - If diet is advanced, please contact us to have orders for subcutaneous insulin      Jayson Wilson MD  Endocrinology Fellow   [phone]     I have seen and examined the patient, reviewed and edited the fellow's note, and agree with the plan of care.  BUCK Chakraborty

## 2023-08-18 NOTE — PROGRESS NOTES
[facility]    Progress Note - MIS Service       Date of Admission:  8/11/2023    Assessment & Plan: Surgery   48 year old woman with DM2, MEN2A with primary hyperparathyroidism, medullary thyroid cancer and bilateral synchronous pheochromocytoma now POD 2 bilateral total thyroidectomy. Intubated with decreasing vent settings. No current pressors with hydrocortisone.     - Okay for transfer out of unit back to primary medicine team  - PO pain control with IV back-up  - Blood pressure, steroid, AI, DM2, Hyperparathyroid management per endocrine, appreciate endocrine cares  - Okay for diet from surgery perspective  - Strict I&O  - Labs per primary team  - Okay for DVT ppx, recommend continuing Lovenox   - Sta[place] to remain in place for one week or until discharge.  - Remainder of cares per primary       Diet: Moderate Consistent Carb (60 g CHO per Meal) Diet    DVT Prophylaxis: Defer to primary service  Rodriguez Catheter: Not present  Lines: None       Drains: None     Cardiac Monitoring: ACTIVE order. Indication: ICU  Code Status: Full Code      Clinically Significant Risk Factors            # Hypomagnesemia: Lowest Mg = 1.4 mg/dL in last 2 days, will replace as needed                  # Financial/Environmental Concerns: unable to afford rent/mortgage;unable to afford food         Disposition Plan     Expected Discharge Date: 08/18/2023      Destination: home with help/services  Discharge Comments: surgery 8/16 followed by a few days in SICU for BP control.         The patient's care was discussed with the Attending Physician, Dr. Caceres .    Leora Lambert MD  [facility]  Non-urgent messages: Securely message with [employer] (more info)  Text page via [employer] Paging/Directory     ______________________________________________________________________    Interval History   Pain well controlled. Doing well.     Physical Exam    Vital Signs: Temp: 98.3  F (36.8  C) Temp src: Oral BP: (!) 149/90 Pulse: 80   Resp: 24 SpO2: 93 % O2 Device: None (Room air)    Weight: 220 lbs 7.36 oz  Intake/Output Summary (Last 24 hours) at 8/17/2023 0749  Last data filed at 8/17/2023 0700  Gross per 24 hour   Intake 5523.87 ml   Output 1480 ml   Net 4043.87 ml     General Appearance: Appears comfortable  Respiratory: NLB  Cardiovascular: RRR  GI: Abdomen soft, approprately tender, incisions with bandages that are clean and dry  Skin: Normal color and turgor  Other: Ext WWP           Data     I have personally reviewed the following data over the past 24 hrs:    12.0 (H)  \   11.9   / 186     142 109 (H) 9.9 /  132 (H)   3.5 22 0.62 \

## 2023-08-18 NOTE — PROGRESS NOTES
SURGICAL ICU PROGRESS NOTE  08/18/2023      PRIMARY TEAM: Minimally invasive surgery  PRIMARY PHYSICIAN: Dr. Caceres  REASON FOR CRITICAL CARE ADMISSION: Invasive BP monitoring   ADMITTING PHYSICIAN: Dr. Sharp      ASSESSMENT: Jessica Kennedy is a 48F who was admitted to the SICU for invasive BP monitoring s/p BL adrenalectomy for BL Pheochromocytomas on 8/16/2023. PMH notable for MEN2A and T2DM (A1c 9.0).      Interval Events:  - NAOE    Plan/Today's Changes  - Resume PTA losartan  - Insulin gtt -> basal bolus insulin   - Stop mIVF  - Transfer to floor back to medicine team at primary    Neurological:  # Acute pain   # Agitation   # Encephalopathy   - Monitor neurological status. Delirium preventions and precautions.  - Pain: APAP 975 mg TID, oxycodone 5 mg Q4H PRN  - Sedation: None    Pulmonary:   Vent Mode: CPAP/PS  (Continuous positive airway pressure with Pressure Support)  FiO2 (%): 40 %  Resp Rate (Set): 15 breaths/min  Tidal Volume (Set, mL): 450 mL  PEEP (cm H2O): 7 cmH2O  Pressure Support (cm H2O): 5 cmH2O  Resp: 24    Arterial Blood Gases   Recent Labs   Lab 08/17/23  0402 08/16/23  2126 08/16/23  1934 08/16/23  1913   PH 7.45 7.40 7.41 7.39   PCO2 36 37 38 40   PO2 73* 79* 68* 73*   HCO3 25 23 24 24     - Patient is saturating well in mid 90's on RA.   - Supplemental O2 with goal SpO2 > 90%  - IS q15-30 minutes when awake    Cardiovascular:    # HTN (PTA)  # Hemodynamic Monitoring s/p OR  - MAPs  via BP cuff  - Got hydralazine x 1 for SBP > 160   - Starting to become hypertensive, per endocrine resuming PTA losartan 50 mg daily today   - Pt is hemodynamically stable, safe for floor transfer   - PTA antihypertensives held post-operatively: amlodipine 10 mg daily, carvedilol 25 mg BID, doxazosin 24 mg BID,phenoxybenzamine 10 mg daily  - Hydralazine, labetalol PRN for SBP > 160     Gastroenterology/Nutrition:  Liver Function Tests  Recent Labs   Lab 08/15/23  0535 08/13/23  1212   AST 20  31   ALT 21 27   ALKPHOS 105* 109*   BILITOTAL 0.2 0.3   ALBUMIN 3.8 3.8     # Protein calorie deficit malnutrition  - Consistent-carb diet  - Senna-docusate BID    Fluids/Electrolytes/Renal:   I/O last 3 completed shifts:  In: 3437.53 [P.O.:270; I.V.:3167.53]  Out: 3320 [Urine:3320]  Basic Metabolic Panel  Recent Labs   Lab 08/18/23  0509 08/18/23  0405 08/18/23  0305 08/18/23  0149 08/17/23  0406 08/17/23  0402 08/16/23 2128 08/16/23 2126 08/16/23 2014 08/16/23  1934 08/16/23  1913 08/16/23  0750 08/16/23  0525 08/15/23  0918 08/15/23  0535   NA  --   --   --   --   --  139  --  140  --  138 138   < > 138  --  136   POTASSIUM  --   --   --   --   --  3.9  --  3.7  --  3.9 4.0   < > 3.8  --  3.5   CHLORIDE  --   --   --   --   --  106  --  104  --   --   --   --  104  --  103   CO2  --   --   --   --   --  22  --  21*  --   --   --   --  22  --  23   BUN  --   --   --   --   --  21.0*  --  23.3*  --   --   --   --  18.0  --  23.6*   CR  --   --   --   --   --  0.88  --  0.98*  --   --   --   --  0.64  --  0.76   * 118* 118* 151*   < > 139*   < > 243*   < > 328* 349*   < > 151*   < > 182*    < > = values in this interval not displayed.     # Volume Status  - Net +0.1L 8/17, -1.2L since midnight  - UOP: 3.25 ml/kg/hr since midnight, this is adequate  - D5 1/2 NS + K @ 100/hr, stop mIVF in setting of resuming PO intake    # Electrolytes  - Balanced, LEATHA    Endocrine:  # MEN2A  # S/P BL total adrenalectomy for BL pheochromocytomas  - Endocrine following  - Continue IV hydrocortisone, titrate down to 25 mg Q8H  - Will transition to maintenance dose hydrocortisone and fludrocortisone in the coming days.   - Will require exogenous steroids indefinitely in setting of non-cortical sparing b/l adrenalectomy      # Stress Hyperglycemia   # Steroid-induced Hyperglycemia  # T2DM   - Last A1C 9.0  - Glucose 118-151/24 hrs, euglycemic this AM. BG goal 140-180 for optimal wound healing  - Insulin gtt d/c'd, basal bolus  insulin regimen per endocrine   - Hold PTA metformin, glipizide    ID:  # Leukocytosis 2/2 Post-Operative Inflammation  -  No indications for antibiotics  -  Trend CBCs QAM.     Heme:     Complete Blood Count   Recent Labs   Lab 08/18/23  0657 08/17/23  0402 08/16/23  2126 08/16/23  1934 08/16/23  1812 08/16/23  0525   WBC 12.0* 14.6* 15.1*  --   --  9.9   HGB 11.9 11.5* 12.7 12.4   < > 13.0    201 183  --   --  202    < > = values in this interval not displayed.     # Acute blood loss anemia   # Anemia of critical illness   - Hemoglobin stable  - Transfuse if hgb <7.0. Trend CBCs QAM.  - Lovenox for DVT ppx    MSK:  # Weakness and deconditioning of critical illness  - Physical and occupational therapy consult     General Cares/Prophylaxis:    DVT Prophylaxis: Mechanical, Lovenox  GI Prophylaxis: PTA PPI  Restraints: Restraints for medical healing needed: NO     LDAs:  - PIVx2  - Daxa    Disposition:  - Transfer to floor     Patient seen and discussed with staff Dr. Jimenez.     Coni Peoples, MS4  [employer]      --------------------------------------------------------------------------------------------------------------------    I saw and evaluated the patient in an independent visit and agree with the student's assessment and plan as written above. I was present at all times for any mentioned procedures.      Sabra Garsia MD, PharmD  General Surgery, PGY2  Pager [phone]      ====================================  SUBJECTIVE: Patient is sitting up in bed, states she is comfortable and not in pain. Heat packs on abdomen, states they are helping.     OBJECTIVE:     Temp:  [98  F (36.7  C)-98.8  F (37.1  C)] 98.3  F (36.8  C)  Pulse:  [63-93] 80  Resp:  [13-24] 24  BP: (108-161)/() 147/94  MAP:  [82 mmHg-121 mmHg] 115 mmHg  Arterial Line BP: (111-164)/(63-92) 157/86  FiO2 (%):  [40 %] 40 %  SpO2:  [90 %-99 %] 94 %  Vent Mode: CPAP/PS  (Continuous positive airway pressure  with Pressure Support)  FiO2 (%): 40 %  Resp Rate (Set): 15 breaths/min  Tidal Volume (Set, mL): 450 mL  PEEP (cm H2O): 7 cmH2O  Pressure Support (cm H2O): 5 cmH2O  Resp: 24      I/O last 3 completed shifts:  In: 3437.53 [P.O.:270; I.V.:3167.53]  Out: 3320 [Urine:3320]    General/Neuro: Awake, alert. Moving all extremities spontaneously.   CV: RRR, no r/g/m  Pulm: Adequate WOB and saturations on RA  Abd: Soft, minimally tender bilaterally. Abdominal incisions without strikethrough. MADISON drains x3 SS  Extremities: no edema  Skin: No obvious rashes, jaundice, erythema; wwp     LABS:   Arterial Blood Gases   Recent Labs   Lab 08/17/23 0402 08/16/23 2126 08/16/23 1934 08/16/23 1913   PH 7.45 7.40 7.41 7.39   PCO2 36 37 38 40   PO2 73* 79* 68* 73*   HCO3 25 23 24 24     Complete Blood Count   Recent Labs   Lab 08/17/23 0402 08/16/23 2126 08/16/23 1934 08/16/23 1913 08/16/23  1812 08/16/23  0525 08/15/23  0535   WBC 14.6* 15.1*  --   --   --  9.9 9.8   HGB 11.5* 12.7 12.4 12.4   < > 13.0 12.4    183  --   --   --  202 206    < > = values in this interval not displayed.     Basic Metabolic Panel  Recent Labs   Lab 08/18/23  0509 08/18/23  0405 08/18/23  0305 08/18/23  0149 08/17/23 0406 08/17/23 0402 08/16/23 2128 08/16/23 2126 08/16/23 2014 08/16/23 1934 08/16/23 1913 08/16/23  0750 08/16/23  0525 08/15/23  0918 08/15/23  0535   NA  --   --   --   --   --  139  --  140  --  138 138   < > 138  --  136   POTASSIUM  --   --   --   --   --  3.9  --  3.7  --  3.9 4.0   < > 3.8  --  3.5   CHLORIDE  --   --   --   --   --  106  --  104  --   --   --   --  104  --  103   CO2  --   --   --   --   --  22  --  21*  --   --   --   --  22  --  23   BUN  --   --   --   --   --  21.0*  --  23.3*  --   --   --   --  18.0  --  23.6*   CR  --   --   --   --   --  0.88  --  0.98*  --   --   --   --  0.64  --  0.76   * 118* 118* 151*   < > 139*   < > 243*   < > 328* 349*   < > 151*   < > 182*    < > = values in  this interval not displayed.     Liver Function Tests  Recent Labs   Lab 08/15/23  0535 08/13/23  1212   AST 20 31   ALT 21 27   ALKPHOS 105* 109*   BILITOTAL 0.2 0.3   ALBUMIN 3.8 3.8     Pancreatic Enzymes  Recent Labs   Lab 08/13/23  1212   LIPASE 25     Coagulation Profile  No lab results found in last 7 days.      IMAGING:   No results found for this or any previous visit (from the past 24 hour(s)).

## 2023-08-18 NOTE — PROGRESS NOTES
"CLINICAL NUTRITION SERVICES - ASSESSMENT NOTE     Nutrition Prescription    RECOMMENDATIONS FOR MDs/PROVIDERS TO ORDER:  If appetite and PO becomes poor, recommend liberalize to Regular diet.    Malnutrition Status:    Patient does not meet two of the established criteria necessary for diagnosing malnutrition    Recommendations already ordered by Registered Dietitian (RD):  Supplements: Glucerna chocolate @ 2pm    Future/Additional Recommendations:  -Monitor PO and wt trends.   -Monitor tolerance and use of Glucerna.      REASON FOR ASSESSMENT  Jessica Kennedy is a/an 48 year old female assessed by the dietitian for LOS    NUTRITION HISTORY  Pt has been followed by outpatient RD for management of T2DM including meal planning and reviewed insulin plan/insulin plan use.    Met with pt at bedside using phone [address]  over phone. Pt reports no issues or concerns with eating/diet. She was a bit distracted during writer's visit with multiple phone calls from her daughter.     CURRENT NUTRITION ORDERS  Diet: Moderate Consistent Carbohydrate  Intake/Tolerance: Pt reports ok appetite. Denies issues with eating prior to intubation. At time of writer's visit, she had a meal of a fresh mixed fruit cup and a vanilla Greek yogurt in front of her that she was about to eat. She was interested in chocolate Glucerna drinks (prefers low-sugar d/t diabetes).    LABS  Labs reviewed  -Phos 2.4 (L) - on IV NaPhos    MEDICATIONS  Medications reviewed  -Medium dose sliding scale insulin  -Novolog with 1 unit:10 g CHO at mealtimes    ANTHROPOMETRICS  Height:   Ht Readings from Last 1 Encounters:   06/20/23 1.702 m (5' 7\")   Most Recent Weight: 100 kg (220 lb 7.4 oz)    IBW: 61.4 kg   BMI: 34.53 kg/m2; Obesity Grade I BMI 30-34.9  Weight History:   Wt Readings from Last 20 Encounters:   08/18/23 100 kg (220 lb 7.4 oz)   07/19/23 97 kg (213 lb 14.4 oz)   07/05/23 96.8 kg (213 lb 8 oz)   06/28/23 95.6 kg (210 lb 11.2 oz)   06/20/23 " 97.5 kg (215 lb)   06/07/23 97.4 kg (214 lb 11.2 oz)   04/05/23 92.5 kg (204 lb)   03/08/23 95.4 kg (210 lb 6.4 oz)   02/15/23 93.2 kg (205 lb 6.4 oz)   05/10/22 93.9 kg (207 lb)   11/20/21 102.1 kg (225 lb)   04/20/21 99.8 kg (220 lb)   05/07/20 93.4 kg (206 lb)   Dosing Weight: 70 kg (adjusted, based on lowest wt this admit of 95.6 kg on 8/16 and IBW of 61.4 kg)    ASSESSED NUTRITION NEEDS  Estimated Energy Needs: 2045-5639 kcals/day (25 - 30 kcals/kg)  Justification: Maintenance  Estimated Protein Needs:  grams protein/day (1.2 - 1.5 grams of pro/kg)  Justification: Hypercatabolism with acute illness  Estimated Fluid Needs: 1 mL/kcal  Justification: Maintenance or Per provider pending fluid status    PHYSICAL FINDINGS  See malnutrition section below.  -Hair, skin, nails appear WNL per visual    MALNUTRITION  % Intake: Decreased intake does not meet criteria  % Weight Loss: None noted  Subcutaneous Fat Loss: None observed per visual  Muscle Loss: None observed per visual of upper body (pt in chair with table over lap, preparing to eat meal so deferred formal exam)  Fluid Accumulation/Edema: None noted  Malnutrition Diagnosis: Patient does not meet two of the established criteria necessary for diagnosing malnutrition    NUTRITION DIAGNOSIS  Predicted inadequate nutrient intake (pro/kcal) related to potential for PO to decline with prolonged LOS, consistent CHO diet, and menu fatigue.     INTERVENTIONS  Implementation  -Nutrition Education: Provided education on RD role. Encouraged PO to support healing and strength. Offered ONS.    -Medical food supplement therapy     Goals  Patient to consume % of nutritionally adequate meal trays TID, or the equivalent with supplements/snacks.     Monitoring/Evaluation  Progress toward goals will be monitored and evaluated per protocol.  Luma Cerrato RD, LD  Pager: [phone]

## 2023-08-18 NOTE — PLAN OF CARE
ICU End of Shift Summary. See flowsheets for vital signs and detailed assessment.    Changes this shift: No significant changes in pt status. Primary c/o pulsating HA along with abd/rib pain, in which causes difficulty taking a deep breath. Oxy + Tylenol given, however only for temporarily relief. Additionally, pt states she has been having difficulty sleeping. Melatonin rx'd for hs. Diet advanced to mod CHO; pt tolerating well; eating yogurt, fruit and tea. IV insulin bridged to subQ today. 1x small hard BM. Initially, pt refusing bowel regimen, but later amenable -Education given. Phos replaced.     Plan: Supportive care. Xfer to Med/Surg.     Goal Outcome Evaluation:      Plan of Care Reviewed With: patient    Overall Patient Progress: no changeOverall Patient Progress: no change    Outcome Evaluation: No significant changes in pt status; however pt endorsing HA and abd pain, despite Tylenol and Oxy PRNs. Pt ambulating ok A1. Pt req anti-HTN prn as SBP >160.    Problem: Risk for Delirium  Goal: Improved Attention and Thought Clarity  Outcome: Progressing

## 2023-08-18 NOTE — PLAN OF CARE
ICU End of Shift Summary. See flowsheets for vital signs and detailed assessment.    Changes this shift:  Extubated to RA at 1307 lung sounds clear-diminished, afebrile vss. De-lined; high, GWENDOLYN and L geronimo leatha removed. Continues on MIVF and insulin drip. Passed BS swallow eval and tolerating PO meds. Family at BS throughout the day and updated.     Plan: Anticipation of transferring out of ICU tomorrow. Continue w/ goals of care.   Goal Outcome Evaluation:      Plan of Care Reviewed With: family    Overall Patient Progress: improvingOverall Patient Progress: improving    Outcome Evaluation: Extubated at 1307 to RA SPO2 >91%

## 2023-08-18 NOTE — PROGRESS NOTES
[facility]    Medicine Progress Note - Medicine Service, MARK TEAM 3    Date of Admission:  8/11/2023    Assessment & Plan   Jessica Kennedy is a 48 year old female admitted on 8/11/2023. She has a history of MEN2A and T2DM (A1c 9.0) and is admitted for BP control (goal <110/70) for bilateral pheochromocytoma removal. She underwent bilateral laparoscopic adrenalectomy on 8/16 with pre-emptive stress dose hydrocortisone. She was managed in the surgical ICU until 8/18 when she was improved enough to return to the general medical service.      Changes today:  - Stable for transfer out of surgical ICU  - Losartan resumed per endocrinology. She has as-needed labetalol and hydralazine for systolic >160mmHg  - Weaning hydrocortisone per endocrinology  - Insulin per endocrinology  - Melatonin as-needed for sleep  - Higher oxycodone if needed for severe pain     #MEN2A  #Pheochromocytoma  #Hypertension  #Primary Hyperparathyroidism   #Medullary Thyroid CA  Endocrinology continues to follow. Blood pressure lower following adrenalectomy.   -Losartan 50 mg daily.  -As-needed hydralazine and/or labetalol if systolic blood pressure >160mmHg  - Postoperative pain control with Tylenol, oxycodone    Adrenal insufficiency due to adrenalectomy  - Appreciate endocrinology consultation  - Hydrocortisone with plan to gradually wean towards maintenance doses of hydrocortisone and fludrocortisone  - Will need sick day plan prior to discharge     #T2DM   Was managed on insulin drip in SICU, transitioned to subcutaneous regimen 8/18.   - 1:10 units carb ratio, medium dose sliding scale  - HOLD PTA Metformin   - HOLD PTA glipizide     #Headache  Long history, no recent changes  - Tylenol 500-1000 mg PRN Q6H     #Irritated Eyes, resolved  #Palpitations, resolved  #Congested Nose, resolved     Diet: Moderate Consistent Carb (60 g CHO per Meal) Diet  Snacks/Supplements Adult: Glucerna; Between  Meals    DVT Prophylaxis: Enoxaparin (Lovenox) SQ  Rodriguez Catheter: Not present  Lines: None     Cardiac Monitoring: None  Code Status: Full Code      Clinically Significant Risk Factors            # Hypomagnesemia: Lowest Mg = 1.4 mg/dL in last 2 days, will replace as needed                  # Financial/Environmental Concerns: unable to afford rent/mortgage;unable to afford food         Disposition Plan           Dina Mcduffie MD  [facility]  [facility]  Securely message with [employer] (more info)  Text page via [employer] Paging/Directory   See signed in provider for up to date coverage information  ______________________________________________________________________    Interval History   Improving in SICU, transitioned off insulin drip, resuming home losartan. I was contacted by SICU team regarding stable to transfer back to medical team.   At bedside, Jessica says she is having a hard time sleeping in the hospital. She has ongoing surgical site pain but she doesn't think the pain is preventing her from sleeping. Still with headache. No nausea or vomiting.   Per RN she has been out of bed and stable on her feet.     Physical Exam   Vital Signs: Temp: 98.1  F (36.7  C) Temp src: Oral BP: (!) 141/85 Pulse: 88   Resp: 23 SpO2: 94 % O2 Device: None (Room air)    Weight: 220 lbs 7.36 oz    General Appearance: Reclined in bed, appears tired but no acute distress  Respiratory: Comfortable on room air  Cardiovascular: Pulses intact, no edema  GI: Abdomen soft. Surgical site pain  Skin: No visible rash  Other: Speech fluent in Indian. Moving all extremities in bed.     Medical Decision Making       25 MINUTES SPENT BY ME on the date of service doing chart review, history, exam, documentation & further activities per the note.      Data     I have personally reviewed the following data over the past 24 hrs:    12.0 (H)  \   11.9   / 186     142 109 (H) 9.9 /   168 (H)   3.5 22 0.62 \       Imaging results reviewed over the past 24 hrs:   No results found for this or any previous visit (from the past 24 hour(s)).

## 2023-08-19 LAB
ANION GAP SERPL CALCULATED.3IONS-SCNC: 11 MMOL/L (ref 7–15)
BUN SERPL-MCNC: 12.8 MG/DL (ref 6–20)
CA-I BLD-MCNC: 5 MG/DL (ref 4.4–5.2)
CALCIUM SERPL-MCNC: 9.5 MG/DL (ref 8.6–10)
CHLORIDE SERPL-SCNC: 105 MMOL/L (ref 98–107)
CREAT SERPL-MCNC: 0.66 MG/DL (ref 0.51–0.95)
DEPRECATED HCO3 PLAS-SCNC: 22 MMOL/L (ref 22–29)
ERYTHROCYTE [DISTWIDTH] IN BLOOD BY AUTOMATED COUNT: 12.7 % (ref 10–15)
GFR SERPL CREATININE-BSD FRML MDRD: >90 ML/MIN/1.73M2
GLUCOSE BLDC GLUCOMTR-MCNC: 136 MG/DL (ref 70–99)
GLUCOSE BLDC GLUCOMTR-MCNC: 159 MG/DL (ref 70–99)
GLUCOSE BLDC GLUCOMTR-MCNC: 161 MG/DL (ref 70–99)
GLUCOSE BLDC GLUCOMTR-MCNC: 205 MG/DL (ref 70–99)
GLUCOSE BLDC GLUCOMTR-MCNC: 236 MG/DL (ref 70–99)
GLUCOSE SERPL-MCNC: 193 MG/DL (ref 70–99)
HCT VFR BLD AUTO: 36.7 % (ref 35–47)
HGB BLD-MCNC: 11.7 G/DL (ref 11.7–15.7)
MAGNESIUM SERPL-MCNC: 1.6 MG/DL (ref 1.7–2.3)
MCH RBC QN AUTO: 30.1 PG (ref 26.5–33)
MCHC RBC AUTO-ENTMCNC: 31.9 G/DL (ref 31.5–36.5)
MCV RBC AUTO: 94 FL (ref 78–100)
PHOSPHATE SERPL-MCNC: 3.2 MG/DL (ref 2.5–4.5)
PLATELET # BLD AUTO: 187 10E3/UL (ref 150–450)
POTASSIUM SERPL-SCNC: 3.4 MMOL/L (ref 3.4–5.3)
POTASSIUM SERPL-SCNC: 3.5 MMOL/L (ref 3.4–5.3)
RBC # BLD AUTO: 3.89 10E6/UL (ref 3.8–5.2)
SODIUM SERPL-SCNC: 138 MMOL/L (ref 136–145)
WBC # BLD AUTO: 8.7 10E3/UL (ref 4–11)

## 2023-08-19 PROCEDURE — 85027 COMPLETE CBC AUTOMATED: CPT

## 2023-08-19 PROCEDURE — 84132 ASSAY OF SERUM POTASSIUM: CPT | Performed by: INTERNAL MEDICINE

## 2023-08-19 PROCEDURE — 250N000013 HC RX MED GY IP 250 OP 250 PS 637: Performed by: INTERNAL MEDICINE

## 2023-08-19 PROCEDURE — 250N000013 HC RX MED GY IP 250 OP 250 PS 637: Performed by: STUDENT IN AN ORGANIZED HEALTH CARE EDUCATION/TRAINING PROGRAM

## 2023-08-19 PROCEDURE — 36415 COLL VENOUS BLD VENIPUNCTURE: CPT | Performed by: INTERNAL MEDICINE

## 2023-08-19 PROCEDURE — 120N000002 HC R&B MED SURG/OB UMMC

## 2023-08-19 PROCEDURE — 83735 ASSAY OF MAGNESIUM: CPT

## 2023-08-19 PROCEDURE — 99232 SBSQ HOSP IP/OBS MODERATE 35: CPT | Mod: GC | Performed by: STUDENT IN AN ORGANIZED HEALTH CARE EDUCATION/TRAINING PROGRAM

## 2023-08-19 PROCEDURE — 250N000011 HC RX IP 250 OP 636: Mod: JZ | Performed by: INTERNAL MEDICINE

## 2023-08-19 PROCEDURE — 250N000011 HC RX IP 250 OP 636: Performed by: PHARMACIST

## 2023-08-19 PROCEDURE — 250N000013 HC RX MED GY IP 250 OP 250 PS 637: Performed by: PHARMACIST

## 2023-08-19 PROCEDURE — 999N000248 HC STATISTIC IV INSERT WITH US BY RN

## 2023-08-19 PROCEDURE — 36415 COLL VENOUS BLD VENIPUNCTURE: CPT

## 2023-08-19 PROCEDURE — 250N000013 HC RX MED GY IP 250 OP 250 PS 637

## 2023-08-19 PROCEDURE — 250N000011 HC RX IP 250 OP 636: Mod: JZ | Performed by: STUDENT IN AN ORGANIZED HEALTH CARE EDUCATION/TRAINING PROGRAM

## 2023-08-19 PROCEDURE — 82330 ASSAY OF CALCIUM: CPT

## 2023-08-19 PROCEDURE — 80048 BASIC METABOLIC PNL TOTAL CA: CPT

## 2023-08-19 PROCEDURE — 84100 ASSAY OF PHOSPHORUS: CPT

## 2023-08-19 RX ORDER — MAGNESIUM OXIDE 400 MG/1
400 TABLET ORAL EVERY 4 HOURS
Status: COMPLETED | OUTPATIENT
Start: 2023-08-19 | End: 2023-08-19

## 2023-08-19 RX ORDER — POTASSIUM CHLORIDE 750 MG/1
40 TABLET, EXTENDED RELEASE ORAL ONCE
Status: COMPLETED | OUTPATIENT
Start: 2023-08-19 | End: 2023-08-19

## 2023-08-19 RX ORDER — HYDROXYZINE HYDROCHLORIDE 25 MG/1
25 TABLET, FILM COATED ORAL
Status: DISCONTINUED | OUTPATIENT
Start: 2023-08-19 | End: 2023-08-23 | Stop reason: HOSPADM

## 2023-08-19 RX ORDER — LANOLIN ALCOHOL/MO/W.PET/CERES
3 CREAM (GRAM) TOPICAL EVERY EVENING
Status: DISCONTINUED | OUTPATIENT
Start: 2023-08-19 | End: 2023-08-23 | Stop reason: HOSPADM

## 2023-08-19 RX ORDER — LOSARTAN POTASSIUM 100 MG/1
100 TABLET ORAL DAILY
Status: DISCONTINUED | OUTPATIENT
Start: 2023-08-20 | End: 2023-08-23 | Stop reason: HOSPADM

## 2023-08-19 RX ORDER — LOSARTAN POTASSIUM 25 MG/1
50 TABLET ORAL ONCE
Status: COMPLETED | OUTPATIENT
Start: 2023-08-19 | End: 2023-08-19

## 2023-08-19 RX ORDER — OXYCODONE HYDROCHLORIDE 5 MG/1
5-10 TABLET ORAL EVERY 6 HOURS PRN
Status: DISCONTINUED | OUTPATIENT
Start: 2023-08-19 | End: 2023-08-23 | Stop reason: HOSPADM

## 2023-08-19 RX ORDER — POTASSIUM CHLORIDE 1.5 G/1.58G
20 POWDER, FOR SOLUTION ORAL ONCE
Status: COMPLETED | OUTPATIENT
Start: 2023-08-19 | End: 2023-08-19

## 2023-08-19 RX ORDER — HYDROXYZINE HYDROCHLORIDE 25 MG/1
50 TABLET, FILM COATED ORAL
Status: DISCONTINUED | OUTPATIENT
Start: 2023-08-19 | End: 2023-08-23 | Stop reason: HOSPADM

## 2023-08-19 RX ADMIN — HYDROCORTISONE SODIUM SUCCINATE 25 MG: 100 INJECTION, POWDER, FOR SOLUTION INTRAMUSCULAR; INTRAVENOUS at 04:08

## 2023-08-19 RX ADMIN — OXYCODONE HYDROCHLORIDE 5 MG: 5 TABLET ORAL at 21:19

## 2023-08-19 RX ADMIN — HYDROCORTISONE 25 MG: 5 TABLET ORAL at 14:32

## 2023-08-19 RX ADMIN — MAGNESIUM OXIDE TAB 400 MG (241.3 MG ELEMENTAL MG) 400 MG: 400 (241.3 MG) TAB at 10:33

## 2023-08-19 RX ADMIN — ACETAMINOPHEN 975 MG: 325 TABLET, FILM COATED ORAL at 21:03

## 2023-08-19 RX ADMIN — ACETAMINOPHEN 975 MG: 325 TABLET, FILM COATED ORAL at 08:54

## 2023-08-19 RX ADMIN — POTASSIUM CHLORIDE 20 MEQ: 1.5 POWDER, FOR SOLUTION ORAL at 16:45

## 2023-08-19 RX ADMIN — PANTOPRAZOLE SODIUM 40 MG: 40 TABLET, DELAYED RELEASE ORAL at 16:45

## 2023-08-19 RX ADMIN — LABETALOL HYDROCHLORIDE 20 MG: 5 INJECTION, SOLUTION INTRAVENOUS at 21:04

## 2023-08-19 RX ADMIN — SENNOSIDES AND DOCUSATE SODIUM 1 TABLET: 8.6; 5 TABLET ORAL at 08:54

## 2023-08-19 RX ADMIN — Medication 3 MG: at 21:03

## 2023-08-19 RX ADMIN — LOSARTAN POTASSIUM 50 MG: 25 TABLET, FILM COATED ORAL at 12:25

## 2023-08-19 RX ADMIN — ENOXAPARIN SODIUM 40 MG: 40 INJECTION SUBCUTANEOUS at 12:26

## 2023-08-19 RX ADMIN — MAGNESIUM OXIDE TAB 400 MG (241.3 MG ELEMENTAL MG) 400 MG: 400 (241.3 MG) TAB at 14:32

## 2023-08-19 RX ADMIN — ACETAMINOPHEN 975 MG: 325 TABLET, FILM COATED ORAL at 14:32

## 2023-08-19 RX ADMIN — OXYCODONE HYDROCHLORIDE 10 MG: 10 TABLET ORAL at 01:22

## 2023-08-19 RX ADMIN — POTASSIUM CHLORIDE 40 MEQ: 750 TABLET, EXTENDED RELEASE ORAL at 10:33

## 2023-08-19 RX ADMIN — INSULIN ASPART 4 UNITS: 100 INJECTION, SOLUTION INTRAVENOUS; SUBCUTANEOUS at 09:24

## 2023-08-19 RX ADMIN — OXYCODONE HYDROCHLORIDE 10 MG: 5 TABLET ORAL at 18:18

## 2023-08-19 RX ADMIN — LABETALOL HYDROCHLORIDE 20 MG: 5 INJECTION, SOLUTION INTRAVENOUS at 01:57

## 2023-08-19 RX ADMIN — OXYCODONE HYDROCHLORIDE 10 MG: 10 TABLET ORAL at 10:33

## 2023-08-19 RX ADMIN — INSULIN ASPART 2 UNITS: 100 INJECTION, SOLUTION INTRAVENOUS; SUBCUTANEOUS at 18:49

## 2023-08-19 RX ADMIN — PANTOPRAZOLE SODIUM 40 MG: 40 TABLET, DELAYED RELEASE ORAL at 08:54

## 2023-08-19 RX ADMIN — HYDROCORTISONE 25 MG: 5 TABLET ORAL at 21:12

## 2023-08-19 RX ADMIN — LOSARTAN POTASSIUM 50 MG: 25 TABLET, FILM COATED ORAL at 08:54

## 2023-08-19 RX ADMIN — LABETALOL HYDROCHLORIDE 20 MG: 5 INJECTION, SOLUTION INTRAVENOUS at 10:35

## 2023-08-19 ASSESSMENT — ACTIVITIES OF DAILY LIVING (ADL)
ADLS_ACUITY_SCORE: 23
ADLS_ACUITY_SCORE: 21
ADLS_ACUITY_SCORE: 23
ADLS_ACUITY_SCORE: 21
ADLS_ACUITY_SCORE: 24
ADLS_ACUITY_SCORE: 21
ADLS_ACUITY_SCORE: 24
ADLS_ACUITY_SCORE: 21
ADLS_ACUITY_SCORE: 23
ADLS_ACUITY_SCORE: 21

## 2023-08-19 NOTE — PROGRESS NOTES
Endocrine Progress Note  Patient: Jessica Kennedy   MRN: [id number]  Date of Service: 08/19/2023    Chart reviewed    She received losartan 50 mg yesterday at 10 AM, blood pressure to continue to be high during the day received hydralazine 1 dose of 10 mg at 3 PM and labetalol 20 mg at 1 AM today morning followed by another dose of hydralazine in the morning.  Blood pressure continues to increase today morning 177/110 at 9 AM.  Losartan dose was increased to 100 mg today blood pressure following that improved significantly at 2 PM was 130/79.    On assessment today.  She stated she feels much better today.  Had some intermittent nausea but overall her appetite is good.  No vomiting.  Mild abdominal pain at the sites of the surgery.  Some dizziness with walking.      Physical Examination:  /74 (BP Location: Right arm, Cuff Size: Adult Regular)   Pulse 73   Temp 97.7  F (36.5  C) (Oral)   Resp 20   Wt 100 kg (220 lb 7.4 oz)   LMP 05/09/2023 (Approximate)   SpO2 95%   BMI 34.53 kg/m      Physical Exam  Constitutional:       General: She is not in acute distress.     Appearance: She is not ill-appearing.   Cardiovascular:      Rate and Rhythm: Normal rate.      Heart sounds: Normal heart sounds. No murmur heard.  Pulmonary:      Effort: Pulmonary effort is normal.      Breath sounds: Normal breath sounds. No wheezing or rales.   Musculoskeletal:      Right lower leg: No edema.      Left lower leg: No edema.   Neurological:      Mental Status: She is alert.          Medications:  Reviewed    Assessment and plan:  Jessica Kennedy is a 48 year old  female with PMHx of MEN 2A with bilateral synchronous pheochromocytomas, HTN, T2DM who was admitted for optimization of blood pressure control / alpha blockade prior to bilateral adrenalectomy. Now s/p bilateral adrenalectomy on 8/16     # Pheochromocytoma, bilateral  s/p bilateral adrenalectomy on 8/16.    Developed postoperative hypertension likely essential  hypertension started on losartan with uptitrating the dose.      # Primary Adrenal Insufficiency secondary to Bilateral Adrenalectomy  8/18 Discussed with patient that during surgery bilateral adrenalectomy was necessary and no adrenal cortex was spared. She was previously aware that there was risk of bilateral adrenalectomy with need for hormone replacement therapy      # Type 2 Diabetes Mellitus  Received Lantus 10 units yesterday at the noon time, carb coverage resumed 1 unit: 10 g CHO with meals add medium dose SSI of 1: 50.     # Primary Hyperparathyroidism  Per primary Endocrinologist, Dr. Vanessa, treatment for this will be set up as outpatient, not during this admission.      # MEN 2A    Recommendations  -If he continues to be stable to transition tomorrow to hydrocortisone 30 mg at 8 a.m. and 10 mg at 2 PM and to start on Florinef 0.05 mg daily.  -To increase losartan dose to 100 mg daily.  -Continue with Lantus 10 units daily, carb coverage with NovoLog of 1: 10 g CHO with meals and snacks and medium dose SSI.  We will reduce the carb coverage in the next few days after reducing the steroids dose.      Calvin Cornejo     Endocrinology diabetes and metabolism  fellow   Pager number: [phone]

## 2023-08-19 NOTE — CHILD FAMILY LIFE
This Child Life Specialist (CCLS) was referred to pt and family through Epic consult.  This CCLS spoke to b/s nurse 8/18 and 8/19 to assess needs and met pt in person 8/19 to introduce self and services with help of video .  Pt noted wanting SW number and needing support for children in school (transportation.) This CCLS will communicate need with appropriate schools and follow up with pt.  Pt also noted 24yo son with DD who has a PCA and 20yo [place] was on phone briefly helping advocate and identify needs for family.  Pt noted no family or community support.  This CCLS will continue to follow and provide support for pt and family as needed.    Shobha Brooks, CCLS  Child Family Life  #[phone]    Time Spent: 45min

## 2023-08-19 NOTE — PROGRESS NOTES
Endocrine Progress Note  Patient: Jessica Kennedy   MRN: [id number]  Date of Service: 08/19/2023    Chart reviewed    She received losartan 50 mg yesterday at 10 AM, blood pressure to continue to be high during the day received hydralazine 1 dose of 10 mg at 3 PM and labetalol 20 mg at 1 AM today morning.  Blood pressure continues to increase today morning 177/110 at 9 AM.    Physical Examination:  /74 (BP Location: Right arm, Cuff Size: Adult Regular)   Pulse 72   Temp 98.2  F (36.8  C) (Oral)   Resp 20   Wt 100 kg (220 lb 7.4 oz)   LMP 05/09/2023 (Approximate)   SpO2 95%   BMI 34.53 kg/m      Physical Exam  Constitutional:       General: She is not in acute distress.     Appearance: She is not ill-appearing.   Cardiovascular:      Rate and Rhythm: Normal rate.      Heart sounds: Normal heart sounds. No murmur heard.  Pulmonary:      Effort: Pulmonary effort is normal.      Breath sounds: Normal breath sounds. No wheezing or rales.   Abdominal:      Palpations: Abdomen is soft.      Tenderness: There is no abdominal tenderness.      Comments: Surgical wounds   Musculoskeletal:      Right lower leg: No edema.      Left lower leg: No edema.   Neurological:      Mental Status: She is alert.          Medications:  Reviewed    Assessment and plan:  Jessica Kennedy is a 48 year old  female with PMHx of MEN 2A with bilateral synchronous pheochromocytomas, HTN, T2DM who was admitted for optimization of blood pressure control / alpha blockade prior to bilateral adrenalectomy. Now s/p bilateral adrenalectomy on 8/16     # Pheochromocytoma, bilateral  s/p bilateral adrenalectomy on 8/16.    She is starting to become hypertensive. No need for alpha blockers at this point. Suspect there is a component of essential hypertension.    # Primary Adrenal Insufficiency secondary to Bilateral Adrenalectomy  8/18 Discussed with patient that during surgery bilateral adrenalectomy was necessary and no adrenal cortex was  spared. She was previously aware that there was risk of bilateral adrenalectomy with need for hormone replacement therapy    Once she is more awake and daughter is here, will review sick day management and need for medical alert bracelet.     # Type 2 Diabetes Mellitus  Received Lantus 10 units yesterday at the noon time, carb coverage resumed 1 unit: 10 g CHO with meals add medium dose SSI of 1: 50.     # Primary Hyperparathyroidism  Per primary Endocrinologist, Dr. Vanessa, treatment for this will be set up as outpatient, not during this admission.      # MEN 2A    Recommendations  -If he continues to be stable to transition tomorrow to hydrocortisone 20 mg at 8 a.m. and 10 mg at 2 PM and to start on Florinef 0.05 mg daily.  -Sick days rule .sickwas explained to her and her daughter and advised to get medical alert bracelet.  -To increase losartan dose to 100 mg daily.  -Continue with Lantus 10 units daily, carb coverage with NovoLog of 1: 10 g CHO with meals and snacks and medium dose SSI.  We will reduce the carb coverage in the next few days after reducing the steroids dose.      Calvin Cornejo     Endocrinology diabetes and metabolism  fellow   Pager number: [phone]

## 2023-08-19 NOTE — CONSULTS
SPIRITUAL HEALTH SERVICES Progress Note  [facility] 4C  REFERRAL SOURCE: Emotional Support Consult    I met briefly with Jessica to assess SHS needs, she had two family present who facilitated conversation. They expressed being alright today and not having urgent SHS needs. I informed them that [facility] chaplains would be available on Monday.    Plan of Care - [facility] chaplains following and will reassess needs as appropriate.    Jevon Grajeda M.Div.  Chaplain Resident  Pager [phone]    * [facility] remains available 24/7 for emergent requests/referrals, either by having the switchboard page the on-call  or by entering an ASAP/STAT consult in Epic (this will also page the on-call ). Routine Epic consults receive an initial response within 24 hours.*

## 2023-08-19 NOTE — PLAN OF CARE
Goal Outcome Evaluation:     Plan of Care Reviewed With: patient    Overall Patient Progress: improving    Outcome Evaluation: No acute changes overnight. Getting oxycodone and scheduled tylenol for pain management.

## 2023-08-19 NOTE — PLAN OF CARE
ICU End of Shift Summary. See flowsheets for vital signs and detailed assessment.    Changes this shift: VSS on RA. PRN oxy and scheduled tylenol for pain. Alert and oriented x 4. PRN labetalol given once for elevated BP. Occasionally forgets to call for BG prior to meals. Ambulating in room. Potassium replaced twice. Mag replaced once.     Plan:  Replace electrolytes as necessary. Pain management. Close BG monitoring. Close BP monitoring.   Goal Outcome Evaluation:      Plan of Care Reviewed With: patient, child, family    Overall Patient Progress: improvingOverall Patient Progress: improving    Outcome Evaluation: PRN oxy given for pain. Ambulating with SBA. Occaisionally forgetful to call for BG check prior to eating.

## 2023-08-19 NOTE — PROGRESS NOTES
[facility]    Progress Note - MIS Service       Date of Admission:  8/11/2023    Assessment & Plan: Surgery   48 year old woman with DM2, MEN2A with primary hyperparathyroidism, medullary thyroid cancer and bilateral synchronous pheochromocytoma now POD 3 bilateral total thyroidectomy. Intubated with decreasing vent settings. No current pressors with hydrocortisone.     No new surgical recs  - PO pain control with IV back-up  - Blood pressure, steroid, AI, DM2, Hyperparathyroid management per endocrine, appreciate endocrine cares  - Strict I&O  - Labs per primary team  - Lovenox for dvt ppx  - Staples to remain in place for one week or until discharge.  - Remainder of cares per primary       Diet: Moderate Consistent Carb (60 g CHO per Meal) Diet  Snacks/Supplements Adult: Glucerna; Between Meals    DVT Prophylaxis: Defer to primary service  Rodriguez Catheter: Not present  Lines: None       Drains: None     Cardiac Monitoring: None  Code Status: Full Code      Clinically Significant Risk Factors                             # Financial/Environmental Concerns: unable to afford rent/mortgage;unable to afford food         Disposition Plan          The patient's care was discussed with the Attending Physician, Dr. Hernandez .    Amanda Dye MD  [facility]  Non-urgent messages: Securely message with [employer] (more info)  Text page via [facility] Paging/Directory     ______________________________________________________________________    Interval History   No acute events overnight, afebrile, hypertensive to the 160s, on room air, elevated glucose on checks. Abdominal pain controlled, passing gas having bowel movements. Denies nausea, vomiting or shortness of breath.     Physical Exam   Vital Signs: Temp: 98.2  F (36.8  C) Temp src: Oral BP: 123/74 Pulse: 72   Resp: 20 SpO2: 95 % O2 Device: None (Room air)    Weight: 220 lbs 7.36  oz  Intake/Output Summary (Last 24 hours) at 8/17/2023 0749  Last data filed at 8/17/2023 0700  Gross per 24 hour   Intake 5523.87 ml   Output 1480 ml   Net 4043.87 ml     General Appearance: Appears comfortable  Respiratory: NLB  Cardiovascular: RRR  GI: Abdomen soft, approprately tender, incisions with bandages that are clean and dry, closed with staples  Skin: Normal color and turgor  Other: Ext WWP         Data     I have personally reviewed the following data over the past 24 hrs:    12.0 (H)  \   11.9   / 186     142 109 (H) 9.9 /  159 (H)   3.5 22 0.62 \

## 2023-08-19 NOTE — PROGRESS NOTES
[facility]    Progress Note - Medicine Service, [facility] TEAM 3       Date of Admission:  8/11/2023    Assessment & Plan   Jessica Kennedy is a 48 year old female admitted on 8/11/2023. She has a history of MEN2A c/b bilaterally pheochromocytoma, insulin dependent T2DM and is admitted for planned surgery now s/p bilateral pheochromocytoma resection/adrenalectomy. Remains admitted for BP control and adrenal insufficiency.    MEN2A c/b bilateral pheochromocytoma s/p resection/adrenalectomy 8/16  Hypertension  Primary Hyperparathyroidism   Medullary Thyroid CA  Admitted for planned pheo resection/adrenalectomy and pre-op BP control. Post-op hypertension likely 2/2 essential hypertension. Still requiring IV prns for BP.  - Endocrinology consult  - increase Losartan 100 mg daily  - As-needed hydralazine and/or labetalol if systolic blood pressure >160mmHg  - Postoperative pain control with Tylenol, oxycodone     Adrenal insufficiency due to adrenalectomy 2/2 bilateral pheochromocytoma  Unable to perform cortical sparing surgery. Started on hydrocortisone with stable BP and electrolytes.  - Endocrinology consult  - PO Hydrocort 25 q8h  - Start florinef tomorrow   - Will need sick day plan prior to discharge    Insulin dependent T2DM   PTA on oral and insulin regimen. Was managed on insulin drip in SICU, transitioned to subcutaneous regimen 8/18.   - 1:10 units carb ratio, medium dose sliding scale  - HOLD PTA Metformin and glipizide     Headache  Long history, no recent changes  - Tylenol 500-1000 mg PRN Q6H      Insomnia  - melatonin PM  - hydroxyzine prn     #Irritated Eyes, resolved  #Palpitations, resolved  #Congested Nose, resolved        Diet: Moderate Consistent Carb (60 g CHO per Meal) Diet  Snacks/Supplements Adult: Glucerna; Between Meals    DVT Prophylaxis: Enoxaparin (Lovenox) SQ  Rodriguez Catheter: Not present  Fluids: PO  Lines: None     Cardiac Monitoring:  None  Code Status: Full Code      Clinically Significant Risk Factors            # Hypomagnesemia: Lowest Mg = 1.6 mg/dL in last 2 days, will replace as needed                    # Financial/Environmental Concerns: unable to afford rent/mortgage;unable to afford food         Disposition Plan    Discharge pending BP, steroid and DM plan. Likely ~8/22     The patient's care was discussed with the Attending Physician, Dr. Mcduffie .    Shobha Arguelles MD  Medicine Service, [facility] TEAM 3  [facility]  Securely message with [employer] (more info)  Text page via [facility] Paging/Directory   See signed in provider for up to date coverage information  ______________________________________________________________________    Interval History   NAEO, NNR. Patient having post-op back pain with sitting/walking but still getting up and walking around. Sore throat. Poor sleep overnight, would like something to help with sleep. Cousin Edy visiting today.     Physical Exam   Vital Signs: Temp: 97.7  F (36.5  C) Temp src: Oral BP: 123/74 Pulse: 73   Resp: 20 SpO2: 95 % O2 Device: None (Room air)    Weight: 220 lbs 7.36 oz    CONSTITUTIONAL: Interactive, in no acute distress.  SKIN: Clear. No significant rash, abnormal pigmentation or lesions.     EYES: No scleral icterus. No conjunctival injection or drainage. EOMI.   HEENT: Normocephalic, atraumatic. Oral mucosa moist. No nasal discharge.   RESPIRATORY: No increased work of breathing. Clear to auscultation bilaterally without wheeze, crackles, rales, or rhonchi.   CARDIOVASCULAR: Normal S1, S2. No murmurs. Cap refill <2sec. No FERNANDO  GI: non-distended. Bowel sounds active. Soft, non-tender to palpation.   NEUROLOGIC: Normal tone. Speech clear and fluent. Following commands. Tracking appropriately. Alert and appropriate.       Medical Decision Making       Please see A&P for additional details of medical decision making.      Data     I have  personally reviewed the following data over the past 24 hrs:    8.7  \   11.7   / 187     138 105 12.8 /  136 (H)   3.4 22 0.66 \       Imaging results reviewed over the past 24 hrs:   No results found for this or any previous visit (from the past 24 hour(s)).

## 2023-08-19 NOTE — PROGRESS NOTES
Care Management Follow Up    Length of Stay (days): 8    Expected Discharge Date:  unknown     Concerns to be Addressed: financial/community needs  Patient plan of care discussed at interdisciplinary rounds: No    Anticipated Discharge Disposition:  unknown     Anticipated Discharge Services:  unknown  Anticipated Discharge DME:  unknown    Patient/family educated on Medicare website which has current facility and service quality ratings:  n/a  Education Provided on the Discharge Plan:  n/a  Patient/Family in Agreement with the Plan:  n/a    Referrals Placed by CM/SW:  none at this encounter  Private pay costs discussed: Not applicable    Additional Information:  10:30 a   SW met with pt bedside at request of Shobha with [employer]. Shobha states that pt has shared they are struggling to pay rent and feed family as pt is single mother of 5 (one child is disabled). Shobha states that she will connect with pt children's schools about transportation.  SW met with pt and pt friend bedside with use of [facility] . Pt states that she did have [facility] benefits like SNAP and others but have not been receiving them in the past 2-3 months because she forgot to do the paperwork. Pt also states that she does not have bed at home and could use assistance getting one. SW asked who might be able to support her in completing these and pt states her daughter Deena will and she will be visiting hospital at noon.   12:00 p  SW returned to room and spoke with pt daughter Deena and provided her with numbers for [employer], location of [place] service center where pt can renew and update [facility] paperwork and a list of agencies who could make a referral for [employer] for pt to get bed and other furniture she may need.  Deena stated she understood and asked SW for letter to give her work regarding pt/mothers hospitalization and one for [facility] should they need it for her paperwork renewal.  FLORINA emailed both letters to  Deena at email address she provided.    Care management will continue to follow through discharge.     MOY Cordero, LGSW  4A/4C/4E [facility]    Units: 4A, 4C, & 4E Pager: [phone]    Social Work and Care Management Department    Units: 6A & 6B  Pager #2: [phone]  Units: 7A &7B  Pager #4: [phone]  Units: 5A, 5B, & 5C  Pager #1: [phone]  Units: 6C & 6D  Pager #3: [phone]  Units: 7C & 7D  Pager #5: [phone]    Unit: [facility]  Pager: [phone] (page copies  to ED SW staff)    [facility] ([id number]) Saturday and Sunday  Units: 5 Ortho, 5 Med/Surg & WB ED  Pager #7: [phone]  Units: 6 Med/Surg, 8A, 10A ICU  Pager #8: [phone]      After hours (1630 - 0000) Saturday & Sunday; ([id number]) Mon-Fri; (1813-5825) [facility] Recognized Holidays  Units: ALL  Pager: [phone]

## 2023-08-20 ENCOUNTER — APPOINTMENT (OUTPATIENT)
Dept: OCCUPATIONAL THERAPY | Facility: CLINIC | Age: 48
End: 2023-08-20
Attending: STUDENT IN AN ORGANIZED HEALTH CARE EDUCATION/TRAINING PROGRAM
Payer: COMMERCIAL

## 2023-08-20 LAB
ANION GAP SERPL CALCULATED.3IONS-SCNC: 9 MMOL/L (ref 7–15)
BUN SERPL-MCNC: 15.1 MG/DL (ref 6–20)
CA-I BLD-MCNC: 5.1 MG/DL (ref 4.4–5.2)
CALCIUM SERPL-MCNC: 10 MG/DL (ref 8.6–10)
CHLORIDE SERPL-SCNC: 103 MMOL/L (ref 98–107)
CREAT SERPL-MCNC: 0.58 MG/DL (ref 0.51–0.95)
DEPRECATED HCO3 PLAS-SCNC: 23 MMOL/L (ref 22–29)
ERYTHROCYTE [DISTWIDTH] IN BLOOD BY AUTOMATED COUNT: 12.4 % (ref 10–15)
GFR SERPL CREATININE-BSD FRML MDRD: >90 ML/MIN/1.73M2
GLUCOSE BLDC GLUCOMTR-MCNC: 125 MG/DL (ref 70–99)
GLUCOSE BLDC GLUCOMTR-MCNC: 127 MG/DL (ref 70–99)
GLUCOSE BLDC GLUCOMTR-MCNC: 169 MG/DL (ref 70–99)
GLUCOSE BLDC GLUCOMTR-MCNC: 172 MG/DL (ref 70–99)
GLUCOSE BLDC GLUCOMTR-MCNC: 185 MG/DL (ref 70–99)
GLUCOSE SERPL-MCNC: 185 MG/DL (ref 70–99)
HCT VFR BLD AUTO: 37.1 % (ref 35–47)
HGB BLD-MCNC: 12.1 G/DL (ref 11.7–15.7)
MAGNESIUM SERPL-MCNC: 1.7 MG/DL (ref 1.7–2.3)
MCH RBC QN AUTO: 29.7 PG (ref 26.5–33)
MCHC RBC AUTO-ENTMCNC: 32.6 G/DL (ref 31.5–36.5)
MCV RBC AUTO: 91 FL (ref 78–100)
PHOSPHATE SERPL-MCNC: 3.7 MG/DL (ref 2.5–4.5)
PLATELET # BLD AUTO: 189 10E3/UL (ref 150–450)
POTASSIUM SERPL-SCNC: 3.9 MMOL/L (ref 3.4–5.3)
RBC # BLD AUTO: 4.07 10E6/UL (ref 3.8–5.2)
SODIUM SERPL-SCNC: 135 MMOL/L (ref 136–145)
WBC # BLD AUTO: 8.3 10E3/UL (ref 4–11)

## 2023-08-20 PROCEDURE — 250N000013 HC RX MED GY IP 250 OP 250 PS 637: Performed by: STUDENT IN AN ORGANIZED HEALTH CARE EDUCATION/TRAINING PROGRAM

## 2023-08-20 PROCEDURE — 97530 THERAPEUTIC ACTIVITIES: CPT | Mod: GO

## 2023-08-20 PROCEDURE — 999N000127 HC STATISTIC PERIPHERAL IV START W US GUIDANCE

## 2023-08-20 PROCEDURE — 250N000013 HC RX MED GY IP 250 OP 250 PS 637: Performed by: PHARMACIST

## 2023-08-20 PROCEDURE — 36415 COLL VENOUS BLD VENIPUNCTURE: CPT

## 2023-08-20 PROCEDURE — 250N000011 HC RX IP 250 OP 636: Mod: JZ | Performed by: STUDENT IN AN ORGANIZED HEALTH CARE EDUCATION/TRAINING PROGRAM

## 2023-08-20 PROCEDURE — 250N000013 HC RX MED GY IP 250 OP 250 PS 637

## 2023-08-20 PROCEDURE — 120N000002 HC R&B MED SURG/OB UMMC

## 2023-08-20 PROCEDURE — 85027 COMPLETE CBC AUTOMATED: CPT

## 2023-08-20 PROCEDURE — 250N000011 HC RX IP 250 OP 636: Mod: JZ | Performed by: PHARMACIST

## 2023-08-20 PROCEDURE — 82310 ASSAY OF CALCIUM: CPT

## 2023-08-20 PROCEDURE — 99232 SBSQ HOSP IP/OBS MODERATE 35: CPT | Mod: GC | Performed by: STUDENT IN AN ORGANIZED HEALTH CARE EDUCATION/TRAINING PROGRAM

## 2023-08-20 PROCEDURE — 84100 ASSAY OF PHOSPHORUS: CPT

## 2023-08-20 PROCEDURE — 97535 SELF CARE MNGMENT TRAINING: CPT | Mod: GO

## 2023-08-20 PROCEDURE — 99232 SBSQ HOSP IP/OBS MODERATE 35: CPT | Mod: GC | Performed by: INTERNAL MEDICINE

## 2023-08-20 PROCEDURE — 82330 ASSAY OF CALCIUM: CPT

## 2023-08-20 PROCEDURE — 83735 ASSAY OF MAGNESIUM: CPT

## 2023-08-20 RX ORDER — AMLODIPINE BESYLATE 10 MG/1
10 TABLET ORAL DAILY
Status: DISCONTINUED | OUTPATIENT
Start: 2023-08-20 | End: 2023-08-23 | Stop reason: HOSPADM

## 2023-08-20 RX ORDER — HYDROCORTISONE 10 MG/1
10 TABLET ORAL DAILY
Status: DISCONTINUED | OUTPATIENT
Start: 2023-08-21 | End: 2023-08-20

## 2023-08-20 RX ORDER — MAGNESIUM OXIDE 400 MG/1
400 TABLET ORAL EVERY 4 HOURS
Status: COMPLETED | OUTPATIENT
Start: 2023-08-20 | End: 2023-08-20

## 2023-08-20 RX ORDER — HYDROCORTISONE 5 MG/1
5 TABLET ORAL ONCE
Status: COMPLETED | OUTPATIENT
Start: 2023-08-20 | End: 2023-08-20

## 2023-08-20 RX ORDER — HYDROCORTISONE 10 MG/1
10 TABLET ORAL EVERY 24 HOURS
Status: DISCONTINUED | OUTPATIENT
Start: 2023-08-21 | End: 2023-08-21

## 2023-08-20 RX ORDER — CARVEDILOL 12.5 MG/1
12.5 TABLET ORAL 2 TIMES DAILY
Status: DISCONTINUED | OUTPATIENT
Start: 2023-08-20 | End: 2023-08-21

## 2023-08-20 RX ADMIN — OXYCODONE HYDROCHLORIDE 10 MG: 5 TABLET ORAL at 02:44

## 2023-08-20 RX ADMIN — LABETALOL HYDROCHLORIDE 20 MG: 5 INJECTION, SOLUTION INTRAVENOUS at 05:17

## 2023-08-20 RX ADMIN — MAGNESIUM OXIDE TAB 400 MG (241.3 MG ELEMENTAL MG) 400 MG: 400 (241.3 MG) TAB at 13:39

## 2023-08-20 RX ADMIN — ENOXAPARIN SODIUM 40 MG: 40 INJECTION SUBCUTANEOUS at 13:39

## 2023-08-20 RX ADMIN — SENNOSIDES AND DOCUSATE SODIUM 1 TABLET: 8.6; 5 TABLET ORAL at 08:54

## 2023-08-20 RX ADMIN — ACETAMINOPHEN 975 MG: 325 TABLET, FILM COATED ORAL at 21:07

## 2023-08-20 RX ADMIN — MAGNESIUM OXIDE TAB 400 MG (241.3 MG ELEMENTAL MG) 400 MG: 400 (241.3 MG) TAB at 10:37

## 2023-08-20 RX ADMIN — HYDROCORTISONE 25 MG: 5 TABLET ORAL at 05:13

## 2023-08-20 RX ADMIN — LOSARTAN POTASSIUM 100 MG: 100 TABLET, FILM COATED ORAL at 08:54

## 2023-08-20 RX ADMIN — PANTOPRAZOLE SODIUM 40 MG: 40 TABLET, DELAYED RELEASE ORAL at 08:54

## 2023-08-20 RX ADMIN — AMLODIPINE BESYLATE 10 MG: 10 TABLET ORAL at 12:09

## 2023-08-20 RX ADMIN — ACETAMINOPHEN 975 MG: 325 TABLET, FILM COATED ORAL at 08:54

## 2023-08-20 RX ADMIN — HYDROCORTISONE 5 MG: 5 TABLET ORAL at 13:39

## 2023-08-20 RX ADMIN — Medication 0.05 MG: at 13:39

## 2023-08-20 RX ADMIN — LABETALOL HYDROCHLORIDE 20 MG: 5 INJECTION, SOLUTION INTRAVENOUS at 18:54

## 2023-08-20 RX ADMIN — ACETAMINOPHEN 975 MG: 325 TABLET, FILM COATED ORAL at 13:39

## 2023-08-20 RX ADMIN — CARVEDILOL 12.5 MG: 12.5 TABLET, FILM COATED ORAL at 21:07

## 2023-08-20 RX ADMIN — Medication 3 MG: at 21:07

## 2023-08-20 RX ADMIN — Medication 1 LOZENGE: at 02:48

## 2023-08-20 RX ADMIN — LABETALOL HYDROCHLORIDE 20 MG: 5 INJECTION, SOLUTION INTRAVENOUS at 12:19

## 2023-08-20 RX ADMIN — PANTOPRAZOLE SODIUM 40 MG: 40 TABLET, DELAYED RELEASE ORAL at 16:37

## 2023-08-20 RX ADMIN — INSULIN ASPART 4 UNITS: 100 INJECTION, SOLUTION INTRAVENOUS; SUBCUTANEOUS at 18:45

## 2023-08-20 RX ADMIN — HYDRALAZINE HYDROCHLORIDE 10 MG: 20 INJECTION INTRAMUSCULAR; INTRAVENOUS at 16:36

## 2023-08-20 RX ADMIN — INSULIN ASPART 5 UNITS: 100 INJECTION, SOLUTION INTRAVENOUS; SUBCUTANEOUS at 10:36

## 2023-08-20 RX ADMIN — HYDRALAZINE HYDROCHLORIDE 10 MG: 20 INJECTION INTRAMUSCULAR; INTRAVENOUS at 05:57

## 2023-08-20 RX ADMIN — Medication 1 LOZENGE: at 21:07

## 2023-08-20 ASSESSMENT — ACTIVITIES OF DAILY LIVING (ADL)
ADLS_ACUITY_SCORE: 21

## 2023-08-20 NOTE — PLAN OF CARE
Goal Outcome Evaluation:    Plan of Care Reviewed With: patient    Overall Patient Progress: no change    Outcome Evaluation: Hypertesive sBP>160 tonight requiring  labetalol x2. Conitnues to endorse pain and oxycodone and scheduled tylenol given as needed.

## 2023-08-20 NOTE — PROGRESS NOTES
Endocrine Progress Note  Patient: Jessica Kennedy   MRN: [id number]  Date of Service: 08/20/2023    Chart reviewed    She received total of 100 mg of losartan yesterday.  Despite that blood pressure remained high received labetalol at 10 AM yesterday and 9 PM also she received another dose at 5 AM today morning.  Received hydralazine 10 mg at 6 AM today morning.  On hydrocortisone 25 mg 3 times daily.    On assessment today.  She states she feels better than yesterday was able to walk within the room.  No nausea or vomiting.  Good appetite.  No abdominal pain.  No lightheadedness or dizziness.    Physical Examination:  BP (!) 179/108   Pulse 68   Temp 97.9  F (36.6  C) (Oral)   Resp 20   Wt 100 kg (220 lb 7.4 oz)   SpO2 97%   BMI 34.53 kg/m      Physical Exam  Vitals reviewed.   Constitutional:       General: She is not in acute distress.     Appearance: She is not ill-appearing.   Cardiovascular:      Rate and Rhythm: Normal rate.   Pulmonary:      Effort: Pulmonary effort is normal.   Neurological:      Mental Status: She is oriented to person, place, and time.   Psychiatric:         Mood and Affect: Mood normal.         Behavior: Behavior normal.          Medications:  Reviewed    Assessment and plan:  Jessica Kennedy is a 48 year old  female with PMHx of MEN 2A with bilateral synchronous pheochromocytomas, HTN, T2DM who was admitted for optimization of blood pressure control / alpha blockade prior to bilateral adrenalectomy. Now s/p bilateral adrenalectomy on 8/16     # Pheochromocytoma, bilateral  s/p bilateral adrenalectomy on 8/16.    Developed postoperative hypertension likely essential hypertension started on losartan with uptitrating the dose.    # Primary Adrenal Insufficiency secondary to Bilateral Adrenalectomy  8/18 Discussed with patient that during surgery bilateral adrenalectomy was necessary and no adrenal cortex was spared. She was previously aware that there was risk of bilateral  adrenalectomy with need for hormone replacement therapy    # Type 2 Diabetes Mellitus: Poorly controlled hemoglobin A1c 9 on 6/28/2023.  PTA was on glipizide 5 mg daily and metformin 1000 mg twice daily.    On Lantus 10 units/NovoLog 1: 10 g CHO with meals and snack on medium dose SSI.     # Primary Hyperparathyroidism  Per primary Endocrinologist, Dr. Vanessa, treatment for this will be set up as outpatient, not during this admission.      # MEN 2A    Recommendations  -Reduce steroid dose from hydrocortisone 25 mg 3 times daily to hydrocortisone 30 mg at 8 a.m. and 10 mg at 2 PM and to start on Florinef 0.05 mg daily.  -Continue losartan 100 mg daily.  -To add amlodipine 10 mg daily.  -To add Coreg 12.5 mg twice daily.  -Continue with Lantus 10 units daily, carb coverage with NovoLog of 1: 10 g CHO with meals and snacks and medium dose SSI.  We will reduce the carb coverage in the next few days after reducing the steroids dose.  -When the steroid is reduced to the maintenance dose of steroids likely Restarted on home metformin to be taken off carb coverage and Lantus.    Calvin Cornejo     Endocrinology diabetes and metabolism  fellow   Pager number: [phone]   I have seen and examined the patient, reviewed and edited the fellow's note, and agree with the plan of care.  BUCK Chakraborty

## 2023-08-20 NOTE — PROGRESS NOTES
[facility]    Progress Note - Medicine Service, [facility] TEAM 3       Date of Admission:  8/11/2023    Assessment & Plan   Jessica Kennedy is a 48 year old female admitted on 8/11/2023. She has a history of MEN2A c/b bilaterally pheochromocytoma, insulin dependent T2DM and is admitted for planned surgery now s/p bilateral pheochromocytoma resection/adrenalectomy. Remains admitted for BP control and adrenal insufficiency.    Today:  - decrease cortef  - start florinef  - start amlodipine 10 and carvedilol 12.5 BID    MEN2A c/b bilateral pheochromocytoma s/p resection/adrenalectomy 8/16  Hypertension  Primary Hyperparathyroidism   Medullary Thyroid CA  Admitted for planned pheo resection/adrenalectomy and pre-op BP control. Post-op hypertension likely 2/2 essential hypertension. Restarted losartan and amlodipine. Still requiring IV prns for BP.  - Endocrinology consult  - Losartan 100 mg daily  - Start amlodipine 10 daily  - As-needed hydralazine and/or labetalol if systolic blood pressure >160mmHg  - Postoperative pain control with Tylenol, oxycodone     Adrenal insufficiency due to adrenalectomy 2/2 bilateral pheochromocytoma  Unable to perform cortical sparing surgery. Started on hydrocortisone with stable BP and electrolytes.  - Endocrinology consult  - Wean Hydrocort per endo  - Start florinef   - Will need sick day plan prior to discharge    Insulin dependent T2DM   PTA on oral and insulin regimen. Was managed on insulin drip in SICU, transitioned to subcutaneous regimen 8/18.   - 1:10 units carb ratio, medium dose sliding scale  - HOLD PTA Metformin and glipizide per endo while adjusting steroids     Headache  Long history, no recent changes  - Tylenol 500-1000 mg PRN Q6H      Insomnia  - melatonin PM  - hydroxyzine prn     #Irritated Eyes, resolved  #Palpitations, resolved  #Congested Nose, resolved        Diet: Moderate Consistent Carb (60 g CHO per Meal)  Diet  Snacks/Supplements Adult: Glucerna; Between Meals    DVT Prophylaxis: Enoxaparin (Lovenox) SQ  Rodriguez Catheter: Not present  Fluids: PO  Lines: None     Cardiac Monitoring: None  Code Status: Full Code      Clinically Significant Risk Factors            # Hypomagnesemia: Lowest Mg = 1.6 mg/dL in last 2 days, will replace as needed                    # Financial/Environmental Concerns: unable to afford rent/mortgage;unable to afford food         Disposition Plan    Discharge pending BP, steroid and DM plan. Likely ~8/22     The patient's care was discussed with the Attending Physician, Dr. Mcduffie .    Shobha Arguelles MD  Medicine Service, [facility] TEAM 3  [facility]  Securely message with [employer] (more info)  Text page via [employer] [facility] Paging/Directory   See signed in provider for up to date coverage information  ______________________________________________________________________    Interval History   NAEO, NNR. Required 3x labetolol and 1x hydral in the past 24 hours for SBP > 160. Pain is better today, and slept very well overnight.     Physical Exam   Vital Signs: Temp: 97.9  F (36.6  C) Temp src: Oral BP: (!) 162/87 Pulse: 68   Resp: 20 SpO2: 97 % O2 Device: None (Room air)    Weight: 220 lbs 7.36 oz    CONSTITUTIONAL: Interactive, in no acute distress.  SKIN: Clear. No significant rash, abnormal pigmentation or lesions.     EYES: No scleral icterus. No conjunctival injection or drainage. EOMI.   HEENT: Normocephalic, atraumatic. Oral mucosa moist. No nasal discharge.   RESPIRATORY: No increased work of breathing. Clear to auscultation bilaterally without wheeze, crackles, rales, or rhonchi.   CARDIOVASCULAR: Normal S1, S2. No murmurs. Cap refill <2sec. No FERNANDO  GI: non-distended. Bowel sounds active. Soft, non-tender to palpation.   NEUROLOGIC: Normal tone. Speech clear and fluent. Following commands. Tracking appropriately. Alert and appropriate.       Medical  Decision Making       Please see A&P for additional details of medical decision making.      Data     I have personally reviewed the following data over the past 24 hrs:    8.3  \   12.1   / 189     138 105 12.8 /  236 (H)   3.5 22 0.66 \       Imaging results reviewed over the past 24 hrs:   No results found for this or any previous visit (from the past 24 hour(s)).

## 2023-08-20 NOTE — PROGRESS NOTES
[facility]    Progress Note - Emergency General Surgery Service       Date of Admission:  8/11/2023    Assessment & Plan: Surgery   Brent Lopez is a 48 year old female with a significant history for  type two diabetes mellitus, MEN2A with primary hyperparathyroidism, medullary thyroid cancer and bilateral synchronous pheochromocytoma. She is POD 4 bilateral adrenalectomy. The patient has been having increased blood sugars and blood pressure.     - PO pain control with IV back-up  - Maintain blood pressure and blood sugar in appropriate ranges.   - Steroid, adrenal insuffiencey, diabetes mellitus type II, hyperparathyroid management per endocrine.   - Strict I&O  - Labs per primary team  - Lovenox for dvt ppx  - Staples to remain in place for one week or until discharge.  - Remainder of cares per primary  - No current new surgical interventions indicated.       Diet: Moderate Consistent Carb (60 g CHO per Meal) Diet  Snacks/Supplements Adult: Glucerna; Between Meals    DVT Prophylaxis: Defer to primary service  Rodriguez Catheter: Not present  Lines: None     Drains: None     Cardiac Monitoring: None  Code Status: Full Code      Clinically Significant Risk Factors            # Hypomagnesemia: Lowest Mg = 1.6 mg/dL in last 2 days, will replace as needed                  # Financial/Environmental Concerns: unable to afford rent/mortgage;unable to afford food         Disposition Plan      Expected Discharge Date: 08/23/2023      Destination: home with help/services  Discharge Comments: Pending stability of blood pressure, glucose, adequate mobility and oral intake         The patient's care was discussed with the Attending Physician, Dr. West, Chief Resident/Fellow, and Emergency General Surgery Team.    Edmundo Arvizu MD  [facility]  ______________________________________________________________________    Interval History    The patient was doing well overnight. No acute events. The patient was afebrile and breathing non-labored on room air. Continues to report no nausea or vomiting.     Physical Exam   Vital Signs: Temp: 98.2  F (36.8  C) Temp src: Oral BP: (!) 159/94 Pulse: 83   Resp: 18 SpO2: 97 % O2 Device: None (Room air)    Weight: 220 lbs 7.36 oz  Intake/Output Summary (Last 24 hours) at 8/20/2023 0945  Last data filed at 8/20/2023 0500  Gross per 24 hour   Intake 1560 ml   Output --   Net 1560 ml     Constitutional: awake, alert, cooperative and no apparent distress.  Respiratory: No increased work of breathing, good air exchange.  Cardiovascular: regular rate and rhythm.  GI:  Abdomen not distended, and slight tenderness in lower right quadrant. Soft. Incision bandages are clean, dry and intact. Closed with staples.  Skin: no bruising or bleeding and normal skin color, texture, turgor.        Data     I have personally reviewed the following data over the past 24 hrs:    8.3  \   12.1   / 189     135 (L) 103 15.1 /  169 (H)   3.9 23 0.58 \       Imaging results reviewed over the past 24 hrs:   No results found for this or any previous visit (from the past 24 hour(s)).

## 2023-08-21 ENCOUNTER — APPOINTMENT (OUTPATIENT)
Dept: INTERPRETER SERVICES | Facility: CLINIC | Age: 48
End: 2023-08-21
Attending: STUDENT IN AN ORGANIZED HEALTH CARE EDUCATION/TRAINING PROGRAM
Payer: COMMERCIAL

## 2023-08-21 ENCOUNTER — APPOINTMENT (OUTPATIENT)
Dept: OCCUPATIONAL THERAPY | Facility: CLINIC | Age: 48
End: 2023-08-21
Attending: STUDENT IN AN ORGANIZED HEALTH CARE EDUCATION/TRAINING PROGRAM
Payer: COMMERCIAL

## 2023-08-21 LAB
ANION GAP SERPL CALCULATED.3IONS-SCNC: 11 MMOL/L (ref 7–15)
BUN SERPL-MCNC: 21.2 MG/DL (ref 6–20)
CA-I BLD-MCNC: 5 MG/DL (ref 4.4–5.2)
CALCIUM SERPL-MCNC: 9.7 MG/DL (ref 8.6–10)
CHLORIDE SERPL-SCNC: 105 MMOL/L (ref 98–107)
CREAT SERPL-MCNC: 0.64 MG/DL (ref 0.51–0.95)
DEPRECATED HCO3 PLAS-SCNC: 23 MMOL/L (ref 22–29)
ERYTHROCYTE [DISTWIDTH] IN BLOOD BY AUTOMATED COUNT: 12.4 % (ref 10–15)
GFR SERPL CREATININE-BSD FRML MDRD: >90 ML/MIN/1.73M2
GLUCOSE BLDC GLUCOMTR-MCNC: 125 MG/DL (ref 70–99)
GLUCOSE BLDC GLUCOMTR-MCNC: 126 MG/DL (ref 70–99)
GLUCOSE BLDC GLUCOMTR-MCNC: 143 MG/DL (ref 70–99)
GLUCOSE BLDC GLUCOMTR-MCNC: 182 MG/DL (ref 70–99)
GLUCOSE SERPL-MCNC: 129 MG/DL (ref 70–99)
HCT VFR BLD AUTO: 37.6 % (ref 35–47)
HGB BLD-MCNC: 12.3 G/DL (ref 11.7–15.7)
MAGNESIUM SERPL-MCNC: 1.9 MG/DL (ref 1.7–2.3)
MCH RBC QN AUTO: 30.2 PG (ref 26.5–33)
MCHC RBC AUTO-ENTMCNC: 32.7 G/DL (ref 31.5–36.5)
MCV RBC AUTO: 92 FL (ref 78–100)
PHOSPHATE SERPL-MCNC: 3.6 MG/DL (ref 2.5–4.5)
PLATELET # BLD AUTO: 215 10E3/UL (ref 150–450)
POTASSIUM SERPL-SCNC: 3.5 MMOL/L (ref 3.4–5.3)
RBC # BLD AUTO: 4.07 10E6/UL (ref 3.8–5.2)
SODIUM SERPL-SCNC: 139 MMOL/L (ref 136–145)
WBC # BLD AUTO: 8.5 10E3/UL (ref 4–11)

## 2023-08-21 PROCEDURE — 93005 ELECTROCARDIOGRAM TRACING: CPT

## 2023-08-21 PROCEDURE — 99233 SBSQ HOSP IP/OBS HIGH 50: CPT | Performed by: INTERNAL MEDICINE

## 2023-08-21 PROCEDURE — 85027 COMPLETE CBC AUTOMATED: CPT

## 2023-08-21 PROCEDURE — 84100 ASSAY OF PHOSPHORUS: CPT

## 2023-08-21 PROCEDURE — 250N000013 HC RX MED GY IP 250 OP 250 PS 637: Performed by: STUDENT IN AN ORGANIZED HEALTH CARE EDUCATION/TRAINING PROGRAM

## 2023-08-21 PROCEDURE — 80048 BASIC METABOLIC PNL TOTAL CA: CPT

## 2023-08-21 PROCEDURE — 250N000011 HC RX IP 250 OP 636: Mod: JZ | Performed by: STUDENT IN AN ORGANIZED HEALTH CARE EDUCATION/TRAINING PROGRAM

## 2023-08-21 PROCEDURE — 97530 THERAPEUTIC ACTIVITIES: CPT | Mod: GO

## 2023-08-21 PROCEDURE — 120N000002 HC R&B MED SURG/OB UMMC

## 2023-08-21 PROCEDURE — 36415 COLL VENOUS BLD VENIPUNCTURE: CPT

## 2023-08-21 PROCEDURE — 250N000013 HC RX MED GY IP 250 OP 250 PS 637: Performed by: INTERNAL MEDICINE

## 2023-08-21 PROCEDURE — 83735 ASSAY OF MAGNESIUM: CPT

## 2023-08-21 PROCEDURE — 250N000013 HC RX MED GY IP 250 OP 250 PS 637

## 2023-08-21 PROCEDURE — 93010 ELECTROCARDIOGRAM REPORT: CPT | Performed by: INTERNAL MEDICINE

## 2023-08-21 PROCEDURE — 97535 SELF CARE MNGMENT TRAINING: CPT | Mod: GO

## 2023-08-21 PROCEDURE — 99232 SBSQ HOSP IP/OBS MODERATE 35: CPT | Mod: GC | Performed by: STUDENT IN AN ORGANIZED HEALTH CARE EDUCATION/TRAINING PROGRAM

## 2023-08-21 PROCEDURE — 250N000013 HC RX MED GY IP 250 OP 250 PS 637: Performed by: PHARMACIST

## 2023-08-21 PROCEDURE — 82330 ASSAY OF CALCIUM: CPT

## 2023-08-21 RX ORDER — POTASSIUM CHLORIDE 750 MG/1
20 TABLET, EXTENDED RELEASE ORAL ONCE
Status: COMPLETED | OUTPATIENT
Start: 2023-08-21 | End: 2023-08-21

## 2023-08-21 RX ORDER — HYDROCORTISONE 5 MG/1
5 TABLET ORAL DAILY
Status: DISCONTINUED | OUTPATIENT
Start: 2023-08-21 | End: 2023-08-21

## 2023-08-21 RX ORDER — CARBOXYMETHYLCELLULOSE SODIUM 5 MG/ML
1 SOLUTION/ DROPS OPHTHALMIC
Status: DISCONTINUED | OUTPATIENT
Start: 2023-08-21 | End: 2023-08-23 | Stop reason: HOSPADM

## 2023-08-21 RX ORDER — POLYETHYLENE GLYCOL 3350 17 G/17G
17 POWDER, FOR SOLUTION ORAL ONCE
Status: COMPLETED | OUTPATIENT
Start: 2023-08-21 | End: 2023-08-21

## 2023-08-21 RX ORDER — MAGNESIUM SULFATE HEPTAHYDRATE 40 MG/ML
2 INJECTION, SOLUTION INTRAVENOUS ONCE
Status: COMPLETED | OUTPATIENT
Start: 2023-08-21 | End: 2023-08-21

## 2023-08-21 RX ORDER — CARVEDILOL 25 MG/1
25 TABLET ORAL 2 TIMES DAILY
Status: DISCONTINUED | OUTPATIENT
Start: 2023-08-21 | End: 2023-08-23 | Stop reason: HOSPADM

## 2023-08-21 RX ORDER — ACETAMINOPHEN 325 MG/1
975 TABLET ORAL 3 TIMES DAILY PRN
Status: DISCONTINUED | OUTPATIENT
Start: 2023-08-21 | End: 2023-08-23 | Stop reason: HOSPADM

## 2023-08-21 RX ORDER — HYDRALAZINE HYDROCHLORIDE 20 MG/ML
10 INJECTION INTRAMUSCULAR; INTRAVENOUS EVERY 4 HOURS PRN
Status: DISCONTINUED | OUTPATIENT
Start: 2023-08-21 | End: 2023-08-21

## 2023-08-21 RX ORDER — HYDRALAZINE HYDROCHLORIDE 20 MG/ML
10 INJECTION INTRAMUSCULAR; INTRAVENOUS EVERY 4 HOURS PRN
Status: DISCONTINUED | OUTPATIENT
Start: 2023-08-21 | End: 2023-08-22

## 2023-08-21 RX ADMIN — ACETAMINOPHEN 975 MG: 325 TABLET, FILM COATED ORAL at 13:42

## 2023-08-21 RX ADMIN — OXYCODONE HYDROCHLORIDE 10 MG: 5 TABLET ORAL at 09:32

## 2023-08-21 RX ADMIN — GLYCERIN 1 SUPPOSITORY: 2 SUPPOSITORY RECTAL at 17:00

## 2023-08-21 RX ADMIN — PANTOPRAZOLE SODIUM 40 MG: 40 TABLET, DELAYED RELEASE ORAL at 08:41

## 2023-08-21 RX ADMIN — Medication 1 LOZENGE: at 08:48

## 2023-08-21 RX ADMIN — CARVEDILOL 12.5 MG: 12.5 TABLET, FILM COATED ORAL at 08:41

## 2023-08-21 RX ADMIN — INSULIN ASPART 3 UNITS: 100 INJECTION, SOLUTION INTRAVENOUS; SUBCUTANEOUS at 09:46

## 2023-08-21 RX ADMIN — Medication 1 DROP: at 17:01

## 2023-08-21 RX ADMIN — Medication 1 LOZENGE: at 00:27

## 2023-08-21 RX ADMIN — POLYETHYLENE GLYCOL 3350 17 G: 17 POWDER, FOR SOLUTION ORAL at 16:23

## 2023-08-21 RX ADMIN — Medication 3 MG: at 23:05

## 2023-08-21 RX ADMIN — Medication 1 LOZENGE: at 03:07

## 2023-08-21 RX ADMIN — INSULIN ASPART 4 UNITS: 100 INJECTION, SOLUTION INTRAVENOUS; SUBCUTANEOUS at 14:19

## 2023-08-21 RX ADMIN — CARVEDILOL 25 MG: 25 TABLET, FILM COATED ORAL at 20:37

## 2023-08-21 RX ADMIN — Medication 1 DROP: at 03:05

## 2023-08-21 RX ADMIN — Medication 1 DROP: at 13:54

## 2023-08-21 RX ADMIN — SENNOSIDES AND DOCUSATE SODIUM 1 TABLET: 8.6; 5 TABLET ORAL at 20:37

## 2023-08-21 RX ADMIN — Medication 1 DROP: at 16:23

## 2023-08-21 RX ADMIN — HYDROCORTISONE 30 MG: 20 TABLET ORAL at 08:42

## 2023-08-21 RX ADMIN — POTASSIUM CHLORIDE 20 MEQ: 750 TABLET, EXTENDED RELEASE ORAL at 08:41

## 2023-08-21 RX ADMIN — Medication 1 DROP: at 18:50

## 2023-08-21 RX ADMIN — OXYCODONE HYDROCHLORIDE 5 MG: 5 TABLET ORAL at 17:00

## 2023-08-21 RX ADMIN — HYDROCORTISONE 10 MG: 10 TABLET ORAL at 13:42

## 2023-08-21 RX ADMIN — MAGNESIUM SULFATE IN WATER 2 G: 40 INJECTION, SOLUTION INTRAVENOUS at 09:19

## 2023-08-21 RX ADMIN — LOSARTAN POTASSIUM 100 MG: 100 TABLET, FILM COATED ORAL at 08:41

## 2023-08-21 RX ADMIN — ENOXAPARIN SODIUM 40 MG: 40 INJECTION SUBCUTANEOUS at 12:15

## 2023-08-21 RX ADMIN — ACETAMINOPHEN 975 MG: 325 TABLET, FILM COATED ORAL at 08:40

## 2023-08-21 RX ADMIN — AMLODIPINE BESYLATE 10 MG: 10 TABLET ORAL at 08:41

## 2023-08-21 RX ADMIN — Medication 0.05 MG: at 08:41

## 2023-08-21 RX ADMIN — SENNOSIDES AND DOCUSATE SODIUM 1 TABLET: 8.6; 5 TABLET ORAL at 08:33

## 2023-08-21 RX ADMIN — PANTOPRAZOLE SODIUM 40 MG: 40 TABLET, DELAYED RELEASE ORAL at 16:23

## 2023-08-21 ASSESSMENT — ACTIVITIES OF DAILY LIVING (ADL)
ADLS_ACUITY_SCORE: 23
ADLS_ACUITY_SCORE: 22
ADLS_ACUITY_SCORE: 23
ADLS_ACUITY_SCORE: 21

## 2023-08-21 NOTE — PROGRESS NOTES
[facility]    Progress Note - Medicine Service, MARK TEAM 3       Date of Admission:  8/11/2023    Assessment & Plan   Jessica Kennedy is a 48 year old female admitted on 8/11/2023. She has a history of MEN2A c/b bilaterally pheochromocytoma, insulin dependent T2DM and is admitted for planned surgery now s/p bilateral pheochromocytoma resection/adrenalectomy. Remains admitted for BP control and adrenal insufficiency.    Today:  - Tylenol 750mg TID ---> TID prn   - Glycerin suppository and miralax for constipation  - Refresh plus drops q1h prn for dry eyes  - Hydrocortisone wean per endo  - Will follow-up endocrine team insulin recs i/s/o steroid wean    MEN2A c/b bilateral pheochromocytoma s/p resection/adrenalectomy 8/16  Hypertension  Primary Hyperparathyroidism   Medullary Thyroid CA  Admitted for planned pheo resection/adrenalectomy and pre-op BP control. Post-op hypertension likely 2/2 essential hypertension. Restarted losartan and amlodipine. Still requiring IV prns for BP.  - Endocrinology consult  - Losartan 100 mg daily  - Continue amlodipine 10 daily  - As-needed hydralazine and/or labetalol if systolic blood pressure >160mmHg  - Postoperative pain control with Tylenol, oxycodone     Adrenal insufficiency due to adrenalectomy 2/2 bilateral pheochromocytoma  Unable to perform cortical sparing surgery. Started on hydrocortisone with stable BP and electrolytes.  - Endocrinology consult  - Wean Hydrocort per endo  - Continue florinef   - Will need sick day plan prior to discharge    RLQ Pain  Reporting RLQ 8/21.  Feels bloated.  Abd exam non-peritonitic.  No fever or leukocytosis.    - Continue Senna  - Glycerin suppository and miralax once  - Continue to monitor; if persists after constipation treatment, will consider abd xray    Insulin dependent T2DM   PTA on oral and insulin regimen. Was managed on insulin drip in SICU, transitioned to subcutaneous regimen  8/18.   - Continue glargine 10U daily  - 1:10 units carb ratio, medium dose sliding scale  - HOLD PTA Metformin and glipizide per endo while adjusting steroids     Headache  Long history.  Headache on 8/20 behind eyes; no other clear neurologic changes.  Given scheduled tylenol recently, possible medication overuse headache.    - Tylenol 750mg TID ---> TID prn   - Continue to monitor     Insomnia  - melatonin PM  - hydroxyzine prn     Irritated Eyes  Resolved then returned on 8/21.  Burning, dry eyes.  No clear vision changes.  Exam reassuring.    - Refresh plus q1h prn for now  - Reassess in AM    #Palpitations, resolved  #Congested Nose, resolved     Diet: Moderate Consistent Carb (60 g CHO per Meal) Diet  Snacks/Supplements Adult: Glucerna; Between Meals    DVT Prophylaxis: Enoxaparin (Lovenox) SQ  Rodriguez Catheter: Not present  Fluids: PO  Lines: None     Cardiac Monitoring: None  Code Status: Full Code      Clinically Significant Risk Factors                               # Financial/Environmental Concerns: unable to afford rent/mortgage;unable to afford food       Disposition Plan      Expected Discharge Date: 08/22/2023      Destination: home with help/services  Discharge Comments: Pending stability of blood pressure, glucose, adequate mobility and oral intake Discharge pending BP, steroid and DM plan. Likely ~8/23     The patient's care was discussed with the Attending Physician, Dr. Mcduffie .    Luis Everett DO, MPH  Med-Peds PGY-4  Medicine Service, [facility] TEAM [address] [place] [address]  Securely message with [employer] (more info)  Text page via [facility] Paging/Directory   See signed in provider for up to date coverage information  ______________________________________________________________________    Interval History   Dry eyes; eye drops ordered overnight.  RLQ pain intermittently today after well controlled pain overnight.  Feels bloated.  Also has slight headache, behind  both eyes; does have chronic headaches.    Physical Exam   Vital Signs: Temp: 98.4  F (36.9  C) Temp src: Oral BP: 128/78 Pulse: 80   Resp: 18 SpO2: 99 % O2 Device: None (Room air)    Weight: 212 lbs 15.43 oz    CONSTITUTIONAL: Interactive, in no acute distress.  SKIN: Clear. No significant rash, abnormal pigmentation or lesions.     EYES: No scleral icterus. EOMI.  Mild conjunctival injection.  PERRLA  HEENT: Normocephalic, atraumatic. Oral mucosa moist. No nasal discharge.   RESPIRATORY: No increased work of breathing. Clear to auscultation bilaterally without wheeze, crackles, rales, or rhonchi.   CARDIOVASCULAR: Normal S1, S2. No murmurs. Cap refill <2sec. No FERNANDO  GI: non-distended. Bowel sounds active. Soft, slightly tender to palpation in RLQ.     NEUROLOGIC: Normal tone. Speech clear and fluent. Following commands. Tracking appropriately. Alert and appropriate. No gross neurologic change based on mobility.      Medical Decision Making       Please see A&P for additional details of medical decision making.      Data     I have personally reviewed the following data over the past 24 hrs:    8.5  \   12.3   / 215     139 105 21.2 (H) /  182 (H)   3.5 23 0.64 \       Imaging results reviewed over the past 24 hrs:   No results found for this or any previous visit (from the past 24 hour(s)).

## 2023-08-21 NOTE — PLAN OF CARE
Goal Outcome Evaluation:    Temp: 98.2  F (36.8  C) Temp src: Oral BP: (!) 155/91 Pulse: 85   Resp: 18 SpO2: 95 % O2 Device: None (Room air)        Neuro: A&Ox4. Czech speaking. C/O blurry vision earlier today, but reports blurry vision improved this afternoon. MD's aware. PRN eyes drop given Q 1 hr   Cardiac: Hypertensive, . MD's made aware, OVSS.   Respiratory: Sating 95% on RA.  GI/: Adequate urine output. Not passing gas. C/O abdominal bloating. No BM. Primary team made aware. Gave one time suppository and miralax   Diet/appetite: Tolerating CHO diet. Appetite fair   Activity: Up with SBA   Pain: C/O RLQ abdominal pain 5/10. MD's aware. Gave prn oxycodone with fair relief   Skin: Abdomen lap sites x8 with staple GUNNER.    LDA's: R PIV SL.    K+ and Magnesium replaced per protocol. Recheck ordered for tomorrow am     Plan: Continue with POC. Notify primary team with changes.

## 2023-08-21 NOTE — H&P
Transferred to:  37 at 8/21 0000 receiving nurse Cristobal  Status at time of transfer: alert   Belongings: sent with patient   Rodriguez removed? (if no, why?): n/a  Chart and medications: send with nurse during transfer  Family notified: Yes

## 2023-08-21 NOTE — PROGRESS NOTES
"SPIRITUAL HEALTH SERVICES  SPIRITUAL ASSESSMENT Progress Note  [facility]B       REFERRAL SOURCE: [facility] consult for Pentecostalism  visit, Pentecostalism specific.     DEMOGRAPHIC: Pt Jessica Kennedy identifies as Pentecostalism and is of [place] descent.        ILLNESS CIRCUMSTANCE: I introduced myself to Jessica as the Lead Pentecost[employer]ism  and oriented her to [facility].  Assessed emotional/spiritual needs and resources while offering reflective conversation, which integrated elements of illness and family narratives.     Jessica stated that, \"they removed my glands, I cannot sleep but I am tired\".     SPIRITUAL INTERVENTIONS: Jessica requested Islamic incantations/prayer at bedside. We prayed together at her request.       PLAN: I will follow up with Jessica for the duration of her stay. [facility] is available to Jessica per request.     Mitzi Avila  Lead Pent[employer]   Pager [phone]    [facility] remains available 24/7 for emergent requests/referrals, either by having the switchboard page the on-call  or by entering an ASAP/STAT consult in Epic (this will also page the on-call ).    "

## 2023-08-21 NOTE — PROGRESS NOTES
"Endocrine Progress Note  Patient: Jessica Kennedy   MRN: [id number]  Date of Service: 08/21/2023    Chart reviewed    The patient endorses RLQ abdominal pain, with an intensity of variable between 7 and 10 out of 10, sometimes sharp. No BM since 3 days ago.  Denies dysuria or fever.  Yesterday, she describes experiencing a \"burning sensation\" throughout her body.  This has now resolved.  Blood pressure has been consistently elevated, requiring extra IV dosages of labetalol and hydralazine.    At the time of the visit, 3 of her daughters were present in the room.  Their age is 12, 16 and 19 years old.  The patient was seen with the help of [place]  over the phone.    Physical Examination:  BP (!) 155/91 (BP Location: Left arm)   Pulse 85   Temp 98.2  F (36.8  C) (Oral)   Resp 18   Wt 96.6 kg (212 lb 15.4 oz)   SpO2 95%   BMI 33.35 kg/m      Physical Exam  Vitals reviewed.   Constitutional:       General: She is not in acute distress.     Appearance: She is not ill-appearing.   Cardiovascular:      Rate and Rhythm: Normal rate.   Pulmonary:      Effort: Pulmonary effort is normal.   Abdominal:      Tenderness: There is abdominal tenderness (RLQ).   Neurological:      Mental Status: She is oriented to person, place, and time.   Psychiatric:         Mood and Affect: Mood normal.         Behavior: Behavior normal.     Abdomen is soft.  Bowel sounds present.    Medications:  Reviewed    Assessment and plan:  Jessica Kennedy is a 48 year old  female with PMHx of MEN 2A with bilateral synchronous pheochromocytomas, HTN, T2DM who was admitted for optimization of blood pressure control / alpha blockade prior to bilateral adrenalectomy. Now s/p bilateral adrenalectomy on 8/16.     # Pheochromocytoma, bilateral  s/p bilateral adrenalectomy on 8/16.    # Hypertension.   BP has remained elevated after bilateral adrenalectomy, most likely a component of essential hypertension.  The abdominal pain might " contribute.    # Primary Adrenal Insufficiency secondary to Bilateral Adrenalectomy  We counseled the patient on the administration of hydrocortisone and Florinef, the importance of doubling or tripling the dose for sick days, the need of wearing a medical alert or necklace, the administration of intramuscular hydrocortisone for severe stress, in order to prevent adrenal crisis.  Recommendations  - Reduce Hydrocortisone to 15 mg AM and 7.5 mg PM (discharge dose)  - Florinef 0.05 mg daily.  - f/up BMP in am     # Type 2 Diabetes Mellitus: Poorly controlled hemoglobin A1c 9 on 6/28/2023.  PTA was on glipizide 5 mg daily and metformin 1000 mg twice daily.    On Lantus 10 units/NovoLog 1: 10 g CHO with meals and snacks and on medium dose SSI.  Will transition to PTA insulin regimen, will order NPH to be given in AM and continue on Lantus 10 units PM. Will stop prandial rapid insulin with meals after dinner tonight. Will keep correction rapid insulin.     # Primary Hyperparathyroidism  Treatment for this will be set up as outpatient.      # MEN 2A  8/21 3 daughters present today, discussed importance of doing genetic testing to all 9 siblings for MEN2A.  Counseled on clinical presentation of MEN syndrome.  2 of the daughters already have appointments scheduled with their pediatrician.    # HTN   -Continue losartan 100 mg daily.  -Continue amlodipine 10 mg daily.  -Increase Carvedilol to 25 mg twice daily.  -Discontinue labetalol  -Continue as needed hydralazine    Jayson Wilson MD  Endocrinology Fellow    I, Deloris Vanessa, was present with the fellow who participated in the service and in the documentation of the note.  I have verified the history and personally performed the physical exam and medical decision making.  I agree with the assessment and plan of care as documented in the note.     Deloris Vanessa MD    50 minutes spent on the date of the encounter doing chart review, history and exam, documentation and  further activities as noted above.

## 2023-08-21 NOTE — PROVIDER NOTIFICATION
Pt c/o blurry vision all the time. can you come to assess her? Thank you . Dina Mcduffie MD was notified

## 2023-08-21 NOTE — PLAN OF CARE
Goal Outcome Evaluation:      Plan of Care Reviewed With: patient    Overall Patient Progress: improving    Outcome Evaluation: Pt transferd from 4C to 7B unit. Pt is A/Ox4, cooperative. Pt reported right side abdominal pain when she was turning & repositioning but declined needing pain meds overnight. Pt requested lozenge x2 for sore throat with relief. Pt c/o dry irriated red eyes, maroon team paged Lashae Salcedo MD, new order placed for prn eye drops. Pt ambulated to  with SBA. Pt encuraged to call before betting OOB, call light within reach. Pt voided -not saved pt mist the hat. +BS, +flatus, no BM this shift, LBM on 8/20 per pt. R PIV SL. No acute changes this shift. Continue POC.    Admitted/transferred from:   2 RN full skin assessment completed by Cristobal Adams, GERSON and Deja Araujo, RN.  Skin assessment finding: issues found mid abdomen abrasion/skin tear covered with primapore. Abdominal lap sites x8 with staples intact, open to air.  Eyes red, scleral edema. R PIV SL.    Interventions/actions: Pt educated on pressure injury prevention, adequate hydration maintained.      Bedside Emergency Equipment Present:  Suction Regulator: Yes  Suction Canister: Yes  Tubing between Regulator and Canister: Yes  O2 Regulator with Tree: Yes  Ambu Bag: Yes

## 2023-08-21 NOTE — PLAN OF CARE
ICU End of Shift Summary. See flowsheets for vital signs and detailed assessment.    Changes this shift: alert and oriented. Pain managed with tylenol. Multiple PRNs utilized for HTN. Amlodipine started. Pt better about calling for pre meal blood sugars.     Plan:  transfer to med/surg when able. Encourage activity.   Goal Outcome Evaluation:      Plan of Care Reviewed With: patient, child    Overall Patient Progress: improvingOverall Patient Progress: improving    Outcome Evaluation: Hypertensive requiring labetalol x2 and hydralazine. Pain managed with tylenol. mag replaced

## 2023-08-21 NOTE — PROGRESS NOTES
[facility]    Progress Note - MIS/Bariatric Surgery Service       Date of Admission:  8/11/2023  Procedure(s):  ADRENALECTOMY, LAPAROSCOPIC BILATERAL on 8/11/2023 - 8/16/2023  Assessment & Plan: Surgery   Brent Lopez is a 48 year old female with a significant history for  type two diabetes mellitus, MEN2A with primary hyperparathyroidism, medullary thyroid cancer and bilateral synchronous pheochromocytoma. She is POD 5 bilateral adrenalectomy.     Having headache this morning, tolerates diet, BP and sugar more controlled.     - PO pain control with IV back-up  - Maintain blood pressure and blood sugar in appropriate ranges.   - Steroid, adrenal insuffiencey, diabetes mellitus type II, hyperparathyroid management per endocrine.   - Strict I&O  - Labs per primary team  - Lovenox for dvt ppx  - Staples to remain in place for one week or until discharge.  - Remainder of cares per primary  - No current new surgical interventions indicated.     The patient's care was discussed with the Attending Physician, Dr. Caceres .    Desi Carbajal MD  [facility]  All communications related to this note should be expressed to resident/KAIT on call for this team at the time of your communication.  ______________________________________________________________________    Interval History   Complains of headache and mild abdominal pain    Physical Exam   Vital Signs: Temp: 98.4  F (36.9  C) Temp src: Oral BP: 128/78 Pulse: 80   Resp: 18 SpO2: 99 % O2 Device: None (Room air)    Weight: 212 lbs 15.43 oz        General: looks well.   Respiratory: No increased WOB  CV: RRR  Abdomen: no tenderness, incisions c.d.I       Intake/Output Summary (Last 24 hours) at 8/21/2023 1335  Last data filed at 8/21/2023 0900  Gross per 24 hour   Intake 890 ml   Output 400 ml   Net 490 ml   Drains: None               Data   Recent Labs   Lab 08/21/23  1205  08/21/23  0818 08/21/23  0543 08/20/23  0853 08/20/23  0656 08/19/23  1723 08/19/23  1423 08/19/23  0841 08/19/23  0748 08/15/23  0918 08/15/23  0535   WBC  --   --  8.5  --  8.3  --   --   --  8.7   < > 9.8   HGB  --   --  12.3  --  12.1  --   --   --  11.7   < > 12.4   MCV  --   --  92  --  91  --   --   --  94   < > 91   PLT  --   --  215  --  189  --   --   --  187   < > 206   NA  --   --  139  --  135*  --   --   --  138   < > 136   POTASSIUM  --   --  3.5  --  3.9  --  3.5  --  3.4   < > 3.5   CHLORIDE  --   --  105  --  103  --   --   --  105   < > 103   CO2  --   --  23  --  23  --   --   --  22   < > 23   BUN  --   --  21.2*  --  15.1  --   --   --  12.8   < > 23.6*   CR  --   --  0.64  --  0.58  --   --   --  0.66   < > 0.76   ANIONGAP  --   --  11  --  9  --   --   --  11   < > 10   MASSIMO  --   --  9.7  --  10.0  --   --   --  9.5   < > 9.8   * 125* 129*   < > 185*   < >  --    < > 193*   < > 182*   ALBUMIN  --   --   --   --   --   --   --   --   --   --  3.8   PROTTOTAL  --   --   --   --   --   --   --   --   --   --  6.3*   BILITOTAL  --   --   --   --   --   --   --   --   --   --  0.2   ALKPHOS  --   --   --   --   --   --   --   --   --   --  105*   ALT  --   --   --   --   --   --   --   --   --   --  21   AST  --   --   --   --   --   --   --   --   --   --  20    < > = values in this interval not displayed.       Clinically Significant Risk Factors                             # Financial/Environmental Concerns: unable to afford rent/mortgage;unable to afford food         Disposition Plan      Expected Discharge Date: 08/22/2023      Destination: home with help/services  Discharge Comments: Pending stability of blood pressure, glucose, adequate mobility and oral intake

## 2023-08-22 ENCOUNTER — APPOINTMENT (OUTPATIENT)
Dept: PHYSICAL THERAPY | Facility: CLINIC | Age: 48
End: 2023-08-22
Attending: STUDENT IN AN ORGANIZED HEALTH CARE EDUCATION/TRAINING PROGRAM
Payer: COMMERCIAL

## 2023-08-22 PROBLEM — E21.0 PRIMARY HYPERPARATHYROIDISM (H): Status: ACTIVE | Noted: 2023-08-22

## 2023-08-22 LAB
ANION GAP SERPL CALCULATED.3IONS-SCNC: 11 MMOL/L (ref 7–15)
ATRIAL RATE - MUSE: 71 BPM
BUN SERPL-MCNC: 28.4 MG/DL (ref 6–20)
CA-I BLD-MCNC: 5 MG/DL (ref 4.4–5.2)
CALCIUM SERPL-MCNC: 9.6 MG/DL (ref 8.6–10)
CHLORIDE SERPL-SCNC: 105 MMOL/L (ref 98–107)
CREAT SERPL-MCNC: 0.8 MG/DL (ref 0.51–0.95)
DEPRECATED HCO3 PLAS-SCNC: 21 MMOL/L (ref 22–29)
DIASTOLIC BLOOD PRESSURE - MUSE: NORMAL MMHG
ERYTHROCYTE [DISTWIDTH] IN BLOOD BY AUTOMATED COUNT: 12.5 % (ref 10–15)
GFR SERPL CREATININE-BSD FRML MDRD: >90 ML/MIN/1.73M2
GLUCOSE BLDC GLUCOMTR-MCNC: 150 MG/DL (ref 70–99)
GLUCOSE BLDC GLUCOMTR-MCNC: 170 MG/DL (ref 70–99)
GLUCOSE BLDC GLUCOMTR-MCNC: 181 MG/DL (ref 70–99)
GLUCOSE BLDC GLUCOMTR-MCNC: 240 MG/DL (ref 70–99)
GLUCOSE SERPL-MCNC: 149 MG/DL (ref 70–99)
HCT VFR BLD AUTO: 36 % (ref 35–47)
HGB BLD-MCNC: 11.7 G/DL (ref 11.7–15.7)
INTERPRETATION ECG - MUSE: NORMAL
MAGNESIUM SERPL-MCNC: 2 MG/DL (ref 1.7–2.3)
MCH RBC QN AUTO: 30.5 PG (ref 26.5–33)
MCHC RBC AUTO-ENTMCNC: 32.5 G/DL (ref 31.5–36.5)
MCV RBC AUTO: 94 FL (ref 78–100)
P AXIS - MUSE: 64 DEGREES
PATH REPORT.COMMENTS IMP SPEC: NORMAL
PATH REPORT.FINAL DX SPEC: NORMAL
PATH REPORT.GROSS SPEC: NORMAL
PATH REPORT.MICROSCOPIC SPEC OTHER STN: NORMAL
PATH REPORT.RELEVANT HX SPEC: NORMAL
PHOSPHATE SERPL-MCNC: 4.1 MG/DL (ref 2.5–4.5)
PHOTO IMAGE: NORMAL
PLATELET # BLD AUTO: 218 10E3/UL (ref 150–450)
POTASSIUM SERPL-SCNC: 3.7 MMOL/L (ref 3.4–5.3)
PR INTERVAL - MUSE: 160 MS
QRS DURATION - MUSE: 94 MS
QT - MUSE: 376 MS
QTC - MUSE: 408 MS
R AXIS - MUSE: 29 DEGREES
RBC # BLD AUTO: 3.84 10E6/UL (ref 3.8–5.2)
SODIUM SERPL-SCNC: 137 MMOL/L (ref 136–145)
SYSTOLIC BLOOD PRESSURE - MUSE: NORMAL MMHG
T AXIS - MUSE: 22 DEGREES
VENTRICULAR RATE- MUSE: 71 BPM
WBC # BLD AUTO: 10.1 10E3/UL (ref 4–11)

## 2023-08-22 PROCEDURE — 84100 ASSAY OF PHOSPHORUS: CPT

## 2023-08-22 PROCEDURE — 82330 ASSAY OF CALCIUM: CPT

## 2023-08-22 PROCEDURE — 36415 COLL VENOUS BLD VENIPUNCTURE: CPT

## 2023-08-22 PROCEDURE — 250N000012 HC RX MED GY IP 250 OP 636 PS 637: Performed by: INTERNAL MEDICINE

## 2023-08-22 PROCEDURE — 85027 COMPLETE CBC AUTOMATED: CPT

## 2023-08-22 PROCEDURE — 83735 ASSAY OF MAGNESIUM: CPT

## 2023-08-22 PROCEDURE — 99232 SBSQ HOSP IP/OBS MODERATE 35: CPT | Performed by: INTERNAL MEDICINE

## 2023-08-22 PROCEDURE — 120N000002 HC R&B MED SURG/OB UMMC

## 2023-08-22 PROCEDURE — 250N000013 HC RX MED GY IP 250 OP 250 PS 637

## 2023-08-22 PROCEDURE — 250N000013 HC RX MED GY IP 250 OP 250 PS 637: Performed by: STUDENT IN AN ORGANIZED HEALTH CARE EDUCATION/TRAINING PROGRAM

## 2023-08-22 PROCEDURE — 80048 BASIC METABOLIC PNL TOTAL CA: CPT

## 2023-08-22 PROCEDURE — 250N000013 HC RX MED GY IP 250 OP 250 PS 637: Performed by: PHARMACIST

## 2023-08-22 PROCEDURE — 97530 THERAPEUTIC ACTIVITIES: CPT | Mod: GP

## 2023-08-22 PROCEDURE — 250N000011 HC RX IP 250 OP 636: Mod: JZ | Performed by: STUDENT IN AN ORGANIZED HEALTH CARE EDUCATION/TRAINING PROGRAM

## 2023-08-22 PROCEDURE — 250N000013 HC RX MED GY IP 250 OP 250 PS 637: Performed by: INTERNAL MEDICINE

## 2023-08-22 PROCEDURE — 99232 SBSQ HOSP IP/OBS MODERATE 35: CPT | Mod: GC | Performed by: HOSPITALIST

## 2023-08-22 PROCEDURE — 97161 PT EVAL LOW COMPLEX 20 MIN: CPT | Mod: GP

## 2023-08-22 PROCEDURE — 250N000013 HC RX MED GY IP 250 OP 250 PS 637: Performed by: HOSPITALIST

## 2023-08-22 RX ORDER — MAGNESIUM OXIDE 400 MG/1
400 TABLET ORAL EVERY 4 HOURS
Status: COMPLETED | OUTPATIENT
Start: 2023-08-22 | End: 2023-08-22

## 2023-08-22 RX ORDER — POTASSIUM CHLORIDE 750 MG/1
20 TABLET, EXTENDED RELEASE ORAL ONCE
Status: COMPLETED | OUTPATIENT
Start: 2023-08-22 | End: 2023-08-22

## 2023-08-22 RX ORDER — MAGNESIUM HYDROXIDE/ALUMINUM HYDROXICE/SIMETHICONE 120; 1200; 1200 MG/30ML; MG/30ML; MG/30ML
15 SUSPENSION ORAL ONCE
Status: COMPLETED | OUTPATIENT
Start: 2023-08-22 | End: 2023-08-22

## 2023-08-22 RX ORDER — POLYETHYLENE GLYCOL 3350 17 G/17G
17 POWDER, FOR SOLUTION ORAL ONCE
Status: COMPLETED | OUTPATIENT
Start: 2023-08-22 | End: 2023-08-22

## 2023-08-22 RX ORDER — HYDRALAZINE HYDROCHLORIDE 25 MG/1
25 TABLET, FILM COATED ORAL 3 TIMES DAILY
Status: DISCONTINUED | OUTPATIENT
Start: 2023-08-22 | End: 2023-08-23 | Stop reason: HOSPADM

## 2023-08-22 RX ADMIN — POLYETHYLENE GLYCOL 3350 17 G: 17 POWDER, FOR SOLUTION ORAL at 09:54

## 2023-08-22 RX ADMIN — ENOXAPARIN SODIUM 40 MG: 40 INJECTION SUBCUTANEOUS at 12:24

## 2023-08-22 RX ADMIN — SENNOSIDES AND DOCUSATE SODIUM 1 TABLET: 8.6; 5 TABLET ORAL at 09:55

## 2023-08-22 RX ADMIN — INSULIN HUMAN 12 UNITS: 100 INJECTION, SUSPENSION SUBCUTANEOUS at 10:02

## 2023-08-22 RX ADMIN — PANTOPRAZOLE SODIUM 40 MG: 40 TABLET, DELAYED RELEASE ORAL at 17:04

## 2023-08-22 RX ADMIN — MAGNESIUM OXIDE TAB 400 MG (241.3 MG ELEMENTAL MG) 400 MG: 400 (241.3 MG) TAB at 12:24

## 2023-08-22 RX ADMIN — PANTOPRAZOLE SODIUM 40 MG: 40 TABLET, DELAYED RELEASE ORAL at 09:53

## 2023-08-22 RX ADMIN — HYDROCORTISONE 15 MG: 5 TABLET ORAL at 09:53

## 2023-08-22 RX ADMIN — Medication 3 MG: at 20:52

## 2023-08-22 RX ADMIN — AMLODIPINE BESYLATE 10 MG: 10 TABLET ORAL at 09:54

## 2023-08-22 RX ADMIN — MAGNESIUM OXIDE TAB 400 MG (241.3 MG ELEMENTAL MG) 400 MG: 400 (241.3 MG) TAB at 17:04

## 2023-08-22 RX ADMIN — LOSARTAN POTASSIUM 100 MG: 100 TABLET, FILM COATED ORAL at 09:54

## 2023-08-22 RX ADMIN — CARVEDILOL 25 MG: 25 TABLET, FILM COATED ORAL at 09:53

## 2023-08-22 RX ADMIN — SENNOSIDES AND DOCUSATE SODIUM 1 TABLET: 8.6; 5 TABLET ORAL at 20:52

## 2023-08-22 RX ADMIN — CARVEDILOL 25 MG: 25 TABLET, FILM COATED ORAL at 20:52

## 2023-08-22 RX ADMIN — POTASSIUM CHLORIDE 20 MEQ: 750 TABLET, EXTENDED RELEASE ORAL at 12:24

## 2023-08-22 RX ADMIN — HYDRALAZINE HYDROCHLORIDE 25 MG: 25 TABLET, FILM COATED ORAL at 20:52

## 2023-08-22 RX ADMIN — ACETAMINOPHEN 975 MG: 325 TABLET, FILM COATED ORAL at 06:19

## 2023-08-22 RX ADMIN — ALUMINUM HYDROXIDE, MAGNESIUM HYDROXIDE, AND SIMETHICONE 15 ML: 200; 200; 20 SUSPENSION ORAL at 22:17

## 2023-08-22 RX ADMIN — HYDRALAZINE HYDROCHLORIDE 25 MG: 25 TABLET, FILM COATED ORAL at 17:04

## 2023-08-22 RX ADMIN — Medication 7.5 MG: at 14:38

## 2023-08-22 RX ADMIN — Medication 0.05 MG: at 09:54

## 2023-08-22 ASSESSMENT — ACTIVITIES OF DAILY LIVING (ADL)
ADLS_ACUITY_SCORE: 20
ADLS_ACUITY_SCORE: 22
ADLS_ACUITY_SCORE: 20
ADLS_ACUITY_SCORE: 22
ADLS_ACUITY_SCORE: 22
ADLS_ACUITY_SCORE: 20
ADLS_ACUITY_SCORE: 20
ADLS_ACUITY_SCORE: 22

## 2023-08-22 NOTE — PLAN OF CARE
Physical Therapy Discharge Summary    Reason for therapy discharge:    All goals and outcomes met, no further needs identified.    Progress towards therapy goal(s). See goals on Care Plan in [facility] electronic health record for goal details.  Goals met    Therapy recommendation(s):    Continued therapy is recommended.  Rationale/Recommendations:   PT recommended to promote activity tolerance and strengthening. Pt will also require walker upon d.c. RN staff to page rehab upon discharge for walker issue.

## 2023-08-22 NOTE — PLAN OF CARE
Problem: Pain Acute  Goal: Optimal Pain Control and Function  Outcome: Progressing   B/P: 128/70, T: 98.1, P: 86, R: 16 C/O minimal pain, requesting tylenol for pain. Up to bathroom independently. Appeared to sleep in between cares. Continue to monitor and notify MD with any significant changes

## 2023-08-22 NOTE — PROGRESS NOTES
Cross-cover note: 9:37 PM 8/21/2023 08/21/2023    General Surgery Cross Cover Note    Called by nursing regarding left sided chest pain worse with inspiration. EKG ordered, went and assessed patient.    Per patient, left sided chest pain which radiates to left neck and shoulder. Pain reproducible upon palpation, patient denies any prior cardiac history, states that this pain has been intermittent since surgery. Denies SOB, nausea, or vomiting.     B/P: 125/65, T: 98.2, P: 72, R: 18    PE:  A&O x3, NAD  Breathing non-labored room air  Cardiac: regular rate and rhythm (see EKG), normal pulses, good indices of perfusion    Plan:  Low suspicion for ACS, EKG showing normal sinus rhythm.     Seen by Devin Monroe, DO  Surgery Resident PGY-1  Please page primary team with questions

## 2023-08-22 NOTE — PROGRESS NOTES
08/22/23 1600   Appointment Info   Signing Clinician's Name / Credentials (PT) Armaan Riojas, PT, DPT   Living Environment   People in Home child(steph), adult;child(steph), dependent   Current Living Arrangements apartment   Home Accessibility no concerns   Transportation Anticipated family or friend will provide   Living Environment Comments Pt lives in an apartment with her 5 children. No stair concerns   Self-Care   Usual Activity Tolerance good   Current Activity Tolerance moderate   Regular Exercise No   Equipment Currently Used at Home none   Fall history within last six months no   Activity/Exercise/Self-Care Comment IND at baseline with all mobility. Currently requires walker; mod-IND with walker.   General Information   Onset of Illness/Injury or Date of Surgery 08/11/23   Referring Physician Bernabe Slade MD   Patient/Family Therapy Goals Statement (PT) to go home   Pertinent History of Current Problem (include personal factors and/or comorbidities that impact the POC) per EMR:Jessica Kennedy is a 48 year old female admitted on 8/11/2023. She has a history of MEN2A c/b bilaterally pheochromocytoma, insulin dependent T2DM and is admitted for planned surgery now s/p bilateral pheochromocytoma resection/adrenalectomy. Remains admitted for BP optimization, adrenal insufficiency, and post-surgical management   Cognition   Affect/Mental Status (Cognition) WNL   Orientation Status (Cognition) oriented x 4   Follows Commands (Cognition) WNL   Pain Assessment   Patient Currently in Pain Yes, see Vital Sign flowsheet   Integumentary/Edema   Integumentary/Edema no deficits were identifed   Posture    Posture Protracted shoulders   Range of Motion (ROM)   Range of Motion ROM is WFL   Strength (Manual Muscle Testing)   Strength (Manual Muscle Testing) strength is WFL   Bed Mobility   Bed Mobility supine-sit   Comment, (Bed Mobility) IND   Transfers   Transfers sit-stand transfer   Comment, (Transfers) IND with sit  to stand   Gait/Stairs (Locomotion)   Butler Level (Gait) modified independence   Assistive Device (Gait) walker, front-wheeled   Comment, (Gait/Stairs) mod- IND with FWW   Balance   Balance no deficits were identified   Sensory Examination   Sensory Perception patient reports no sensory changes   Coordination   Coordination no deficits were identified   Muscle Tone   Muscle Tone no deficits were identified   Clinical Impression   Criteria for Skilled Therapeutic Intervention Yes, treatment indicated   PT Diagnosis (PT) impaired functional mobility   Influenced by the following impairments pain, weakness   Functional limitations due to impairments continuous activity/gait   Clinical Presentation (PT Evaluation Complexity) Stable/Uncomplicated   Clinical Presentation Rationale clinician judgement   Clinical Decision Making (Complexity) low complexity   Planned Therapy Interventions (PT) home exercise program;gait training;progressive activity/exercise   Anticipated Equipment Needs at Discharge (PT) walker, standard   Risk & Benefits of therapy have been explained evaluation/treatment results reviewed;care plan/treatment goals reviewed;risks/benefits reviewed;current/potential barriers reviewed;participants voiced agreement with care plan;participants included;patient   PT Total Evaluation Time   PT Eval, Low Complexity Minutes (71301) 8   Plan of Care Review   Plan of Care Reviewed With patient   Physical Therapy Goals   PT Frequency One time eval and treatment only   PT Predicted Duration/Target Date for Goal Attainment 08/23/23   PT Goals Gait   PT: Gait Modified independent;Assistive device;Standard walker;100 feet;Goal Met   Interventions   Interventions Quick Adds Therapeutic Activity   Therapeutic Activity   Therapeutic Activities: dynamic activities to improve functional performance Minutes (98086) 10   Symptoms Noted During/After Treatment None   Treatment Detail/Skilled Intervention pt agreeable to  session; demonstrated and cued through bed mobility with log rolling technique SBA. IND with sit to stand with cues provided to push thorugh LEs for reduced strain at abdominal/core area. Pt then ambulated mod IND with walker with visual demonstration initially provided to cue for upright posture and walking closer to walker. Pt stable with walker. Encouraged to continue ambulating 3-4x/day. Pt notified she will require use of walker at home and d.c planning with HH PT. Agreeable.   PT Discharge Planning   PT Plan PT:d/c   PT Discharge Recommendation (DC Rec) home with home care physical therapy   PT Rationale for DC Rec Pt near baseline in terms of functional mobility. Safe to d/c home with HH PT to promote strength and activity tolerance. Pt will require FWW upon d/c for safe mobility (orders placed)   PT Brief overview of current status mod IND with FWW   Total Session Time   Timed Code Treatment Minutes 10   Total Session Time (sum of timed and untimed services) 18

## 2023-08-22 NOTE — PROGRESS NOTES
Care Management Follow Up    Length of Stay (days): 11    Expected Discharge Date: 08/23/2023     Concerns to be Addressed: all concerns addressed in this encounter     Patient plan of care discussed at interdisciplinary rounds: Yes    Anticipated Discharge Disposition:  home with home care     Anticipated Discharge Services:    Anticipated Discharge DME:      Patient/family educated on Medicare website which has current facility and service quality ratings:  NA  Education Provided on the Discharge Plan:  yes  Patient/Family in Agreement with the Plan:  yes    Referrals Placed by CM/SW:  NA  Private pay costs discussed: Not applicable    Additional Information:    SW called patients daughter Deena ([phone]) to follow-up on community resources given by REAGAN Castro on 8/19. Deena answered the phone and SW introduced self and role but then call was disconnected. SW attempted to call right back and had to leave a VM. Left 7B SW direct phone number and awaiting a call back.      MARYCRUZ Dunbar LSW   7B    Phone: [phone]  Pager: [phone]

## 2023-08-22 NOTE — PLAN OF CARE
Goal Outcome Evaluation:      Plan of Care Reviewed With: patient    Overall Patient Progress: no changeOverall Patient Progress: no change     A&OX4, CMS intact. C/o acute chest pain w/ inspiration. Denies shortness of breath at rest. MD notified w/ EKG ordered-Unremarkable. Up independently w/ walker. Unable to perform skin check. R PIV SL. Mod Carb diet; BG WNL. LBM ~3 days ago. Suppository w/ miralax given earlier in the day w/ no result up to date     Problem: Plan of Care - These are the overarching goals to be used throughout the patient stay.    Goal: Plan of Care Review  Description: The Plan of Care Review/Shift note should be completed every shift.  The Outcome Evaluation is a brief statement about your assessment that the patient is improving, declining, or no change.  This information will be displayed automatically on your shift note.  Outcome: Progressing  Flowsheets (Taken 8/22/2023 0003)  Plan of Care Reviewed With: patient  Overall Patient Progress: no change

## 2023-08-22 NOTE — PLAN OF CARE
Outcome Evaluation: VSS, afebrile. Pain controlled with heat therapy. No acute changes noted in assessment. Performing ADLs independently, except for assistance ordering food. Pending discharge, patient will need staples removed from lap sites and more support at home including assistive devices/hospital bed (info is per provider). Social work now following.    Neuro: A&Ox4. Denies neuropathy.  Cardiac: VSS except hypertensive prior to medication administration. TID Hydralazine ordered. No active cardiac telemetry monitoring. K and Mg both replaced with recheck tomorrow 8/23. RN educated patient on foods that have higher levels of K and Mg.  Respiratory: Sating >90% on RA. Deep breaths encouraged.  GI/: Continent. Bowel regimen increased and had bm 8/22.  Diet/appetite: Tolerating carbohydrate diet + supplements. Eating well. Room service assistance ordered.  Activity:  Independent in room and halls; walker ordered. PT following.  Pain: At acceptable level on current regimen.   Skin: No new deficits noted. Patient able to shower independently.  LDA's: PENELOPE CHARLES.  Psychosocial: WDL. Family available by phone.      Goal Outcome Evaluation:      Plan of Care Reviewed With: patient    Overall Patient Progress: improvingOverall Patient Progress: improving

## 2023-08-22 NOTE — PROGRESS NOTES
[facility]    Progress Note - MIS/Bariatric Surgery Service       Date of Admission:  8/11/2023  Procedure(s):  ADRENALECTOMY, LAPAROSCOPIC BILATERAL on 8/11/2023 - 8/16/2023  Assessment & Plan: Surgery   Brent Lopez is a 48 year old female with a significant history for  type two diabetes mellitus, MEN2A with primary hyperparathyroidism, medullary thyroid cancer and bilateral synchronous pheochromocytoma. She is POD 6 bilateral adrenalectomy.     Having headache this morning, tolerates diet, BP and sugar more controlled.     - PO pain control with IV back-up  - Maintain blood pressure and blood sugar in appropriate ranges.   - Steroid, adrenal insuffiencey, diabetes mellitus type II, hyperparathyroid management per endocrine.   - Labs per primary team  - Lovenox for dvt ppx  - Staples to remain in place for one week or until discharge.  - Remainder of cares per primary  - No current new surgical interventions indicated.     The patient's care was discussed with the Attending Physician, Dr. Caceres .    Desi Carbajal MD  [facility]  All communications related to this note should be expressed to resident/KAIT on call for this team at the time of your communication.  ______________________________________________________________________    Interval History   Abd pain and headache improved a lot, no nausea or vomiting, eating well.     Physical Exam   Vital Signs: Temp: 98.3  F (36.8  C) Temp src: Oral BP: 130/74 Pulse: 69   Resp: 17 SpO2: 97 % O2 Device: None (Room air)    Weight: 212 lbs 15.43 oz      General: looks well.   Respiratory: No increased WOB  CV: RRR  Abdomen: no tenderness, incisions c.d.I       Intake/Output Summary (Last 24 hours) at 8/22/2023 0904  Last data filed at 8/21/2023 1400  Gross per 24 hour   Intake 240 ml   Output --   Net 240 ml   Drains: None               Data   Recent Labs   Lab  08/22/23  0701 08/21/23  2158 08/21/23  1751 08/21/23  0818 08/21/23  0543 08/20/23  0853 08/20/23  0656   WBC 10.1  --   --   --  8.5  --  8.3   HGB 11.7  --   --   --  12.3  --  12.1   MCV 94  --   --   --  92  --  91     --   --   --  215  --  189     --   --   --  139  --  135*   POTASSIUM 3.7  --   --   --  3.5  --  3.9   CHLORIDE 105  --   --   --  105  --  103   CO2 21*  --   --   --  23  --  23   BUN 28.4*  --   --   --  21.2*  --  15.1   CR 0.80  --   --   --  0.64  --  0.58   ANIONGAP 11  --   --   --  11  --  9   MASSIMO 9.6  --   --   --  9.7  --  10.0   * 143* 126*   < > 129*   < > 185*    < > = values in this interval not displayed.       Clinically Significant Risk Factors                             # Financial/Environmental Concerns: unable to afford rent/mortgage;unable to afford food         Disposition Plan     Expected Discharge Date: 08/22/2023      Destination: home with help/services  Discharge Comments: Pending stability of blood pressure, glucose, adequate mobility and oral intake

## 2023-08-22 NOTE — PROGRESS NOTES
Endocrine Progress Note  Patient: Jessica Kennedy   MRN: [id number]  Date of Service: 08/22/2023    Chart reviewed    Her abdominal pain persists but less intense than yesterday (6/10). She had a BM today.     Overnight blood pressure was better controlled but higher today.     BP checked manually by Dr. Vanessa was 155/90 at around 3 PM.     Physical Examination:  BP (!) 182/92   Pulse 72   Temp 98.3  F (36.8  C) (Oral)   Resp 17   Wt 96.6 kg (212 lb 15.4 oz)   SpO2 96%   BMI 33.35 kg/m      Physical Exam  Vitals reviewed.   Constitutional:       General: She is not in acute distress.     Appearance: She is not ill-appearing.   Cardiovascular:      Rate and Rhythm: Normal rate.   Pulmonary:      Effort: Pulmonary effort is normal.   Abdominal:      Tenderness: There is abdominal tenderness (RLQ).   Neurological:      Mental Status: She is oriented to person, place, and time.   Psychiatric:         Mood and Affect: Mood normal.         Behavior: Behavior normal.       Pathology 8/16/2023  Final Diagnosis   A(1). Right adrenal:  -Pheochromocytoma (3.7 cm)  -All resection margins, negative for tumor     B(2). left adrenal:  -Pheochromocytoma (4.1 cm)  -Focal lymphovascular invasion is seen  -All resection margins, negative for tumor         Medications:  Reviewed    Assessment and plan:  Jessica Kennedy is a 48 year old  female with PMHx of MEN 2A with bilateral synchronous pheochromocytomas, HTN, T2DM who was admitted for optimization of blood pressure control / alpha blockade prior to bilateral adrenalectomy. Now s/p bilateral adrenalectomy on 8/16.     # Pheochromocytoma, bilateral  s/p bilateral adrenalectomy on 8/16.    # Hypertension.   BP has remained elevated after bilateral adrenalectomy, most likely a component of essential hypertension.      # Primary Adrenal Insufficiency secondary to Bilateral Adrenalectomy  Electrolytes normal today.   Plan:  - Continue Hydrocortisone to 15 mg AM and 7.5 mg PM  (discharge dose)  - Continue Florinef 0.05 mg daily.    # Type 2 Diabetes Mellitus: Poorly controlled hemoglobin A1c 9 on 6/28/2023.  PTA was on glipizide 5 mg daily and metformin 1000 mg twice daily.    On Lantus 10 units/NovoLog 1: 10 g CHO with meals and snacks and on medium dose SSI.  The goal is to simplify her insulin regimen prior to discharge, in order to improve compliance.   Discontinue Lantus   Add 10 U NPH prior to supper, starting today   We'll discontinue Novolog for meals and snacks after supper today.   Increase the morning dose of NPH tomorrow to 22 units   Continue medium dose correction scale     # Primary Hyperparathyroidism  Treatment for this will be set up as outpatient.     # HTN   -Continue losartan 100 mg daily.  -Continue amlodipine 10 mg daily.  -Continue Carvedilol  25 mg twice daily.  - Add oral hydralazine 25 mg TID    Anticipated to patient that she will be discharged in AM. Medical therapy for hypertension and diabetes can be further optimized as outpatient.   Plan for discharge:   NPH 22 units in the morning and 10 units prior to supper       Jayson Wilson MD  Endocrinology Fellow    I, Deloris Vanessa, was present with the fellow who participated in the service and in the documentation of the note.  I have verified the history and personally performed the physical exam and medical decision making.  I agree with the assessment and plan of care as documented in the note.     Deloris Vanessa MD

## 2023-08-22 NOTE — PROGRESS NOTES
[facility]    Progress Note - Medicine Service, MARK TEAM 3       Date of Admission:  8/11/2023    Assessment & Plan   Jessica Kennedy is a 48 year old female admitted on 8/11/2023. She has a history of MEN2A c/b bilaterally pheochromocytoma, insulin dependent T2DM and is admitted for planned surgery now s/p bilateral pheochromocytoma resection/adrenalectomy. Remains admitted for BP optimization, adrenal insufficiency, and post-surgical management.      Today:  - Continue current hydrocortisone and florinef dosing (no adjustment)  - Continue current BP regimen (no adjustment)  - Continue glargine 10U daily, NPH 12U qam, MSSI    MEN2A c/b bilateral pheochromocytoma s/p resection/adrenalectomy 8/16  Hypertension  Primary Hyperparathyroidism   Medullary Thyroid CA  Admitted for planned pheo resection/adrenalectomy and pre-op BP control. Post-op hypertension likely 2/2 essential hypertension. Restarted losartan and amlodipine. Increased Carvedilol.    - Endocrinology consult  - Losartan 100 mg daily  - Continue amlodipine 10 daily  - Continue Carvedilol 25mg BID (increased on 8/22)  - As-needed hydralazine if systolic blood pressure >160mmHg  - Postoperative pain control with Tylenol, oxycodone  - PT evaluation today     Adrenal insufficiency due to adrenalectomy 2/2 bilateral pheochromocytoma  Unable to perform cortical sparing surgery. Started on hydrocortisone with stable BP and electrolytes.  - Endocrinology consult  - Wean Hydrocort per endo - no change today  - Continue florinef - no change today  - Will need sick day plan prior to discharge    RLQ Pain  Reporting RLQ 8/21.  Feels bloated.  Abd exam non-peritonitic.  No fever or leukocytosis.  Likely post-surgical vs. Constipation.  - Continue Senna  - Glycerin suppository and miralax daily  - Continue to monitor; if persists after constipation treatment, will consider abd xray    Insulin dependent T2DM   PTA on  oral and insulin regimen. Was managed on insulin drip in SICU, transitioned to subcutaneous regimen 8/18.   - Glargine 10U daily  - NPH 12U qam  - MDSSI  - HOLD PTA Metformin and glipizide per endo while adjusting steroids - endocrine team assessming oral T2DM medication home plan     Headache  Long history.  Headache on 8/21 behind eyes; no other clear neurologic changes.  Given scheduled tylenol recently, possible medication overuse headache.  Resolved as of 8/22 AM.    - Tylenol 750mg TID ---> TID prn   - Continue to monitor     Insomnia  - melatonin PM  - hydroxyzine prn     Irritated Eyes  Resolved then returned on 8/21.  Burning, dry eyes.  No clear vision changes.  Exam reassuring.  Improved with drops.  - Refresh plus q1h prn for now  - Reassess in AM    #Palpitations, resolved  #Congested Nose, resolved     Diet: Moderate Consistent Carb (60 g CHO per Meal) Diet  Snacks/Supplements Adult: Glucerna; Between Meals  Room Service    DVT Prophylaxis: Enoxaparin (Lovenox) SQ  Rodriguez Catheter: Not present  Fluids: PO  Lines: None     Cardiac Monitoring: None  Code Status: Full Code      Clinically Significant Risk Factors                               # Financial/Environmental Concerns: unable to afford rent/mortgage;unable to afford food       Disposition Plan      Expected Discharge Date: 08/23/2023, 12:00 PM    Destination: home with help/services  Discharge Comments: Pending stability of blood pressure, glucose, adequate mobility and oral intake. Adequate home support plan.  PT assessment. Discharge pending BP, steroid and DM and safe home disposition plan.  ~8/23-24     The patient's care was discussed with the Attending Physician, Dr. Slade .    Luis Everett DO, MPH  Med-Peds PGY-4  Medicine Service, MARK TEAM 3  [facility]  Securely message with [employer] (more info)  Text page via [facility] Paging/Directory   See signed in provider for up to date coverage  information  ______________________________________________________________________    Interval History   Mild RLQ abdominal pain, much better from yesterday.  No bowel movement yet.  Headache resolved.  Concerned about adequate assistance at home at discharge as she is a single parent with five kids.      Physical Exam   Vital Signs: Temp: 98.3  F (36.8  C) Temp src: Oral BP: (!) 182/92 Pulse: 72   Resp: 17 SpO2: 96 % O2 Device: None (Room air)    Weight: 212 lbs 15.43 oz    CONSTITUTIONAL: Interactive, in no acute distress.  SKIN: Clear. No significant rash, abnormal pigmentation or lesions.     EYES: No scleral icterus. EOMI.  No conjunctival injection.    HEENT: Normocephalic, atraumatic. Oral mucosa moist. No nasal discharge.   RESPIRATORY: No increased work of breathing. Clear to auscultation bilaterally without wheeze, crackles, rales, or rhonchi.   CARDIOVASCULAR: Normal S1, S2. No murmurs. Cap refill <2sec. No FERNANDO  GI: non-distended. Bowel sounds active. Soft, slightly tender to palpation in RLQ.     NEUROLOGIC: Normal tone. Speech clear and fluent. Following commands. Tracking appropriately. Alert and appropriate. No gross neurologic change based on mobility.      Medical Decision Making       Please see A&P for additional details of medical decision making.      Data     I have personally reviewed the following data over the past 24 hrs:    10.1  \   11.7   / 218     137 105 28.4 (H) /  170 (H)   3.7 21 (L) 0.80 \       Imaging results reviewed over the past 24 hrs:   No results found for this or any previous visit (from the past 24 hour(s)).

## 2023-08-23 ENCOUNTER — APPOINTMENT (OUTPATIENT)
Dept: INTERPRETER SERVICES | Facility: CLINIC | Age: 48
End: 2023-08-23
Attending: STUDENT IN AN ORGANIZED HEALTH CARE EDUCATION/TRAINING PROGRAM
Payer: COMMERCIAL

## 2023-08-23 ENCOUNTER — APPOINTMENT (OUTPATIENT)
Dept: GENERAL RADIOLOGY | Facility: CLINIC | Age: 48
End: 2023-08-23
Attending: STUDENT IN AN ORGANIZED HEALTH CARE EDUCATION/TRAINING PROGRAM
Payer: COMMERCIAL

## 2023-08-23 VITALS
SYSTOLIC BLOOD PRESSURE: 147 MMHG | OXYGEN SATURATION: 96 % | HEART RATE: 78 BPM | RESPIRATION RATE: 18 BRPM | BODY MASS INDEX: 33.35 KG/M2 | WEIGHT: 212.96 LBS | TEMPERATURE: 98.5 F | DIASTOLIC BLOOD PRESSURE: 83 MMHG

## 2023-08-23 LAB
ALBUMIN SERPL BCG-MCNC: 3.6 G/DL (ref 3.5–5.2)
ALP SERPL-CCNC: 102 U/L (ref 35–104)
ALT SERPL W P-5'-P-CCNC: 19 U/L (ref 0–50)
ANION GAP SERPL CALCULATED.3IONS-SCNC: 11 MMOL/L (ref 7–15)
AST SERPL W P-5'-P-CCNC: 23 U/L (ref 0–45)
BILIRUB DIRECT SERPL-MCNC: <0.2 MG/DL (ref 0–0.3)
BILIRUB SERPL-MCNC: 0.3 MG/DL
BUN SERPL-MCNC: 23 MG/DL (ref 6–20)
CA-I BLD-MCNC: 4.7 MG/DL (ref 4.4–5.2)
CALCIUM SERPL-MCNC: 9.7 MG/DL (ref 8.6–10)
CHLORIDE SERPL-SCNC: 106 MMOL/L (ref 98–107)
CREAT SERPL-MCNC: 0.72 MG/DL (ref 0.51–0.95)
DEPRECATED HCO3 PLAS-SCNC: 22 MMOL/L (ref 22–29)
ERYTHROCYTE [DISTWIDTH] IN BLOOD BY AUTOMATED COUNT: 12.3 % (ref 10–15)
GFR SERPL CREATININE-BSD FRML MDRD: >90 ML/MIN/1.73M2
GLUCOSE BLDC GLUCOMTR-MCNC: 159 MG/DL (ref 70–99)
GLUCOSE BLDC GLUCOMTR-MCNC: 171 MG/DL (ref 70–99)
GLUCOSE SERPL-MCNC: 117 MG/DL (ref 70–99)
HCT VFR BLD AUTO: 36.8 % (ref 35–47)
HGB BLD-MCNC: 12.1 G/DL (ref 11.7–15.7)
LIPASE SERPL-CCNC: 26 U/L (ref 13–60)
MAGNESIUM SERPL-MCNC: 2 MG/DL (ref 1.7–2.3)
MCH RBC QN AUTO: 30.3 PG (ref 26.5–33)
MCHC RBC AUTO-ENTMCNC: 32.9 G/DL (ref 31.5–36.5)
MCV RBC AUTO: 92 FL (ref 78–100)
PHOSPHATE SERPL-MCNC: 3.3 MG/DL (ref 2.5–4.5)
PLATELET # BLD AUTO: 233 10E3/UL (ref 150–450)
POTASSIUM SERPL-SCNC: 3.9 MMOL/L (ref 3.4–5.3)
PROT SERPL-MCNC: 6.3 G/DL (ref 6.4–8.3)
RBC # BLD AUTO: 3.99 10E6/UL (ref 3.8–5.2)
SODIUM SERPL-SCNC: 139 MMOL/L (ref 136–145)
TROPONIN T SERPL HS-MCNC: 7 NG/L
WBC # BLD AUTO: 9.7 10E3/UL (ref 4–11)

## 2023-08-23 PROCEDURE — 250N000013 HC RX MED GY IP 250 OP 250 PS 637: Performed by: PHARMACIST

## 2023-08-23 PROCEDURE — 36415 COLL VENOUS BLD VENIPUNCTURE: CPT

## 2023-08-23 PROCEDURE — 82248 BILIRUBIN DIRECT: CPT | Performed by: STUDENT IN AN ORGANIZED HEALTH CARE EDUCATION/TRAINING PROGRAM

## 2023-08-23 PROCEDURE — 250N000013 HC RX MED GY IP 250 OP 250 PS 637

## 2023-08-23 PROCEDURE — 85027 COMPLETE CBC AUTOMATED: CPT

## 2023-08-23 PROCEDURE — 250N000011 HC RX IP 250 OP 636: Mod: JZ | Performed by: STUDENT IN AN ORGANIZED HEALTH CARE EDUCATION/TRAINING PROGRAM

## 2023-08-23 PROCEDURE — 250N000013 HC RX MED GY IP 250 OP 250 PS 637: Performed by: INTERNAL MEDICINE

## 2023-08-23 PROCEDURE — 82330 ASSAY OF CALCIUM: CPT

## 2023-08-23 PROCEDURE — 74018 RADEX ABDOMEN 1 VIEW: CPT

## 2023-08-23 PROCEDURE — 83690 ASSAY OF LIPASE: CPT | Performed by: STUDENT IN AN ORGANIZED HEALTH CARE EDUCATION/TRAINING PROGRAM

## 2023-08-23 PROCEDURE — 250N000013 HC RX MED GY IP 250 OP 250 PS 637: Performed by: STUDENT IN AN ORGANIZED HEALTH CARE EDUCATION/TRAINING PROGRAM

## 2023-08-23 PROCEDURE — 84100 ASSAY OF PHOSPHORUS: CPT

## 2023-08-23 PROCEDURE — 84484 ASSAY OF TROPONIN QUANT: CPT | Performed by: STUDENT IN AN ORGANIZED HEALTH CARE EDUCATION/TRAINING PROGRAM

## 2023-08-23 PROCEDURE — 99238 HOSP IP/OBS DSCHRG MGMT 30/<: CPT | Mod: GC | Performed by: HOSPITALIST

## 2023-08-23 PROCEDURE — 82310 ASSAY OF CALCIUM: CPT

## 2023-08-23 PROCEDURE — 99232 SBSQ HOSP IP/OBS MODERATE 35: CPT | Performed by: INTERNAL MEDICINE

## 2023-08-23 PROCEDURE — 80053 COMPREHEN METABOLIC PANEL: CPT | Performed by: STUDENT IN AN ORGANIZED HEALTH CARE EDUCATION/TRAINING PROGRAM

## 2023-08-23 PROCEDURE — 83735 ASSAY OF MAGNESIUM: CPT

## 2023-08-23 PROCEDURE — 74018 RADEX ABDOMEN 1 VIEW: CPT | Mod: 26 | Performed by: RADIOLOGY

## 2023-08-23 RX ORDER — AMOXICILLIN 250 MG
1 CAPSULE ORAL 2 TIMES DAILY
Qty: 60 TABLET | Refills: 0 | Status: SHIPPED | OUTPATIENT
Start: 2023-08-23

## 2023-08-23 RX ORDER — ACETAMINOPHEN 325 MG/1
975 TABLET ORAL 3 TIMES DAILY PRN
Qty: 30 TABLET | Refills: 0 | Status: SHIPPED | OUTPATIENT
Start: 2023-08-23

## 2023-08-23 RX ORDER — HYDRALAZINE HYDROCHLORIDE 25 MG/1
25 TABLET, FILM COATED ORAL 3 TIMES DAILY
Qty: 45 TABLET | Refills: 1 | Status: SHIPPED | OUTPATIENT
Start: 2023-08-23 | End: 2024-02-13

## 2023-08-23 RX ORDER — HYDROCORTISONE 5 MG/1
7.5 TABLET ORAL DAILY
Qty: 45 TABLET | Refills: 1 | Status: ON HOLD | OUTPATIENT
Start: 2023-08-23 | End: 2024-02-17

## 2023-08-23 RX ORDER — HYDROCORTISONE 5 MG/1
15 TABLET ORAL DAILY
Qty: 90 TABLET | Refills: 1 | Status: SHIPPED | OUTPATIENT
Start: 2023-08-23 | End: 2023-08-23

## 2023-08-23 RX ORDER — FLUDROCORTISONE ACETATE 0.1 MG/1
0.05 TABLET ORAL DAILY
Qty: 30 TABLET | Refills: 1 | Status: ON HOLD | OUTPATIENT
Start: 2023-08-23 | End: 2024-02-17

## 2023-08-23 RX ORDER — OXYCODONE HYDROCHLORIDE 5 MG/1
5 TABLET ORAL EVERY 6 HOURS PRN
Qty: 12 TABLET | Refills: 0 | Status: SHIPPED | OUTPATIENT
Start: 2023-08-23 | End: 2023-08-27

## 2023-08-23 RX ORDER — HYDROCORTISONE 5 MG/1
TABLET ORAL
Qty: 135 TABLET | Refills: 1 | Status: SHIPPED | OUTPATIENT
Start: 2023-08-23 | End: 2023-10-31

## 2023-08-23 RX ORDER — CALCIUM CARBONATE 500 MG/1
1000 TABLET, CHEWABLE ORAL ONCE
Status: COMPLETED | OUTPATIENT
Start: 2023-08-23 | End: 2023-08-23

## 2023-08-23 RX ORDER — POLYETHYLENE GLYCOL 3350 17 G/17G
17 POWDER, FOR SOLUTION ORAL DAILY
Qty: 510 G | Refills: 0 | Status: SHIPPED | OUTPATIENT
Start: 2023-08-23

## 2023-08-23 RX ORDER — LOSARTAN POTASSIUM 100 MG/1
100 TABLET ORAL DAILY
Qty: 30 TABLET | Refills: 1 | Status: ON HOLD | OUTPATIENT
Start: 2023-08-23 | End: 2024-02-17

## 2023-08-23 RX ORDER — CALCIUM CARBONATE 750 MG/1
750 TABLET, CHEWABLE ORAL 4 TIMES DAILY PRN
Qty: 30 TABLET | Refills: 0 | Status: SHIPPED | OUTPATIENT
Start: 2023-08-23

## 2023-08-23 RX ORDER — BISACODYL 10 MG
10 SUPPOSITORY, RECTAL RECTAL ONCE
Status: COMPLETED | OUTPATIENT
Start: 2023-08-23 | End: 2023-08-23

## 2023-08-23 RX ADMIN — AMLODIPINE BESYLATE 10 MG: 10 TABLET ORAL at 09:37

## 2023-08-23 RX ADMIN — PANTOPRAZOLE SODIUM 40 MG: 40 TABLET, DELAYED RELEASE ORAL at 09:37

## 2023-08-23 RX ADMIN — CARVEDILOL 25 MG: 25 TABLET, FILM COATED ORAL at 09:37

## 2023-08-23 RX ADMIN — Medication 7.5 MG: at 13:15

## 2023-08-23 RX ADMIN — Medication 0.05 MG: at 09:37

## 2023-08-23 RX ADMIN — SENNOSIDES AND DOCUSATE SODIUM 1 TABLET: 8.6; 5 TABLET ORAL at 09:37

## 2023-08-23 RX ADMIN — HYDROCORTISONE 15 MG: 5 TABLET ORAL at 09:38

## 2023-08-23 RX ADMIN — HYDRALAZINE HYDROCHLORIDE 25 MG: 25 TABLET, FILM COATED ORAL at 13:15

## 2023-08-23 RX ADMIN — HYDRALAZINE HYDROCHLORIDE 25 MG: 25 TABLET, FILM COATED ORAL at 09:37

## 2023-08-23 RX ADMIN — LOSARTAN POTASSIUM 100 MG: 100 TABLET, FILM COATED ORAL at 09:37

## 2023-08-23 RX ADMIN — ENOXAPARIN SODIUM 40 MG: 40 INJECTION SUBCUTANEOUS at 13:15

## 2023-08-23 RX ADMIN — CALCIUM CARBONATE (ANTACID) CHEW TAB 500 MG 1000 MG: 500 CHEW TAB at 09:52

## 2023-08-23 ASSESSMENT — ACTIVITIES OF DAILY LIVING (ADL)
ADLS_ACUITY_SCORE: 20

## 2023-08-23 NOTE — PROGRESS NOTES
[facility]    Progress Note - MIS/Bariatric Surgery Service       Date of Admission:  8/11/2023  Procedure(s):  ADRENALECTOMY, LAPAROSCOPIC BILATERAL on 8/11/2023 - 8/16/2023  Assessment & Plan: Surgery   Brent Lopez is a 48 year old female with a significant history for  type two diabetes mellitus, MEN2A with primary hyperparathyroidism, medullary thyroid cancer and bilateral synchronous pheochromocytoma. She is POD 7 bilateral adrenalectomy.     Abdominal pain in the LUQ, tolerating diet, no nausea or vomiting.     Pathology   A(1). Right adrenal:  -Pheochromocytoma (3.7 cm)  -All resection margins, negative for tumor     B(2). left adrenal:  -Pheochromocytoma (4.1 cm)  -Focal lymphovascular invasion is seen  -All resection margins, negative for tumor    - PO pain control  - Maintain blood pressure and blood sugar in appropriate ranges.   - Steroid, adrenal insuffiencey, diabetes mellitus type II, hyperparathyroid management per endocrine.   - Labs per primary team  - Lovenox for dvt ppx  - Staples to remain in place for one week or until discharge.  - Remainder of cares per primary  - No current new surgical interventions indicated.     The patient's care was discussed with the Attending Physician, Dr. Caceres .    NORMAN TranBS, FEBS (EmSurg)  General Surgery PGY-5  *[phone]  All communications related to this note should be expressed to resident/KAIT on call for this team at the time of your communication.  ______________________________________________________________________    Interval History   Pain in the LUQ, controlled with med.     Physical Exam   Vital Signs: Temp: 98.5  F (36.9  C) Temp src: Oral BP: (!) 146/82 Pulse: 78   Resp: 18 SpO2: 96 % O2 Device: None (Room air)    Weight: 212 lbs 15.43 oz      General: looks well.   Respiratory: No increased WOB  CV: RRR  Abdomen: no tenderness, incisions c.d.I          Intake/Output Summary (Last 24  hours) at 8/23/2023 0843  Last data filed at 8/22/2023 2040  Gross per 24 hour   Intake 1023 ml   Output --   Net 1023 ml   Drains: None               Data   Recent Labs   Lab 08/22/23  2209 08/22/23  1444 08/22/23  1435 08/22/23  1001 08/22/23  0701 08/21/23  0818 08/21/23  0543 08/20/23  0853 08/20/23  0656   WBC  --   --   --   --  10.1  --  8.5  --  8.3   HGB  --   --   --   --  11.7  --  12.3  --  12.1   MCV  --   --   --   --  94  --  92  --  91   PLT  --   --   --   --  218  --  215  --  189   NA  --   --   --   --  137  --  139  --  135*   POTASSIUM  --   --   --   --  3.7  --  3.5  --  3.9   CHLORIDE  --   --   --   --  105  --  105  --  103   CO2  --   --   --   --  21*  --  23  --  23   BUN  --   --   --   --  28.4*  --  21.2*  --  15.1   CR  --   --   --   --  0.80  --  0.64  --  0.58   ANIONGAP  --   --   --   --  11  --  11  --  9   MASSIMO  --   --   --   --  9.6  --  9.7  --  10.0   * 170* 181*   < > 149*   < > 129*   < > 185*    < > = values in this interval not displayed.       Clinically Significant Risk Factors                             # Financial/Environmental Concerns: unable to afford rent/mortgage;unable to afford food         Disposition Plan     Expected Discharge Date: 08/23/2023, 12:00 PM    Destination: home with help/services  Discharge Comments: Pending stability of blood pressure, glucose, adequate mobility and oral intake. Adequate home support plan.  PT assessment.

## 2023-08-23 NOTE — PROGRESS NOTES
"CHW booked discharge ride from front entrance to home with [employer] # [phone]- at 3:12 pm was told ride will come in 20-30 minutes. Left pt's # [phone] for  to call when arrives.   Scheduled with [employer] insurance (was told to call back when pt is ready for ride)- called # [phone].        CHW scheduled appt at [facility] in [place] on Thursday August 31 at 12:40 pm with an internal medicine doctor. Address is [address] [place] [address].       CHW set up ride to August 31 appt (appt is 1 pm at [facility]):  August 31 pickup at 12:15pm at home going to clinic.  is with [employer] # [phone] (you can call to see if a  is coming).  will call you at # [phone] when arrives.   \"Will-call\" return ride is set up- you need to call # [phone] when you are ready to go home and a  will come pick you up then to go home. (Ride was booked through [employer] insurance- to reschedule call # [phone]).     Added this to discharge instructions.      Lashae Chavez   7A/B Community Health Worker   Phone: [phone]    "

## 2023-08-23 NOTE — PROGRESS NOTES
Endocrine Progress Note  Patient: Jessica Kennedy   MRN: [id number]  Date of Service: 08/23/2023    Chart reviewed    She reports today upper abdominal pain with a burning quality.     No other complaints today. She would like to go home today.     Physical Examination:  BP (!) 147/83   Pulse 78   Temp 98.5  F (36.9  C) (Oral)   Resp 18   Wt 96.6 kg (212 lb 15.4 oz)   SpO2 96%   BMI 33.35 kg/m      Physical Exam  Vitals reviewed.   Constitutional:       General: She is not in acute distress.     Appearance: She is not ill-appearing.   Cardiovascular:      Rate and Rhythm: Normal rate.   Pulmonary:      Effort: Pulmonary effort is normal.   Abdominal:      Tenderness: There is abdominal tenderness (Epigastric).   Neurological:      Mental Status: She is oriented to person, place, and time.   Psychiatric:         Mood and Affect: Mood normal.         Behavior: Behavior normal.       Pathology 8/16/2023  Final Diagnosis   A(1). Right adrenal:  -Pheochromocytoma (3.7 cm)  -All resection margins, negative for tumor     B(2). left adrenal:  -Pheochromocytoma (4.1 cm)  -Focal lymphovascular invasion is seen  -All resection margins, negative for tumor         Medications:  Reviewed    Assessment and plan:  Jessica Kennedy is a 48 year old  female with PMHx of MEN 2A with bilateral synchronous pheochromocytomas, HTN, T2DM who was admitted for optimization of blood pressure control / alpha blockade prior to bilateral adrenalectomy. Now s/p bilateral adrenalectomy on 8/16.     # Pheochromocytoma, bilateral  s/p bilateral adrenalectomy on 8/16. Pathology confirmed the preop diagnosis.    # Hypertension.   BP has remained elevated after bilateral adrenalectomy, most likely a component of essential hypertension.      # Primary Adrenal Insufficiency secondary to Bilateral Adrenalectomy  Electrolytes normal today.   Plan:  - Continue Hydrocortisone to 15 mg AM and 7.5 mg PM (discharge dose)  - Continue Florinef 0.05 mg  daily.    Information on sick day dosing included in patient discharge instructions.     # Type 2 Diabetes Mellitus: Poorly controlled hemoglobin A1c 9 on 6/28/2023.  PTA was on glipizide 5 mg daily and metformin 1000 mg twice daily.  Plan:   NPH 25 units in the morning and 10 unit(s) at bedtime   The patient is going to wear the Jabari sensor at home and update us with the BG values.     # Primary Hyperparathyroidism  Treatment for this will be set up as outpatient.     # HTN   -Continue losartan 100 mg daily.  -Continue amlodipine 10 mg daily.  -Continue Carvedilol  25 mg twice daily.  - Continue hydralazine 25 mg TID    Plan for discharge:   Diabetes. NPH 25 units in the morning and 10 units prior to supper     Hypertension.   -Continue losartan 100 mg daily.  -Continue amlodipine 10 mg daily.  -Continue Carvedilol  25 mg twice daily.  -Continue hydralazine 25 mg TID    Ok to discharge from Endocrinology standpoint. We will coordinate follow up appointments.     Jayson Wilson MD  Endocrinology Fellow    I, Deloris Vanessa, was present with the fellow who participated in the service and in the documentation of the note.  I have verified the history and personally performed the physical exam and medical decision making.  I agree with the assessment and plan of care as documented in the note.     Deloris Vanessa MD

## 2023-08-23 NOTE — DISCHARGE INSTRUCTIONS
"Ride to August 31 appt (appt is at 1 pm at [facility]):  August 31 pickup at 12:15pm from home going to clinic.  is with [employer] # [phone] (you can call to see if a  is coming).  will call you at # [phone] when arrives.   \"Will-call\" return ride is set up- you need to call # [phone] when you are ready to go home and a  will come pick you up at the clinic and bring you home. (Ride was booked through [email or web address] insurance- to reschedule call # [phone]).     ADRENAL CRISIS -- Adrenal crisis refers to overwhelming and life-threatening adrenal insufficiency. The most common signs of adrenal crisis are shock (very low blood pressure with a loss of consciousness), dehydration, and an imbalance of sodium and potassium levels in the body. In some cases, shock is preceded by fever, nausea, vomiting, and abdominal pain, weakness or fatigue, and confusion. Adrenal crisis usually occurs after an infection, trauma, or another stressor.    Adrenal crisis is a life-threatening condition that requires emergency medical treatment. The patient or a family member or friend should immediately give an emergency injection of a glucocorticoid at the first signs of adrenal crisis. In the emergency department or ambulance, treatment usually includes giving several liters of a salt solution (saline) and an injection of a glucocorticoid (dexamethasone or another form of cortisol) into a vein. Mineralocorticoid treatment (if needed) is usually started at a later time, when the saline treatment is completed.    Following treatment, it is important look for and treat any factors that may have triggered the crisis, such as infection.    ADRENAL INSUFFICIENCY PRECAUTIONS AND SPECIAL SITUATIONS -- People with adrenal insufficiency should learn as much as possible about their condition and should be aware of early warning signs of hormone deficiency. It is also wise to share this information " with family, friends, and any caregivers, so that they can also identify signs of trouble and be prepared to act in case of adrenal crisis.    General precautions -- People with adrenal insufficiency should wear a medical alert bracelet or necklace. They should also carry an emergency medical information card that lists the names and doses of their daily medications and the clinician and family member(s) to call in case of emergency. It is extremely important to identify early symptoms of adrenal deficiency and adrenal crisis; a clinician can describe subtle symptoms that should not be ignored.    Emergency precautions -- Even with careful use of medications and medical monitoring, some people with primary adrenal insufficiency will experience adrenal crisis. As a safety measure, the patient should always carry a syringe and a vial of steroid. This system should be stored at home, at work or school, and in the patient's handbag or backpack.    Most people with adrenal insufficiency go years without needing an injection. However, the injection is recommended if there is any reason to believe it might be needed. There is little to no risk of being given an extra dose of a glucocorticoid while there are significant risks of having a low glucocorticoid level.    After the injection is given, the patient should seek medical care immediately. After a major injury or if the person is found unconscious, it is best to call for emergency medical services. In the [place], most people can access emergency care by calling 911.    SICK DAY RULES FOR PATIENTS ON STEROID REPLACEMENT   Mild illness (cold without fever; routine physical training; psychological stress like a job interview or exam)   No adjustment required.  You can take your regular dose of hydrocortisone.    Moderate illness: cold/cough or other infection with fever over 100.4 Fahrenheit; 1 episode of vomiting; 1-2 episodes of diarrhea)   Double the dose of  steroid during the period of illness. In your case you would take HYDROCORTISONE 30 mg IN THE MORNING AFTER YOU WAKE UP AND 15 mg AT 2 PM    Major illness: (cough or infection with significant fever, diarrhea/vomiting and able to keep down tablets)  Triple your dose of steroid during the period of illness.In your case, you would take HYDROCORTISONE 45 mg IN THE MORNING AFTER YOU WAKE UP AND 22.5 mg AT 2 PM    Vomiting, severe diarrhea  Repeat the dose if you vomit within 1 hour of taking medication.  If you vomit again, have another episode of diarrhea, or are unable to keep fluids down, give the intramuscular injection of hydrocortisone.  Call 911 after giving the injection or go to the nearest emergency room.    Severe physical trauma (major injury with substantial blood loss, fracture, or shock), signs or symptoms of adrenal crisis or unconsciousness   Give the intramuscular injection of hydrocortisone.  Call 911 after giving the injection or go to the nearest emergency room.    MEDICAL PROCEDURES OR SURGERY  It is also important to call before any medical procedures or surgery, as we usually recommend temporarily increasing the glucocorticoid dose for these types of stresses.

## 2023-08-23 NOTE — PLAN OF CARE
Shift Note    Neuro: A&Ox4.   Cardiac: VSS.   Respiratory: Sating >94% on RA.  GI/: Adequate urine output. No BM overnight  Diet/appetite: Tolerating diet. Eating well.  Activity:  Independent, up to chair and in halls with stand by assist  Pain: At acceptable level on current regimen.   Skin: Lap sites with staples, plans to remove staples 8/23  LDA's: PIV SL    Plan: Continue with POC. Notify primary team with changes.      Paged Erika @ [phone]  Pt experiencing heart burn and gastric reflux, can you order something PRN? thanks!  - Jerica [phone]  MD ordered one time Maalox      Goal Outcome Evaluation:    Problem: Plan of Care - These are the overarching goals to be used throughout the patient stay.    Goal: Optimal Comfort and Wellbeing  Outcome: Progressing     Problem: Fall Injury Risk  Goal: Absence of Fall and Fall-Related Injury  Outcome: Progressing     Problem: Pain Acute  Goal: Optimal Pain Control and Function  Outcome: Progressing     Problem: Diabetes Comorbidity  Goal: Blood Glucose Level Within Targeted Range  Outcome: Progressing     Problem: Hypertension Comorbidity  Goal: Blood Pressure in Desired Range  Outcome: Progressing

## 2023-08-23 NOTE — PROGRESS NOTES
Care Management Discharge Note    Discharge Date: 08/23/2023       Discharge Disposition: Home with services    Discharge Services: Home Care     Discharge DME: walker     Discharge Transportation: family or friend will provide    Private pay costs discussed: Not applicable    Does the patient's insurance plan have a 3 day qualifying hospital stay waiver? NA    PAS Confirmation Code: NA   Patient/family educated on Medicare website which has current facility and service quality ratings:  yes    Education Provided on the Discharge Plan: yes   Persons Notified of Discharge Plans: patient  Patient/Family in Agreement with the Plan: yes    Handoff Referral Completed: Yes    Additional Information:  PT/OT recommending home PT/OT at discharge.  Referral sent to [facility].  [facility] accepted for RN/PT/OT services.  Orders placed.  PT/OT to issue a walker to pt prior to discharge.  Patient needs assistance with a ride to home.  CHW to assist in arranging a cab ride through pt's health plan.  RNCC will remain available if further needs arise.       [facility](RN/PT/OT)  Phone: [phone]  Fax: [phone]     Karla Diego RNCC  Phone: [phone]  Pager: [phone]    SEARCHABLE in [facility] - search CARE COORDINATOR     [place] [address] [place] ([phone]) Saturday & Sunday; ([phone]) [facility] Recognized Holidays     Units: 5A, 5B & 5C  Pager: [phone]    Units: 6B, 6C & 6D    Pager: [phone]    Units: 7A, 7B, 7C & 7D    Pager: [phone]    Units: 6A & ICU   Pager: [phone]    Units: 5 Ortho, 5MS & WB ED Pager: [phone]    Units: 6MS, 8A & 10 ICU  Pager [phone]

## 2023-08-23 NOTE — PROGRESS NOTES
arrived at 1400. Patient verbalized After Visit Summary, daughter present at bedside. Patient and daughter verbalized understanding to medications and medication changes. Personal belongings packed and patient will be escorted to discharge pharmacy. Clarke sent with patient.

## 2023-08-24 ENCOUNTER — CARE COORDINATION (OUTPATIENT)
Dept: ENDOCRINOLOGY | Facility: CLINIC | Age: 48
End: 2023-08-24
Payer: COMMERCIAL

## 2023-08-24 ENCOUNTER — PATIENT OUTREACH (OUTPATIENT)
Dept: ENDOCRINOLOGY | Facility: CLINIC | Age: 48
End: 2023-08-24
Payer: COMMERCIAL

## 2023-08-24 NOTE — PROGRESS NOTES
RN Post-Op/Post-Discharge Care Coordination Note    Ms. Jessica Kennedy is a 48 year old female who underwent  laparoscopic bilateral adrenalectomy  on 8/16 with  Dr. Domenico Caceres.  Spoke with Patient.    Support  Patient able to care for self independently     Health Status  Nausea/Vomiting: Patient denies nausea/vomiting.  Eating/drinking: Patient is able to eat and drink without any complaints.  Bowel habits: Patient reports no bowel movement since surgery.She will start Miralax  Drains ([place]): N/A  Fevers/chills: Patient denies any fever or chills.  Incisions: Patient denies any signs and symptoms of infection..    Pain: no, taking Tylenol prn  New Medications:  none    Activity/Restrictions  No lifting in excess of 15-20 pounds for 3-4 weeks    Equipment  None    Pathology reviewed with patient:   no    Forms/Letters  No    All of her questions were answered including reviewing restrictions, pathologyNA, and wound care.  She will call this office if she has any further questions and/or concerns.       In lieu of a post-op clinic patient that patient would like to be contacted in approximately 7-10 days via telephone.    A copy of this note was routed to the primary surgeon.      Whom and When to Call  Patient acknowledges understanding of how to manage any medication changes and   when to seek medical care.     Patient advised that if after hour medical concerns arise to please call [phone] and choose option 4 to speak to the physician on call.

## 2023-08-24 NOTE — PLAN OF CARE
Occupational Therapy Discharge Summary    Reason for therapy discharge:    Discharged to home with home therapy.    Progress towards therapy goal(s). See goals on Care Plan in [facility] electronic health record for goal details.  Goals partially met.  Barriers to achieving goals:   discharge from facility.    Therapy recommendation(s):    Continued therapy is recommended.  Rationale/Recommendations:  for increased ADL/IADL safety and IND upon return home. Pt was not seen by discharging therapist.

## 2023-08-24 NOTE — PROGRESS NOTES
Patient notified of her upcoming appointment with Dr Caceres on Sept 13 at 8:30 am. She was also informed of the location of the visit. This was done via [place] [employer]  on the phone. Patient verbalized understanding.

## 2023-08-25 ENCOUNTER — MEDICAL CORRESPONDENCE (OUTPATIENT)
Dept: HEALTH INFORMATION MANAGEMENT | Facility: CLINIC | Age: 48
End: 2023-08-25

## 2023-08-25 ENCOUNTER — TELEPHONE (OUTPATIENT)
Dept: FAMILY MEDICINE | Facility: CLINIC | Age: 48
End: 2023-08-25
Payer: COMMERCIAL

## 2023-08-25 ENCOUNTER — TELEPHONE (OUTPATIENT)
Dept: ENDOCRINOLOGY | Facility: CLINIC | Age: 48
End: 2023-08-25
Payer: COMMERCIAL

## 2023-08-25 NOTE — TELEPHONE ENCOUNTER
Received instructions from Dr. Vanessa to schedule patient as follows:    -Hospital follow-up in 1-2 weeks, of no openings overbook around lunchtime or 7 am. In person.     -Schedule her for a nuclear scan (NM Parathyroid Planar w/Spect Dual Isotope)    -Schedule an apt with ENT (Dr Benton or Dr. Mao).         Writer will send to Clinic Coordinators to schedule all of the above apppointment.. Please offer patient appointment with Dr. Vanessa next Wednesday, 8/30 PH[place] at 8:00 or 8:30 am in person for hospital follow-up. If patient cannot do that ok to offer 8/30 at 12:00pm overbook, or 9/6 at 12pm overbook. If none of those work please ask if 7am appointments would work.         Missy Patricia, RN  Endocrine Care Coordinator  [facility]

## 2023-08-25 NOTE — TELEPHONE ENCOUNTER
Hello,     I called the patient using  services to schedule the following appointments. The patient was unable to schedule the appointments at the time of the call and stated that she will call back to schedule the appointments. I was able to provide [phone] for her to call back and schedule.       Sona cervantes Procedure   Gastroenterology, Infectious Diseases, Nephrology, Pulmonology and Rheumatology Specialties  [facility]

## 2023-08-25 NOTE — TELEPHONE ENCOUNTER
Britt with [facility] calling for verbal orders from Dr. Marvin.    RN notes patient has not had a visit with Dr. Marvin and no recent family practice provider visit noted. Patient has had multiple visits with endocrinology clinic this past year. Patient does have upcoming appointment with Dr. Marvin to establish care on 8/31/2023. However, without establishment of care, Britt will try to reach out to endocrinology provider for home care orders for the time being. No further action needed.    GERSON Mae  [facility] Triage

## 2023-08-25 NOTE — DISCHARGE SUMMARY
[facility]  Discharge Summary - Medicine & Pediatrics       Date of Admission:  8/11/2023  Date of Discharge:  8/23/2023  4:14 PM  Discharging Provider: Dr. Bernabe Slade  Discharge Service: Medicine Service, MARK TEAM 3    Discharge Diagnoses   Bilateral Pheochromocytoma  Hypertension  Primary Adrenal Insufficiency 2/2 bilateral adrenalectomy  Poorly Controlled Type 2 Diabetes Mellitus  Primary Hyperparathyroidism    Follow-ups Needed After Discharge   Follow-up Appointments     Follow Up and recommended labs and tests      Follow-up with primary care provider on 8/31/23.  Labs- BMP  The endocrinology team will notify you with your follow-up appointment   timing.    The surgery team will notify you with your follow-up appointment timing to   have staples removed and for reassessment.            Discharge Disposition   Discharged to home  Condition at discharge: Stable    Hospital Course   Jessica Kennedy is a 48 year old  female with PMHx of MEN 2A with bilateral synchronous pheochromocytomas, HTN, T2DM who was admitted for optimization of blood pressure control / alpha blockade prior to bilateral adrenalectomy. Now s/p bilateral adrenalectomy on 8/16.    Bilateral pheochromocytoma s/p bilateral adrenalectomy   Iatrogenic Adrenal Insufficiency  Underwent bilateral adrenalectomy on 8/16.  Clean margins on pathology.  Steroids adjusted to home regimen by endocrine team with planned follow-up.  Stable BP and electrolytes for multiple days prior to discharge.  - Hydrocortisone to 15 mg AM and 7.5 mg PM  - Florinef 0.05 mg daily  - Repeat BMP on 8/29 at PCP visit  - Sick steroid plan provided to patient and daughter (see below)  - Endocrine team to coordinate follow-up endocrine outpatient visit   - Surgery team to coordinate follow-up surgery outpatient visit and staple removal  - Home PT/OT    Hypertension  Continued to have BP above goal throughout hospital stay even  after adrenalectomy. Suspect significant underlying component of essential hypertension. Step-wise establishment of below antihypertensive regimen during hospital stay.  - Continue losartan 100 mg daily  -Continue amlodipine 10 mg daily  -Continue Carvedilol  25 mg twice daily  - Continue hydralazine 25 mg TID  - Check BP daily at home and notify PCP if persistently outside goal     Insulin dependent T2DM   PTA on oral and insulin regimen. Was managed on insulin drip in SICU, transitioned to subcutaneous regimen 8/18.  Adjusted by endocrinology team during admisison.  Ultimately transitioned to NPH-based insulin discharge plan.  - Hold PTA Glargine 5mg daily and Metformin 1000mg BID  - NPH 25 units in the morning and 10 unit(s) at bedtime   - Patient to wear the Jabari sensor at home and update endocrine team with the BG values    Primary Hyperparathyroidism  Further assessment planned at outpatient endocrinology appointment per endocrine team.    Right Lower Quadrant Pain  Epigastric Pain  Reported RLQ on 8/21-22.  Felt bloated.  Abd exam non-peritonitic.  No fever or leukocytosis.  Abd xray reassuring.  RLQ pain likely post-surgical vs. Constipation.  Had epigastric pain on 8/23. Troponin and lipase wnl.  Abd xray without abnormality.  Suspect GERD.  Improved with tums.   - Continue bowel regimen  - Tums prn    Irritated Eyes  Resolved then returned on 8/21.  Burning, dry eyes.  No clear vision changes.  Exam reassuring.  Improved with refresh drops.  - Refresh plus prn provided at discharge  - PCP to reassess at follow-up - if persistent, consider optometry referral     Headache  Long history.  Headache on 8/21 behind eyes; no other clear neurologic changes.  Given scheduled tylenol recently, possible medication overuse headache.  Resolved as of 8/22 AM.    - Tylenol prn     #Palpitations, resolved  #Congested Nose, resolved    Consultations This Hospital Stay   ENDOCRINE NON-DIABETES ADULT IP CONSULT  NURSING TO  CONSULT FOR VASCULAR ACCESS CARE IP CONSULT  NURSING TO CONSULT FOR VASCULAR ACCESS CARE IP CONSULT  NURSING TO CONSULT FOR VASCULAR ACCESS CARE IP CONSULT  NURSING TO CONSULT FOR VASCULAR ACCESS CARE IP CONSULT  NURSING TO CONSULT FOR VASCULAR ACCESS CARE IP CONSULT  NURSING TO CONSULT FOR VASCULAR ACCESS CARE IP CONSULT  NURSING TO CONSULT FOR VASCULAR ACCESS CARE IP CONSULT  PHYSICAL THERAPY ADULT IP CONSULT  OCCUPATIONAL THERAPY ADULT IP CONSULT  CARE MANAGEMENT / SOCIAL WORK IP CONSULT  CHILD FAMILY LIFE IP CONSULT  NURSING TO CONSULT FOR VASCULAR ACCESS CARE IP CONSULT  SPIRITUAL HEALTH SERVICES IP CONSULT  NURSING TO CONSULT FOR VASCULAR ACCESS CARE IP CONSULT  NURSING TO CONSULT FOR VASCULAR ACCESS CARE IP CONSULT    Code Status   Prior       The patient was discussed with Dr. Yany Everett DO, MPH  Med-Peds PGY-4  [address] [address]  Phone: [phone]  ______________________________________________________________________    Physical Exam   Vital Signs:                    B/P: 147/83, T: 98.5, P: 78, R: 18  Weight: 212 lbs 15.43 oz  CONSTITUTIONAL: Interactive, in no acute distress.  SKIN: Clear. No significant rash, abnormal pigmentation or lesions.     EYES: No scleral icterus. EOMI.  No conjunctival injection.    HEENT: Normocephalic, atraumatic. Oral mucosa moist. No nasal discharge.   RESPIRATORY: No increased work of breathing. Clear to auscultation bilaterally without wheeze, crackles, rales, or rhonchi.   CARDIOVASCULAR: Normal S1, S2. No murmurs. Cap refill <2sec. No FERNANDO  GI: non-distended. Bowel sounds active. Soft, slightly tender to palpation in RLQ.   Incisions with staples without erythema, drainage; well approximated  NEUROLOGIC: Normal tone. Speech clear and fluent. Following commands. Tracking appropriately. Alert and appropriate. No gross neurologic change based on mobility.        Primary Care Physician  "  [facility]    Discharge Orders      Home Care Referral      Reason for your hospital stay    You were admitted to the hospital to get a left and right sided adrenal gland non-cancerous lesion removed.  Your medications were then adjusted.  Please take your new medications as prescribed.     Activity    Your activity upon discharge: activity as tolerated with front-wheel walker     Discharge Instructions    Use tylenol and oxycodone as prescribed for pain.  If pain is worsening, contact your primary care doctor to discuss next steps.    Check your blood sugar two times per day.  Notify your primary care provider or endocrinologist if your blood sugar is consistently below 100 or above 300.  Take insulin as prescribed.      Follow your sick plan as outlined in the instructions.     Check your blood pressure each day.  Notify your primary care provider if systolic pressure (top number) is <100 or >160 consistently.  Write down these values and bring them to your follow-up visit.     Follow Up and recommended labs and tests    Follow-up with primary care provider on 8/31/23.  Labs- BMP  The endocrinology team will notify you with your follow-up appointment timing.    The surgery team will notify you with your follow-up appointment timing to have staples removed and for reassessment.     Reason for your hospital stay    Bilateral adrenalectomy:     When to call - Contact Surgeon Team    You may experience symptoms that require follow-up before your scheduled appointment. Refer to the \"Stoplight Tool\" for instructions on when to contact your Surgeon Team if you are concerned about pain control, blood clots, constipation, or if you are unable to urinate.     When to call - Reach out to Urgent Care    If you are not able to reach your Surgeon Team and you need immediate care, go to the Orthopedic Walk-in Clinic or Urgent Care at your Surgeon's office.  Do NOT go to the Emergency Room unless you have " shortness of breath, chest pain, or other signs of a medical emergency.     When to call - Reasons to Call 911    Call 911 immediately if you experience sudden-onset chest pain, arm weakness/numbness, slurred speech, or shortness of breath     Discharge Instruction - Breathing exercises    Perform breathing exercises using your Incentive Spirometer 10 times per hour while awake for 2 weeks.     Symptoms - Fever Management    A low grade fever can be expected after surgery.  Use acetaminophen (TYLENOL) as needed for fever management.  Contact your Surgeon Team if you have a fever greater than 101.5 F, chills, and/or night sweats.     Symptoms - Constipation management    Constipation (hard, dry bowel movements) is expected after surgery due to the combination of being less active, the anesthetic, and the opioid pain medication.  You can do the following to help reduce constipation:  ~  FLUIDS:  Drink clear liquids (water or Gatorade), or juice (apple/prune).  ~  DIET:  Eat a fiber rich diet.    ~  ACTIVITY:  Get up and move around several times a day.  Increase your activity as you are able.  MEDICATIONS:  Reduce the risk of constipation by starting medications before you are constipated.  You can take Miralax   (1 packet as directed) and/or a stool softener (Senokot 1-2 tablets 1-2 times a day).  If you already have constipation and these medications are not working, you can get magnesium citrate and use as directed.  If you continue to have constipation you can try an over the counter suppository or enema.  Call your Surgeon Team if it has been greater than 3 days since your last bowel movement.     Symptoms - Reduced Urine Output    Changes in the amount of fluids you drank before and after surgery may result in problems urinating.  It is important to stay well-hydrated after surgery and drink plenty of water. If it has been greater than 8 hours since you have urinated despite drinking plenty of water, call your  Surgeon Team.     Comfort and Pain Management - Pain after Surgery    Pain after surgery is normal and expected.  You will have some amount of pain for several weeks after surgery.  Your pain will improve with time.  There are several things you can do to help reduce your pain including: rest, ice, elevation, and using pain medications as needed. Contact your Surgeon Team if you have pain that persists or worsens after surgery despite rest, ice, elevation, and taking your medication(s) as prescribed. Contact your Surgeon Team if you have new numbness, tingling, or weakness in your operative extremity.     Comfort and Pain Management - Cold therapy    Ice can be used to control swelling and discomfort after surgery. Place a thin towel over your operative site and apply the ice pack overtop. Leave ice pack in place for 20 minutes, then remove for 20 minutes. Repeat this 20 minutes on/20 minutes off routine as often as tolerated.     Medication Instructions - Acetaminophen (TYLENOL) Instructions    You were discharged with acetaminophen (TYLENOL) for pain management after surgery. Acetaminophen most effectively manages pain symptoms when it is taken on a schedule without missing doses (every four, six, or eight hours). Your Provider will prescribe a safe daily dose between 3000 - 4000 mg.  Do NOT exceed this daily dose. Most patients use acetaminophen for pain control for the first four weeks after surgery.  You can wean from this medication as your pain decreases.     Medication Instructions - Opioids - Tapering Instructions    In the first three days following surgery, your symptoms may warrant use of the narcotic pain medication every four to six hours as prescribed. This is normal. As your pain symptoms improve, focus your efforts on decreasing (tapering) use of narcotic medications. The most successful tapering strategy is to first, decrease the number of tablets you take every 4-6 hours to the minimum prescribed.  Then, increase the amount of time between doses.  For example:  First, taper to   or 1 tablet every 4-6 hours.  Then, taper to   or 1 tablet every 6-8 hours.  Then, taper to   or 1 tablet every 8-10 hours.  Then, taper to   or 1 tablet every 10-12 hours.  Then, taper to   or 1 tablet at bedtime.  The bedtime dose can help with comfort during sleep and is typically the last dose to be discontinued after surgery.     Brief Discharge Instructions    Steri strips (bandages) will stay in place until they fall off on their own. You can shower normally and let soap and water run over the incisions. This is all normal. Avoid lifting greater than 10 pounds for the next 3 weeks. Avoid submerging in bath or pool for another 2 weeks.     Walker Order    I, the undersigned, certify that the above prescribed supplies are medically necessary for this patient and is both reasonable and necessary in reference to accepted standards of medical and necessary in reference to accepted standards of medical practice in the treatment of this patient's condition and is not prescribed as a convenience.      Walker Order for DME - ONLY FOR DME    I, the undersigned, certify that the above prescribed supplies are medically necessary for this patient and is both reasonable and necessary in reference to accepted standards of medical and necessary in reference to accepted standards of medical practice in the treatment of this patient's condition and is not prescribed as a convenience.      Diet    Follow this diet upon discharge: Carb consistent diet. Can use glucerna between meals.     Discharge Instruction - Regular Diet Adult    Return to your pre-surgery diet unless instructed otherwise       SICK DAY RULES FOR PATIENTS ON STEROID REPLACEMENT   Mild illness (cold without fever; routine physical training; psychological stress like a job interview or exam)   No adjustment required.  You can take your regular dose of hydrocortisone.     Moderate  illness: cold/cough or other infection with fever over 100.4 Fahrenheit; 1 episode of vomiting; 1-2 episodes of diarrhea)   Double the dose of steroid during the period of illness. In your case you would take HYDROCORTISONE 30 mg IN THE MORNING AFTER YOU WAKE UP AND 15 mg AT 2 PM     Major illness: (cough or infection with significant fever, diarrhea/vomiting and able to keep down tablets)  Triple your dose of steroid during the period of illness.In your case, you would take HYDROCORTISONE 45 mg IN THE MORNING AFTER YOU WAKE UP AND 22.5 mg AT 2 PM     Vomiting, severe diarrhea  Repeat the dose if you vomit within 1 hour of taking medication.  If you vomit again, have another episode of diarrhea, or are unable to keep fluids down, give the intramuscular injection of hydrocortisone.  Call 911 after giving the injection or go to the nearest emergency room.     Severe physical trauma (major injury with substantial blood loss, fracture, or shock), signs or symptoms of adrenal crisis or unconsciousness   Give the intramuscular injection of hydrocortisone.  Call 911 after giving the injection or go to the nearest emergency room.     MEDICAL PROCEDURES OR SURGERY  It is also important to call before any medical procedures or surgery, as we usually recommend temporarily increasing the glucocorticoid dose for these types of stresses.    Significant Results and Procedures   Most Recent 3 BMP's:Recent Labs   Lab Test 08/23/23  1158 08/23/23  0932 08/23/23  0849 08/22/23  1001 08/22/23  0701 08/21/23  0818 08/21/23  0543   NA  --   --  139  --  137  --  139   POTASSIUM  --   --  3.9  --  3.7  --  3.5   CHLORIDE  --   --  106  --  105  --  105   CO2  --   --  22  --  21*  --  23   BUN  --   --  23.0*  --  28.4*  --  21.2*   CR  --   --  0.72  --  0.80  --  0.64   ANIONGAP  --   --  11  --  11  --  11   MASSIMO  --   --  9.7  --  9.6  --  9.7   * 171* 117*   < > 149*   < > 129*    < > = values in this interval not displayed.        Pathology 8/16/2023  Final Diagnosis   A(1). Right adrenal:  -Pheochromocytoma (3.7 cm)  -All resection margins, negative for tumor     B(2). left adrenal:  -Pheochromocytoma (4.1 cm)  -Focal lymphovascular invasion is seen  -All resection margins, negative for tumor         Discharge Medications   Discharge Medication List as of 8/23/2023  2:14 PM      START taking these medications    Details   acetaminophen (TYLENOL) 325 MG tablet Take 3 tablets (975 mg) by mouth 3 times daily as needed for mild pain, Disp-30 tablet, R-0, E-Prescribe      calcium carbonate 750 MG CHEW Take 1 tablet (750 mg) by mouth 4 times daily as needed (Heartburn), Disp-30 tablet, R-0, E-Prescribe      fludrocortisone (FLORINEF) 0.1 MG tablet Take 0.5 tablets (0.05 mg) by mouth daily, Disp-30 tablet, R-1, E-Prescribe      hydrALAZINE (APRESOLINE) 25 MG tablet Take 1 tablet (25 mg) by mouth 3 times daily, Disp-45 tablet, R-1, E-Prescribe      oxyCODONE (ROXICODONE) 5 MG tablet Take 1 tablet (5 mg) by mouth every 6 hours as needed for pain, Disp-12 tablet, R-0, E-Prescribe      polyethylene glycol (MIRALAX) 17 GM/Dose powder Take 17 g by mouth daily . Hold for loose stools., Disp-510 g, R-0, E-Prescribe      senna-docusate (SENOKOT-S/PERICOLACE) 8.6-50 MG tablet Take 1 tablet by mouth 2 times daily Hold for loose stools., Disp-60 tablet, R-0, E-Prescribe         CONTINUE these medications which have CHANGED    Details   !! hydrocortisone (CORTEF) 5 MG tablet Take 1.5 tablets (7.5 mg) by mouth daily . Take at 2PM each day., Disp-45 tablet, R-1, E-Prescribe      !! hydrocortisone (CORTEF) 5 MG tablet Take 3 tablets (15 mg) by mouth every morning AND 1.5 tablets (7.5 mg) daily. 15mg AT 8:00AM AND 7.5MG AT 2PM, Disp-135 tablet, R-1, E-Prescribe      insulin  UNIT/ML injection Inject 22 Units Subcutaneous every morning (before breakfast) AND 10 Units daily (with dinner)., Disp-15 mL, R-0, E-Prescribe      losartan (COZAAR) 100 MG tablet  Take 1 tablet (100 mg) by mouth daily, Disp-30 tablet, R-1, E-Prescribe       !! - Potential duplicate medications found. Please discuss with provider.      CONTINUE these medications which have NOT CHANGED    Details   amLODIPine (NORVASC) 10 MG tablet Take 1 tablet (10 mg) by mouth daily, Disp-90 tablet, R-1, E-Prescribe      carboxymethylcellulose-glycerin (REFRESH OPTIVE) 0.5-0.9 % ophthalmic solution Place 1 drop into both eyes 3-4 TIMES DAILY, Historical      carvedilol (COREG) 25 MG tablet Take 1 tablet (25 mg) by mouth 2 times daily (with meals), Disp-180 tablet, R-3, E-Prescribe      fluticasone (FLONASE) 50 MCG/ACT nasal spray Spray 1 spray into both nostrils daily, Disp-9.9 mL, R-1, E-Prescribe      omeprazole (PRILOSEC) 40 MG DR capsule Take 1 capsule (40 mg) by mouth daily, Disp-90 capsule, R-1, E-Prescribe      alcohol swab prep pads Use to swab area of injection/laurie as directed.Disp-100 each, [id number]      Benzocaine-Menthol 10-2.1 MG LOZG Take 1 lozenge by mouth 4 times daily as needed (Cough or sore throat), Disp-84 lozenge, R-0, Local Print      blood glucose (NO BRAND SPECIFIED) test strip 1 strip by In Vitro route every morning (before breakfast), Disp-350 strip, R-3, E-Prescribe      Continuous Blood Gluc  (FREESTYLE ABHIJIT 14 DAY READER) RODERICK Use to read blood sugars as per 's instructions., Disp-1 each, R-0, E-Prescribe      Continuous Blood Gluc Sensor (FREESTYLE ABHIJIT 14 DAY SENSOR) MISC Change every 14 days., Disp-6 each, R-11, E-Prescribe      insulin pen needle (BD CELINE U/F) 32G X 4 MM miscellaneous Use 1 pen needles daily or as directed.Disp-100 each, [id number]      Microlet Lancets MISC 1 each daily, Disp-100 each, R-3, E-Prescribe         STOP taking these medications       aspirin (ASA) 81 MG chewable tablet Comments:   Reason for Stopping:         doxazosin (CARDURA) 8 MG tablet Comments:   Reason for Stopping:         glipiZIDE (GLUCOTROL) 5 MG tablet  Comments:   Reason for Stopping:         ibuprofen (ADVIL/MOTRIN) 200 MG tablet Comments:   Reason for Stopping:         metFORMIN (GLUCOPHAGE) 1000 MG tablet Comments:   Reason for Stopping:         phenoxybenzamine (DIBENZYLINE) 10 MG capsule Comments:   Reason for Stopping:             Allergies   Allergies   Allergen Reactions     Hydromorphone Hives     Other reaction(s): Hives     Ace Inhibitors      Other reaction(s): Unknown     Adhesive Tape Blisters

## 2023-08-28 ENCOUNTER — TELEPHONE (OUTPATIENT)
Dept: ENDOCRINOLOGY | Facility: CLINIC | Age: 48
End: 2023-08-28
Payer: COMMERCIAL

## 2023-08-28 ENCOUNTER — APPOINTMENT (OUTPATIENT)
Dept: INTERPRETER SERVICES | Facility: CLINIC | Age: 48
End: 2023-08-28
Payer: COMMERCIAL

## 2023-08-28 NOTE — TELEPHONE ENCOUNTER
[facility] Call Center    Phone Message    May a detailed message be left on voicemail: yes     Reason for Call: Order(s): Home Care Orders: Skilled Nursing:     Action Taken: Other: please call Britt at [facility]  [phone]. Need skilled nursing 1x per week for 1 week, 2x week for 2 weeks, and 1 time a week for 3 weeks and medical social worker oskar, patient has been complaining of chest pain since surgery like heart burn, sleeping has been difficult patient was advised to go to ER reporting 8/10 pain scale, patient has not been to Emergency Room, medication is not helping her pain, please advise thank you    Travel Screening: Not Applicable

## 2023-08-28 NOTE — TELEPHONE ENCOUNTER
I agree with the skilled nurse orders and the PT/.     Do not know what's causing the pain. Maybe she should be seen in ER. Does she have any SOB? Is she taking the omeprazole?

## 2023-08-28 NOTE — TELEPHONE ENCOUNTER
Duplicate encounter. Please refer to separate encounter for details.     Missy Patricia, RN  Endocrine Care Coordinator  [facility]

## 2023-08-28 NOTE — TELEPHONE ENCOUNTER
[facility] Call Center    Phone Message    May a detailed message be left on voicemail: yes     Reason for Call: Order(s): Home Care Orders: Physical Therapy (PT): [facility] treat and Occupational Therapy (OT): anuja and treat for the week of 8/27/2023    Action Taken: Message routed to:  [facility] endo    Travel Screening: Not Applicable

## 2023-08-28 NOTE — TELEPHONE ENCOUNTER
Writer called Britt to review further and determine what is needed from Dr. Vanessa. No answer. Left voicemail for patient to contact our office.       Missy Patricia RN  Endocrine Care Coordinator  [facility]

## 2023-08-28 NOTE — TELEPHONE ENCOUNTER
Britt returned call. Until patient is seen this Thursday by new primary care provider Britt needs verbal orders from Dr. Vanessa's office for 1 time weekly Skilled Nursing for last week, then 2 times weekly for 3 weeks starting this week, and then 1 time weekly for 3 weeks.     Britt also needs a verbal order for Physical Therapy/Occupational Therapy evaluation and verbal order for  Evaluation.    Britt also wanted to report that patient reported she was having severe pain that was visible to nurse. Patient also reported that she was having burning pain in her chest when taking deep breaths. Patient reported to Britt she had been having that since her surgery. Britt noted that she advised patient to go to the emergency room multiple times but patient refused.     Britt plans to go out tomorrow or Wednesday again. Britt is wanting to know who she should call for pain control? Surgery?    Writer will send all detailed above information to Dr. Vanessa and will call back to Britt with further recommendations. Britt verbalizes understanding and agrees to plan.       Missy Patricia, RN  Endocrine Care Coordinator  [facility]

## 2023-08-29 ENCOUNTER — TELEPHONE (OUTPATIENT)
Dept: ENDOCRINOLOGY | Facility: CLINIC | Age: 48
End: 2023-08-29
Payer: COMMERCIAL

## 2023-08-29 NOTE — TELEPHONE ENCOUNTER
Left voicemail for patient to contact our office.     Missy Patricia RN  Endocrine Care Coordinator  [facility]

## 2023-08-29 NOTE — TELEPHONE ENCOUNTER
Caregiver called back. She asked to leave a detailed message if she is unable to answer the call.

## 2023-08-29 NOTE — TELEPHONE ENCOUNTER
Writer called Britt to review. Advised her of recommendations from Dr. Vanessa. Britt verbalizes understanding. Britt will make sure to have nurses checking in with patient about questions and recommendations from Dr. Vanessa.       Missy Patricia, RN  Endocrine Care Coordinator  [facility]

## 2023-08-29 NOTE — TELEPHONE ENCOUNTER
Writer gave verbal ok for Physical Therapy orders to Jada from Dr. Vanessa.      Missy Patricia, RN  Endocrine Care Coordinator  [facility]

## 2023-08-29 NOTE — TELEPHONE ENCOUNTER
Caregiver called in again.  Needs a call back from Missy.  Voice mail is confidential and can have a Detailed VM left on it.Thank you.

## 2023-08-29 NOTE — TELEPHONE ENCOUNTER
[facility] Call Center    Phone Message    May a detailed message be left on voicemail: yes     Reason for Call: Order(s): Home Care Orders: Physical Therapy (PT): Verbal Orders Needed  Jada is requesting a call back for verbal orders for physical therapy. She advised the pt to be seen for PT, 1 time a week for 1 wk, 2 times for 2 wks, and then 1 time a week for 3 wks.    Action Taken: Other: Endo    Travel Screening: Not Applicable

## 2023-08-30 ENCOUNTER — TELEPHONE (OUTPATIENT)
Dept: ENDOCRINOLOGY | Facility: CLINIC | Age: 48
End: 2023-08-30
Payer: COMMERCIAL

## 2023-08-30 NOTE — TELEPHONE ENCOUNTER
[facility] Call Center    Phone Message    May a detailed message be left on voicemail: yes     Reason for Call: Order(s): Home Care Orders: Occupational Therapy (OT): OT eval week of 9/4  Per Hannah, verbal orders needed    Action Taken: Other: Endo    Travel Screening: Not Applicable

## 2023-08-30 NOTE — TELEPHONE ENCOUNTER
Duplicate encounter. Please refer to yesterday's 8/30/23 telephone encounter. Orders have already been faxed.       Missy Patricia RN  Endocrine Care Coordinator  [facility]     Lot # (Optional): [id number] Lot # For Kenalog (Optional): [id number]

## 2023-08-30 NOTE — TELEPHONE ENCOUNTER
[facility] Call Center    Phone Message    May a detailed message be left on voicemail: yes     Reason for Call: Order(s): Other:     Reason for requested: Jeferson Canela states they received a message from PT that had seen patient and was told that Dr. Vanessa wanted patient to have in home labs drawn. Rola is wanting to confirm if this is correct and if so they are needing to have the lab orders be faxed to [phone].     **Rola is also stated they will need to know what labs they are wanting and if this is a one time draw or how frequent the labs needs to be drawn and where to have the labs be sent to once drawn. Please advise**    Date needed: asap    Provider name: Otf    Action Taken: Message routed to:  [facility] Endo    Travel Screening: Not Applicable

## 2023-08-30 NOTE — TELEPHONE ENCOUNTER
Please refer to 8/29/23 telephone encounter for further follow-up details.       Missy Patricia RN  Endocrine Care Coordinator  [facility]

## 2023-08-31 ENCOUNTER — DOCUMENTATION ONLY (OUTPATIENT)
Dept: ENDOCRINOLOGY | Facility: CLINIC | Age: 48
End: 2023-08-31

## 2023-08-31 NOTE — TELEPHONE ENCOUNTER
Writer provided Rola with verbal order for OT evaluation.       Missy Patricia RN  Endocrine Care Coordinator  [facility]

## 2023-08-31 NOTE — TELEPHONE ENCOUNTER
Orders are for labs to be completed 2 hours after patient takes morning medications, so cannot be done at clinic today when has 1pm appointment. Writer attempted to contact Rola at phone number provided. Left voicemail for Rola to contact clinic.       Missy Patricia RN  Endocrine Care Coordinator  [facility]

## 2023-08-31 NOTE — TELEPHONE ENCOUNTER
Rola returned call. Advised her of recommendations. Rola verbalizes understanding and agrees to plan.           Missy Patricia RN  Endocrine Care Coordinator  [facility]

## 2023-08-31 NOTE — TELEPHONE ENCOUNTER
Per Rola is calling back. Rola states they are not sure if they are able to draw patients lab before patient leaves to go to appt that is scheduled today at 1:00pm. Rola wanted to let the clinic know and see if the orders can be done in clinic while patient is being seen due to the timing. Please advise.     Rola states she was reviewing the orders that were received yesterday and she is wanting to clarify if these orders are to be ongoing or if this is a one time draw. Rloa is wanting to get a call back. Please advise.

## 2023-08-31 NOTE — PROGRESS NOTES
[facility] office      Sending Client Missed Visit Report to provider.     Labeled and placed in file for provider to review.     At this time they are not requesting any return response.       Nadya Mann, [facility]  Adult Endocrinology  [facility]

## 2023-09-08 ENCOUNTER — APPOINTMENT (OUTPATIENT)
Dept: LAB | Facility: CLINIC | Age: 48
End: 2023-09-08
Payer: COMMERCIAL

## 2023-09-08 PROCEDURE — 80048 BASIC METABOLIC PNL TOTAL CA: CPT | Mod: ORL | Performed by: INTERNAL MEDICINE

## 2023-09-08 PROCEDURE — 82088 ASSAY OF ALDOSTERONE: CPT | Mod: ORL | Performed by: INTERNAL MEDICINE

## 2023-09-08 PROCEDURE — 82533 TOTAL CORTISOL: CPT | Mod: ORL | Performed by: INTERNAL MEDICINE

## 2023-09-11 ENCOUNTER — TELEPHONE (OUTPATIENT)
Dept: ENDOCRINOLOGY | Facility: CLINIC | Age: 48
End: 2023-09-11
Payer: COMMERCIAL

## 2023-09-11 DIAGNOSIS — E31.22 MULTIPLE ENDOCRINE NEOPLASIA TYPE 2A (MEN2A) (H): Primary | ICD-10-CM

## 2023-09-11 DIAGNOSIS — E11.9 TYPE 2 DIABETES MELLITUS NOT AT GOAL (H): ICD-10-CM

## 2023-09-11 NOTE — TELEPHONE ENCOUNTER
I called Jessica and she did not answer.  I called her daughter at .  The daughter was not at home she told me her mother is doing fine. Jessica is scheduled to have an appointment with surgery this week and I asked her to stop by the lab and have the metanephrines checked.  I also asked the daughter to help her mother schedule an appointment in our clinic, so we can review diabetes management.

## 2023-09-12 DIAGNOSIS — Z53.9 DIAGNOSIS NOT YET DEFINED: Primary | ICD-10-CM

## 2023-09-12 NOTE — PROGRESS NOTES
I reviewed the sensor records. BG numbers improved. Has significant postprandial spikes with BF and dinner. Recommend to increase the morning dose of NPH by 3 nits and maintain the dinner dose the same.

## 2023-09-13 ENCOUNTER — TELEPHONE (OUTPATIENT)
Dept: ENDOCRINOLOGY | Facility: CLINIC | Age: 48
End: 2023-09-13

## 2023-09-13 ENCOUNTER — OFFICE VISIT (OUTPATIENT)
Dept: SURGERY | Facility: CLINIC | Age: 48
End: 2023-09-13
Payer: COMMERCIAL

## 2023-09-13 VITALS
WEIGHT: 214.9 LBS | SYSTOLIC BLOOD PRESSURE: 149 MMHG | HEART RATE: 90 BPM | OXYGEN SATURATION: 100 % | HEIGHT: 67 IN | DIASTOLIC BLOOD PRESSURE: 83 MMHG | BODY MASS INDEX: 33.73 KG/M2

## 2023-09-13 DIAGNOSIS — G89.18 ACUTE POST-OPERATIVE PAIN: Primary | ICD-10-CM

## 2023-09-13 PROCEDURE — 99024 POSTOP FOLLOW-UP VISIT: CPT | Performed by: SURGERY

## 2023-09-13 RX ORDER — TRAMADOL HYDROCHLORIDE 50 MG/1
50 TABLET ORAL EVERY 6 HOURS PRN
Qty: 10 TABLET | Refills: 0 | Status: SHIPPED | OUTPATIENT
Start: 2023-09-13 | End: 2023-09-16

## 2023-09-13 ASSESSMENT — PAIN SCALES - GENERAL: PAINLEVEL: EXTREME PAIN (9)

## 2023-09-13 NOTE — TELEPHONE ENCOUNTER
Patient had surgery 8/11/23 (bilateral pheochromocytoma s/p bilateral adrenalectomy).     As per chart review patient was to follow-up with new primary care provider on 8/31/23 and no showed office visit. Dr. Vanessa has been temporarily signing home care order. Please refer back to 9/6/23 encounter. Writer has requested patient get rescheduled ASAP.         Dr. Vanessa also reviewed patient's blood sugars and would like to make the following changes:    Deloris Vanessa MD Mathis, Ruth RAMIREZ RN  Please ask the nurse (or the patient) to increased the morning dose on NPH by 3 units. According to my notes, she takes 22 units in am and 10 unit(s) with dinner.    Writer again called Jada with patient's home care team ([phone]). Left Jada another voicemail to review above details.         Missy Patricia RN  Endocrine Care Coordinator  [facility]

## 2023-09-13 NOTE — NURSING NOTE
"Chief Complaint   Patient presents with    RECHECK     Adrenalectomy post-op       Vitals:    09/13/23 0847   BP: (!) 149/83   BP Location: Left arm   Patient Position: Sitting   Cuff Size: Adult Regular   Pulse: 90   SpO2: 100%   Weight: 97.5 kg (214 lb 14.4 oz)   Height: 1.702 m (5' 7\")       Body mass index is 33.66 kg/m .                          Rd Sanderson, EMT    "

## 2023-09-13 NOTE — PROGRESS NOTES
General Surgery Clinic Note - New Patient Visit    NAME: Jessica Kennedy,  48 year old female    PCP: Mariela, [facility]  MRN:   [id number]      #: [phone]  Date: Sep 13, 2023    Chief Complaint:     History of Present Illness: Jessica Kennedy is a 48 year old female PMHx of MEN 2A with bilateral synchronous pheochromocytomas, HTN, T2DM  who presents to the clinic for a routine POC 27 after bilateral adrenalectomy on 8/16. She is complaining of pain in bilateral flank, otherwise doing well.     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    Social History: History in Epic reviewed with the patient:  Social History     Socioeconomic History    Marital status:      Spouse name: Not on file    Number of children: Not on file    Years of education: Not on file    Highest education level: Not on file   Occupational History    Not on file   Tobacco Use    Smoking status: Never     Passive exposure: Never    Smokeless tobacco: Never   Substance and Sexual Activity    Alcohol use: Never    Drug use: Never    Sexual activity: Not on file   Other Topics Concern    Not on file   Social History Narrative    Not on file     Social Determinants of Health     Financial Resource Strain: Not on file   Food Insecurity: Not on file    Transportation Needs: Not on file   Physical Activity: Not on file   Stress: Not on file   Social Connections: Not on file   Intimate Partner Violence: Not on file   Housing Stability: Not on file       Allergies:     Allergies   Allergen Reactions    Hydromorphone Hives     Other reaction(s): Hives    Ace Inhibitors      Other reaction(s): Unknown    Adhesive Tape Blisters       Outpatient Medications:  Outpatient Encounter Medications as of 9/13/2023   Medication Sig Dispense Refill    acetaminophen (TYLENOL) 325 MG tablet Take 3 tablets (975 mg) by mouth 3 times daily as needed for mild pain 30 tablet 0    alcohol swab prep pads Use to swab area of injection/laurie as directed. 100 each 3    amLODIPine (NORVASC) 10 MG tablet Take 1 tablet (10 mg) by mouth daily 90 tablet 1    Benzocaine-Menthol 10-2.1 MG LOZG Take 1 lozenge by mouth 4 times daily as needed (Cough or sore throat) 84 lozenge 0    blood glucose (NO BRAND SPECIFIED) test strip 1 strip by In Vitro route every morning (before breakfast) 350 strip 3    calcium carbonate 750 MG CHEW Take 1 tablet (750 mg) by mouth 4 times daily as needed (Heartburn) 30 tablet 0    carboxymethylcellulose-glycerin (REFRESH OPTIVE) 0.5-0.9 % ophthalmic solution Place 1 drop into both eyes 3-4 TIMES DAILY      carvedilol (COREG) 25 MG tablet Take 1 tablet (25 mg) by mouth 2 times daily (with meals) 180 tablet 3    Continuous Blood Gluc  (RescaleYLE ABHIJIT 14 DAY READER) RODERICK Use to read blood sugars as per 's instructions. 1 each 0    Continuous Blood Gluc Sensor ([employer] TVSTYLE ABHIJIT 14 DAY SENSOR) MISC Change every 14 days. 6 each 11    fludrocortisone (FLORINEF) 0.1 MG tablet Take 0.5 tablets (0.05 mg) by mouth daily 30 tablet 1    fluticasone (FLONASE) 50 MCG/ACT nasal spray Spray 1 spray into both nostrils daily 9.9 mL 1    hydrALAZINE (APRESOLINE) 25 MG tablet Take 1 tablet (25 mg) by mouth 3 times daily 45 tablet 1    hydrocortisone (CORTEF) 5 MG tablet  "Take 1.5 tablets (7.5 mg) by mouth daily . Take at 2PM each day. 45 tablet 1    hydrocortisone (CORTEF) 5 MG tablet Take 3 tablets (15 mg) by mouth every morning AND 1.5 tablets (7.5 mg) daily. 15mg AT 8:00AM AND 7.5MG AT 2PM 135 tablet 1    insulin  UNIT/ML injection Inject 22 Units Subcutaneous every morning (before breakfast) AND 10 Units daily (with dinner). 15 mL 0    insulin pen needle (BD CELINE U/F) 32G X 4 MM miscellaneous Use 1 pen needles daily or as directed. 100 each 3    losartan (COZAAR) 100 MG tablet Take 1 tablet (100 mg) by mouth daily 30 tablet 1    Microlet Lancets MISC 1 each daily 100 each 3    omeprazole (PRILOSEC) 40 MG DR capsule Take 1 capsule (40 mg) by mouth daily 90 capsule 1    polyethylene glycol (MIRALAX) 17 GM/Dose powder Take 17 g by mouth daily . Hold for loose stools. 510 g 0    senna-docusate (SENOKOT-S/PERICOLACE) 8.6-50 MG tablet Take 1 tablet by mouth 2 times daily Hold for loose stools. 60 tablet 0     No facility-administered encounter medications on file as of 9/13/2023.         ROS: 10 systems reviewed and all are negative except as above.    EXAM:  BP (!) 149/83 (BP Location: Left arm, Patient Position: Sitting, Cuff Size: Adult Regular)   Pulse 90   Ht 1.702 m (5' 7\")   Wt 97.5 kg (214 lb 14.4 oz)   SpO2 100%   BMI 33.66 kg/m    Psych: Normal affect  Neuro: No gross focal deficits noted  Head:Normocephalic, atraumatic  Eyes: Non icteric  Neck:supple  Heart: Regular rate and rhythm  Lungs: non-labored, quiet respiration  Abdomen: soft, mild appropriate tenderness, incisions well healed  Extremities: No obvious deformities    Imaging Data (I have personally reviewed the following reports):  Recent Results (from the past 744 hour(s))   XR Chest Port 1 View    Narrative    EXAM: XR Chest 1 view 8/16/2023 7:17 PM      HISTORY: Doctor request.    COMPARISON: Chest radiograph 5/7/2020.     TECHNIQUE: Frontal view of the chest.    FINDINGS: ET tube is in the " midthoracic trachea. Right IJ CVC with tip  in the upper SVC near the brachiocephalic confluence.  Trachea is midline. Cardiac and mediastinal silhouette is within  normal limits. Streaky perihilar pulmonary opacities. No pleural  effusions. No pneumothoraces. Low lung volumes. No acute osseous  abnormalities.      Impression    IMPRESSION:   1. ET tube is in the mid thoracic trachea.  2. Streaky perihilar pulmonary opacities, which may be seen with  atelectasis, edema, or infection.    I have personally reviewed the examination and initial interpretation  and I agree with the findings.    LINDA OLIVERA DO         SYSTEM ID:  [id number]   XR Abdomen 1 View    Narrative    Exam: XR ABDOMEN 1 VIEW, 8/23/2023 10:23 AM    Indication: epigastric pain    Comparison: CT abdomen/pelvis 4/13/2022    Findings:   Upright AP radiographs of the abdomen. Surgical clips project over the  left upper quadrant. The bowel gas pattern is nonobstructive. No  visualized pneumatosis or portal venous gas. Degenerative changes of  the spine. Mild convex left scoliotic curvature of the lumbar spine  with apex at L2-3 (Moncada angle 20 degrees).      Impression    Impression: Nonobstructive bowel gas pattern.    I have personally reviewed the examination and initial interpretation  and I agree with the findings.    DURAN SANTAMARIA MD         SYSTEM ID:  [id number]       A/P: Jessica Kennedy is a 48 year old female PMHx of MEN 2A with bilateral synchronous pheochromocytomas, HTN, T2DM  who presents to the clinic for a routine POC 27 after bilateral adrenalectomy on 8/16. Patient was seen with Dr Caceres. [employer] ipad  was used to communicate with the patient.She is complaining of pain in bilateral flank, otherwise doing well. Incision is well healed. She is seeing her endocrinologist outpatient for blood pressure and iatrogenic adrenal insufficiency.   -Tramadol 50 mg four times a day as needed   -She is requesting if the  hydrocortisone BID can be made once daily. We discussed that it is up to the Endocrinologists to adjust the medications.   -Also requested a copy of her surgical pathology report.- we will arrange her a copy    BUCK Molina  PGY1 General Surgery  [facility]

## 2023-09-13 NOTE — LETTER
9/13/2023       RE: Jessica Kennedy  [address]  [place] [facility] [address]       Dear Colleague,    Thank you for referring your patient, Jessica Kennedy, to the [facility] [place] [facility]. Please see a copy of my visit note below.    General Surgery Clinic Note - New Patient Visit    NAME: Jessica Kennedy,  48 year old female    PCP: Mariela, [facility]  MRN:   [id number]      #: [phone]  Date: Sep 13, 2023    Chief Complaint:     History of Present Illness: Jessica Kennedy is a 48 year old female PMHx of MEN 2A with bilateral synchronous pheochromocytomas, HTN, T2DM  who presents to the clinic for a routine POC 27 after bilateral adrenalectomy on 8/16. She is complaining of pain in bilateral flank, otherwise doing well.     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    Social History: History in Epic reviewed with the patient:  Social History     Socioeconomic History    Marital status:      Spouse name: Not on file    Number of children: Not on file    Years of education: Not on file    Highest education level: Not on file   Occupational History    Not on file   Tobacco Use    Smoking status: Never     Passive  exposure: Never    Smokeless tobacco: Never   Substance and Sexual Activity    Alcohol use: Never    Drug use: Never    Sexual activity: Not on file   Other Topics Concern    Not on file   Social History Narrative    Not on file     Social Determinants of Health     Financial Resource Strain: Not on file   Food Insecurity: Not on file   Transportation Needs: Not on file   Physical Activity: Not on file   Stress: Not on file   Social Connections: Not on file   Intimate Partner Violence: Not on file   Housing Stability: Not on file       Allergies:     Allergies   Allergen Reactions    Hydromorphone Hives     Other reaction(s): Hives    Ace Inhibitors      Other reaction(s): Unknown    Adhesive Tape Blisters       Outpatient Medications:  Outpatient Encounter Medications as of 9/13/2023   Medication Sig Dispense Refill    acetaminophen (TYLENOL) 325 MG tablet Take 3 tablets (975 mg) by mouth 3 times daily as needed for mild pain 30 tablet 0    alcohol swab prep pads Use to swab area of injection/laurie as directed. 100 each 3    amLODIPine (NORVASC) 10 MG tablet Take 1 tablet (10 mg) by mouth daily 90 tablet 1    Benzocaine-Menthol 10-2.1 MG LOZG Take 1 lozenge by mouth 4 times daily as needed (Cough or sore throat) 84 lozenge 0    blood glucose (NO BRAND SPECIFIED) test strip 1 strip by In Vitro route every morning (before breakfast) 350 strip 3    calcium carbonate 750 MG CHEW Take 1 tablet (750 mg) by mouth 4 times daily as needed (Heartburn) 30 tablet 0    carboxymethylcellulose-glycerin (REFRESH OPTIVE) 0.5-0.9 % ophthalmic solution Place 1 drop into both eyes 3-4 TIMES DAILY      carvedilol (COREG) 25 MG tablet Take 1 tablet (25 mg) by mouth 2 times daily (with meals) 180 tablet 3    Continuous Blood Gluc  (FREESTYLE ABHIJIT 14 DAY READER) RODERICK Use to read blood sugars as per 's instructions. 1 each 0    Continuous Blood Gluc Sensor (FREESTYLE ABHIJIT 14 DAY SENSOR) MISC Change every 14 days. 6  "each 11    fludrocortisone (FLORINEF) 0.1 MG tablet Take 0.5 tablets (0.05 mg) by mouth daily 30 tablet 1    fluticasone (FLONASE) 50 MCG/ACT nasal spray Spray 1 spray into both nostrils daily 9.9 mL 1    hydrALAZINE (APRESOLINE) 25 MG tablet Take 1 tablet (25 mg) by mouth 3 times daily 45 tablet 1    hydrocortisone (CORTEF) 5 MG tablet Take 1.5 tablets (7.5 mg) by mouth daily . Take at 2PM each day. 45 tablet 1    hydrocortisone (CORTEF) 5 MG tablet Take 3 tablets (15 mg) by mouth every morning AND 1.5 tablets (7.5 mg) daily. 15mg AT 8:00AM AND 7.5MG AT 2PM 135 tablet 1    insulin  UNIT/ML injection Inject 22 Units Subcutaneous every morning (before breakfast) AND 10 Units daily (with dinner). 15 mL 0    insulin pen needle (BD CELINE U/F) 32G X 4 MM miscellaneous Use 1 pen needles daily or as directed. 100 each 3    losartan (COZAAR) 100 MG tablet Take 1 tablet (100 mg) by mouth daily 30 tablet 1    Microlet Lancets MISC 1 each daily 100 each 3    omeprazole (PRILOSEC) 40 MG DR capsule Take 1 capsule (40 mg) by mouth daily 90 capsule 1    polyethylene glycol (MIRALAX) 17 GM/Dose powder Take 17 g by mouth daily . Hold for loose stools. 510 g 0    senna-docusate (SENOKOT-S/PERICOLACE) 8.6-50 MG tablet Take 1 tablet by mouth 2 times daily Hold for loose stools. 60 tablet 0     No facility-administered encounter medications on file as of 9/13/2023.         ROS: 10 systems reviewed and all are negative except as above.    EXAM:  BP (!) 149/83 (BP Location: Left arm, Patient Position: Sitting, Cuff Size: Adult Regular)   Pulse 90   Ht 1.702 m (5' 7\")   Wt 97.5 kg (214 lb 14.4 oz)   SpO2 100%   BMI 33.66 kg/m    Psych: Normal affect  Neuro: No gross focal deficits noted  Head:Normocephalic, atraumatic  Eyes: Non icteric  Neck:supple  Heart: Regular rate and rhythm  Lungs: non-labored, quiet respiration  Abdomen: soft, mild appropriate tenderness, incisions well healed  Extremities: No obvious " deformities    Imaging Data (I have personally reviewed the following reports):  Recent Results (from the past 744 hour(s))   XR Chest Port 1 View    Narrative    EXAM: XR Chest 1 view 8/16/2023 7:17 PM      HISTORY: Doctor request.    COMPARISON: Chest radiograph 5/7/2020.     TECHNIQUE: Frontal view of the chest.    FINDINGS: ET tube is in the midthoracic trachea. Right IJ CVC with tip  in the upper SVC near the brachiocephalic confluence.  Trachea is midline. Cardiac and mediastinal silhouette is within  normal limits. Streaky perihilar pulmonary opacities. No pleural  effusions. No pneumothoraces. Low lung volumes. No acute osseous  abnormalities.      Impression    IMPRESSION:   1. ET tube is in the mid thoracic trachea.  2. Streaky perihilar pulmonary opacities, which may be seen with  atelectasis, edema, or infection.    I have personally reviewed the examination and initial interpretation  and I agree with the findings.    LINDA OLIVERA DO         SYSTEM ID:  [id number]   XR Abdomen 1 View    Narrative    Exam: XR ABDOMEN 1 VIEW, 8/23/2023 10:23 AM    Indication: epigastric pain    Comparison: CT abdomen/pelvis 4/13/2022    Findings:   Upright AP radiographs of the abdomen. Surgical clips project over the  left upper quadrant. The bowel gas pattern is nonobstructive. No  visualized pneumatosis or portal venous gas. Degenerative changes of  the spine. Mild convex left scoliotic curvature of the lumbar spine  with apex at L2-3 (Moncada angle 20 degrees).      Impression    Impression: Nonobstructive bowel gas pattern.    I have personally reviewed the examination and initial interpretation  and I agree with the findings.    DURAN SANTAMARIA MD         SYSTEM ID:  [id number]       A/P: Jessica Kennedy is a 48 year old female PMHx of MEN 2A with bilateral synchronous pheochromocytomas, HTN, T2DM  who presents to the clinic for a routine POC 27 after bilateral adrenalectomy on 8/16. Patient was seen with   Morris. [employer] ipad  was used to communicate with the patient.She is complaining of pain in bilateral flank, otherwise doing well. Incision is well healed. She is seeing her endocrinologist outpatient for blood pressure and iatrogenic adrenal insufficiency.   -Tramadol 50 mg four times a day as needed   -She is requesting if the hydrocortisone BID can be made once daily. We discussed that it is up to the Endocrinologists to adjust the medications.   -Also requested a copy of her surgical pathology report.- we will arrange her a copy      Again, thank you for allowing me to participate in the care of your patient.      Sincerely,    Domenico Caceres MD

## 2023-09-13 NOTE — TELEPHONE ENCOUNTER
Jada called back. She recommended writer reach out to Rola Nurse  at [phone] to review insulin orders. Jada needed verbal ok to discontinue OT orders because patient is not interested. Patient is continuing with physical therapy.     Writer contacted Rola nurse  with [facility]. Left voicemail to contact our office.       Missy Patricia, RN  Endocrine Care Coordinator  [facility]

## 2023-09-14 ENCOUNTER — APPOINTMENT (OUTPATIENT)
Dept: INTERPRETER SERVICES | Facility: CLINIC | Age: 48
End: 2023-09-14
Payer: COMMERCIAL

## 2023-09-14 NOTE — PROGRESS NOTES
Provider reviewed home health order request. Approved and signed.     Fax back to: [phone] Confirmed via Right Fax that transmission was successful.       Placed in Misc file      Nadya Mann CMA  Adult Endocrinology  [facility]

## 2023-09-15 ENCOUNTER — DOCUMENTATION ONLY (OUTPATIENT)
Dept: ENDOCRINOLOGY | Facility: CLINIC | Age: 48
End: 2023-09-15

## 2023-09-15 NOTE — PROGRESS NOTES
[facility] Health plan/Home health agency Prior Authorization Request Form  Fax: [phone]      Home Health Agency Information:   [facility]       :   Ludwin Jamey               Phone: [phone]   Fax: [phone]    Labeled and placed in provider's file to review and sign if approved.       Nadya Mann, TIM  Adult Endocrinology  [facility]

## 2023-09-20 NOTE — PROGRESS NOTES
Writer called [facility] to review the [employer]/Home Health Agency Prior Authorization Form ( [phone]). Writer reviewed with [facility] representative and writer was informed that patient does not need a prior authorization and the forms must be getting faxed from home care.     Writer called to home care listed on form ([facility]) and left voicemail for Ludwin Concepcion at [phone] (listed on form).        Missy Patricia RN  Endocrine Care Coordinator  [facility]

## 2023-09-21 ENCOUNTER — DOCUMENTATION ONLY (OUTPATIENT)
Dept: ENDOCRINOLOGY | Facility: CLINIC | Age: 48
End: 2023-09-21

## 2023-09-21 NOTE — PROGRESS NOTES
[facility]  Office: [phone] Fax: [phone]    Requesting provider's signature on Verbal Orders to Discontinue OT services.   Please fax back to: [phone]      Labeled and placed in file for provider to sign when in office next.      Nadya Mann CMA  Adult Endcrinology   [facility]

## 2023-09-29 ENCOUNTER — DOCUMENTATION ONLY (OUTPATIENT)
Dept: ENDOCRINOLOGY | Facility: CLINIC | Age: 48
End: 2023-09-29
Payer: COMMERCIAL

## 2023-09-29 NOTE — PROGRESS NOTES
[facility]  Office: [phone] Fax: [phone]     Requesting provider's signature on Verbal Orders to Discontinue OT services on 9/13/2023.     Please fax back to: [phone]        Labeled and placed in file for provider to sign when in office next.        Nadya Mann, TIM  Adult Endcrinology   [facility]

## 2023-10-04 ENCOUNTER — MEDICAL CORRESPONDENCE (OUTPATIENT)
Dept: HEALTH INFORMATION MANAGEMENT | Facility: CLINIC | Age: 48
End: 2023-10-04

## 2023-10-04 NOTE — PROGRESS NOTES
Provider reviewed and signed acknowledgement that no home visits was done 9/13/2023.       Faxed back to: [phone]   Confirmed via Right Fax that transmission was successful.       Placed in Misc file      Nadya Mann CMA  Adult Endocrinology   [facility]

## 2023-10-05 ENCOUNTER — DOCUMENTATION ONLY (OUTPATIENT)
Dept: ENDOCRINOLOGY | Facility: CLINIC | Age: 48
End: 2023-10-05

## 2023-10-05 NOTE — PROGRESS NOTES
[facility]   Marked: For Review    Discharge-Transfer Summary received via Fax.     Patient has met home health goals and is no longer homebound and able to drive again.   Pt to continue work with physical therapy/occupational therapy for strengthening      Nadya Mann CMA  Adult Endocrinology   [facility]

## 2023-10-10 ENCOUNTER — APPOINTMENT (OUTPATIENT)
Dept: INTERPRETER SERVICES | Facility: CLINIC | Age: 48
End: 2023-10-10
Payer: COMMERCIAL

## 2023-10-12 ENCOUNTER — APPOINTMENT (OUTPATIENT)
Dept: INTERPRETER SERVICES | Facility: CLINIC | Age: 48
End: 2023-10-12
Payer: COMMERCIAL

## 2023-10-31 ENCOUNTER — LAB (OUTPATIENT)
Dept: LAB | Facility: CLINIC | Age: 48
End: 2023-10-31
Payer: COMMERCIAL

## 2023-10-31 DIAGNOSIS — E31.22 MULTIPLE ENDOCRINE NEOPLASIA TYPE 2A (MEN2A) (H): ICD-10-CM

## 2023-10-31 DIAGNOSIS — E11.9 TYPE 2 DIABETES MELLITUS NOT AT GOAL (H): ICD-10-CM

## 2023-10-31 DIAGNOSIS — E27.40 ADRENAL INSUFFICIENCY (H): ICD-10-CM

## 2023-10-31 DIAGNOSIS — E27.1 PRIMARY ADRENAL INSUFFICIENCY (H): ICD-10-CM

## 2023-10-31 LAB
ANION GAP SERPL CALCULATED.3IONS-SCNC: 12 MMOL/L (ref 7–15)
BUN SERPL-MCNC: 24.2 MG/DL (ref 6–20)
CALCIUM SERPL-MCNC: 11 MG/DL (ref 8.6–10)
CHLORIDE SERPL-SCNC: 105 MMOL/L (ref 98–107)
CORTIS SERPL-MCNC: 3.8 UG/DL
CREAT SERPL-MCNC: 0.94 MG/DL (ref 0.51–0.95)
DEPRECATED HCO3 PLAS-SCNC: 22 MMOL/L (ref 22–29)
EGFRCR SERPLBLD CKD-EPI 2021: 75 ML/MIN/1.73M2
GLUCOSE SERPL-MCNC: 221 MG/DL (ref 70–99)
POTASSIUM SERPL-SCNC: 4 MMOL/L (ref 3.4–5.3)
SODIUM SERPL-SCNC: 139 MMOL/L (ref 135–145)

## 2023-10-31 PROCEDURE — 83835 ASSAY OF METANEPHRINES: CPT | Mod: 90

## 2023-10-31 PROCEDURE — 36415 COLL VENOUS BLD VENIPUNCTURE: CPT

## 2023-10-31 PROCEDURE — 82088 ASSAY OF ALDOSTERONE: CPT

## 2023-10-31 PROCEDURE — 80048 BASIC METABOLIC PNL TOTAL CA: CPT

## 2023-10-31 PROCEDURE — 84244 ASSAY OF RENIN: CPT | Mod: 90

## 2023-10-31 PROCEDURE — 82533 TOTAL CORTISOL: CPT

## 2023-10-31 RX ORDER — HYDROCORTISONE 5 MG/1
TABLET ORAL
Qty: 430 TABLET | Refills: 3 | Status: ON HOLD | OUTPATIENT
Start: 2023-10-31 | End: 2024-02-17

## 2023-11-03 LAB
ALDOST SERPL-MCNC: <3 NG/DL (ref 0–31)
ANNOTATION COMMENT IMP: NORMAL
METANEPHS SERPL-SCNC: <0.1 NMOL/L
NORMETANEPHRINE SERPL-SCNC: 0.36 NMOL/L
RENIN PLAS-CCNC: 1.3 NG/ML/HR

## 2023-11-03 NOTE — RESULT ENCOUNTER NOTE
Lab results are looking good.  The adrenal hormones which were elevated have now normalized.  The cortisol was low but I suspect she did not take the hydrocortisone prior to the labs or she took it shortly before having the blood drawn.  The other labs are normal.  The plan is for her to schedule the parathyroid imaging and surgery with Dr. Benton.  She should see me approximately 2 months after surgery.

## 2023-11-08 ENCOUNTER — TELEPHONE (OUTPATIENT)
Dept: ENDOCRINOLOGY | Facility: CLINIC | Age: 48
End: 2023-11-08
Payer: COMMERCIAL

## 2023-11-08 NOTE — TELEPHONE ENCOUNTER
Writer attempted to contact patient on 3 way call with . No answer. Left voicemail for patient to contact our office.       Missy Patricia RN  Endocrine Care Coordinator  [facility]     Additional Anesthesia Volume In Cc (Will Not Render If 0): 0

## 2023-11-08 NOTE — TELEPHONE ENCOUNTER
----- Message from Ruth Patricia RN sent at 11/6/2023  8:12 AM CST -----    ----- Message -----  From: Deloris Vanessa MD  Sent: 11/3/2023   6:54 PM CST  To: Ruth Patricia RN    Lab results are looking good.  The adrenal hormones which were elevated have now normalized.  The cortisol was low but I suspect she did not take the hydrocortisone prior to the labs or she took it shortly before having the blood drawn.  The other labs are normal.  The plan is for her to schedule the parathyroid imaging and surgery with Dr. Benton.  She should see me approximately 2 months after surgery.

## 2023-11-28 ENCOUNTER — APPOINTMENT (OUTPATIENT)
Dept: INTERPRETER SERVICES | Facility: CLINIC | Age: 48
End: 2023-11-28
Payer: COMMERCIAL

## 2023-11-28 NOTE — TELEPHONE ENCOUNTER
Writer has not heard back from patient in regards to lab results. Writer called patient again on 3 way call with . Writer was able to reach patient.     Advised patient of results and recommendations from Dr. Vanessa. Patient verbalizes understanding. Patient states that she has not scheduled the parathyroid scan or surgery consult.    Writer will request that scheduling reach out to patient to scheduled parathyroid scan and also get patient scheduled with parathyroid scan and consult with Dr. Benton.          Missy Patricia RN  Endocrine Care Coordinator  [facility]

## 2023-11-29 ENCOUNTER — TELEPHONE (OUTPATIENT)
Dept: ENDOCRINOLOGY | Facility: CLINIC | Age: 48
End: 2023-11-29
Payer: COMMERCIAL

## 2023-11-29 NOTE — TELEPHONE ENCOUNTER
1st attempt to reach the patient, however, no voicemail and no answer, for the patient to call back and schedule the following:    Appointment type: NM Thyroid Scan  Provider: Dr. Vanessa  Return date: next available  Specialty phone number: [phone]  Additional appointment(s) needed: Yes  Additonal Notes: Surgery consult with Dr. Benton    Using  Services, attempted to reach the patient and schedule the NM Thyroid Scan and consultation with Dr. Milan.    No answer at patient home. Patient does not have DayanaratZeke Julien R/Procedure    [facility]   Neurology, NeuroSurgery, NeuroPsychology, Pain Management and Cardiology Specialties  Medical/Surgical Adult Specialties

## 2023-12-04 ENCOUNTER — APPOINTMENT (OUTPATIENT)
Dept: INTERPRETER SERVICES | Facility: CLINIC | Age: 48
End: 2023-12-04
Payer: COMMERCIAL

## 2023-12-04 NOTE — TELEPHONE ENCOUNTER
12/4 Called and left voicemail., provided phone number [phone] to schedule parathyroid scan and surgery consult with Dr. Benton.     Pricila cervantes Procedure   Orthopedics, Podiatry, Sports Medicine, Ent ,Eye , Audiology, Adult Endocrine & Diabetes, Nutrition & Medication Therapy Management Specialties   [facility]

## 2023-12-08 NOTE — TELEPHONE ENCOUNTER
12/8 Called and left voicemail., provided phone number  [phone] to schedule consult with Dr. Benton.      Pricila cervantes Procedure   Orthopedics, Podiatry, Sports Medicine, Ent ,Eye , Audiology, Adult Endocrine & Diabetes, Nutrition & Medication Therapy Management Specialties   [facility]

## 2024-02-13 ENCOUNTER — HOSPITAL ENCOUNTER (INPATIENT)
Facility: CLINIC | Age: 49
LOS: 4 days | Discharge: HOME | End: 2024-02-17
Attending: EMERGENCY MEDICINE | Admitting: INTERNAL MEDICINE
Payer: COMMERCIAL

## 2024-02-13 ENCOUNTER — APPOINTMENT (OUTPATIENT)
Dept: CT IMAGING | Facility: CLINIC | Age: 49
End: 2024-02-13
Attending: EMERGENCY MEDICINE
Payer: COMMERCIAL

## 2024-02-13 DIAGNOSIS — R10.84 GENERALIZED ABDOMINAL PAIN: ICD-10-CM

## 2024-02-13 DIAGNOSIS — R19.7 NAUSEA VOMITING AND DIARRHEA: ICD-10-CM

## 2024-02-13 DIAGNOSIS — N17.9 ACUTE KIDNEY INJURY (H): ICD-10-CM

## 2024-02-13 DIAGNOSIS — E27.40 ADRENAL INSUFFICIENCY (H): ICD-10-CM

## 2024-02-13 DIAGNOSIS — I10 ESSENTIAL HYPERTENSION: ICD-10-CM

## 2024-02-13 DIAGNOSIS — R42 DIZZINESS: ICD-10-CM

## 2024-02-13 DIAGNOSIS — R19.7 DIARRHEA, UNSPECIFIED TYPE: Primary | ICD-10-CM

## 2024-02-13 DIAGNOSIS — R09.81 STUFFY NOSE: ICD-10-CM

## 2024-02-13 DIAGNOSIS — R11.2 NAUSEA VOMITING AND DIARRHEA: ICD-10-CM

## 2024-02-13 LAB
ALBUMIN SERPL BCG-MCNC: 4.8 G/DL (ref 3.5–5.2)
ALBUMIN UR-MCNC: NEGATIVE MG/DL
ALP SERPL-CCNC: 115 U/L (ref 40–150)
ALT SERPL W P-5'-P-CCNC: 13 U/L (ref 0–50)
ANION GAP SERPL CALCULATED.3IONS-SCNC: 14 MMOL/L (ref 7–15)
APPEARANCE UR: CLEAR
AST SERPL W P-5'-P-CCNC: 16 U/L (ref 0–45)
BASOPHILS # BLD AUTO: 0.1 10E3/UL (ref 0–0.2)
BASOPHILS NFR BLD AUTO: 0 %
BILIRUB SERPL-MCNC: 0.3 MG/DL
BILIRUB UR QL STRIP: NEGATIVE
BUN SERPL-MCNC: 51.9 MG/DL (ref 6–20)
CA-I BLD-MCNC: 5.8 MG/DL (ref 4.4–5.2)
CALCIUM SERPL-MCNC: 11.4 MG/DL (ref 8.6–10)
CHLORIDE SERPL-SCNC: 100 MMOL/L (ref 98–107)
COLOR UR AUTO: NORMAL
CREAT SERPL-MCNC: 1.36 MG/DL (ref 0.51–0.95)
DEPRECATED HCO3 PLAS-SCNC: 16 MMOL/L (ref 22–29)
EGFRCR SERPLBLD CKD-EPI 2021: 48 ML/MIN/1.73M2
EOSINOPHIL # BLD AUTO: 0.3 10E3/UL (ref 0–0.7)
EOSINOPHIL NFR BLD AUTO: 2 %
ERYTHROCYTE [DISTWIDTH] IN BLOOD BY AUTOMATED COUNT: 12.8 % (ref 10–15)
GLUCOSE SERPL-MCNC: 139 MG/DL (ref 70–99)
GLUCOSE UR STRIP-MCNC: NEGATIVE MG/DL
HCT VFR BLD AUTO: 49.5 % (ref 35–47)
HGB BLD-MCNC: 16.1 G/DL (ref 11.7–15.7)
HGB UR QL STRIP: NEGATIVE
IMM GRANULOCYTES # BLD: 0 10E3/UL
IMM GRANULOCYTES NFR BLD: 0 %
KETONES UR STRIP-MCNC: NEGATIVE MG/DL
LACTATE SERPL-SCNC: 1.3 MMOL/L (ref 0.7–2)
LEUKOCYTE ESTERASE UR QL STRIP: NEGATIVE
LIPASE SERPL-CCNC: 55 U/L (ref 13–60)
LYMPHOCYTES # BLD AUTO: 3.5 10E3/UL (ref 0.8–5.3)
LYMPHOCYTES NFR BLD AUTO: 29 %
MAGNESIUM SERPL-MCNC: 1.9 MG/DL (ref 1.7–2.3)
MCH RBC QN AUTO: 29.9 PG (ref 26.5–33)
MCHC RBC AUTO-ENTMCNC: 32.5 G/DL (ref 31.5–36.5)
MCV RBC AUTO: 92 FL (ref 78–100)
MONOCYTES # BLD AUTO: 0.7 10E3/UL (ref 0–1.3)
MONOCYTES NFR BLD AUTO: 6 %
NEUTROPHILS # BLD AUTO: 7.7 10E3/UL (ref 1.6–8.3)
NEUTROPHILS NFR BLD AUTO: 63 %
NITRATE UR QL: NEGATIVE
NRBC # BLD AUTO: 0 10E3/UL
NRBC BLD AUTO-RTO: 0 /100
PH UR STRIP: 5 [PH] (ref 5–7)
PHOSPHATE SERPL-MCNC: 4.9 MG/DL (ref 2.5–4.5)
PLATELET # BLD AUTO: 271 10E3/UL (ref 150–450)
POTASSIUM SERPL-SCNC: 5.2 MMOL/L (ref 3.4–5.3)
PROT SERPL-MCNC: 8.5 G/DL (ref 6.4–8.3)
RBC # BLD AUTO: 5.38 10E6/UL (ref 3.8–5.2)
RBC URINE: <1 /HPF
SODIUM SERPL-SCNC: 130 MMOL/L (ref 135–145)
SP GR UR STRIP: 1.03 (ref 1–1.03)
SQUAMOUS EPITHELIAL: <1 /HPF
UROBILINOGEN UR STRIP-MCNC: NORMAL MG/DL
WBC # BLD AUTO: 12.2 10E3/UL (ref 4–11)
WBC URINE: 0 /HPF

## 2024-02-13 PROCEDURE — 84443 ASSAY THYROID STIM HORMONE: CPT | Performed by: INTERNAL MEDICINE

## 2024-02-13 PROCEDURE — 120N000001 HC R&B MED SURG/OB

## 2024-02-13 PROCEDURE — 36415 COLL VENOUS BLD VENIPUNCTURE: CPT | Performed by: INTERNAL MEDICINE

## 2024-02-13 PROCEDURE — 84100 ASSAY OF PHOSPHORUS: CPT | Performed by: EMERGENCY MEDICINE

## 2024-02-13 PROCEDURE — 82306 VITAMIN D 25 HYDROXY: CPT | Performed by: INTERNAL MEDICINE

## 2024-02-13 PROCEDURE — 96374 THER/PROPH/DIAG INJ IV PUSH: CPT | Mod: 59

## 2024-02-13 PROCEDURE — 83690 ASSAY OF LIPASE: CPT | Performed by: EMERGENCY MEDICINE

## 2024-02-13 PROCEDURE — 250N000011 HC RX IP 250 OP 636: Performed by: EMERGENCY MEDICINE

## 2024-02-13 PROCEDURE — 36415 COLL VENOUS BLD VENIPUNCTURE: CPT | Performed by: EMERGENCY MEDICINE

## 2024-02-13 PROCEDURE — 258N000003 HC RX IP 258 OP 636: Performed by: EMERGENCY MEDICINE

## 2024-02-13 PROCEDURE — 83036 HEMOGLOBIN GLYCOSYLATED A1C: CPT | Performed by: INTERNAL MEDICINE

## 2024-02-13 PROCEDURE — 74177 CT ABD & PELVIS W/CONTRAST: CPT

## 2024-02-13 PROCEDURE — 82330 ASSAY OF CALCIUM: CPT | Performed by: INTERNAL MEDICINE

## 2024-02-13 PROCEDURE — 99223 1ST HOSP IP/OBS HIGH 75: CPT | Performed by: INTERNAL MEDICINE

## 2024-02-13 PROCEDURE — 85025 COMPLETE CBC W/AUTO DIFF WBC: CPT | Performed by: EMERGENCY MEDICINE

## 2024-02-13 PROCEDURE — 96361 HYDRATE IV INFUSION ADD-ON: CPT

## 2024-02-13 PROCEDURE — 84703 CHORIONIC GONADOTROPIN ASSAY: CPT | Performed by: EMERGENCY MEDICINE

## 2024-02-13 PROCEDURE — 87637 SARSCOV2&INF A&B&RSV AMP PRB: CPT | Performed by: EMERGENCY MEDICINE

## 2024-02-13 PROCEDURE — 96375 TX/PRO/DX INJ NEW DRUG ADDON: CPT

## 2024-02-13 PROCEDURE — 83605 ASSAY OF LACTIC ACID: CPT | Performed by: EMERGENCY MEDICINE

## 2024-02-13 PROCEDURE — 81001 URINALYSIS AUTO W/SCOPE: CPT | Performed by: EMERGENCY MEDICINE

## 2024-02-13 PROCEDURE — 80053 COMPREHEN METABOLIC PANEL: CPT | Performed by: EMERGENCY MEDICINE

## 2024-02-13 PROCEDURE — 84484 ASSAY OF TROPONIN QUANT: CPT | Performed by: INTERNAL MEDICINE

## 2024-02-13 PROCEDURE — 96376 TX/PRO/DX INJ SAME DRUG ADON: CPT

## 2024-02-13 PROCEDURE — 93005 ELECTROCARDIOGRAM TRACING: CPT

## 2024-02-13 PROCEDURE — 99285 EMERGENCY DEPT VISIT HI MDM: CPT | Mod: 25

## 2024-02-13 PROCEDURE — 83735 ASSAY OF MAGNESIUM: CPT | Performed by: EMERGENCY MEDICINE

## 2024-02-13 PROCEDURE — 250N000009 HC RX 250: Performed by: EMERGENCY MEDICINE

## 2024-02-13 RX ORDER — EXENATIDE 2 MG/.65ML
2 INJECTION, SUSPENSION, EXTENDED RELEASE SUBCUTANEOUS
COMMUNITY

## 2024-02-13 RX ORDER — ONDANSETRON 2 MG/ML
4 INJECTION INTRAMUSCULAR; INTRAVENOUS EVERY 30 MIN PRN
Status: DISCONTINUED | OUTPATIENT
Start: 2024-02-13 | End: 2024-02-14

## 2024-02-13 RX ORDER — LIDOCAINE 40 MG/G
CREAM TOPICAL
Status: DISCONTINUED | OUTPATIENT
Start: 2024-02-13 | End: 2024-02-17 | Stop reason: HOSPADM

## 2024-02-13 RX ORDER — ASPIRIN 81 MG/1
81 TABLET ORAL DAILY
COMMUNITY

## 2024-02-13 RX ORDER — PREDNISOLONE 5 MG/1
5 TABLET ORAL DAILY
COMMUNITY

## 2024-02-13 RX ORDER — IOPAMIDOL 755 MG/ML
107 INJECTION, SOLUTION INTRAVASCULAR ONCE
Status: COMPLETED | OUTPATIENT
Start: 2024-02-13 | End: 2024-02-13

## 2024-02-13 RX ADMIN — SODIUM CHLORIDE 71 ML: 9 INJECTION, SOLUTION INTRAVENOUS at 20:26

## 2024-02-13 RX ADMIN — SODIUM CHLORIDE 1000 ML: 9 INJECTION, SOLUTION INTRAVENOUS at 19:13

## 2024-02-13 RX ADMIN — SODIUM CHLORIDE 1000 ML: 9 INJECTION, SOLUTION INTRAVENOUS at 23:55

## 2024-02-13 RX ADMIN — IOPAMIDOL 107 ML: 755 INJECTION, SOLUTION INTRAVENOUS at 20:26

## 2024-02-13 RX ADMIN — ONDANSETRON 4 MG: 2 INJECTION INTRAMUSCULAR; INTRAVENOUS at 22:46

## 2024-02-13 RX ADMIN — ONDANSETRON 4 MG: 2 INJECTION INTRAMUSCULAR; INTRAVENOUS at 19:13

## 2024-02-13 RX ADMIN — HYDROCORTISONE SODIUM SUCCINATE 100 MG: 100 INJECTION, POWDER, FOR SOLUTION INTRAMUSCULAR; INTRAVENOUS at 22:42

## 2024-02-13 RX ADMIN — FAMOTIDINE 20 MG: 10 INJECTION, SOLUTION INTRAVENOUS at 22:40

## 2024-02-13 ASSESSMENT — ACTIVITIES OF DAILY LIVING (ADL)
ADLS_ACUITY_SCORE: 37

## 2024-02-13 NOTE — LETTER
[facility] ORTHOPEDICS SPINE  [address] [place] [address]  Phone: [phone]  Fax: [phone]    February 17, 2024        Jessica Kennedy  [address]   [place] [address]      To whom it may concern:    RE: Jessica Kennedy    Patient was admitted to our hospital from 2/13/2024 to 02/17/24.       Sincerely,      Yarelis Rosa MD

## 2024-02-14 ENCOUNTER — APPOINTMENT (OUTPATIENT)
Dept: PHYSICAL THERAPY | Facility: CLINIC | Age: 49
End: 2024-02-14
Attending: INTERNAL MEDICINE
Payer: COMMERCIAL

## 2024-02-14 LAB
ADV 40+41 DNA STL QL NAA+NON-PROBE: NEGATIVE
ANION GAP SERPL CALCULATED.3IONS-SCNC: 8 MMOL/L (ref 7–15)
ANION GAP SERPL CALCULATED.3IONS-SCNC: 9 MMOL/L (ref 7–15)
ASTRO TYP 1-8 RNA STL QL NAA+NON-PROBE: NEGATIVE
ATRIAL RATE - MUSE: 80 BPM
ATRIAL RATE - MUSE: 81 BPM
BUN SERPL-MCNC: 34.3 MG/DL (ref 6–20)
BUN SERPL-MCNC: 38.4 MG/DL (ref 6–20)
C CAYETANENSIS DNA STL QL NAA+NON-PROBE: NEGATIVE
C DIFF TOX B STL QL: NEGATIVE
CA-I BLD-MCNC: 5.6 MG/DL (ref 4.4–5.2)
CALCIUM SERPL-MCNC: 10.4 MG/DL (ref 8.6–10)
CALCIUM SERPL-MCNC: 9.8 MG/DL (ref 8.6–10)
CAMPYLOBACTER DNA SPEC NAA+PROBE: NEGATIVE
CHLORIDE SERPL-SCNC: 109 MMOL/L (ref 98–107)
CHLORIDE SERPL-SCNC: 113 MMOL/L (ref 98–107)
CREAT SERPL-MCNC: 1.08 MG/DL (ref 0.51–0.95)
CREAT SERPL-MCNC: 1.15 MG/DL (ref 0.51–0.95)
CRYPTOSP DNA STL QL NAA+NON-PROBE: NEGATIVE
DEPRECATED HCO3 PLAS-SCNC: 16 MMOL/L (ref 22–29)
DEPRECATED HCO3 PLAS-SCNC: 16 MMOL/L (ref 22–29)
DIASTOLIC BLOOD PRESSURE - MUSE: NORMAL MMHG
DIASTOLIC BLOOD PRESSURE - MUSE: NORMAL MMHG
E COLI O157 DNA STL QL NAA+NON-PROBE: NORMAL
E HISTOLYT DNA STL QL NAA+NON-PROBE: NEGATIVE
EAEC ASTA GENE ISLT QL NAA+PROBE: NEGATIVE
EC STX1+STX2 GENES STL QL NAA+NON-PROBE: NEGATIVE
EGFRCR SERPLBLD CKD-EPI 2021: 59 ML/MIN/1.73M2
EGFRCR SERPLBLD CKD-EPI 2021: 63 ML/MIN/1.73M2
EPEC EAE GENE STL QL NAA+NON-PROBE: NEGATIVE
ERYTHROCYTE [DISTWIDTH] IN BLOOD BY AUTOMATED COUNT: 12.6 % (ref 10–15)
ETEC LTA+ST1A+ST1B TOX ST NAA+NON-PROBE: NEGATIVE
FLUAV RNA SPEC QL NAA+PROBE: NEGATIVE
FLUBV RNA RESP QL NAA+PROBE: NEGATIVE
G LAMBLIA DNA STL QL NAA+NON-PROBE: NEGATIVE
GLUCOSE BLDC GLUCOMTR-MCNC: 128 MG/DL (ref 70–99)
GLUCOSE BLDC GLUCOMTR-MCNC: 134 MG/DL (ref 70–99)
GLUCOSE BLDC GLUCOMTR-MCNC: 163 MG/DL (ref 70–99)
GLUCOSE BLDC GLUCOMTR-MCNC: 186 MG/DL (ref 70–99)
GLUCOSE BLDC GLUCOMTR-MCNC: 205 MG/DL (ref 70–99)
GLUCOSE SERPL-MCNC: 112 MG/DL (ref 70–99)
GLUCOSE SERPL-MCNC: 120 MG/DL (ref 70–99)
HBA1C MFR BLD: 6.9 %
HCG SERPL QL: NEGATIVE
HCT VFR BLD AUTO: 40.2 % (ref 35–47)
HGB BLD-MCNC: 12.9 G/DL (ref 11.7–15.7)
HOLD SPECIMEN: NORMAL
INTERPRETATION ECG - MUSE: NORMAL
INTERPRETATION ECG - MUSE: NORMAL
MCH RBC QN AUTO: 29.5 PG (ref 26.5–33)
MCHC RBC AUTO-ENTMCNC: 32.1 G/DL (ref 31.5–36.5)
MCV RBC AUTO: 92 FL (ref 78–100)
NOROVIRUS GI+II RNA STL QL NAA+NON-PROBE: NEGATIVE
P AXIS - MUSE: 51 DEGREES
P AXIS - MUSE: 64 DEGREES
P SHIGELLOIDES DNA STL QL NAA+NON-PROBE: NEGATIVE
PLATELET # BLD AUTO: 210 10E3/UL (ref 150–450)
POTASSIUM SERPL-SCNC: 5 MMOL/L (ref 3.4–5.3)
POTASSIUM SERPL-SCNC: 5.6 MMOL/L (ref 3.4–5.3)
POTASSIUM SERPL-SCNC: 6 MMOL/L (ref 3.4–5.3)
PR INTERVAL - MUSE: 160 MS
PR INTERVAL - MUSE: 164 MS
PTH-INTACT SERPL-MCNC: 113 PG/ML (ref 15–65)
QRS DURATION - MUSE: 82 MS
QRS DURATION - MUSE: 90 MS
QT - MUSE: 332 MS
QT - MUSE: 340 MS
QTC - MUSE: 385 MS
QTC - MUSE: 392 MS
R AXIS - MUSE: 20 DEGREES
R AXIS - MUSE: 21 DEGREES
RBC # BLD AUTO: 4.37 10E6/UL (ref 3.8–5.2)
RSV RNA SPEC NAA+PROBE: NEGATIVE
RVA RNA STL QL NAA+NON-PROBE: NEGATIVE
SALMONELLA SP RPOD STL QL NAA+PROBE: NEGATIVE
SAPO I+II+IV+V RNA STL QL NAA+NON-PROBE: NEGATIVE
SARS-COV-2 RNA RESP QL NAA+PROBE: NEGATIVE
SHIGELLA SP+EIEC IPAH ST NAA+NON-PROBE: NEGATIVE
SODIUM SERPL-SCNC: 134 MMOL/L (ref 135–145)
SODIUM SERPL-SCNC: 137 MMOL/L (ref 135–145)
SYSTOLIC BLOOD PRESSURE - MUSE: NORMAL MMHG
SYSTOLIC BLOOD PRESSURE - MUSE: NORMAL MMHG
T AXIS - MUSE: 27 DEGREES
T AXIS - MUSE: 33 DEGREES
TROPONIN T SERPL HS-MCNC: 12 NG/L
TSH SERPL DL<=0.005 MIU/L-ACNC: 1.64 UIU/ML (ref 0.3–4.2)
V CHOLERAE DNA SPEC QL NAA+PROBE: NEGATIVE
VENTRICULAR RATE- MUSE: 80 BPM
VENTRICULAR RATE- MUSE: 81 BPM
VIBRIO DNA SPEC NAA+PROBE: NEGATIVE
VIT D+METAB SERPL-MCNC: 31 NG/ML (ref 20–50)
WBC # BLD AUTO: 8.1 10E3/UL (ref 4–11)
Y ENTEROCOL DNA STL QL NAA+PROBE: NEGATIVE

## 2024-02-14 PROCEDURE — 84132 ASSAY OF SERUM POTASSIUM: CPT | Performed by: INTERNAL MEDICINE

## 2024-02-14 PROCEDURE — 250N000013 HC RX MED GY IP 250 OP 250 PS 637: Performed by: INTERNAL MEDICINE

## 2024-02-14 PROCEDURE — 85014 HEMATOCRIT: CPT | Performed by: INTERNAL MEDICINE

## 2024-02-14 PROCEDURE — 258N000003 HC RX IP 258 OP 636: Performed by: INTERNAL MEDICINE

## 2024-02-14 PROCEDURE — 36415 COLL VENOUS BLD VENIPUNCTURE: CPT | Performed by: INTERNAL MEDICINE

## 2024-02-14 PROCEDURE — 97530 THERAPEUTIC ACTIVITIES: CPT | Mod: GP | Performed by: PHYSICAL THERAPIST

## 2024-02-14 PROCEDURE — 80048 BASIC METABOLIC PNL TOTAL CA: CPT | Performed by: INTERNAL MEDICINE

## 2024-02-14 PROCEDURE — 97161 PT EVAL LOW COMPLEX 20 MIN: CPT | Mod: GP | Performed by: PHYSICAL THERAPIST

## 2024-02-14 PROCEDURE — 83970 ASSAY OF PARATHORMONE: CPT | Performed by: INTERNAL MEDICINE

## 2024-02-14 PROCEDURE — 82962 GLUCOSE BLOOD TEST: CPT

## 2024-02-14 PROCEDURE — 97116 GAIT TRAINING THERAPY: CPT | Mod: GP | Performed by: PHYSICAL THERAPIST

## 2024-02-14 PROCEDURE — 82308 ASSAY OF CALCITONIN: CPT | Performed by: INTERNAL MEDICINE

## 2024-02-14 PROCEDURE — 87507 IADNA-DNA/RNA PROBE TQ 12-25: CPT | Performed by: INTERNAL MEDICINE

## 2024-02-14 PROCEDURE — 87493 C DIFF AMPLIFIED PROBE: CPT | Performed by: INTERNAL MEDICINE

## 2024-02-14 PROCEDURE — 99233 SBSQ HOSP IP/OBS HIGH 50: CPT | Performed by: INTERNAL MEDICINE

## 2024-02-14 PROCEDURE — 82330 ASSAY OF CALCIUM: CPT | Performed by: INTERNAL MEDICINE

## 2024-02-14 PROCEDURE — 250N000011 HC RX IP 250 OP 636: Performed by: INTERNAL MEDICINE

## 2024-02-14 PROCEDURE — 120N000001 HC R&B MED SURG/OB

## 2024-02-14 RX ORDER — LOSARTAN POTASSIUM 100 MG/1
100 TABLET ORAL DAILY
Status: DISCONTINUED | OUTPATIENT
Start: 2024-02-14 | End: 2024-02-14

## 2024-02-14 RX ORDER — NALOXONE HYDROCHLORIDE 0.4 MG/ML
0.2 INJECTION, SOLUTION INTRAMUSCULAR; INTRAVENOUS; SUBCUTANEOUS
Status: DISCONTINUED | OUTPATIENT
Start: 2024-02-14 | End: 2024-02-17 | Stop reason: HOSPADM

## 2024-02-14 RX ORDER — MORPHINE SULFATE 2 MG/ML
1 INJECTION, SOLUTION INTRAMUSCULAR; INTRAVENOUS
Status: DISCONTINUED | OUTPATIENT
Start: 2024-02-14 | End: 2024-02-17 | Stop reason: HOSPADM

## 2024-02-14 RX ORDER — PROCHLORPERAZINE MALEATE 10 MG
10 TABLET ORAL EVERY 6 HOURS PRN
Status: DISCONTINUED | OUTPATIENT
Start: 2024-02-14 | End: 2024-02-17 | Stop reason: HOSPADM

## 2024-02-14 RX ORDER — PROCHLORPERAZINE 25 MG
25 SUPPOSITORY, RECTAL RECTAL EVERY 12 HOURS PRN
Status: DISCONTINUED | OUTPATIENT
Start: 2024-02-14 | End: 2024-02-17 | Stop reason: HOSPADM

## 2024-02-14 RX ORDER — NALOXONE HYDROCHLORIDE 0.4 MG/ML
0.4 INJECTION, SOLUTION INTRAMUSCULAR; INTRAVENOUS; SUBCUTANEOUS
Status: DISCONTINUED | OUTPATIENT
Start: 2024-02-14 | End: 2024-02-17 | Stop reason: HOSPADM

## 2024-02-14 RX ORDER — POLYETHYLENE GLYCOL 3350 17 G/17G
17 POWDER, FOR SOLUTION ORAL 2 TIMES DAILY PRN
Status: DISCONTINUED | OUTPATIENT
Start: 2024-02-14 | End: 2024-02-17 | Stop reason: HOSPADM

## 2024-02-14 RX ORDER — PANTOPRAZOLE SODIUM 40 MG/1
40 TABLET, DELAYED RELEASE ORAL
Status: DISCONTINUED | OUTPATIENT
Start: 2024-02-14 | End: 2024-02-17 | Stop reason: HOSPADM

## 2024-02-14 RX ORDER — MORPHINE SULFATE 2 MG/ML
2 INJECTION, SOLUTION INTRAMUSCULAR; INTRAVENOUS
Status: DISCONTINUED | OUTPATIENT
Start: 2024-02-14 | End: 2024-02-17 | Stop reason: HOSPADM

## 2024-02-14 RX ORDER — PANTOPRAZOLE SODIUM 40 MG/1
40 TABLET, DELAYED RELEASE ORAL
Status: DISCONTINUED | OUTPATIENT
Start: 2024-02-14 | End: 2024-02-14

## 2024-02-14 RX ORDER — LIDOCAINE 40 MG/G
CREAM TOPICAL
Status: DISCONTINUED | OUTPATIENT
Start: 2024-02-14 | End: 2024-02-14

## 2024-02-14 RX ORDER — SODIUM CHLORIDE 9 MG/ML
INJECTION, SOLUTION INTRAVENOUS CONTINUOUS
Status: DISCONTINUED | OUTPATIENT
Start: 2024-02-14 | End: 2024-02-16

## 2024-02-14 RX ORDER — ASPIRIN 81 MG/1
81 TABLET ORAL DAILY
Status: DISCONTINUED | OUTPATIENT
Start: 2024-02-14 | End: 2024-02-17 | Stop reason: HOSPADM

## 2024-02-14 RX ORDER — HYDRALAZINE HYDROCHLORIDE 10 MG/1
10 TABLET, FILM COATED ORAL EVERY 4 HOURS PRN
Status: DISCONTINUED | OUTPATIENT
Start: 2024-02-14 | End: 2024-02-17 | Stop reason: HOSPADM

## 2024-02-14 RX ORDER — ONDANSETRON 2 MG/ML
4 INJECTION INTRAMUSCULAR; INTRAVENOUS EVERY 6 HOURS PRN
Status: DISCONTINUED | OUTPATIENT
Start: 2024-02-14 | End: 2024-02-17 | Stop reason: HOSPADM

## 2024-02-14 RX ORDER — SUCRALFATE ORAL 1 G/10ML
1 SUSPENSION ORAL
Status: DISCONTINUED | OUTPATIENT
Start: 2024-02-14 | End: 2024-02-17 | Stop reason: HOSPADM

## 2024-02-14 RX ORDER — ACETAMINOPHEN 325 MG/1
975 TABLET ORAL 3 TIMES DAILY PRN
Status: DISCONTINUED | OUTPATIENT
Start: 2024-02-14 | End: 2024-02-17 | Stop reason: HOSPADM

## 2024-02-14 RX ORDER — AMLODIPINE BESYLATE 10 MG/1
10 TABLET ORAL DAILY
Status: DISCONTINUED | OUTPATIENT
Start: 2024-02-14 | End: 2024-02-17 | Stop reason: HOSPADM

## 2024-02-14 RX ORDER — DEXTROSE MONOHYDRATE 25 G/50ML
25-50 INJECTION, SOLUTION INTRAVENOUS
Status: DISCONTINUED | OUTPATIENT
Start: 2024-02-14 | End: 2024-02-17 | Stop reason: HOSPADM

## 2024-02-14 RX ORDER — NICOTINE POLACRILEX 4 MG
15-30 LOZENGE BUCCAL
Status: DISCONTINUED | OUTPATIENT
Start: 2024-02-14 | End: 2024-02-17 | Stop reason: HOSPADM

## 2024-02-14 RX ORDER — ONDANSETRON 4 MG/1
4 TABLET, ORALLY DISINTEGRATING ORAL EVERY 6 HOURS PRN
Status: DISCONTINUED | OUTPATIENT
Start: 2024-02-14 | End: 2024-02-17 | Stop reason: HOSPADM

## 2024-02-14 RX ORDER — AMOXICILLIN 250 MG
2 CAPSULE ORAL 2 TIMES DAILY PRN
Status: DISCONTINUED | OUTPATIENT
Start: 2024-02-14 | End: 2024-02-17 | Stop reason: HOSPADM

## 2024-02-14 RX ORDER — SALIVA STIMULANT COMB. NO.3
2 SPRAY, NON-AEROSOL (ML) MUCOUS MEMBRANE
Status: DISCONTINUED | OUTPATIENT
Start: 2024-02-14 | End: 2024-02-17 | Stop reason: HOSPADM

## 2024-02-14 RX ORDER — HYDRALAZINE HYDROCHLORIDE 20 MG/ML
10 INJECTION INTRAMUSCULAR; INTRAVENOUS EVERY 4 HOURS PRN
Status: DISCONTINUED | OUTPATIENT
Start: 2024-02-14 | End: 2024-02-17 | Stop reason: HOSPADM

## 2024-02-14 RX ORDER — OXYCODONE HYDROCHLORIDE 5 MG/1
5 TABLET ORAL EVERY 4 HOURS PRN
Status: DISCONTINUED | OUTPATIENT
Start: 2024-02-14 | End: 2024-02-17 | Stop reason: HOSPADM

## 2024-02-14 RX ORDER — AMOXICILLIN 250 MG
1 CAPSULE ORAL 2 TIMES DAILY PRN
Status: DISCONTINUED | OUTPATIENT
Start: 2024-02-14 | End: 2024-02-17 | Stop reason: HOSPADM

## 2024-02-14 RX ADMIN — ACETAMINOPHEN 975 MG: 325 TABLET, FILM COATED ORAL at 21:35

## 2024-02-14 RX ADMIN — AMLODIPINE BESYLATE 10 MG: 10 TABLET ORAL at 08:57

## 2024-02-14 RX ADMIN — HYDROCORTISONE SODIUM SUCCINATE 50 MG: 100 INJECTION, POWDER, FOR SOLUTION INTRAMUSCULAR; INTRAVENOUS at 14:43

## 2024-02-14 RX ADMIN — SUCRALFATE 1 G: 1 SUSPENSION ORAL at 17:36

## 2024-02-14 RX ADMIN — SODIUM CHLORIDE: 9 INJECTION, SOLUTION INTRAVENOUS at 03:44

## 2024-02-14 RX ADMIN — PANTOPRAZOLE SODIUM 40 MG: 40 TABLET, DELAYED RELEASE ORAL at 07:11

## 2024-02-14 RX ADMIN — ASPIRIN 81 MG: 81 TABLET, COATED ORAL at 08:58

## 2024-02-14 RX ADMIN — SODIUM ZIRCONIUM CYCLOSILICATE 15 G: 5 POWDER, FOR SUSPENSION ORAL at 14:43

## 2024-02-14 RX ADMIN — SUCRALFATE 1 G: 1 SUSPENSION ORAL at 21:35

## 2024-02-14 RX ADMIN — PANTOPRAZOLE SODIUM 40 MG: 40 TABLET, DELAYED RELEASE ORAL at 17:36

## 2024-02-14 RX ADMIN — HYDROCORTISONE SODIUM SUCCINATE 50 MG: 100 INJECTION, POWDER, FOR SOLUTION INTRAMUSCULAR; INTRAVENOUS at 22:04

## 2024-02-14 RX ADMIN — OXYCODONE HYDROCHLORIDE 5 MG: 5 TABLET ORAL at 00:53

## 2024-02-14 RX ADMIN — HYDROCORTISONE SODIUM SUCCINATE 50 MG: 100 INJECTION, POWDER, FOR SOLUTION INTRAMUSCULAR; INTRAVENOUS at 08:58

## 2024-02-14 RX ADMIN — SODIUM CHLORIDE 2000 ML: 9 INJECTION, SOLUTION INTRAVENOUS at 00:47

## 2024-02-14 RX ADMIN — SODIUM CHLORIDE: 9 INJECTION, SOLUTION INTRAVENOUS at 20:55

## 2024-02-14 ASSESSMENT — ACTIVITIES OF DAILY LIVING (ADL)
ADLS_ACUITY_SCORE: 38
ADLS_ACUITY_SCORE: 38
ADLS_ACUITY_SCORE: 37
ADLS_ACUITY_SCORE: 38
ADLS_ACUITY_SCORE: 38
ADLS_ACUITY_SCORE: 37
ADLS_ACUITY_SCORE: 38
ADLS_ACUITY_SCORE: 37
ADLS_ACUITY_SCORE: 38
ADLS_ACUITY_SCORE: 38

## 2024-02-14 NOTE — PHARMACY-ADMISSION MEDICATION HISTORY
Pharmacist Admission Medication History    Admission medication history is complete. The information provided in this note is only as accurate as the sources available at the time of the update.    Information Source(s): Patient, Family member, Hospital records, and CareEverywhere/SureScripts via in-person    Pertinent Information: None    Changes made to PTA medication list:  Added: Aspirin, Vit D, Bydureon, Prednisone  Deleted: Coreg, Hydrocortisone, NPH  Changed: Flonase, Fludrocortisone      Medication History Completed By: Roc Gutierrez RPH 2/13/2024 10:49 PM    PTA Med List   Medication Sig Last Dose    acetaminophen (TYLENOL) 325 MG tablet Take 3 tablets (975 mg) by mouth 3 times daily as needed for mild pain Unknown at prn    alcohol swab prep pads Use to swab area of injection/laurie as directed. Unknown    amLODIPine (NORVASC) 10 MG tablet Take 1 tablet (10 mg) by mouth daily 2/13/2024 at AM    aspirin 81 MG EC tablet Take 81 mg by mouth daily 2/13/2024 at AM    Benzocaine-Menthol 10-2.1 MG LOZG Take 1 lozenge by mouth 4 times daily as needed (Cough or sore throat) Unknown    blood glucose (NO BRAND SPECIFIED) test strip 1 strip by In Vitro route every morning (before breakfast) Unknown    calcium carbonate 750 MG CHEW Take 1 tablet (750 mg) by mouth 4 times daily as needed (Heartburn) Unknown at prn    Continuous Blood Gluc  (FREESTYLE ABHIJIT 14 DAY READER) RODERICK Use to read blood sugars as per 's instructions. Unknown    Continuous Blood Gluc Sensor (FREESTYLE ABHIJIT 14 DAY SENSOR) MISC Change every 14 days. Unknown    exenatide ER (BYDUREON) 2 MG pen Inject 2 mg Subcutaneous every 7 days Mondays 2/12/2024    fludrocortisone (FLORINEF) 0.1 MG tablet Take 0.5 tablets (0.05 mg) by mouth daily (Patient taking differently: Take 0.1 mg by mouth daily) 2/13/2024 at AM    fluticasone (FLONASE) 50 MCG/ACT nasal spray Spray 1 spray into both nostrils daily (Patient taking differently: Spray 1  spray into both nostrils daily as needed for allergies) Unknown at prn    insulin pen needle (BD CELINE U/F) 32G X 4 MM miscellaneous Use 1 pen needles daily or as directed. Unknown    losartan (COZAAR) 100 MG tablet Take 1 tablet (100 mg) by mouth daily 2/13/2024 at AM    Microlet Lancets MISC 1 each daily Unknown    omeprazole (PRILOSEC) 40 MG DR capsule Take 1 capsule (40 mg) by mouth daily 2/13/2024 at AM    polyethylene glycol (MIRALAX) 17 GM/Dose powder Take 17 g by mouth daily . Hold for loose stools. 2/13/2024 at AM    prednisoLONE 5 MG tablet Take 5 mg by mouth daily 2/13/2024 at AM    senna-docusate (SENOKOT-S/PERICOLACE) 8.6-50 MG tablet Take 1 tablet by mouth 2 times daily Hold for loose stools. 2/13/2024 at AM    Vitamin D3 (CHOLECALCIFEROL) 125 MCG (5000 UT) tablet Take 125 mcg by mouth daily 2/13/2024 at AM

## 2024-02-14 NOTE — PROGRESS NOTES
RECEIVING UNIT ED HANDOFF REVIEW    ED Nurse Handoff Report was reviewed by: Tru Hallman RN on February 14, 2024 at 5:57 AM

## 2024-02-14 NOTE — ED TRIAGE NOTES
Abd pain, N/V/D for about a week. Now unable to eat without vomiting.      Triage Assessment (Adult)       Row Name 02/13/24 0332          Triage Assessment    Airway WDL WDL        Respiratory WDL    Respiratory WDL WDL        Cardiac WDL    Cardiac WDL WDL        Peripheral/Neurovascular WDL    Peripheral Neurovascular WDL WDL        Cognitive/Neuro/Behavioral WDL    Cognitive/Neuro/Behavioral WDL WDL

## 2024-02-14 NOTE — PROGRESS NOTES
02/14/24 1000   Appointment Info   Signing Clinician's Name / Credentials (PT) William Gonzalez DPT       Present yes   Language Gabonese   Living Environment   People in Home child(steph), adult   Current Living Arrangements apartment   Home Accessibility no concerns   Transportation Anticipated family or friend will provide   Living Environment Comments Pt lives in an apartment with her adult children. No stairs. Pt reports her children can provide assist as needed.   Self-Care   Usual Activity Tolerance good   Current Activity Tolerance moderate   Regular Exercise No   Equipment Currently Used at Home walker, rolling   Fall history within last six months no   Activity/Exercise/Self-Care Comment Pt reports being IND at baseline with all ADLs. Pt ambulates w/ a FWW at baseline.   General Information   Onset of Illness/Injury or Date of Surgery 02/14/24   Referring Physician Crowley, Juan Antonio Coronado MD   Patient/Family Therapy Goals Statement (PT) Pt did not state   Pertinent History of Current Problem (include personal factors and/or comorbidities that impact the POC) Per Chart: Jessica Kennedy is a 49 year old female admitted on 2/13/2024. She presents to the emergency department with 1 week of orthostasis, nausea and vomiting, worsening abdominal pain, headaches.  1 day of diarrhea following laxative use over the weekend for constipation.  Found to have hypercalcemia, acute kidney injury in the setting of multiple endocrine neoplasia with incomplete follow-up and prior bilateral adrenalectomy.   Existing Precautions/Restrictions fall   Weight-Bearing Status - LLE full weight-bearing   Weight-Bearing Status - RLE full weight-bearing   Cognition   Orientation Status (Cognition) oriented x 4   Integumentary/Edema   Integumentary/Edema no deficits were identifed   Posture    Posture Forward head position   Range of Motion (ROM)   Range of Motion ROM is WFL   Strength (Manual Muscle Testing)   Strength  (Manual Muscle Testing) Able to perform R SLR;Able to perform L SLR   Bed Mobility   Comment, (Bed Mobility) Supine>sit w/ IND   Transfers   Comment, (Transfers) Sit>stand w/ FWW and SBA   Gait/Stairs (Locomotion)   [place] Level (Gait) supervision   Assistive Device (Gait) walker, front-wheeled   Distance in Feet (Gait) 5'   Balance   Balance Comments Adequate static sitting balance; pt ambulates w/ a FWW for added stability and support.   Sensory Examination   Sensory Perception patient reports no sensory changes   Clinical Impression   Criteria for Skilled Therapeutic Intervention Yes, treatment indicated   PT Diagnosis (PT) Impaired gait   Influenced by the following impairments Decreased balance; decreased activity tolerance   Functional limitations due to impairments Impaired functional mobility   Clinical Presentation (PT Evaluation Complexity) stable   Clinical Presentation Rationale Clinical judgement   Clinical Decision Making (Complexity) low complexity   Planned Therapy Interventions (PT) balance training;bed mobility training;gait training;patient/family education;strengthening;transfer training;progressive activity/exercise   Risk & Benefits of therapy have been explained evaluation/treatment results reviewed;care plan/treatment goals reviewed;risks/benefits reviewed;current/potential barriers reviewed;participants voiced agreement with care plan;participants included;patient   PT Total Evaluation Time   PT Eval, Low Complexity Minutes (69396) 10   Physical Therapy Goals   PT Frequency One time eval and treatment only   PT Predicted Duration/Target Date for Goal Attainment 02/19/24   PT Goals Bed Mobility;Transfers;Gait   PT: Bed Mobility Supervision/stand-by assist;Supine to/from sit;Goal Met   PT: Transfers Supervision/stand-by assist;Sit to/from stand;Assistive device;Goal Met   PT: Gait Supervision/stand-by assist;Assistive device;150 feet;Goal Met   Interventions   Interventions Quick Adds  Gait Training;Therapeutic Activity   Therapeutic Activity   Therapeutic Activities: dynamic activities to improve functional performance Minutes (23690) 10   Symptoms Noted During/After Treatment None   Treatment Detail/Skilled Intervention Greeted pt supine in bed, agreed to PT. VSS on RA throughout session. YOGASMOGA  used via BECC. Pt performed supine>sit w/ IND. Once in sitting, pt able to scoot self to EOB and sit unsupported without LOB. Pt performed sit>stand x 3 w/ FWW and SBA, verbal cues for hand placement. Pt ambulated to the bathroom, pt able to perform hygiene without assist. After ambulation, pt returned to supine w/ SBA, demonstrating ability to safely lift BLEs back into bed and reposition self. Pt ended session supine in bed, with all needs met and call light within reach.   Gait Training   Gait Training Minutes (80721) 15   Symptoms Noted During/After Treatment (Gait Training) fatigue   Treatment Detail/Skilled Intervention Pt ambulated w/ FWW and SBA. Pt ambulated with decreased gait speed, downward gaze, decreased step length, and FWW out too far in front of pt. Verbal cues for upright gaze and posture, to increase step length, and FWW proximity. Antalgic gait noted due to ongoing L knee pain which pt reports is not new. No LOB noted.   Distance in Feet 200'   PT Discharge Planning   PT Plan DC   PT Discharge Recommendation (DC Rec) home with assist   PT Rationale for DC Rec Pt appears at/near baseline for functional mobility. Pt demonstrates safe and effective technique with all transfers and ambulation using a FWW. Anticipate at time of discharge pt will be able to safely return home with assist from her adult children.   PT Brief overview of current status SBA w/ FWW   Total Session Time   Timed Code Treatment Minutes 25   Total Session Time (sum of timed and untimed services) 35

## 2024-02-14 NOTE — H&P
[facility]    History and Physical - Hospitalist Service       Date of Admission:  2/13/2024    Assessment & Plan      Jessica Kennedy is a 49 year old female admitted on 2/13/2024. She presents to the emergency department with 1 week of orthostasis, nausea and vomiting, worsening abdominal pain, headaches.  1 day of diarrhea following laxative use over the weekend for constipation.  Found to have hypercalcemia, acute kidney injury in the setting of multiple endocrine neoplasia with incomplete follow-up and prior bilateral adrenalectomy.    Hyponatremia, orthostasis, nausea and vomiting: Could be secondary to adrenal insufficiency.  There is some concern of medication nonadherence noted in chart.  She tells me that she has been taking Florinef as well as prednisolone daily, but did not increase her dose with onset of nausea and vomiting or diarrheal illness.  Suspect she may have adrenal insufficiency given prior pheochromocytoma and bilateral adrenalectomy.  No fevers or chills.  -COVID-19, RSV, influenza tested and negative  -Enteric panel as below  -Holding prior to admission Florinef and prednisolone; receiving brief burst of stress dose steroids.  If symptoms resolved with steroid administration, would recommend switching back to hydrocortisone twice daily rather than Florinef/prednisolone regimen.  Patient reports that this medication change was made approximately 3 months ago, and she is uncertain why.  By review of outside records, it appears there was a desire to have patient on a once daily medication, so perhaps this is reason for change.  -Salt liberalized diet    Hypercalcemia: History of partial thyroidectomy x 2 in [place] in 1995, 2009 for goiter.  Has MEN2A with what I believe to be presumed primary hyperparathyroidism, though I do not see a prior PTH in review of records.  -Ionized calcium pending  -Repeat BMP in a.m.  -PTH, Vit D  -Hold calcium carbonate (reports daily  use) and vit D supplementation while awaiting labs as above.  -Note that patient is due for follow-up with endocrinology for SPECT-CT and parathyroidectomy.  See last communication from surgical/endocrine team in November.  Discussed this with patient and her family at bedside.  She had been aware of these recommendations, but had forgotten to follow-up.  -Additional 2 L normal saline bolus now prior to recheck of labs in a.m.  -Intake and output, daily weights    Diarrhea: Multiple episodes of loose stools over 2/13 and 2/14/2024.  Reports that she takes a stool softener as well as MiraLAX over the weekend for constipation, so diarrhea may simply be resolution of constipation with laxative use.  No bowel movement over the past 5 hours in the emergency department.  -Enteric panel pending.  Enteric precautions until this returns  -Treatment of hypercalcemia as above    Acute kidney injury: Creatinine of 1.36, baseline closer to 0.7.  -Prior to admission losartan on hold; if creatinine normalizes with fluid administration, can resume    Type 2 diabetes: Diet controlled.  Hemoglobin A1c of 6.9.  -Medium dose sliding scale insulin ordered;  -Anticipate patient will need addition of basal insulin for stress dose steroids    Hypertension:  -Prior to admission losartan on hold with acute kidney injury  -Hydralazine available as needed    Abdominal pain: Patient reports chronic abdominal pain since bilateral adrenalectomy last year.  Worsened over this past week.  May have had worsening abdominal pain with worsening hypercalcemia; improvement in abdominal symptoms with normalization of calcium might be telling.  CT unrevealing, exam nonfocal.  Feels that she is bloated.  Adrenal insufficiency could also be contributing to abdominal discomfort.  -IV volume resuscitation as above  -As needed bowel regimen  -Acetaminophen, oxycodone, IV morphine available as needed for pain control.  Minimize narcotics as able.  -Stress dose  steroids    MEN 2A:  -Repeat calcitonin.  Elevated in the 50s range as of this past June  -See above regarding need for follow-up with endocrinology and SPECT-CT, thorough parathyroidectomy.  Discussed importance of follow-up with patient as well as her family present at bedside              Diet: NPO for Medical/Clinical Reasons Except for: Meds    DVT Prophylaxis: Pneumatic Compression Devices  Rodriguez Catheter: Not present  Lines: None     Cardiac Monitoring: None  Code Status:  full code    Clinically Significant Risk Factors Present on Admission           # Hypercalcemia: Highest Ca = 11.4 mg/dL in last 2 days, will monitor as appropriate      # Drug Induced Platelet Defect: home medication list includes an antiplatelet medication   # Hypertension: Home medication list includes antihypertensive(s)          # Financial/Environmental Concerns:           Disposition Plan      Expected Discharge Date: 02/15/2024                  Juan Antonio Crowley MD  Hospitalist Service  [facility]  Securely message with [employer] (more info)  Text page via [employer] Paging/Directory     ______________________________________________________________________    Chief Complaint   Nausea and vomiting, abdominal pain    History is obtained from the patient, family at bedside, discussion with Dr. Villavicencio in the emergency department, chart review    A professional telephone  was offered to patient, she preferred that her family at bedside assist with interpretation instead.    History of Present Illness   Jessica Kennedy is a 49 year old female who presents to the emergency department for evaluation of 1 week nausea and vomiting, orthostasis, acute on chronic diffuse abdominal pain, and 1-2 days of diarrhea following laxative use over the weekend.  Patient reports that she has been able to take her medications despite nausea and vomiting, but has had decreased oral intake.  For the past day, diarrhea has been  frequent and described by patient has nonstop.  Has not have a bowel movement in the past 5 hours in the emergency department, however.    Patient has a history of MEN 2A with bilateral adrenalectomy for pheochromocytoma this past summer.  She has an elevated calcitonin concerning for medullary carcinoma of the thyroid as well as an elevated calcium concerning for primary hyperparathyroidism consistent with MEN 2A syndrome.  It appears that she has been lost to follow-up by her endocrinology team at the [facility].  Last attempts at contact were at the end of November for a planned SPECT CT and parathyroidectomy.    Historically there have been some concerns regarding medication adherence.  She has difficulty telling me her medications; for example she tells me that she takes Tums once weekly for constipation.  Later it is clarified that she is taking MiraLAX once weekly for constipation and she takes Tums once daily for indigestion and does not find them to be helpful.  She does, however, tell me that she is taking her 2 steroids daily.  It appears that she is on prednisolone 5 mg daily and Florinef 0.1 mg daily.  Historically had been on hydrocortisone twice daily, but this medication regimen was switched approximately 3 months ago.  Patient is uncertain as to why this switched, but I do see mentions in the chart via telephone encounters where there was a request for patient to be placed on once daily medications rather than twice daily hydrocortisone.    Patient lives with her daughter, age 24, who currently has headache, cough, nasal congestion.  Patient herself has had a headache for the past week, but no nasal sinus congestion or cough.  No fevers, no rigors.    Patient tells me that she has had chronic abdominal pain since her adrenalectomy at the [facility] in August 2023.  Over the past week, her abdominal pain has worsened.  She complains of diarrhea for the past 1 to 2 days, but  this is in the context of previously having experienced constipation and taking laxatives over the weekend.  She has also had 1 week of increased headache, 1 week of increased lightheadedness with standing.  1 week of nausea and vomiting.  She has not increased her steroid dose in the setting of her nausea and vomiting or lightheadedness, and may be experiencing adrenal insufficiency.  Prior bilateral adrenalectomy for pheochromocytomas.    Patient with some hypercalcemia which has worsened from prior labs.  Appears to be somewhat volume deplete with acute kidney injury as well.  As noted elsewhere, seems to have been lost to follow-up with her endocrinology team.      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 Taking?   Benzocaine-Menthol 10-2.1 MG LOZG Unknown  No Yes   Sig: Take 1 lozenge by mouth 4 times daily as needed (Cough or sore throat)   Continuous Blood Gluc  (FREESTYLE ABHIJIT 14 DAY READER) RODERICK Unknown  No Yes   Sig: Use to read blood sugars as per 's instructions.   Continuous Blood Gluc Sensor (FREESTYLE ABHIJIT 14 DAY SENSOR) MISC Unknown  No Yes   Sig: Change every 14 days.   Microlet Lancets MISC Unknown  No Yes   Si each daily   Vitamin D3 (CHOLECALCIFEROL) 125 MCG (5000 UT) tablet 2024 at AM  Yes Yes   Sig: Take 125 mcg by mouth daily    acetaminophen (TYLENOL) 325 MG tablet Unknown at prn  No Yes   Sig: Take 3 tablets (975 mg) by mouth 3 times daily as needed for mild pain   alcohol swab prep pads Unknown  No Yes   Sig: Use to swab area of injection/laurie as directed.   amLODIPine (NORVASC) 10 MG tablet 2024 at AM  No Yes   Sig: Take 1 tablet (10 mg) by mouth daily   aspirin 81 MG EC tablet 2024 at AM  Yes Yes   Sig: Take 81 mg by mouth daily   blood glucose (NO BRAND SPECIFIED) test strip Unknown  No Yes   Si strip by In Vitro route every morning (before breakfast)   calcium carbonate 750 MG CHEW Unknown at prn  No Yes   Sig: Take 1 tablet (750 mg) by mouth 4 times daily as needed (Heartburn)   carboxymethylcellulose-glycerin (REFRESH OPTIVE) 0.5-0.9 % ophthalmic solution   Yes No   Sig: Place 1 drop into both eyes 4 times daily as needed for dry eyes   exenatide ER (BYDUREON) 2 MG pen 2024  Yes Yes   Sig: Inject 2 mg Subcutaneous every 7 days    fludrocortisone (FLORINEF) 0.1 MG tablet 2024 at AM  No Yes   Sig: Take 0.5 tablets (0.05 mg) by mouth daily   Patient taking differently: Take 0.1 mg by mouth daily   fluticasone (FLONASE) 50 MCG/ACT nasal spray Unknown at prn  No Yes   Sig: Spray 1 spray into both nostrils daily   Patient taking differently: Spray 1 spray into both nostrils daily as needed for allergies   hydrocortisone (CORTEF) 5 MG tablet Not Taking  No No   Sig: Take 1.5 tablets (7.5 mg) by mouth daily . Take at 2PM each day.   Patient not taking: Reported on 2024   hydrocortisone (CORTEF) 5 MG tablet Not Taking  No No   Sig: 15 mg (3 tablets) when you wake up and 7.5 mg (one and a half tablet) at 2PM. Plus extra tablets for sick days.   Patient not taking: Reported on 2024   insulin pen needle (BD CELINE U/F) 32G X 4 MM miscellaneous Unknown  No Yes   Sig: Use 1 pen needles daily or as directed.   losartan (COZAAR) 100 MG tablet 2024 at AM  No Yes   Sig: Take 1 tablet (100 mg) by mouth  daily   omeprazole (PRILOSEC) 40 MG DR capsule 2/13/2024 at AM  No Yes   Sig: Take 1 capsule (40 mg) by mouth daily   polyethylene glycol (MIRALAX) 17 GM/Dose powder 2/13/2024 at AM  No Yes   Sig: Take 17 g by mouth daily . Hold for loose stools.   prednisoLONE 5 MG tablet 2/13/2024 at AM  Yes Yes   Sig: Take 5 mg by mouth daily   senna-docusate (SENOKOT-S/PERICOLACE) 8.6-50 MG tablet 2/13/2024 at AM  No Yes   Sig: Take 1 tablet by mouth 2 times daily Hold for loose stools.      Facility-Administered Medications: None           Physical Exam   Vital Signs: Temp: 97.7  F (36.5  C)   BP: 134/81 Pulse: 99   Resp: 16 SpO2: 98 % O2 Device: None (Room air)      General Appearance: Obese 49-year-old [place] female laying in bed.  She is comfortable at rest, but when sitting up complains of abdominal discomfort  Eyes: No scleral icterus or injection  HEENT: Prior partial thyroidectomy scar, some tissue fullness lateral to cricoid cartilage more notable on the right.  Nontender.  Respiratory: Breath sounds clear bilaterally to auscultation without wheezes or crackles.  Cardiovascular: Borderline tachycardia in the upper 90s range.  Regular rhythm.  GI: Diffuse tenderness to palpation of abdomen without palpable mass, no guarding.  Bowel sounds present.  No rebound.  Musculoskeletal: Muscular tone and bulk intact in extremities and appropriate for age.  Neurologic: Alert, conversant, appropriate in conversation.  Mental status grossly intact.  Psychiatric: Pleasant, normal affect    Medical Decision Making       85 MINUTES SPENT BY ME on the date of service doing chart review, history, exam, documentation & further activities per the note.      Data     I have personally reviewed the following data over the past 24 hrs:    12.2 (H)  \   16.1 (H)   / 271     130 (L) 100 51.9 (H) /  139 (H)   5.2 16 (L) 1.36 (H) \     ALT: 13 AST: 16 AP: 115 TBILI: 0.3   ALB: 4.8 TOT PROTEIN: 8.5 (H) LIPASE: 55     Procal: N/A CRP: N/A Lactic  Acid: 1.3         Imaging results reviewed over the past 24 hrs:   Recent Results (from the past 24 hour(s))   CT Abdomen Pelvis w Contrast    Narrative    EXAM: CT ABDOMEN PELVIS W CONTRAST  LOCATION: [facility]  DATE: 2/13/2024    INDICATION: abd pain, n v d  COMPARISON: 12/08/2022  TECHNIQUE: CT scan of the abdomen and pelvis was performed following injection of IV contrast. Multiplanar reformats were obtained. Dose reduction techniques were used.  CONTRAST: 107mL Isovue 370    FINDINGS:   LOWER CHEST: Normal.    HEPATOBILIARY: Mild motion artifact. Fatty infiltration of liver.    PANCREAS: Normal.    SPLEEN: Normal.    ADRENAL GLANDS: Interval bilateral adrenalectomy.    KIDNEYS/BLADDER: Mild motion artifact. No stones or hydronephrosis evident. There is mild diffuse bladder wall thickening.    BOWEL: No obstruction or inflammatory change. Motion artifact. Appendix normal.    LYMPH NODES: Normal.    VASCULATURE: Unremarkable.    PELVIC ORGANS: No pelvic mass. No free fluid.    MUSCULOSKELETAL: Degenerative changes throughout lumbar spine.      Impression    IMPRESSION:   1.  No etiology for symptoms evident. Nothing obstructive or inflammatory involving bowel.  2.  Fatty infiltration of liver.  3.  Mild diffuse bladder wall thickening, cystitis not excluded.

## 2024-02-14 NOTE — ED NOTES
Bed: ED11  Expected date: 2/13/24  Expected time: 9:40 PM  Means of arrival: Ambulance  Comments:  [facility] 2 89M fall eval; multiple issues

## 2024-02-14 NOTE — PLAN OF CARE
Physical Therapy Discharge Summary    Reason for therapy discharge:    All goals and outcomes met, no further needs identified.    Progress towards therapy goal(s). See goals on Care Plan in Epic electronic health record for goal details.  Goals met    Therapy recommendation(s):    Recommend pt continue to ambulate with nursing staff throughout remainder of hospital stay.

## 2024-02-14 NOTE — PROGRESS NOTES
[facility]    Medicine Progress Note - Hospitalist Service    Date of Admission:  2/13/2024    Assessment & Plan     aimee Kennedy is a 49 year old female admitted on 2/13/2024. She presents to the emergency department with 1 week of orthostasis, nausea and vomiting, worsening abdominal pain, headaches.  1 day of diarrhea following laxative use over the weekend for constipation.  Found to have hypercalcemia, acute kidney injury in the setting of multiple endocrine neoplasia with incomplete follow-up and prior bilateral adrenalectomy.     Hyponatremia, orthostasis, nausea and vomiting: Could be secondary to adrenal insufficiency.  There is some concern of medication nonadherence noted in chart.  She tells me that she has been taking Florinef as well as prednisolone daily, but did not increase her dose with onset of nausea and vomiting or diarrheal illness.  Suspect she may have adrenal insufficiency given prior pheochromocytoma and bilateral adrenalectomy.  No fevers or chills.  -COVID-19, RSV, influenza tested and negative  -Enteric panel as below  -Holding prior to admission Florinef and prednisolone; receiving brief burst of stress dose steroids.  If symptoms resolved with steroid administration, would recommend switching back to hydrocortisone twice daily rather than Florinef/prednisolone regimen.  Patient reports that this medication change was made approximately 3 months ago, and she is uncertain why.  By review of outside records, it appears there was a desire to have patient on a once daily medication, so perhaps this is reason for change.  -Salt liberalized diet  N/v improved   Continuous to have diarrhoea   Enteric panel sent , c.diff ordered   Low fiber diet ordered     Hyperkalemia at 6.0;  Could be from hemolysis   BMP stat reordered    Adendum:    Pts repeat bmp returned with potassium remained elevated at 5.6  Patient's losartan has been on hold since admission discontinue  will be due to hyperkalemia  We will give her a dose of Lokelma 15 g one-time  Recheck potassium this evening     Hypercalcemia: History of partial thyroidectomy x 2 in [place] in 1995, 2009 for goiter.  Has MEN2A with what I believe to be presumed primary hyperparathyroidism, though I do not see a prior PTH in review of records.  -Ionized calcium pending  -Repeat BMP in a.m.  -PTH, Vit D  -Hold calcium carbonate (reports daily use) and vit D supplementation while awaiting labs as above.  -Note that patient is due for follow-up with endocrinology for SPECT-CT and parathyroidectomy.  See last communication from surgical/endocrine team in November.  Discussed this with patient and her family at bedside.  She had been aware of these recommendations, but had forgotten to follow-up.  Getting IVF   Follow BMP daily     Diarrhea: Multiple episodes of loose stools over 2/13 and 2/14/2024.  Reports that she takes a stool softener as well as MiraLAX over the weekend for constipation, so diarrhea may simply be resolution of constipation with laxative use.  No bowel movement over the past 5 hours in the emergency department.  -Enteric panel pending. C.diff testing ordered .  Enteric precautions until this returns  -Treatment of hypercalcemia as above     Acute kidney injury: Creatinine of 1.36, baseline closer to 0.7.  -Prior to admission losartan on hold; if creatinine normalizes with fluid administration, can resume  Cr improved to  1.15      Type 2 diabetes: Diet controlled.  Hemoglobin A1c of 6.9.  -Medium dose sliding scale insulin ordered;  -Anticipate patient will need addition of basal insulin for stress dose steroids     Hypertension:  -Prior to admission losartan on hold with acute kidney injury  -Hydralazine available as needed     Abdominal pain: Patient reports chronic abdominal pain since bilateral adrenalectomy last year.  Worsened over this past week.  May have had worsening abdominal pain with worsening  hypercalcemia; improvement in abdominal symptoms with normalization of calcium might be telling.  CT unrevealing, exam nonfocal.  Feels that she is bloated.  Adrenal insufficiency could also be contributing to abdominal discomfort.  -IV volume resuscitation as above  -As needed bowel regimen  -Acetaminophen, oxycodone, IV morphine available as needed for pain control.  Minimize narcotics as able.  -Stress dose steroids     MEN 2A:  -Repeat calcitonin.  Elevated in the 50s range as of this past June  -See above regarding need for follow-up with endocrinology and SPECT-CT, thorough parathyroidectomy.  Discussed importance of follow-up with patient as well as her family present at bedside                 Diet low fiber diet   DVT Prophylaxis: Pneumatic Compression Devices  Rodriguez Catheter: Not present  Lines: None     Cardiac Monitoring: None  Code Status:  full code        Clinically Significant Risk Factors Present on Admission          Clinically Significant Risk Factors Present on Admission        # Hyperkalemia: Highest K = 6 mmol/L in last 2 days, will monitor as appropriate    # Hypercalcemia: Highest Ca = 11.4 mg/dL in last 2 days, will monitor as appropriate      # Drug Induced Platelet Defect: home medication list includes an antiplatelet medication   # Hypertension: Home medication list includes antihypertensive(s)     # DMII: A1C = 6.9 % (Ref range: <5.7 %) within past 6 months        # Financial/Environmental Concerns:           Disposition Plan      Expected Discharge Date: 02/16/2024                Discussed with bedside RN and patient on 2/14     Lakeshia Conley MD  Hospitalist Service  [facility]  Securely message with [employer] (more info)  Text page via [facility] Paging/Directory   ______________________________________________________________________    Interval History     Patient resting in bed. N/V improved. C/o diarrhoea. Upper abdominal pain. No other acute issues. Diet advanced  to low fiber. Potassium high on BMP recheck ordered STAT     Physical Exam   Vital Signs: Temp: 98.2  F (36.8  C) Temp src: Oral BP: 117/70 Pulse: 83   Resp: 18 SpO2: 96 % O2 Device: None (Room air)    Weight: 207 lbs 10.77 oz    General Appearance: Alert, awake, NAD   Respiratory: CTA b/l   Cardiovascular: RRR  GI: soft, epigastric tenderness present, no distension, BS+  Skin: warm and dry   Other:      Medical Decision Making       52 MINUTES SPENT BY ME on the date of service doing chart review, history, exam, documentation & further activities per the note.      Data     I have personally reviewed the following data over the past 24 hrs:    8.1  \   12.9   / 210     134 (L) 109 (H) 38.4 (H) /  134 (H)   6.0 (H) 16 (L) 1.15 (H) \     ALT: 13 AST: 16 AP: 115 TBILI: 0.3   ALB: 4.8 TOT PROTEIN: 8.5 (H) LIPASE: 55     Trop: 12 BNP: N/A     TSH: 1.64 T4: N/A A1C: 6.9 (H)     Procal: N/A CRP: N/A Lactic Acid: 1.3         Imaging results reviewed over the past 24 hrs:   Recent Results (from the past 24 hour(s))   CT Abdomen Pelvis w Contrast    Narrative    EXAM: CT ABDOMEN PELVIS W CONTRAST  LOCATION: [facility]  DATE: 2/13/2024    INDICATION: abd pain, n v d  COMPARISON: 12/08/2022  TECHNIQUE: CT scan of the abdomen and pelvis was performed following injection of IV contrast. Multiplanar reformats were obtained. Dose reduction techniques were used.  CONTRAST: 107mL Isovue 370    FINDINGS:   LOWER CHEST: Normal.    HEPATOBILIARY: Mild motion artifact. Fatty infiltration of liver.    PANCREAS: Normal.    SPLEEN: Normal.    ADRENAL GLANDS: Interval bilateral adrenalectomy.    KIDNEYS/BLADDER: Mild motion artifact. No stones or hydronephrosis evident. There is mild diffuse bladder wall thickening.    BOWEL: No obstruction or inflammatory change. Motion artifact. Appendix normal.    LYMPH NODES: Normal.    VASCULATURE: Unremarkable.    PELVIC ORGANS: No pelvic mass. No free  fluid.    MUSCULOSKELETAL: Degenerative changes throughout lumbar spine.      Impression    IMPRESSION:   1.  No etiology for symptoms evident. Nothing obstructive or inflammatory involving bowel.  2.  Fatty infiltration of liver.  3.  Mild diffuse bladder wall thickening, cystitis not excluded.

## 2024-02-14 NOTE — UTILIZATION REVIEW
"  Admission Status; Secondary Review Determination         Under the authority of the Utilization Management Committee, the utilization review process indicated a secondary review on the above patient.  The review outcome is based on review of the medical records, discussions with staff, and applying clinical experience noted on the date of the review.        (xxx)      Inpatient Status Appropriate - This patient's medical care is consistent with medical management for inpatient care and reasonable inpatient medical practice.      () Observation Status Appropriate - This patient does not meet hospital inpatient criteria and is placed in observation status. If this patient's primary payer is Medicare and was admitted as an inpatient, Condition Code 44 should be used and patient status changed to \"observation\".   () Admission Status NOT Appropriate - This patient's medical care is not consistent with medical management for Inpatient or Observation Status.          RATIONALE FOR DETERMINATION     Jessica Kennedy is a 49 year old female with a h/o MEN2A s/p bilateral adrenalectomy who was admitted on 2/13/2024 with 1 week of orthostasis, nausea and vomiting, worsening abdominal pain, headaches, and 1 day of diarrhea.  She was found to have hypercalcemia, acute kidney injury.  She is admitted for close clinical monitoring, IVF, IV stress dose steroids, IV antiemetics, and further evaluation.  IP status is appropriate.      The severity of illness, intensity of service provided, expected LOS and risk for adverse outcome make the care complex, high risk and appropriate for hospital admission.        The information on this document is developed by the utilization review team in order for the business office to ensure compliance.  This only denotes the appropriateness of proper admission status and does not reflect the quality of care rendered.         The definitions of Inpatient Status and Observation Status used in " making the determination above are those provided in the CMS Coverage Manual, Chapter 1 and Chapter 6, section 70.4.      Sincerely,     Kellie Mccall MD  Physician Advisor   Utilization Review/ Case Management  [facility].

## 2024-02-14 NOTE — ED NOTES
Rapid Assessment Note    History:   Jessica Kennedy is a 49 year old female who presents with abdominal pain, nausea, vomiting and diarrhea. Her family member reports that the patient has been having abdominal pain, vomiting, and diarrhea for the past week or so. Her family member reports that the patient took her blood pressure today as well and thought she had high blood pressure when the number was 123/100. Family member reports patient had abdominal surgery for pheochromocytoma about six months ago and the surgical site has been hurting for the past week. The pain is radiating around to her kidneys and to her back. Patient has bloating along with loss of appetite. Denies fever or urinary symptoms.       Exam:   General:  Alert, interactive  Cardiovascular:  Well perfused  Lungs:  No respiratory distress, no accessory muscle use  Neuro:  Moving all 4 extremities  Skin:  Warm, dry  Psych:  Normal affect      Plan of Care:   I evaluated the patient and developed an initial plan of care. I discussed this plan and explained that I, or one of my partners, would be returning to complete the evaluation.  Laboratory workup, IV fluids, Zofran, and CT scan ordered.        I, Dianelys Parker, am serving as a scribe to document services personally performed by Shaheen Shelby MD, based on my observations and the provider's statements to me.    2/13/2024  [facility] [facility]    Portions of this medical record were completed by a scribe. UPON MY REVIEW AND AUTHENTICATION BY ELECTRONIC SIGNATURE, this confirms (a) I performed the applicable clinical services, and (b) the record is accurate.      Shaheen Shelby MD  02/13/24 1946

## 2024-02-14 NOTE — ED PROVIDER NOTES
History     Chief Complaint:  Nausea, Vomiting, & Diarrhea       HPI   Jessica Kennedy is a 49 year old female with type II diabetes, and pheochromocytoma, presents to the emergency room with nausea, vomiting and diarrhea. That patient reports that she has been sick for the past 5 days. She adds that yesterday night she has been more sick. She has has diarrhea since last night as well and has been lightheaded. She denies a fever, travel, medical problems or being around ill people.  No recent antibiotic use.      Independent Historian:    Daughters report that their mom's been sick for the past 5 days or so.  No sick contacts that they are aware of.  She is normally on prednisone as a steroid as far as they know.  They do note that she is on chronic steroids and has a history of M EN.  They report that she  had a stomach surgery earlier this year.    Review of External Notes:  Reviewed past medical history.  Patient has a history of diabetes, pheochromocytoma, multiple endocrine neoplasia and hypertension.  She is status post adrenalectomy.  She does have a plan to increase her home hydrocortisone when ill.    Medications:    Norvasc   Coreg   Florinef  Flonase   Appesoline   Cortef   Cozaar   Prilosec   Miralax   Senokot-s     Past Medical History:    Hypertension   Multiple endocrine neoplasia   Pheochromocytoma   Type II diabetes    Past Surgical History:    Head and neck surgery   Adrenalectomy   Orthopedic surgery      Physical Exam   Patient Vitals for the past 24 hrs:   BP Temp Pulse Resp SpO2   02/13/24 1855 134/81 97.7  F (36.5  C) 99 16 98 %        Physical Exam  General: Well-nourished, appears nauseated   eyes: PERRL, conjunctivae pink no scleral icterus or conjunctival injection  ENT:  Moist mucus membranes, posterior oropharynx clear without erythema or exudates  Respiratory:  Lungs clear to auscultation bilaterally, no crackles/rubs/wheezes.  Good air movement  CV: Normal rate and rhythm, no  murmurs/rubs/gallops  GI:  Abdomen soft and protuberant.  Generalized tenderness.  No guarding or rebound  Skin: Warm, dry.  No rashes or petechiae  Musculoskeletal: No peripheral edema or calf tenderness  Neuro: Alert and oriented to person/place/time  Psychiatric: Normal affect      Emergency Department Course   ECG  ECG taken at 2212, ECG read at 2320  Normal sinus rhythm    Rate 80 bpm. NH interval 164 ms. QRS duration 90 ms. QT/QTc 340/392 ms. P-R-T axes 64 21 27.    Imaging:  CT Abdomen Pelvis w Contrast   Final Result   IMPRESSION:    1.  No etiology for symptoms evident. Nothing obstructive or inflammatory involving bowel.   2.  Fatty infiltration of liver.   3.  Mild diffuse bladder wall thickening, cystitis not excluded.        Report per radiology    Laboratory:  Labs Ordered and Resulted from Time of ED Arrival to Time of ED Departure   COMPREHENSIVE METABOLIC PANEL - Abnormal       Result Value    Sodium 130 (*)     Potassium 5.2      Carbon Dioxide (CO2) 16 (*)     Anion Gap 14      Urea Nitrogen 51.9 (*)     Creatinine 1.36 (*)     GFR Estimate 48 (*)     Calcium 11.4 (*)     Chloride 100      Glucose 139 (*)     Alkaline Phosphatase 115      AST 16      ALT 13      Protein Total 8.5 (*)     Albumin 4.8      Bilirubin Total 0.3     CBC WITH PLATELETS AND DIFFERENTIAL - Abnormal    WBC Count 12.2 (*)     RBC Count 5.38 (*)     Hemoglobin 16.1 (*)     Hematocrit 49.5 (*)     MCV 92      MCH 29.9      MCHC 32.5      RDW 12.8      Platelet Count 271      % Neutrophils 63      % Lymphocytes 29      % Monocytes 6      % Eosinophils 2      % Basophils 0      % Immature Granulocytes 0      NRBCs per 100 WBC 0      Absolute Neutrophils 7.7      Absolute Lymphocytes 3.5      Absolute Monocytes 0.7      Absolute Eosinophils 0.3      Absolute Basophils 0.1      Absolute Immature Granulocytes 0.0      Absolute NRBCs 0.0     LIPASE - Normal    Lipase 55     LACTIC ACID WHOLE BLOOD - Normal    Lactic Acid 1.3             Emergency Department Course & Assessments:       Interventions:  Medications   ondansetron (ZOFRAN) injection 4 mg (4 mg Intravenous $Given 2/13/24 1913)   sodium chloride 0.9% BOLUS 1,000 mL (1,000 mLs Intravenous $New Bag 2/13/24 1913)   iopamidol (ISOVUE-370) solution 107 mL (107 mLs Intravenous $Given 2/13/24 2026)   Saline (71 mLs Intravenous $Given 2/13/24 2026)        Assessments:  2200 I obtained history and examined the patient as noted above.   I consulted with the clinical pharmacist regarding stress dose steroids.  He was able to find the patient's home emergency plan for steroids.    Independent Interpretation (X-rays, CTs, rhythm strip):  None    Consultations/Discussion of Management or Tests:  I consulted with Dr. Crowley regarding admission.       Social Determinants of Health affecting care:  None     Disposition:  The patient was admitted to the hospital under the care of Dr. Crowley.     Impression & Plan      Medical Decision Making:  Jessica Kennedy is a 49 year old female on chronic steroids after adrenalectomy for MEN and diabetes who presents with nausea, vomiting and diarrhea along with lightheadedness for the past 5 days.  She has abdominal tenderness.  CT scan shows no etiology for her symptoms.  Stool studies are pending.  Electrolytes show a mild kidney injury.  She continues to feel dizzy and have ongoing nausea and vomiting despite treatment with antiemetics.  She was treated with IV fluids.  We treated with stress dose steroids.  She will be admitted to the hospital for ongoing fluid hydration, steroid treatment and monitoring until she is able to tolerate p.o.  Dr. Crowley graciously agreed to admit the patient.    Diagnosis:    ICD-10-CM    1. Nausea vomiting and diarrhea  R11.2     R19.7       2. Adrenal insufficiency (H24)  E27.40       3. Dizziness  R42       4. Generalized abdominal pain  R10.84       5. Acute kidney injury (H24)  N17.9            Discharge Medications:  New  Prescriptions    No medications on file          Scribe Disclosure:  I, Nataly Mckinney, am serving as a scribe at 10:05 PM on 2/13/2024 to document services personally performed by Ernestine Villavicencio MD based on my observations and the provider's statements to me.  2/13/2024   Ernestine Villavicencio MD Cho, Amy C, MD  02/14/24 0131

## 2024-02-14 NOTE — ED NOTES
"[facility]  ED Nurse Handoff Report    ED Chief complaint: Nausea, Vomiting, & Diarrhea      ED Diagnosis:   Final diagnoses:   Nausea vomiting and diarrhea   Adrenal insufficiency (H24)   Dizziness   Generalized abdominal pain   Acute kidney injury (H24)       Code Status: Full Code    Allergies:   Allergies   Allergen Reactions    Hydromorphone Hives     Other reaction(s): Hives    Ace Inhibitors      Other reaction(s): Unknown    Adhesive Tape Blisters       Patient Story: Pt arrives from home with daughter with c/o abdominal pain, n/v/d x 1 week. Pt's family reports HTN at home. C/o \"bloating\", loss of appetite, and bilateral flank pain.  Focused Assessment:  Pt s/p pheochromocytoma approximately 6 months ago. Surgical site pain.    Treatments and/or interventions provided: PIV; IVF; pepcid; ondansetron; cortef; oxycodone; labs; CT; EKG  Labs Ordered and Resulted from Time of ED Arrival to Time of ED Departure   COMPREHENSIVE METABOLIC PANEL - Abnormal       Result Value    Sodium 130 (*)     Potassium 5.2      Carbon Dioxide (CO2) 16 (*)     Anion Gap 14      Urea Nitrogen 51.9 (*)     Creatinine 1.36 (*)     GFR Estimate 48 (*)     Calcium 11.4 (*)     Chloride 100      Glucose 139 (*)     Alkaline Phosphatase 115      AST 16      ALT 13      Protein Total 8.5 (*)     Albumin 4.8      Bilirubin Total 0.3     CBC WITH PLATELETS AND DIFFERENTIAL - Abnormal    WBC Count 12.2 (*)     RBC Count 5.38 (*)     Hemoglobin 16.1 (*)     Hematocrit 49.5 (*)     MCV 92      MCH 29.9      MCHC 32.5      RDW 12.8      Platelet Count 271      % Neutrophils 63      % Lymphocytes 29      % Monocytes 6      % Eosinophils 2      % Basophils 0      % Immature Granulocytes 0      NRBCs per 100 WBC 0      Absolute Neutrophils 7.7      Absolute Lymphocytes 3.5      Absolute Monocytes 0.7      Absolute Eosinophils 0.3      Absolute Basophils 0.1      Absolute Immature Granulocytes 0.0      Absolute NRBCs 0.0   "   PHOSPHORUS - Abnormal    Phosphorus 4.9 (*)    IONIZED CALCIUM - Abnormal    Calcium Ionized Whole Blood 5.8 (*)    HEMOGLOBIN A1C - Abnormal    Hemoglobin A1C 6.9 (*)    GLUCOSE BY METER - Abnormal    GLUCOSE BY METER POCT 163 (*)    LIPASE - Normal    Lipase 55     LACTIC ACID WHOLE BLOOD - Normal    Lactic Acid 1.3     MAGNESIUM - Normal    Magnesium 1.9     HCG QUALITATIVE PREGNANCY - Normal    hCG Serum Qualitative Negative     ROUTINE UA WITH MICROSCOPIC REFLEX TO CULTURE - Normal    Color Urine Straw      Appearance Urine Clear      Glucose Urine Negative      Bilirubin Urine Negative      Ketones Urine Negative      Specific Gravity Urine 1.028      Blood Urine Negative      pH Urine 5.0      Protein Albumin Urine Negative      Urobilinogen Urine Normal      Nitrite Urine Negative      Leukocyte Esterase Urine Negative      RBC Urine <1      WBC Urine 0      Squamous Epithelials Urine <1     INFLUENZA A/B, RSV, & SARS-COV2 PCR - Normal    Influenza A PCR Negative      Influenza B PCR Negative      RSV PCR Negative      SARS CoV2 PCR Negative     TROPONIN T, HIGH SENSITIVITY - Normal    Troponin T, High Sensitivity 12     TSH WITH FREE T4 REFLEX - Normal    TSH 1.64     PARATHYROID HORMONE INTACT   VITAMIN D DEFICIENCY SCREENING   ENTERIC BACTERIA AND VIRUS PANEL BY PCR      Patient's response to treatments and/or interventions: Tolerated    To be done/followed up on inpatient unit:  Monitor    Does this patient have any cognitive concerns?:  N/A    Activity level - Baseline/Home:  Stand with Assist and Walker  Activity Level - Current:   Stand with Assist and Walker    Patient's Preferred language: Cambodian   Needed?: Yes    Isolation: None and Enteric  Infection: C-Diff Pending  Patient tested for COVID 19 prior to admission: YES  Bariatric?: Yes    Vital Signs:   Vitals:    02/13/24 1855 02/13/24 2330 02/14/24 0000   BP: 134/81 (!) 177/107 128/84   Pulse: 99 83 80   Resp: 16 16 16   Temp: 97.7   F (36.5  C)     SpO2: 98% 98% 97%       Cardiac Rhythm:     Was the PSS-3 completed:   Yes  What interventions are required if any?               Family Comments: See above  OBS brochure/video discussed/provided to patient/family: N/A              Name of person given brochure if not patient:               Relationship to patient:     For the majority of the shift this patient's behavior was Green.   Behavioral interventions performed were .    ED NURSE PHONE NUMBER: *[id number]

## 2024-02-15 ENCOUNTER — APPOINTMENT (OUTPATIENT)
Dept: ULTRASOUND IMAGING | Facility: CLINIC | Age: 49
End: 2024-02-15
Attending: NURSE PRACTITIONER
Payer: COMMERCIAL

## 2024-02-15 ENCOUNTER — TELEPHONE (OUTPATIENT)
Dept: ENDOCRINOLOGY | Facility: CLINIC | Age: 49
End: 2024-02-15
Payer: COMMERCIAL

## 2024-02-15 LAB
ANION GAP SERPL CALCULATED.3IONS-SCNC: 9 MMOL/L (ref 7–15)
BUN SERPL-MCNC: 26.9 MG/DL (ref 6–20)
CA-I BLD-MCNC: 5.6 MG/DL (ref 4.4–5.2)
CALCIUM SERPL-MCNC: 9.9 MG/DL (ref 8.6–10)
CHLORIDE SERPL-SCNC: 112 MMOL/L (ref 98–107)
CREAT SERPL-MCNC: 1 MG/DL (ref 0.51–0.95)
DEPRECATED HCO3 PLAS-SCNC: 18 MMOL/L (ref 22–29)
EGFRCR SERPLBLD CKD-EPI 2021: 70 ML/MIN/1.73M2
ERYTHROCYTE [DISTWIDTH] IN BLOOD BY AUTOMATED COUNT: 12.6 % (ref 10–15)
GLUCOSE BLDC GLUCOMTR-MCNC: 145 MG/DL (ref 70–99)
GLUCOSE BLDC GLUCOMTR-MCNC: 147 MG/DL (ref 70–99)
GLUCOSE BLDC GLUCOMTR-MCNC: 156 MG/DL (ref 70–99)
GLUCOSE BLDC GLUCOMTR-MCNC: 163 MG/DL (ref 70–99)
GLUCOSE BLDC GLUCOMTR-MCNC: 181 MG/DL (ref 70–99)
GLUCOSE SERPL-MCNC: 125 MG/DL (ref 70–99)
HCT VFR BLD AUTO: 38.9 % (ref 35–47)
HGB BLD-MCNC: 12.6 G/DL (ref 11.7–15.7)
MAGNESIUM SERPL-MCNC: 1.5 MG/DL (ref 1.7–2.3)
MAGNESIUM SERPL-MCNC: 2.4 MG/DL (ref 1.7–2.3)
MCH RBC QN AUTO: 29.8 PG (ref 26.5–33)
MCHC RBC AUTO-ENTMCNC: 32.4 G/DL (ref 31.5–36.5)
MCV RBC AUTO: 92 FL (ref 78–100)
PLATELET # BLD AUTO: 206 10E3/UL (ref 150–450)
POTASSIUM SERPL-SCNC: 4.3 MMOL/L (ref 3.4–5.3)
RBC # BLD AUTO: 4.23 10E6/UL (ref 3.8–5.2)
SODIUM SERPL-SCNC: 139 MMOL/L (ref 135–145)
WBC # BLD AUTO: 9.2 10E3/UL (ref 4–11)

## 2024-02-15 PROCEDURE — 250N000011 HC RX IP 250 OP 636: Performed by: INTERNAL MEDICINE

## 2024-02-15 PROCEDURE — 250N000012 HC RX MED GY IP 250 OP 636 PS 637: Performed by: INTERNAL MEDICINE

## 2024-02-15 PROCEDURE — 83735 ASSAY OF MAGNESIUM: CPT | Performed by: STUDENT IN AN ORGANIZED HEALTH CARE EDUCATION/TRAINING PROGRAM

## 2024-02-15 PROCEDURE — 99233 SBSQ HOSP IP/OBS HIGH 50: CPT | Performed by: INTERNAL MEDICINE

## 2024-02-15 PROCEDURE — 76705 ECHO EXAM OF ABDOMEN: CPT

## 2024-02-15 PROCEDURE — 80048 BASIC METABOLIC PNL TOTAL CA: CPT | Performed by: INTERNAL MEDICINE

## 2024-02-15 PROCEDURE — 82330 ASSAY OF CALCIUM: CPT | Performed by: INTERNAL MEDICINE

## 2024-02-15 PROCEDURE — 250N000013 HC RX MED GY IP 250 OP 250 PS 637: Performed by: INTERNAL MEDICINE

## 2024-02-15 PROCEDURE — 258N000003 HC RX IP 258 OP 636: Performed by: INTERNAL MEDICINE

## 2024-02-15 PROCEDURE — 36415 COLL VENOUS BLD VENIPUNCTURE: CPT | Performed by: INTERNAL MEDICINE

## 2024-02-15 PROCEDURE — 83735 ASSAY OF MAGNESIUM: CPT | Performed by: INTERNAL MEDICINE

## 2024-02-15 PROCEDURE — 250N000013 HC RX MED GY IP 250 OP 250 PS 637: Performed by: NURSE PRACTITIONER

## 2024-02-15 PROCEDURE — 120N000001 HC R&B MED SURG/OB

## 2024-02-15 PROCEDURE — 85014 HEMATOCRIT: CPT | Performed by: INTERNAL MEDICINE

## 2024-02-15 RX ORDER — MAGNESIUM CARB/ALUMINUM HYDROX 105-160MG
296 TABLET,CHEWABLE ORAL ONCE
Status: COMPLETED | OUTPATIENT
Start: 2024-02-16 | End: 2024-02-16

## 2024-02-15 RX ORDER — ONDANSETRON 2 MG/ML
4 INJECTION INTRAMUSCULAR; INTRAVENOUS
Status: CANCELLED | OUTPATIENT
Start: 2024-02-15

## 2024-02-15 RX ORDER — POLYETHYLENE GLYCOL 3350 17 G/17G
238 POWDER, FOR SOLUTION ORAL ONCE
Status: COMPLETED | OUTPATIENT
Start: 2024-02-15 | End: 2024-02-15

## 2024-02-15 RX ORDER — MAGNESIUM SULFATE HEPTAHYDRATE 40 MG/ML
4 INJECTION, SOLUTION INTRAVENOUS ONCE
Status: COMPLETED | OUTPATIENT
Start: 2024-02-15 | End: 2024-02-15

## 2024-02-15 RX ORDER — BISACODYL 5 MG
10 TABLET, DELAYED RELEASE (ENTERIC COATED) ORAL ONCE
Status: COMPLETED | OUTPATIENT
Start: 2024-02-15 | End: 2024-02-15

## 2024-02-15 RX ORDER — LIDOCAINE 40 MG/G
CREAM TOPICAL
Status: CANCELLED | OUTPATIENT
Start: 2024-02-15

## 2024-02-15 RX ADMIN — SODIUM CHLORIDE: 9 INJECTION, SOLUTION INTRAVENOUS at 20:21

## 2024-02-15 RX ADMIN — BISACODYL 10 MG: 5 TABLET, COATED ORAL at 14:23

## 2024-02-15 RX ADMIN — SUCRALFATE 1 G: 1 SUSPENSION ORAL at 16:59

## 2024-02-15 RX ADMIN — AMLODIPINE BESYLATE 10 MG: 10 TABLET ORAL at 09:08

## 2024-02-15 RX ADMIN — SODIUM CHLORIDE: 9 INJECTION, SOLUTION INTRAVENOUS at 06:42

## 2024-02-15 RX ADMIN — HYDROCORTISONE SODIUM SUCCINATE 50 MG: 100 INJECTION, POWDER, FOR SOLUTION INTRAMUSCULAR; INTRAVENOUS at 19:28

## 2024-02-15 RX ADMIN — HYDROCORTISONE SODIUM SUCCINATE 50 MG: 100 INJECTION, POWDER, FOR SOLUTION INTRAMUSCULAR; INTRAVENOUS at 06:44

## 2024-02-15 RX ADMIN — ASPIRIN 81 MG: 81 TABLET, COATED ORAL at 09:08

## 2024-02-15 RX ADMIN — INSULIN ASPART 2 UNITS: 100 INJECTION, SOLUTION INTRAVENOUS; SUBCUTANEOUS at 13:09

## 2024-02-15 RX ADMIN — SUCRALFATE 1 G: 1 SUSPENSION ORAL at 06:44

## 2024-02-15 RX ADMIN — SUCRALFATE 1 G: 1 SUSPENSION ORAL at 11:01

## 2024-02-15 RX ADMIN — PANTOPRAZOLE SODIUM 40 MG: 40 TABLET, DELAYED RELEASE ORAL at 16:59

## 2024-02-15 RX ADMIN — SUCRALFATE 1 G: 1 SUSPENSION ORAL at 21:37

## 2024-02-15 RX ADMIN — PANTOPRAZOLE SODIUM 40 MG: 40 TABLET, DELAYED RELEASE ORAL at 06:44

## 2024-02-15 RX ADMIN — MAGNESIUM SULFATE HEPTAHYDRATE 4 G: 40 INJECTION, SOLUTION INTRAVENOUS at 11:03

## 2024-02-15 ASSESSMENT — ACTIVITIES OF DAILY LIVING (ADL)
ADLS_ACUITY_SCORE: 38

## 2024-02-15 NOTE — LETTER
5/13/2024         RE: Jessica Kennedy  [address]  [facility] [address]        Attention Dr. Kelley,    Thank you for referring your patient, Jessica Kennedy, to the [facility].  We wanted to let you know that patient has not been following up for her Endocrine Care with our clinic and wanted to make sure that she has been following up with your clinic. We have not been able to reach patient.       Please call us at [phone] with any questions or concerns.       Sincerely,        Clinic Staff for Deloris Vanessa MD

## 2024-02-15 NOTE — TELEPHONE ENCOUNTER
Per chart review patient is currently admitted to hospital.    Writer contacted patient on 3 way call with . Patient reports that she missed her scan in December and would like to reschedule.    Patient was scheduled for parathyroid scan on 12/11/23 and missed that. Writer will send message to Clinic Coordinators to call patient to assist in getting rescheduled for scan.    Patient does report that she is currently admitted in hospital because she was having diarrhea for days.     Writer will also update Dr. Vanessa about patient being currently admitted.       Missy Patricia RN  Endocrine Care Coordinator  [facility]     Subjective:       Patient ID: Dewayne Leija is a 74 y.o. female.    Chief Complaint: Annual Exam    HPI     74 y.o. female here for annual exam.     Cholesterol: needs  Vaccines: Influenza - needs; Tetanus - done 2017 at [facility]; Pneumovax - 2016; Prevnar - 2015; Zoster - not sure (thinks she got the older vaccine)  Eye exam: done last Oct 2017  Mammogram: due  Gyn exam: no longer indicated  Colonoscopy: done 9 yrs ago at .  DEXA: 2014, osteopenia  A1c: needs    Exercise:  She is active.  No regimented exercise.  She is active.  Diet:  Home cooked.    Her sinuses are killing her right now.  Late at night, she feels a post nasal drip.  She can blow her nose and nothing comes out.    Past Medical History:   Diagnosis Date    Cancer     basal cell, squamou cell    Carotid artery disease     Diverticulosis     hx diverticulitis     Hyperlipidemia     Hypertension     Osteopenia     Other and unspecified hyperlipidemia     Stroke     tia     Past Surgical History:   Procedure Laterality Date    APPENDECTOMY      CATARACT EXTRACTION      OU 10 years ago    EYE SURGERY      cataracts    HYSTERECTOMY      RECONSTRUCTION USING FLAP N/A 12/4/2012    Performed by Sunday Cespedes MD at [facility] OR [address] [place] [address]    skin cancer surgery       Social History     Socioeconomic History    Marital status: Single     Spouse name: Not on file    Number of children: Not on file    Years of education: Not on file    Highest education level: Not on file   Social Needs    Financial resource strain: Not on file    Food insecurity - worry: Not on file    Food insecurity - inability: Not on file    Transportation needs - medical: Not on file    Transportation needs - non-medical: Not on file   Occupational History    Not on file   Tobacco Use    Smoking status: Current Every Day Smoker     Packs/day: 1.00     Years: 33.00     Pack years: 33.00     Types: Cigarettes    Smokeless tobacco: Never Used    Tobacco comment:  not ready to quit   Substance and Sexual Activity    Alcohol use: No    Drug use: No    Sexual activity: Not Currently   Other Topics Concern    Not on file   Social History Narrative    Not on file     Review of patient's allergies indicates:   Allergen Reactions    Zinc-allantoin-benzethonium cl Other (See Comments)     Severe swelling and blistering    Betadine prepstick [povidone-iodine-ethyl alcohol]     Amlodipine Other (See Comments)     Burning /itching/gi upset     Codeine Rash     Dewayne Leija had no medications administered during this visit.    Review of Systems   Constitutional: Negative for activity change and unexpected weight change.   HENT: Negative for hearing loss, rhinorrhea and trouble swallowing.    Eyes: Negative for discharge and visual disturbance.   Respiratory: Negative for chest tightness and wheezing.    Cardiovascular: Negative for chest pain and palpitations.   Gastrointestinal: Negative for blood in stool, constipation, diarrhea and vomiting.   Endocrine: Negative for polydipsia and polyuria.   Genitourinary: Negative for difficulty urinating, dysuria, hematuria and menstrual problem.   Musculoskeletal: Negative for arthralgias, joint swelling and neck pain.   Neurological: Negative for weakness and headaches.   Psychiatric/Behavioral: Negative for confusion and dysphoric mood.       Objective:      Physical Exam   Constitutional: She is oriented to person, place, and time. She appears well-developed and well-nourished.   HENT:   Head: Normocephalic and atraumatic.   Mouth/Throat: No oropharyngeal exudate.   Eyes: EOM are normal. Pupils are equal, round, and reactive to light. Right eye exhibits no discharge. Left eye exhibits no discharge. No scleral icterus.   Neck: Normal range of motion. Neck supple. No tracheal deviation present. No thyromegaly present.   Cardiovascular: Normal rate, regular rhythm and normal heart sounds. Exam reveals no gallop and no friction rub.    No murmur heard.  Pulmonary/Chest: Effort normal and breath sounds normal. No respiratory distress. She has no wheezes. She has no rales. She exhibits no tenderness.   Abdominal: Soft. Bowel sounds are normal. She exhibits no distension and no mass. There is no tenderness. There is no rebound and no guarding.   Musculoskeletal: Normal range of motion. She exhibits no edema or tenderness.   Neurological: She is alert and oriented to person, place, and time.   Skin: Skin is warm and dry. No rash noted. No erythema. No pallor.   Psychiatric: She has a normal mood and affect. Her behavior is normal.   Vitals reviewed.      Assessment:       1. Annual physical exam    2. Essential hypertension    3. Hyperlipidemia, unspecified hyperlipidemia type    4. Tobacco use disorder    5. Cigarette smoker    6. Osteopenia, unspecified location    7. Intermittent claudication    8. Environmental allergies    9. Weight loss    10. Visit for screening mammogram        Plan:       1.  Check CBC, CMP, TSH, lipids.  Encouraged regular exercise 5 days a week, 30 min a day.  Flu vaccine given today.  Otherwise up-to-date on most vaccines.  Discussed shingles vaccine.  Up-to-date on colonoscopy.  Mammogram ordered.  2.  Continue HCTZ 25 mg.  Monitor at home.  3.  Continue Lipitor 10 mg.  4/5.  Encouraged patient to quit.  6.  Plan to check DEXA scan next year.  7.  Continue aspirin, Lipitor.  8.  Flonase, Astelin.  9.  Patient counseled to monitor her weight.  If she continues to lose weight, return to clinic for workup.  This could be normal variation throughout the year.  10.  Mammogram.

## 2024-02-15 NOTE — PROGRESS NOTES
[facility]    Medicine Progress Note - Hospitalist Service    Date of Admission:  2/13/2024    Assessment & Plan     aimee Kennedy is a 49 year old female admitted on 2/13/2024. She presents to the emergency department with 1 week of orthostasis, nausea and vomiting, worsening abdominal pain, headaches.  1 day of diarrhea following laxative use over the weekend for constipation.  Found to have hypercalcemia, acute kidney injury in the setting of multiple endocrine neoplasia with incomplete follow-up and prior bilateral adrenalectomy.     Hyponatremia, orthostasis, nausea and vomiting: Could be secondary to adrenal insufficiency.  There is some concern of medication nonadherence noted in chart.  She tells me that she has been taking Florinef as well as prednisolone daily, but did not increase her dose with onset of nausea and vomiting or diarrheal illness.  Suspect she may have adrenal insufficiency given prior pheochromocytoma and bilateral adrenalectomy.  No fevers or chills.  -COVID-19, RSV, influenza tested and negative  -Enteric panel as below  -Holding prior to admission Florinef and prednisolone; receiving brief burst of stress dose steroids.  If symptoms resolved with steroid administration, would recommend switching back to hydrocortisone twice daily rather than Florinef/prednisolone regimen.  Patient reports that this medication change was made approximately 3 months ago, and she is uncertain why.  By review of outside records, it appears there was a desire to have patient on a once daily medication, so perhaps this is reason for change.  -Salt liberalized diet  N/v improved   Continuous to have ongoing diarrhoea 5 times so far this morning  Enteric panel sent , c.diff returned negative.    Inserted GI consultation requested.  They are planning on colonoscopy.  Clear liquid diet, bowel prep today, n.p.o. after midnight    Hyperkalemia at 6.0;  BMP recheck showed elevated  potassium at 5.6 again on 2/14/2024  Patient was given Lokelma 15 g one-time dose  Potassium improved to 4.3 on 2/15/2024  Discontinue losartan due to hyperkalemia      Hypercalcemia: History of partial thyroidectomy x 2 in [place] in 1995, 2009 for goiter.  Has MEN2A with what I believe to be presumed primary hyperparathyroidism, though I do not see a prior PTH in review of records.  -Ionized calcium pending  -Repeat BMP in a.m.  -PTH, Vit D  -Hold calcium carbonate (reports daily use) and vit D supplementation while awaiting labs as above.  -Note that patient is due for follow-up with endocrinology for SPECT-CT and parathyroidectomy.  See last communication from surgical/endocrine team in November.  Discussed this with patient and her family at bedside.  She had been aware of these recommendations, but had forgotten to follow-up.  Getting IVF at 125 mL/h reduced to 75 mill per hour    Again patient was advised to follow-up with endocrinology outpatient after discharge    Diarrhea: Multiple episodes of loose stools over 2/13 and 2/14/2024.  Reports that she takes a stool softener as well as MiraLAX over the weekend for constipation, so diarrhea may simply be resolution of constipation with laxative use.  No bowel movement over the past 5 hours in the emergency department.  -Enteric panel pending. C.diff testing ordered .  Returned negative    Continues to have diarrhea.  GI consulted planning on colonoscopy tomorrow as noted above     Acute kidney injury: Creatinine of 1.36, baseline closer to 0.7.  -Prior to admission losartan on hold; if creatinine normalizes with fluid administration, can resume  Cr improved to  1.15 -1.00     Type 2 diabetes: Diet controlled.  Hemoglobin A1c of 6.9.  -Medium dose sliding scale insulin ordered;  Blood sugars are well-controlled even on IV steroids     Hypertension:  -Prior to admission losartan on hold with acute kidney injury  -Hydralazine available as needed     Abdominal pain:  Patient reports chronic abdominal pain since bilateral adrenalectomy last year.  Worsened over this past week.  May have had worsening abdominal pain with worsening hypercalcemia; improvement in abdominal symptoms with normalization of calcium might be telling.  CT unrevealing, exam nonfocal.  Feels that she is bloated.  Adrenal insufficiency could also be contributing to abdominal discomfort.  -IV volume resuscitation as above  -As needed bowel regimen  -Acetaminophen, oxycodone, IV morphine available as needed for pain control.  Minimize narcotics as able.  -Stress dose steroids     MEN 2A:  -Repeat calcitonin.  Elevated in the 50s range as of this past June  -See above regarding need for follow-up with endocrinology and SPECT-CT, thorough parathyroidectomy.  Discussed importance of follow-up with patient as well as her family present at bedside                 Diet clear liquid diet, n.p.o. after midnight  DVT Prophylaxis: Pneumatic Compression Devices  Rodriguez Catheter: Not present  Lines: None     Cardiac Monitoring: None  Code Status:  full code        Clinically Significant Risk Factors Present on Admission          Clinically Significant Risk Factors        # Hyperkalemia: Highest K = 6 mmol/L in last 2 days, will monitor as appropriate    # Hypercalcemia: Highest Ca = 11.4 mg/dL in last 2 days, will monitor as appropriate  # Hypomagnesemia: Lowest Mg = 1.5 mg/dL in last 2 days, will replace as needed             # DMII: A1C = 6.9 % (Ref range: <5.7 %) within past 6 months, PRESENT ON ADMISSION        # Financial/Environmental Concerns:           Disposition Plan     Expected Discharge Date: 02/16/2024                Discussed with bedside RN and patient on 2/14     Lakeshia Conley MD  Hospitalist Service  [facility]  Securely message with [employer] (more info)  Text page via [employer] Paging/Directory   ______________________________________________________________________    Interval History      Patient resting in bed. N/V improved.  Having ongoing diarrhea 5 episodes since morning.  GI consulted planning on colonoscopy tomorrow.  Hyperkalemia resolved.  For hypercalcemia she needs to follow-up with endocrinology and to have outpatient surgery for the hyperparathyroidism.  Discussed with patient and her daughter at bedside.  No other acute issues since yesterday    Physical Exam   Vital Signs: Temp: 98.5  F (36.9  C) Temp src: Oral BP: 126/81 Pulse: 90   Resp: 16 SpO2: 96 % O2 Device: None (Room air)    Weight: 207 lbs 10.77 oz    General Appearance: Alert, awake, NAD   Respiratory: CTA b/l   Cardiovascular: RRR  GI: soft, obese, tenderness present in the umbilical region , no distension, BS+  Skin: warm and dry   Other:      Medical Decision Making       52 MINUTES SPENT BY ME on the date of service doing chart review, history, exam, documentation & further activities per the note.      Data     I have personally reviewed the following data over the past 24 hrs:    9.2  \   12.6   / 206     139 112 (H) 26.9 (H) /  147 (H)   4.3 18 (L) 1.00 (H) \       Imaging results reviewed over the past 24 hrs:   No results found for this or any previous visit (from the past 24 hour(s)).

## 2024-02-15 NOTE — PROGRESS NOTES
Pt A/OX4,Vitally stable, SBA, takes pills whole with thin liquids, on low fiber diet with carb count, denies N/V,SOB, CP, Voids adequately to the BR, NS infusing @125ml/hr, BG ACHS, needs met this shift, pain managed wit scheduled tylenol.

## 2024-02-15 NOTE — TELEPHONE ENCOUNTER
[facility] Call Center    Phone Message    May a detailed message be left on voicemail: yes     Reason for Call: Other: Pt requesting call back from Missy, all pt would disclose is that it was regarding an appt

## 2024-02-15 NOTE — LETTER
April 30, 2024      Jessica Kennedy  [address]   [place] [address]        Dear Jessica,     We have attempted to contact you several times to schedule follow-up testing and office visit with Dr. Vanessa. Please let us know if you are following up elsewhere or if you would like to get scheduled for follow-up here. You can contact us at [phone].       Sincerely,      Clinic Staff for Deloris Vanessa MD  [facility]

## 2024-02-15 NOTE — TELEPHONE ENCOUNTER
2/15 Called and left voicemail, provided phone number [phone] to schedule nuclear medicine scan.     [facility]   Orthopedics, Podiatry, Sports Medicine, Ent ,Eye , Audiology, Adult Endocrine & Diabetes, Nutrition & Medication Therapy Management Specialties   [facility]

## 2024-02-15 NOTE — PLAN OF CARE
Goal Outcome Evaluation:         A+Ox4. VSS on RA. Denies pain, nausea, vomiting. Carb count/low fiber diet. Independent. NS @ 125mL/hr. BG checks.

## 2024-02-15 NOTE — CONSULTS
"GASTROENTEROLOGY CONSULTATION    Jessica Kennedy  [address]   [place] [address]  49 year old female    Admission Date/Time: 2/13/2024  Primary Care Provider: Mariela, [facility]    We were asked to see the patient in consultation by Dr. Conley for evaluation of diarrhea.      HPI: Jessica Kennedy is a 49 year old female with PMH including MEN 2A and prior partial thyroidectomy x2 in [facility] for goiter, bilateral adrenalectomy, DM type 2, HTN, who was admitted 2/13/24 with 1 week of orthostasis, nausea and vomiting, worsening abdominal pain, headaches.     Patient has a history of MEN 2A with bilateral adrenalectomy for pheochromocytoma this past summer. She has an elevated calcitonin concerning for medullary carcinoma of the thyroid as well as an elevated calcium concerning for primary hyperparathyroidism consistent with MEN 2A syndrome. It appears that she has been lost to follow-up by her endocrinology team at the [facility]. Last attempts at contact were at the end of November for a planned SPECT CT and parathyroidectomy.     It is suspected she could have secondary to adrenal insufficiency. Getting a burst of stress dose steroids here. She has been taking her prednisone and Florinef daily.     History was obtained with help of family/friends interpreting. She declined to have a telephone  when offered. She developed diarrhea on Monday. She had multiple episodes of watery diarrhea, at least 15 stools and \"couldn't get off the toilet.\" It eased up for a day and then started up again. She's having nocturnal stools and has had at least 4 stools morning per the RN.  Enteric pathogens negative. C. Diff negative. CBC normal. CT Abdomen/pelvis with contrast showed fatty liver, mild diffuse bladder wall thickening, nothing to explain her symptoms.     She has generalized abdominal pain - worse in the upper abdomen, which sounds like she has had since her surgery. She also " reports nausea/vomiting for the last week. No ill contacts or suspicious foods. No travel or antibiotic use. She reports she was diagnosed with H pylori last January but she never took prescribed antibiotics. She uses Tums and Miralax PRN for constipation at home. Occasional ASA. No other NSAID use.     No EGD/Colonoscopy hx.     ROS: A comprehensive ten point review of systems was negative aside from those in mentioned in the HPI.      MEDICATIONS: No current facility-administered medications on file prior to encounter.  acetaminophen (TYLENOL) 325 MG tablet, Take 3 tablets (975 mg) by mouth 3 times daily as needed for mild pain  alcohol swab prep pads, Use to swab area of injection/laurie as directed.  amLODIPine (NORVASC) 10 MG tablet, Take 1 tablet (10 mg) by mouth daily  aspirin 81 MG EC tablet, Take 81 mg by mouth daily  Benzocaine-Menthol 10-2.1 MG LOZG, Take 1 lozenge by mouth 4 times daily as needed (Cough or sore throat)  blood glucose (NO BRAND SPECIFIED) test strip, 1 strip by In Vitro route every morning (before breakfast)  calcium carbonate 750 MG CHEW, Take 1 tablet (750 mg) by mouth 4 times daily as needed (Heartburn)  Continuous Blood Gluc  (FREESTYLE ABHIJIT 14 DAY READER) RODERICK, Use to read blood sugars as per 's instructions.  Continuous Blood Gluc Sensor (FREESTYLE ABHIJIT 14 DAY SENSOR) [facility], Change every 14 days.  exenatide ER (BYDUREON) 2 MG pen, Inject 2 mg Subcutaneous every 7 days Mondays  fludrocortisone (FLORINEF) 0.1 MG tablet, Take 0.5 tablets (0.05 mg) by mouth daily (Patient taking differently: Take 0.1 mg by mouth daily)  fluticasone (FLONASE) 50 MCG/ACT nasal spray, Spray 1 spray into both nostrils daily (Patient taking differently: Spray 1 spray into both nostrils daily as needed for allergies)  insulin pen needle (BD CELINE U/F) 32G X 4 MM miscellaneous, Use 1 pen needles daily or as directed.  losartan (COZAAR) 100 MG tablet, Take 1 tablet (100 mg) by mouth  daily  Microlet Lancets MISC, 1 each daily  omeprazole (PRILOSEC) 40 MG DR capsule, Take 1 capsule (40 mg) by mouth daily  polyethylene glycol (MIRALAX) 17 GM/Dose powder, Take 17 g by mouth daily . Hold for loose stools.  prednisoLONE 5 MG tablet, Take 5 mg by mouth daily  senna-docusate (SENOKOT-S/PERICOLACE) 8.6-50 MG tablet, Take 1 tablet by mouth 2 times daily Hold for loose stools.  Vitamin D3 (CHOLECALCIFEROL) 125 MCG (5000 UT) tablet, Take 125 mcg by mouth daily  carboxymethylcellulose-glycerin (REFRESH OPTIVE) 0.5-0.9 % ophthalmic solution, Place 1 drop into both eyes 4 times daily as needed for dry eyes  hydrocortisone (CORTEF) 5 MG tablet, 15 mg (3 tablets) when you wake up and 7.5 mg (one and a half tablet) at 2PM. Plus extra tablets for sick days. (Patient not taking: Reported on 2/13/2024)  hydrocortisone (CORTEF) 5 MG tablet, Take 1.5 tablets (7.5 mg) by mouth daily . Take at 2PM each day. 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    PHYSICAL EXAM:   BP (!) 146/97 (BP  Location: Right arm)   Pulse 82   Temp 97.6  F (36.4  C) (Oral)   Resp 16   Wt 94.2 kg (207 lb 10.8 oz)   SpO2 96%   BMI 32.53 kg/m     Constitutional: alert, no acute distress  Cardiovascular: regular rate and rhythm, no murmur or edema   Respiratory: clear to auscultation bilaterally  Psychiatric: normal pleasant affect  ENT:no scleral icterus   Abdomen: soft, mild generalized tenderness >upper abdomen, non-distended, normally active bowel sounds. No rebound tenderness or guarding  Neuro: Neurologically intact grossly  Skin: warm, dry, no rashes are noted      LABORATORY WORK  Recent Labs   Lab Test 02/15/24  0804 02/14/24  0725 02/13/24  1906 11/20/21  2218 05/07/20  1742   WBC 9.2 8.1 12.2*   < > 10.6   HGB 12.6 12.9 16.1*   < > 14.2   MCV 92 92 92   < > 90    210 271   < > 248   INR  --   --   --   --  1.07    < > = values in this interval not displayed.     Recent Labs   Lab Test 02/15/24  0804 02/14/24  1952 02/14/24  1222 02/14/24  0725     --  137 134*   POTASSIUM 4.3 5.0 5.6* 6.0*   CHLORIDE 112*  --  113* 109*   CO2 18*  --  16* 16*   BUN 26.9*  --  34.3* 38.4*   CR 1.00*  --  1.08* 1.15*   ANIONGAP 9  --  8 9   MASSIMO 9.9  --  10.4* 9.8     Recent Labs   Lab Test 02/13/24  2224 02/13/24  1906 08/23/23  0849 08/15/23  0535 08/13/23  1212 08/12/23  1840 05/11/22  0242   ALBUMIN  --  4.8 3.6 3.8 3.8  --    < >   BILITOTAL  --  0.3 0.3 0.2 0.3  --   --    DBIL  --   --  <0.20  --   --   --   --    ALT  --  13 19 21 27  --   --    AST  --  16 23 20 31  --   --    ALKPHOS  --  115 102 105* 109*  --   --    PROTEIN Negative  --   --   --   --  30*  --    LIPASE  --  55 26  --  25  --   --     < > = values in this interval not displayed.       IMAGING   EXAM: CT ABDOMEN PELVIS W CONTRAST  LOCATION: [facility]  DATE: 2/13/2024     INDICATION: abd pain, n v d  COMPARISON: 12/08/2022  TECHNIQUE: CT scan of the abdomen and pelvis was performed following injection of IV  contrast. Multiplanar reformats were obtained. Dose reduction techniques were used.  CONTRAST: 107mL Isovue 370     FINDINGS:   LOWER CHEST: Normal.     HEPATOBILIARY: Mild motion artifact. Fatty infiltration of liver.  PANCREAS: Normal.  SPLEEN: Normal.  ADRENAL GLANDS: Interval bilateral adrenalectomy.  KIDNEYS/BLADDER: Mild motion artifact. No stones or hydronephrosis evident. There is mild diffuse bladder wall thickening.  BOWEL: No obstruction or inflammatory change. Motion artifact. Appendix normal.  LYMPH NODES: Normal.  VASCULATURE: Unremarkable.  PELVIC ORGANS: No pelvic mass. No free fluid.  MUSCULOSKELETAL: Degenerative changes throughout lumbar spine.                                                                   IMPRESSION:   1.  No etiology for symptoms evident. Nothing obstructive or inflammatory involving bowel.  2.  Fatty infiltration of liver.  3.  Mild diffuse bladder wall thickening, cystitis not excluded.    CONSULTATION ASSESSMENT AND PLAN  49 year old female with PMH including MEN 2A and prior partial thyroidectomy x2 in [facility] for goiter, bilateral adrenalectomy, DM type 2, HTN, who was admitted 2/13/24 with 1 week of orthostasis, nausea and vomiting, worsening abdominal pain, diarrhea, and headaches. C. Difficile and enteric pathogens neg. CT did not show any abnormality to explain her symptoms.     Plan  -Discussed with Dr. Wylie - will plan on colonoscopy tomorrow  -Clear liquid diet today. NPO at midnight. Bowel prep today  -Will obtain RUQ US to assess gallbladder given intermittent RUQ pain     Thank you for asking us to participate in the care of this patient.    Total time: 50 minutes     Lola Juarez CNP  [facility]  Cell: [phone]   Office: [phone]      Staff addendum: 49 yof with MEN post hermilo adrenalectomy, admitted  with N/V/D and abd pain. Also complains of bloating. Infectious work up negative. Sx may be related to adrenal insufficiency but will plan  EGD/Colon tomorrow to evaluate for PUD, gastritis, esophagitis, microscopic colitis, IBS (unlikely). With bloating may have overflow diarrhea, SIBO, fructose intolerance, FODMAP intolerance    Agree with above assessment and plan.     Total time 15 min    Anny Wylie MD  [facility]  Phone [phone] until 5 pm  Office [phone] for after hours on call provider

## 2024-02-16 ENCOUNTER — APPOINTMENT (OUTPATIENT)
Dept: GENERAL RADIOLOGY | Facility: CLINIC | Age: 49
End: 2024-02-16
Attending: INTERNAL MEDICINE
Payer: COMMERCIAL

## 2024-02-16 LAB
ANION GAP SERPL CALCULATED.3IONS-SCNC: 10 MMOL/L (ref 7–15)
BUN SERPL-MCNC: 19.5 MG/DL (ref 6–20)
CALCIT SERPL-MCNC: 55.8 PG/ML
CALCIUM SERPL-MCNC: 9.4 MG/DL (ref 8.6–10)
CHLORIDE SERPL-SCNC: 112 MMOL/L (ref 98–107)
CREAT SERPL-MCNC: 0.86 MG/DL (ref 0.51–0.95)
DEPRECATED HCO3 PLAS-SCNC: 18 MMOL/L (ref 22–29)
EGFRCR SERPLBLD CKD-EPI 2021: 83 ML/MIN/1.73M2
ERYTHROCYTE [DISTWIDTH] IN BLOOD BY AUTOMATED COUNT: 12.8 % (ref 10–15)
GLUCOSE BLDC GLUCOMTR-MCNC: 137 MG/DL (ref 70–99)
GLUCOSE BLDC GLUCOMTR-MCNC: 156 MG/DL (ref 70–99)
GLUCOSE BLDC GLUCOMTR-MCNC: 196 MG/DL (ref 70–99)
GLUCOSE BLDC GLUCOMTR-MCNC: 87 MG/DL (ref 70–99)
GLUCOSE SERPL-MCNC: 88 MG/DL (ref 70–99)
HCT VFR BLD AUTO: 38 % (ref 35–47)
HGB BLD-MCNC: 12.5 G/DL (ref 11.7–15.7)
MAGNESIUM SERPL-MCNC: 1.8 MG/DL (ref 1.7–2.3)
MCH RBC QN AUTO: 30.1 PG (ref 26.5–33)
MCHC RBC AUTO-ENTMCNC: 32.9 G/DL (ref 31.5–36.5)
MCV RBC AUTO: 92 FL (ref 78–100)
PLATELET # BLD AUTO: 209 10E3/UL (ref 150–450)
POTASSIUM SERPL-SCNC: 4 MMOL/L (ref 3.4–5.3)
RBC # BLD AUTO: 4.15 10E6/UL (ref 3.8–5.2)
SODIUM SERPL-SCNC: 140 MMOL/L (ref 135–145)
WBC # BLD AUTO: 9.9 10E3/UL (ref 4–11)

## 2024-02-16 PROCEDURE — 99233 SBSQ HOSP IP/OBS HIGH 50: CPT | Performed by: INTERNAL MEDICINE

## 2024-02-16 PROCEDURE — 82438 ASSAY OTHER FLUID CHLORIDES: CPT | Performed by: INTERNAL MEDICINE

## 2024-02-16 PROCEDURE — 250N000013 HC RX MED GY IP 250 OP 250 PS 637: Performed by: INTERNAL MEDICINE

## 2024-02-16 PROCEDURE — 85027 COMPLETE CBC AUTOMATED: CPT | Performed by: INTERNAL MEDICINE

## 2024-02-16 PROCEDURE — 36415 COLL VENOUS BLD VENIPUNCTURE: CPT | Performed by: INTERNAL MEDICINE

## 2024-02-16 PROCEDURE — 258N000003 HC RX IP 258 OP 636: Performed by: INTERNAL MEDICINE

## 2024-02-16 PROCEDURE — 74018 RADEX ABDOMEN 1 VIEW: CPT

## 2024-02-16 PROCEDURE — 83735 ASSAY OF MAGNESIUM: CPT | Performed by: STUDENT IN AN ORGANIZED HEALTH CARE EDUCATION/TRAINING PROGRAM

## 2024-02-16 PROCEDURE — 250N000013 HC RX MED GY IP 250 OP 250 PS 637: Performed by: NURSE PRACTITIONER

## 2024-02-16 PROCEDURE — 82374 ASSAY BLOOD CARBON DIOXIDE: CPT | Performed by: INTERNAL MEDICINE

## 2024-02-16 PROCEDURE — 250N000011 HC RX IP 250 OP 636: Performed by: INTERNAL MEDICINE

## 2024-02-16 PROCEDURE — 83986 ASSAY PH BODY FLUID NOS: CPT | Performed by: INTERNAL MEDICINE

## 2024-02-16 PROCEDURE — 84133 ASSAY OF URINE POTASSIUM: CPT | Performed by: INTERNAL MEDICINE

## 2024-02-16 PROCEDURE — 120N000001 HC R&B MED SURG/OB

## 2024-02-16 RX ORDER — DICYCLOMINE HYDROCHLORIDE 10 MG/1
20 CAPSULE ORAL 4 TIMES DAILY PRN
Status: DISCONTINUED | OUTPATIENT
Start: 2024-02-16 | End: 2024-02-17 | Stop reason: HOSPADM

## 2024-02-16 RX ADMIN — SUCRALFATE 1 G: 1 SUSPENSION ORAL at 21:37

## 2024-02-16 RX ADMIN — ASPIRIN 81 MG: 81 TABLET, COATED ORAL at 08:37

## 2024-02-16 RX ADMIN — SUCRALFATE 1 G: 1 SUSPENSION ORAL at 08:37

## 2024-02-16 RX ADMIN — PANTOPRAZOLE SODIUM 40 MG: 40 TABLET, DELAYED RELEASE ORAL at 16:41

## 2024-02-16 RX ADMIN — HYDROCORTISONE 7.5 MG: 5 TABLET ORAL at 17:53

## 2024-02-16 RX ADMIN — RIFAXIMIN 550 MG: 550 TABLET ORAL at 21:37

## 2024-02-16 RX ADMIN — SODIUM CHLORIDE: 9 INJECTION, SOLUTION INTRAVENOUS at 11:11

## 2024-02-16 RX ADMIN — AMLODIPINE BESYLATE 10 MG: 10 TABLET ORAL at 08:37

## 2024-02-16 RX ADMIN — SUCRALFATE 1 G: 1 SUSPENSION ORAL at 16:41

## 2024-02-16 RX ADMIN — SUCRALFATE 1 G: 1 SUSPENSION ORAL at 11:16

## 2024-02-16 RX ADMIN — HYDROCORTISONE SODIUM SUCCINATE 50 MG: 100 INJECTION, POWDER, FOR SOLUTION INTRAMUSCULAR; INTRAVENOUS at 08:38

## 2024-02-16 RX ADMIN — FLUDROCORTISONE ACETATE 0.05 MG: 0.1 TABLET ORAL at 18:06

## 2024-02-16 RX ADMIN — PANTOPRAZOLE SODIUM 40 MG: 40 TABLET, DELAYED RELEASE ORAL at 08:37

## 2024-02-16 RX ADMIN — RIFAXIMIN 550 MG: 550 TABLET ORAL at 16:41

## 2024-02-16 RX ADMIN — ACETAMINOPHEN 975 MG: 325 TABLET, FILM COATED ORAL at 21:57

## 2024-02-16 ASSESSMENT — ACTIVITIES OF DAILY LIVING (ADL)
ADLS_ACUITY_SCORE: 37
ADLS_ACUITY_SCORE: 38
ADLS_ACUITY_SCORE: 37
ADLS_ACUITY_SCORE: 38
ADLS_ACUITY_SCORE: 37
ADLS_ACUITY_SCORE: 37
ADLS_ACUITY_SCORE: 38
ADLS_ACUITY_SCORE: 37

## 2024-02-16 NOTE — PROGRESS NOTES
Alert and oriented x4. VSS, on RA. Clear lungs bilaterally, denies SOB. On Clear liquid diet, denies nausea and vomiting. Pt is currently NPO for an abdominal US tonight. Active bowel sounds, pt has been having diarrhea today and c/o abdominal pain. She was to have a colonoscopy tomorrow but she declined. On call GI doctor was notified. Up independently.

## 2024-02-16 NOTE — PROGRESS NOTES
"[facility] Progress Note     Interval History:  Telephone  used when rounding. She states pain is a little better today - present on bilateral sides. Still bothersome. Still having diarrhea. States she \"can't count\" how many stools she is having. She was up all night having diarrhea. She is scared to have procedures and does not want to have them done.       Physical Exam:    /79 (BP Location: Left arm)   Pulse 77   Temp 98.2  F (36.8  C) (Oral)   Resp 16   Wt 94.5 kg (208 lb 4.8 oz)   SpO2 98%   BMI 32.62 kg/m    Temp (24hrs), Av.5  F (36.9  C), Min:98.2  F (36.8  C), Max:98.7  F (37.1  C)    Patient Vitals for the past 72 hrs:   Weight   24 0628 94.5 kg (208 lb 4.8 oz)   24 0900 94.2 kg (207 lb 10.8 oz)     Constitutional: No acute distress  Cardiovascular: RRR, normal S1/S2, no murmurs/edema  Respiratory: Effort normal, CTA bilaterally  Abdomen: Soft, nondistended, diffuse mild tenderness, BS+     Laboratory Data  Recent Labs   Lab Test 24  0613 02/15/24  0804 24  0725 21  2218 20  1742   WBC 9.9 9.2 8.1   < > 10.6   HGB 12.5 12.6 12.9   < > 14.2   MCV 92 92 92   < > 90    206 210   < > 248   INR  --   --   --   --  1.07    < > = values in this interval not displayed.     Recent Labs   Lab Test 24  0613 02/15/24  0804 24  1952 24  1222    139  --  137   POTASSIUM 4.0 4.3 5.0 5.6*   CHLORIDE 112* 112*  --  113*   CO2 18* 18*  --  16*   BUN 19.5 26.9*  --  34.3*   CR 0.86 1.00*  --  1.08*   ANIONGAP 10 9  --  8   MASSIMO 9.4 9.9  --  10.4*     Recent Labs   Lab Test 24  2224 24  1906 23  0849 08/15/23  0535 23  1212 23  1840 22  0242   ALBUMIN  --  4.8 3.6 3.8 3.8  --    < >   BILITOTAL  --  0.3 0.3 0.2 0.3  --   --    DBIL  --   --  <0.20  --   --   --   --    ALT  --    --   --    AST  --   20   --   --    ALKPHOS  --  115 102 105* 109*  --   --    PROTEIN Negative  --   --   " --   --  30*  --    LIPASE  --  55 26  --  25  --   --     < > = values in this interval not displayed.       Imaging  ABDOMEN ONE VIEW February 16, 2024 8:13 AM  HISTORY: Abdominal pain, evaluate stool burden.  COMPARISON: None.                                                                   IMPRESSION: Surgical clip in the epigastric region. Bowel gas pattern  is nonobstructed. No significant stool in the colon.    EXAM: US ABDOMEN LIMITED  LOCATION: [facility]  DATE: 2/15/2024     INDICATION: Intermittent RUQ pain  COMPARISON: None.  TECHNIQUE: Limited abdominal ultrasound.     FINDINGS:     GALLBLADDER: Normal. No gallstones, wall thickening, or pericholecystic fluid. Negative sonographic Pringle's sign.  BILE DUCTS: No biliary dilatation. The common duct measures 2 mm.  LIVER: Increased echogenicity from diffuse fatty infiltration. No focal mass.  RIGHT KIDNEY: No hydronephrosis.  PANCREAS: The visualized portions are normal.                                                               IMPRESSION:  1.  No gallstones or biliary ductal dilatation.  2.  Hepatic steatosis.    Assessment & Plan:  49 year old female with MEN post hermilo adrenalectomy, admitted with N/V/D and abd pain. Also complains of bloating. Infectious work up negative. Sx may be related to adrenal insufficiency. Was planning on EGD/Colon to evaluate for PUD, gastritis, esophagitis, microscopic colitis, IBD (unlikely), etc. But now refusing.     Preliminary XR did not show significant stool volume, making overflow diarrhea unlikely. Other differentials include post infectious IBS, SIBO, fructose intolerance, etc.     RUQ for intermittent RUQ pain was normal - no evidence of gallbladder abnormality or stones. Incidental fatty liver seen.     Plan  -Continue PPI BID  -Carafate QID for 2 weeks  -Start Xifaxan 550mg TID for 2 weeks for possible post-infectious IBS  -Start dicyclomine for abdominal pain 20mg QID  PRN  -Additional stool studies: Stool pH and electrolytes ordered   -Discussed with Dr. Inessa Juaerz, [facility]  Cell: [phone]   Office: [phone]

## 2024-02-16 NOTE — PROGRESS NOTES
A/OX4, SBA, takes pills whole with thin liquids, colonoscopy schedule for today, but patient declined,on call GI doctor notified per the previous RN, IV infusing @75ml/hr, BG ACHS, Discharge pending.

## 2024-02-16 NOTE — PLAN OF CARE
Goal Outcome Evaluation:    Date/Time- 2/16/24 [id number]    Trauma/Ortho/Medical: Medical    Diagnosis: KIERAN, Abd pain, N/V  POD#: N/A  Mental Status: A&Ox4  Activity/dangle: Up ind in room  Diet: Regular diet, denies nausea. Tolerating diet.  Pain:Reports improved, no PRN neeeded.   Rodriguez/Voiding: Voiding spontaneously without difficulties.   Tele/Restraints/Iso:N/A  02/LDA: PIV running NS at 75cc/hr.   D/C Date:Likely tomorrow 2/17/24   Other Info:GI following, stool specimen collection needed- patient aware. Patient has not had any stool since early this AM per report.

## 2024-02-16 NOTE — PROGRESS NOTES
[facility]    Hospitalist Progress Note    Date of Service (when I saw the patient): 02/16/2024  Admit date: 2/13/2024    Interval History   Full details of events over last 24 hours outlined below.   As outlined below.  Patient has had frequent diarrhea.  Overnight apparently significant diarrhea was observed.  And states she went more than she could count.  Today she states she has had 5 more bowel movements.  This has not been observed by the RN.  She is eating without nausea or vomiting.  No significant abdominal pain at this time.  She states that her doctor told her she was leaving tonight.  And she also requests that she gets refills for her medications for 3 months.  Unfortunately she has a complex history including men 2A, s/p bilateral adrenalectomy for pheochromocytoma in August.  Unfortunately it appears she missed endocrinology follow-up.  She also has hyperacute parathyroidism and parathyroidectomy has been recommended.  Extensive discussion today regarding need to adjust medications and stop IV fluid and ensure stability before discharge as well as endocrinology follow-up recommendation.  I also tried to call the endocrinology at  of  before she did not receive a call back.   Denies any SOB, CP, abdominal pain, N/V/D.    Assessment & Plan   Jessica Kennedy is a 49 year old female admitted on 2/13/2024. She presents to the emergency department with 1 week of orthostasis, nausea and vomiting, worsening abdominal pain, headaches.  1 day of diarrhea following laxative use over the weekend for constipation.  Found to have hypercalcemia, acute kidney injury in the setting of multiple endocrine neoplasia with incomplete follow-up and prior bilateral adrenalectomy.     Hyponatremia, orthostasis, nausea and vomiting, diarrhea  Suspect Adrenal Crisis   MEN 2A  Primary Hyperparathyroidism  Bilateral Pheochromocytoma S/p bilateral adrenalectomy on 8/16  Adrenal Insufficiency   * Not  "taking her florinef and hydrocortisone per med rec, and unclear if she was actually taking it on direct questioning.   * No fever, chills or other signs of infection.  * COVID-19, RSV, influenza tested and negative    Discharged on on hydrocortisone 15 mg in the AM and 7.5 mg in the PM. Florinef 0.05 mg daily. I do not see any follow up with endocrinology.  Given 100 mg hydrocortisone, followed by 50 mg IV q 8 hours. Currently on 50 mg IV BID.   Change back to PTA doses of both hydrocortisone and florinef on 02/16/24   Called to discuss with [facility] endocrinology to see if we can set up follow up and discuss above plan.  Unfortunately, did not receive callback will call again tomorrow.    Diarrhea: Multiple episodes of loose stools over 2/13 and 2/14/2024.  Reports that she takes a stool softener as well as MiraLAX over the weekend for constipation, so diarrhea may simply be resolution of constipation with laxative use.    * Enteric panel and c.diff negative.   Continued diarrhea overnight on 02/16/24, \"too many times to count\" overnight, \"5x this AM\" Discussed with RN, no BM's observed today.   Appreciate GI consult. Colonoscopy planned but patient refused.   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   BMP recheck showed elevated potassium at 5.6 again on 2/14/2024  * Patient was given Lokelma 15 g one-time dose  Potassium normalized with resumption of hydrocortisone  Resume PTA doses and follow up BMP as above.   Holding losartan for now. But if patient remains compliant with hydrocortisone and florinef above, could resume following K.     Mild Hypercalcemia: History of partial thyroidectomy x 2 in [place] in 1995, 2009 for goiter.  Has MEN2A with primary hyperparathyroidism, noted in last discharge summary. "   PTH elevated at 113, vitamin D 25 normal at 31 on 2/14.    Calcium normal on 02/16/24   Hold PTA calcium carbonate   Resume vit D supplementation at discharge.   Note that patient is due for follow-up with endocrinology for SPECT-CT and parathyroidectomy.  See last communication from surgical/endocrine team in November.  Discussed this with patient and her family at bedside.  She had been aware of these recommendations, but had forgotten to follow-up.  States she called endocrinology yesterday and waiting for callback.  I called endocrinology as well on 2/16 but unfortunately never received a call back    MEN 2A:  Calcitonin elevated at 55.8, stable from past June.   See above regarding need for follow-up with endocrinology and SPECT-CT, thorough parathyroidectomy.    Discussed importance of follow-up with patient as well as her family present at bedside  Called endocrinology at [facility], but never received call back.     Type 2 diabetes: Diet controlled.  Hemoglobin A1c of 6.9.  Medium dose sliding scale insulin ordered;  Blood sugars are well-controlled even on IV steroids    Hemoglobin A1C   Date Value Ref Range Status   02/13/2024 6.9 (H) <5.7 % Final     Comment:     Normal <5.7%   Prediabetes 5.7-6.4%    Diabetes 6.5% or higher     Note: Adopted from ADA consensus guidelines.     Recent Labs   Lab 02/16/24  1116 02/16/24  0751 02/16/24  0613 02/16/24  0211 02/15/24  2209 02/15/24  1706   * 87 88 196* 163* 145*         Hypertension:  -Prior to admission losartan on hold with acute kidney injury  -Hydralazine available as needed     Abdominal pain, chronic Patient reports chronic abdominal pain since bilateral adrenalectomy last year.  Worsened over this past week, related to above.   CT unrevealing, exam nonfocal.   Currently at baseline.     Social barriers to care  Does not appear patient ever admitted to follow-up with endocrinology.  She plans to go to [facility] next week and asked me to refill  all of her medications for the next 3 months.  Explained that she needs to be seen by endocrinology prior to discharge as mismanagement of her adrenal insufficiency is what likely led to this hospitalization.           Clinically Significant Risk Factors           # Hypercalcemia: Highest iCa = 5.6 mg/dL in last 2 days, will monitor as appropriate  # Hypomagnesemia: Lowest Mg = 1.5 mg/dL in last 2 days, will replace as needed             # DMII: A1C = 6.9 % (Ref range: <5.7 %) within past 6 months, PRESENT ON ADMISSION      # Financial/Environmental Concerns:             PT/OT: Recommend home with assist  Diet: Orders Placed This Encounter      Regular Diet Adult     IVF: None  DVT Prophylaxis: PCD's up and walking  Rodriguze Catheter: Not present      Full Code  Disposition:  Anticipate discharge tomorrow if tolerates stopping IVF and adjustment of medications as above.   Communication: Discussed with with patient through phone .  On 02/16/24.  Discussed with bedside nurse.  Patient reported that the doctor come in this morning and told her she would be discharged today.  There is no indication of this in the chart and RN did not observe this either.  Due to extensive discussion regarding need to adjust medications stop IV fluid and ensure she is stable, as well as extensive chart review regarding complex medical history, more than 50 minutes was required in today's stay    Yarelis Rosa MD    Hospitalist Service  [facility]  Securely message with the Vocera Web Console (learn more here)  Text page via [employer] Paging/Directory      -Data reviewed today: I reviewed all new labs and imaging results over the last 24 hours. I personally reviewed no images or EKG's today.    Physical Exam   Temp: 98.2  F (36.8  C) Temp src: Oral BP: 114/79 Pulse: 77   Resp: 16 SpO2: 98 % O2 Device: None (Room air)    Vitals:    02/14/24 0900 02/16/24 0628   Weight: 94.2 kg (207 lb 10.8 oz) 94.5 kg (208  lb 4.8 oz)     Vital Signs with Ranges  Temp:  [98.2  F (36.8  C)-98.7  F (37.1  C)] 98.2  F (36.8  C)  Pulse:  [77-85] 77  Resp:  [16] 16  BP: (109-175)/() 114/79  SpO2:  [98 %] 98 %  No intake/output data recorded.    Today's Exam  Constitutional NAD,   Neuropsyche:  alert and oriented, answers questions appropriately.   Respiratory:  Breathing comfortably, good air exchange, no wheezes, no crackles.   Cardiovascular:  Regular rate and rhythm, no edema.  GI:  soft, NT/ND, BS normal  Skin/Integumen:  No acute rash or sign of bleeding.     Medications   All medications reviewed on 02/16/24     sodium chloride 75 mL/hr at 02/16/24 1111      amLODIPine  10 mg Oral Daily    aspirin  81 mg Oral Daily    fludrocortisone  0.05 mg Oral Daily    hydrocortisone  7.5 mg Oral Daily    [START ON 2/17/2024] hydrocortisone  15 mg Oral QAM    insulin aspart  1-7 Units Subcutaneous TID AC    insulin aspart  1-5 Units Subcutaneous At Bedtime    insulin aspart   Subcutaneous TID w/meals    pantoprazole  40 mg Oral BID AC    rifaximin  550 mg Oral TID    sodium chloride (PF)  3 mL Intracatheter Q8H    sucralfate  1 g Oral 4x Daily AC & HS     PRN Meds: acetaminophen, artificial saliva, glucose **OR** dextrose **OR** glucagon, dicyclomine, hydrALAZINE **OR** hydrALAZINE, lidocaine 4%, lidocaine (buffered or not buffered), melatonin, morphine, morphine, naloxone **OR** naloxone **OR** naloxone **OR** naloxone, ondansetron **OR** ondansetron, oxyCODONE, oxyCODONE IR, polyethylene glycol, prochlorperazine **OR** prochlorperazine **OR** prochlorperazine, senna-docusate **OR** senna-docusate, sodium chloride (PF)    Data   Recent Labs   Lab 02/16/24  1116 02/16/24  0751 02/16/24  0613 02/15/24  0902 02/15/24  0804 02/14/24  2148 02/14/24  1952 02/14/24  1735 02/14/24  1222 02/14/24  0801 02/14/24  0725 02/14/24  0039 02/13/24  1906   WBC  --   --  9.9  --  9.2  --   --   --   --   --  8.1  --  12.2*   HGB  --   --  12.5  --  12.6  --    --   --   --   --  12.9  --  16.1*   MCV  --   --  92  --  92  --   --   --   --   --  92  --  92   PLT  --   --  209  --  206  --   --   --   --   --  210  --  271   NA  --   --  140  --  139  --   --   --  137  --  134*  --  130*   POTASSIUM  --   --  4.0  --  4.3  --  5.0  --  5.6*  --  6.0*  --  5.2   CHLORIDE  --   --  112*  --  112*  --   --   --  113*  --  109*  --  100   CO2  --   --  18*  --  18*  --   --   --  16*  --  16*  --  16*   BUN  --   --  19.5  --  26.9*  --   --   --  34.3*  --  38.4*  --  51.9*   CR  --   --  0.86  --  1.00*  --   --   --  1.08*  --  1.15*  --  1.36*   ANIONGAP  --   --  10  --  9  --   --   --  8  --  9  --  14   MASSIMO  --   --  9.4  --  9.9  --   --   --  10.4*  --  9.8  --  11.4*   * 87 88   < > 125*   < >  --    < > 120*   < > 112*   < > 139*   ALBUMIN  --   --   --   --   --   --   --   --   --   --   --   --  4.8   PROTTOTAL  --   --   --   --   --   --   --   --   --   --   --   --  8.5*   BILITOTAL  --   --   --   --   --   --   --   --   --   --   --   --  0.3   ALKPHOS  --   --   --   --   --   --   --   --   --   --   --   --  115   ALT  --   --   --   --   --   --   --   --   --   --   --   --  13   AST  --   --   --   --   --   --   --   --   --   --   --   --  16   LIPASE  --   --   --   --   --   --   --   --   --   --   --   --  55    < > = values in this interval not displayed.       Recent Results (from the past 24 hour(s))   US Abdomen Limited    Narrative    EXAM: US ABDOMEN LIMITED  LOCATION: [facility]  DATE: 2/15/2024    INDICATION: Intermittent RUQ pain  COMPARISON: None.  TECHNIQUE: Limited abdominal ultrasound.    FINDINGS:    GALLBLADDER: Normal. No gallstones, wall thickening, or pericholecystic fluid. Negative sonographic Pringle's sign.    BILE DUCTS: No biliary dilatation. The common duct measures 2 mm.    LIVER: Increased echogenicity from diffuse fatty infiltration. No focal mass.    RIGHT KIDNEY: No  hydronephrosis.    PANCREAS: The visualized portions are normal.      Impression    IMPRESSION:  1.  No gallstones or biliary ductal dilatation.  2.  Hepatic steatosis.       XR Abdomen 1 View    Narrative    ABDOMEN ONE VIEW February 16, 2024 8:13 AM    HISTORY: Abdominal pain, evaluate stool burden.    COMPARISON: None.      Impression    IMPRESSION: Surgical clip in the epigastric region. Bowel gas pattern  is nonobstructed. No significant stool in the colon.    CELIO MONROY MD         SYSTEM ID:  [id number]

## 2024-02-17 VITALS
BODY MASS INDEX: 32.62 KG/M2 | SYSTOLIC BLOOD PRESSURE: 146 MMHG | RESPIRATION RATE: 18 BRPM | OXYGEN SATURATION: 98 % | HEART RATE: 78 BPM | TEMPERATURE: 98.3 F | DIASTOLIC BLOOD PRESSURE: 96 MMHG | WEIGHT: 208.3 LBS

## 2024-02-17 LAB
ANION GAP SERPL CALCULATED.3IONS-SCNC: 9 MMOL/L (ref 7–15)
BUN SERPL-MCNC: 17.9 MG/DL (ref 6–20)
CALCIUM SERPL-MCNC: 9.7 MG/DL (ref 8.6–10)
CHLORIDE SERPL-SCNC: 112 MMOL/L (ref 98–107)
CREAT SERPL-MCNC: 0.86 MG/DL (ref 0.51–0.95)
DEPRECATED HCO3 PLAS-SCNC: 18 MMOL/L (ref 22–29)
EGFRCR SERPLBLD CKD-EPI 2021: 83 ML/MIN/1.73M2
GLUCOSE BLDC GLUCOMTR-MCNC: 114 MG/DL (ref 70–99)
GLUCOSE BLDC GLUCOMTR-MCNC: 166 MG/DL (ref 70–99)
GLUCOSE SERPL-MCNC: 94 MG/DL (ref 70–99)
MAGNESIUM SERPL-MCNC: 1.5 MG/DL (ref 1.7–2.3)
PH STL: 6.5 PH (ref 7–7.5)
POTASSIUM SERPL-SCNC: 3.9 MMOL/L (ref 3.4–5.3)
SODIUM SERPL-SCNC: 139 MMOL/L (ref 135–145)

## 2024-02-17 PROCEDURE — 250N000013 HC RX MED GY IP 250 OP 250 PS 637: Performed by: NURSE PRACTITIONER

## 2024-02-17 PROCEDURE — 36415 COLL VENOUS BLD VENIPUNCTURE: CPT | Performed by: INTERNAL MEDICINE

## 2024-02-17 PROCEDURE — 99239 HOSP IP/OBS DSCHRG MGMT >30: CPT | Performed by: INTERNAL MEDICINE

## 2024-02-17 PROCEDURE — 250N000013 HC RX MED GY IP 250 OP 250 PS 637: Performed by: INTERNAL MEDICINE

## 2024-02-17 PROCEDURE — 80048 BASIC METABOLIC PNL TOTAL CA: CPT | Performed by: INTERNAL MEDICINE

## 2024-02-17 PROCEDURE — 83735 ASSAY OF MAGNESIUM: CPT | Performed by: INTERNAL MEDICINE

## 2024-02-17 RX ORDER — LOSARTAN POTASSIUM 100 MG/1
TABLET ORAL
COMMUNITY
Start: 2024-02-17 | End: 2024-02-17

## 2024-02-17 RX ORDER — SUCRALFATE ORAL 1 G/10ML
1 SUSPENSION ORAL
Qty: 440 ML | Refills: 0 | Status: SHIPPED | OUTPATIENT
Start: 2024-02-17 | End: 2024-02-28

## 2024-02-17 RX ORDER — FLUDROCORTISONE ACETATE 0.1 MG/1
0.1 TABLET ORAL DAILY
COMMUNITY
Start: 2024-02-17

## 2024-02-17 RX ORDER — FLUTICASONE PROPIONATE 50 MCG
1 SPRAY, SUSPENSION (ML) NASAL DAILY PRN
COMMUNITY
Start: 2024-02-17

## 2024-02-17 RX ORDER — FLUDROCORTISONE ACETATE 0.1 MG/1
0.1 TABLET ORAL DAILY
Status: DISCONTINUED | OUTPATIENT
Start: 2024-02-17 | End: 2024-02-17 | Stop reason: HOSPADM

## 2024-02-17 RX ORDER — LOSARTAN POTASSIUM 100 MG/1
100 TABLET ORAL DAILY
COMMUNITY
Start: 2024-02-17

## 2024-02-17 RX ADMIN — HYDROCORTISONE 15 MG: 10 TABLET ORAL at 09:37

## 2024-02-17 RX ADMIN — FLUDROCORTISONE ACETATE 0.1 MG: 0.1 TABLET ORAL at 09:37

## 2024-02-17 RX ADMIN — PANTOPRAZOLE SODIUM 40 MG: 40 TABLET, DELAYED RELEASE ORAL at 07:11

## 2024-02-17 RX ADMIN — RIFAXIMIN 550 MG: 550 TABLET ORAL at 09:37

## 2024-02-17 RX ADMIN — SUCRALFATE 1 G: 1 SUSPENSION ORAL at 07:10

## 2024-02-17 RX ADMIN — SUCRALFATE 1 G: 1 SUSPENSION ORAL at 12:46

## 2024-02-17 RX ADMIN — ASPIRIN 81 MG: 81 TABLET, COATED ORAL at 09:37

## 2024-02-17 RX ADMIN — AMLODIPINE BESYLATE 10 MG: 10 TABLET ORAL at 09:37

## 2024-02-17 ASSESSMENT — ACTIVITIES OF DAILY LIVING (ADL)
ADLS_ACUITY_SCORE: 37

## 2024-02-17 NOTE — DISCHARGE SUMMARY
[facility]  Hospitalist Discharge Summary      Date of Admission:  2/13/2024  Date of Discharge:  2/17/2024  Discharging Provider: Yarelis Rosa MD  Discharge Service: Hospitalist Service    Discharge Diagnoses   Hyponatremia, orthostasis, nausea and vomiting, diarrhea  Suspect Adrenal Crisis   MEN 2A  Primary Hyperparathyroidism  Bilateral Pheochromocytoma S/p bilateral adrenalectomy on 8/16  Adrenal Insufficiency   Diarrhea, concern for medullary thyroid cancer:   HTN  Hyperkalemia at 6.0, KIERAN in context of adrenal insufficiency above, RESOLVED  Metabolic acidosis, resolving   Primary hyperparathyroidism,   MEN 2A:  Type 2 diabetes  Abdominal pain, chronic  Social barriers to care    Clinically Significant Risk Factors     # DMII: A1C = 6.9 % (Ref range: <5.7 %) within past 6 months       Follow-ups Needed After Discharge   Follow-up Appointments     Follow-up and recommended labs and tests       Follow up with endocrinologist  Dr. Vanessa, within 7 days to for   hospital follow- up, to evaluate MEN2a and potential medullary thyroid   cancer. It is very important you follow up. The following labs/tests are   recommended: BMP.            Unresulted Labs Ordered in the Past 30 Days of this Admission       Date and Time Order Name Status Description    2/16/2024 12:38 PM Electrolytes fecal In process         These results should be followed up by [facility].     Discharge Disposition   Discharged to home  Condition at discharge: Good    Hospital Course   Jessica Kennedy is a 49 year old female admitted on 2/13/2024. She presents to the emergency department with 1 week of orthostasis, nausea and vomiting, worsening abdominal pain, headaches.  1 day of diarrhea following laxative use over the weekend for constipation.  Found to have hypercalcemia, acute kidney injury in the setting of multiple endocrine neoplasia with incomplete follow-up and prior bilateral adrenalectomy.     Hyponatremia,  "orthostasis, nausea and vomiting, diarrhea  Suspect Adrenal Crisis   MEN 2A  Primary Hyperparathyroidism  Bilateral Pheochromocytoma S/p bilateral adrenalectomy on 8/16  Adrenal Insufficiency   * Not taking her florinef and hydrocortisone per med rec, and unclear if she was actually taking it on direct questioning.   * No fever, chills or other signs of infection.  * COVID-19, RSV, influenza tested and negative    Discharged on on hydrocortisone 15 mg in the AM and 7.5 mg in the PM. Florinef 0.05 mg daily. I do not see any follow up with endocrinology.  Given 100 mg hydrocortisone, followed by 50 mg IV q 8 hours. Currently on 50 mg IV BID.   Change back to physiologic doses of hydrocortisone and florinef on 02/16/24   Discussed with endocrinologist, Dr. Steve Vera who is very familiar with her case on day of discharge. Unfortunately, she has not consistently come for follow-up.  There is a concern she had medullary thyroid cancer which can cause diarrhea.  Patient has not wanted to undergo further evaluation or treatment for this.   Patient can discharge on PTA Prednisone 5 mg daily with Florinef 0.1 mg daily. (Was switched to this regimen by primary endocrinologist) to improve compliance.)   For mild illness she should increase doses 2 times for moderate illness (e.g. with fever) she should increase doses 3 times.   Strong recommendation to follow up with Dr. Vanessa for further evaluation within 7 days of discharge    Diarrhea: Multiple episodes of loose stools over 2/13 and 2/14/2024.  Reports that she takes a stool softener as well as MiraLAX over the weekend for constipation, so diarrhea may simply be resolution of constipation with laxative use.    * Enteric panel and c.diff negative.   Continued diarrhea overnight on 02/16/24, \"too many times to count\" overnight, \"5x this AM\" Discussed with RN, no BM's observed today.   Appreciate GI consult. Colonoscopy planned but patient refused.   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   On 02/17/24, diarrhea significantly improved. Discussed with GI And patient and daughter that rifaximin is an empiric trial to treat possible SB overgrowth syndrome, continue x 2 weeks if helpful.   Also discussed that diarrhea may be secondary to medullary thyroid cancer and it is very important she follows up with Dr. Vanessa.      HTN  Hyperkalemia at 6.0, KIERAN in context of adrenal insufficiency above, RESOLVED  Metabolic acidosis, resolving Also likely related to AI.   BMP recheck showed elevated potassium at 5.6 again on 2/14/2024  * Patient was given Lokelma 15 g one-time dose  Potassium normalized with resumption of hydrocortisone  Resume PTA doses and follow up BMP as above.   Held losartan during her stay.   High potassium likely secondary to adrenal crisis, K  3.9 on 02/17/24, . Has been consistently refilling losartan since August with no other history of high potassium levels per chart review.   Discussed with endocrinologist, agree we should resume her losartan at discharge. Her BP has been very difficult to control in the past.    BMP in one week.   Continue PTA amlodipine.    Mild Hypercalcemia: History of partial thyroidectomy x 2 in [place] in 1995, 2009 for goiter.  Has MEN2A with primary hyperparathyroidism, noted in last discharge summary.   PTH elevated at 113, vitamin D 25 normal at 31 on 2/14.    Calcium normal on 02/16/24 Likely secondary to dehydration at admission. Calcium levels have been okay prior to this admission.   Discussed with endocrinologist > Agree with resuming calcium and vitamin D at discharge.   Note that patient is due for follow-up with endocrinology for SPECT-CT and parathyroidectomy.  See last communication from surgical/endocrine team in November.  Discussed this with patient and her family at bedside.  She had been  aware of these recommendations, but had forgotten to follow-up.  It is very important she follows up with Dr. Vanessa.    MEN 2A:  Calcitonin elevated at 55.8, stable from past June.   See above regarding need for follow-up with endocrinology and SPECT-CT, thorough parathyroidectomy.    Discussed importance of follow-up with patient as well as her family present at bedside    Type 2 diabetes: Diet controlled.  Hemoglobin A1c of 6.9.  Medium dose sliding scale insulin ordered;  Blood sugars are well-controlled even on IV steroids  Resume exenatide at discharge. (She has consistently refilled this since October. Not new medication, so less likely contributed to admission)     Hemoglobin A1C   Date Value Ref Range Status   02/13/2024 6.9 (H) <5.7 % Final     Comment:     Normal <5.7%   Prediabetes 5.7-6.4%    Diabetes 6.5% or higher     Note: Adopted from ADA consensus guidelines.     Recent Labs   Lab 02/16/24  1116 02/16/24  0751 02/16/24  0613 02/16/24  0211 02/15/24  2209 02/15/24  1706   * 87 88 196* 163* 145*           Abdominal pain, chronic Patient reports chronic abdominal pain since bilateral adrenalectomy last year.  Worsened over this past week, related to above.   CT unrevealing, exam nonfocal.   Currently at baseline.     Social barriers to care  Patient requires Say-Hey .   She has missed multiple follow up visits. She is anxious about undergoing procedures.   More than 1 hour was spent daily talking to different teams involved in her care and discussing plan with patient and family.     Consultations This Hospital Stay   PHYSICAL THERAPY ADULT IP CONSULT  GASTROENTEROLOGY IP CONSULT    Code Status   Full Code    Time Spent on this Encounter   I, Yarelis Rosa MD, personally saw the patient today and spent greater than 30 minutes discharging this patient.       Yarelis Rosa MD  [facility]  [address] [facility] [facility] [address]  Phone:  [phone]  Fax: [phone]  ______________________________________________________________________    Physical Exam   Vital Signs: Temp: 98.3  F (36.8  C) Temp src: Oral BP: (!) 146/96 Pulse: 78   Resp: 18 SpO2: 98 % O2 Device: None (Room air)    Weight: 208 lbs 4.8 oz  Constitutional:  NAD,   Neuropsyche:  alert and oriented, answers questions appropriately. Speech normal, face symmetric and moving all 4 extremities without gross focal neurological deficit.   Respiratory:  Breathing comfortably, good air exchange, no wheezes, no crackles.   Cardiovascular:  Regular rate and rhythm,trace edema.  GI:  central obesity, soft, NT/ND, BS normal  Skin/Integumen:  No acute rash or sign of bleeding.          Primary Care Physician   [facility]    Discharge Orders      Reason for your hospital stay    You were admitted with a probable viral GI illness and acute adrenal crisis. In the future, when you are suffering from a mild illness, you should increase your prednisone and fludrocortisone  x 2. If you are suffering from moderate illness (for example, you have a fever), you should increase prednisone and fludrocortisone doses x 3. Go back to usual doses when you are feeling better. The Gastroenterologist started you on a trial of sucralfate for upper abdominal pan and rifaximin for diarrhea. Continue these x 2 weeks if they are helpful.   It is very important you follow up with your endocrinologist. (See Follow up below).     Follow-up and recommended labs and tests     Follow up with endocrinologist  Dr. Vanessa, within 7 days to for hospital follow- up, to evaluate MEN2a and potential medullary thyroid cancer. It is very important you follow up. The following labs/tests are recommended: BMP.     Activity    Your activity upon discharge: activity as tolerated     Diet    Follow this diet upon discharge: Orders Placed This Encounter      Regular Diet Adult       Significant Results and Procedures    Most Recent 3 CBC's:  Recent Labs   Lab Test 02/16/24  0613 02/15/24  0804 02/14/24  0725   WBC 9.9 9.2 8.1   HGB 12.5 12.6 12.9   MCV 92 92 92    206 210     Most Recent 3 BMP's:  Recent Labs   Lab Test 02/17/24  1006 02/17/24  0752 02/17/24  0231 02/16/24  0751 02/16/24  0613 02/15/24  0902 02/15/24  0804   NA  --  139  --   --  140  --  139   POTASSIUM  --  3.9  --   --  4.0  --  4.3   CHLORIDE  --  112*  --   --  112*  --  112*   CO2  --  18*  --   --  18*  --  18*   BUN  --  17.9  --   --  19.5  --  26.9*   CR  --  0.86  --   --  0.86  --  1.00*   ANIONGAP  --  9  --   --  10  --  9   MASSIMO  --  9.7  --   --  9.4  --  9.9   * 94 166*   < > 88   < > 125*    < > = values in this interval not displayed.     Most Recent 2 LFT's:  Recent Labs   Lab Test 02/13/24  1906 08/23/23  0849   AST 16 23   ALT 13 19   ALKPHOS 115 102   BILITOTAL 0.3 0.3   ,   Results for orders placed or performed during the hospital encounter of 02/13/24   CT Abdomen Pelvis w Contrast    Narrative    EXAM: CT ABDOMEN PELVIS W CONTRAST  LOCATION: [facility]  DATE: 2/13/2024    INDICATION: abd pain, n v d  COMPARISON: 12/08/2022  TECHNIQUE: CT scan of the abdomen and pelvis was performed following injection of IV contrast. Multiplanar reformats were obtained. Dose reduction techniques were used.  CONTRAST: 107mL Isovue 370    FINDINGS:   LOWER CHEST: Normal.    HEPATOBILIARY: Mild motion artifact. Fatty infiltration of liver.    PANCREAS: Normal.    SPLEEN: Normal.    ADRENAL GLANDS: Interval bilateral adrenalectomy.    KIDNEYS/BLADDER: Mild motion artifact. No stones or hydronephrosis evident. There is mild diffuse bladder wall thickening.    BOWEL: No obstruction or inflammatory change. Motion artifact. Appendix normal.    LYMPH NODES: Normal.    VASCULATURE: Unremarkable.    PELVIC ORGANS: No pelvic mass. No free fluid.    MUSCULOSKELETAL: Degenerative changes throughout lumbar spine.      Impression     IMPRESSION:   1.  No etiology for symptoms evident. Nothing obstructive or inflammatory involving bowel.  2.  Fatty infiltration of liver.  3.  Mild diffuse bladder wall thickening, cystitis not excluded.   US Abdomen Limited    Narrative    EXAM: US ABDOMEN LIMITED  LOCATION: [facility]  DATE: 2/15/2024    INDICATION: Intermittent RUQ pain  COMPARISON: None.  TECHNIQUE: Limited abdominal ultrasound.    FINDINGS:    GALLBLADDER: Normal. No gallstones, wall thickening, or pericholecystic fluid. Negative sonographic Pringle's sign.    BILE DUCTS: No biliary dilatation. The common duct measures 2 mm.    LIVER: Increased echogenicity from diffuse fatty infiltration. No focal mass.    RIGHT KIDNEY: No hydronephrosis.    PANCREAS: The visualized portions are normal.      Impression    IMPRESSION:  1.  No gallstones or biliary ductal dilatation.  2.  Hepatic steatosis.       XR Abdomen 1 View    Narrative    ABDOMEN ONE VIEW February 16, 2024 8:13 AM    HISTORY: Abdominal pain, evaluate stool burden.    COMPARISON: None.      Impression    IMPRESSION: Surgical clip in the epigastric region. Bowel gas pattern  is nonobstructed. No significant stool in the colon.    CELIO MONROY MD         SYSTEM ID:  [id number]       Discharge Medications   Current Discharge Medication List        START taking these medications    Details   rifaximin (XIFAXAN) 550 MG TABS tablet Take 1 tablet (550 mg) by mouth 3 times daily for 13 days  Qty: 39 tablet, Refills: 0    Associated Diagnoses: Diarrhea, unspecified type      sucralfate (CARAFATE) 1 GM/10ML suspension Take 10 mLs (1 g) by mouth 4 times daily (before meals and nightly) for 11 days  Qty: 440 mL, Refills: 0    Associated Diagnoses: Acute kidney injury (H24)           CONTINUE these medications which have CHANGED    Details   fludrocortisone (FLORINEF) 0.1 MG tablet Take 1 tablet (0.1 mg) by mouth daily    Associated Diagnoses: Adrenal insufficiency (H24)       fluticasone (FLONASE) 50 MCG/ACT nasal spray Spray 1 spray into both nostrils daily as needed for allergies    Associated Diagnoses: Stuffy nose      losartan (COZAAR) 100 MG tablet Take 1 tablet (100 mg) by mouth daily HOLDING Resume at 100 mg daily if BP over 140/90.    Associated Diagnoses: Essential hypertension           CONTINUE these medications which have NOT CHANGED    Details   acetaminophen (TYLENOL) 325 MG tablet Take 3 tablets (975 mg) by mouth 3 times daily as needed for mild pain  Qty: 30 tablet, Refills: 0    Associated Diagnoses: Postoperative pain      alcohol swab prep pads Use to swab area of injection/laurie as directed.  Qty: 100 each, Refills: 3    Associated Diagnoses: Type 2 diabetes mellitus not at goal (H)      amLODIPine (NORVASC) 10 MG tablet Take 1 tablet (10 mg) by mouth daily  Qty: 90 tablet, Refills: 1    Associated Diagnoses: Pheochromocytoma, unspecified laterality      aspirin 81 MG EC tablet Take 81 mg by mouth daily      Benzocaine-Menthol 10-2.1 MG LOZG Take 1 lozenge by mouth 4 times daily as needed (Cough or sore throat)  Qty: 84 lozenge, Refills: 0      blood glucose (NO BRAND SPECIFIED) test strip 1 strip by In Vitro route every morning (before breakfast)  Qty: 350 strip, Refills: 3    Associated Diagnoses: Type 2 diabetes mellitus not at goal (H)      calcium carbonate 750 MG CHEW Take 1 tablet (750 mg) by mouth 4 times daily as needed (Heartburn)  Qty: 30 tablet, Refills: 0    Associated Diagnoses: Epigastric pain      Continuous Blood Gluc  (FREESTYLE ABHIJIT 14 DAY READER) RODERICK Use to read blood sugars as per 's instructions.  Qty: 1 each, Refills: 0    Associated Diagnoses: Type 2 diabetes mellitus not at goal (H)      Continuous Blood Gluc Sensor (FREESTYLE ABHIJIT 14 DAY SENSOR) MISC Change every 14 days.  Qty: 6 each, Refills: 11    Associated Diagnoses: Type 2 diabetes mellitus not at goal (H)      exenatide ER (BYDUREON) 2 MG pen Inject 2 mg  Subcutaneous every 7 days Mondays      insulin pen needle (BD CELINE U/F) 32G X 4 MM miscellaneous Use 1 pen needles daily or as directed.  Qty: 100 each, Refills: 3    Associated Diagnoses: Type 2 diabetes mellitus not at goal (H)      Microlet Lancets MISC 1 each daily  Qty: 100 each, Refills: 3    Associated Diagnoses: Type 2 diabetes mellitus not at goal (H)      omeprazole (PRILOSEC) 40 MG DR capsule Take 1 capsule (40 mg) by mouth daily  Qty: 90 capsule, Refills: 1    Associated Diagnoses: Pheochromocytoma, unspecified laterality      polyethylene glycol (MIRALAX) 17 GM/Dose powder Take 17 g by mouth daily . Hold for loose stools.  Qty: 510 g, Refills: 0    Associated Diagnoses: Postoperative pain      prednisoLONE 5 MG tablet Take 5 mg by mouth daily      senna-docusate (SENOKOT-S/PERICOLACE) 8.6-50 MG tablet Take 1 tablet by mouth 2 times daily Hold for loose stools.  Qty: 60 tablet, Refills: 0    Associated Diagnoses: Postoperative pain      Vitamin D3 (CHOLECALCIFEROL) 125 MCG (5000 UT) tablet Take 125 mcg by mouth daily      carboxymethylcellulose-glycerin (REFRESH OPTIVE) 0.5-0.9 % ophthalmic solution Place 1 drop into both eyes 4 times daily as needed for dry eyes           STOP taking these medications       hydrocortisone (CORTEF) 5 MG tablet Comments:   Reason for Stopping:         hydrocortisone (CORTEF) 5 MG tablet Comments:   Reason for Stopping:             Allergies   Allergies   Allergen Reactions    Hydromorphone Hives     Other reaction(s): Hives    Ace Inhibitors      Other reaction(s): Unknown    Adhesive Tape Blisters

## 2024-02-17 NOTE — TREATMENT PLAN
Discussed with endocrinologist, Dr. Steve Vera who is very familiar with her case.     Unfortunately, it has been very difficult to reach her.  She has not consistently come for follow-up.  There is a concern she had medullary thyroid cancer which can cause diarrhea.  Patient has not wanted to undergo further evaluation or treatment for this.  We discussed the following for discharge    1. Patient was changed from twice daily hydrocortisone to once daily prednisone due to difficulties with compliance.  Hydrocortisone is preferred.  But after speaking with patient she can be discharged on either one of the following:   Hydrocortisone 15 mg in the morning and 7.5 mg in the p.m. plus Florinef 0.05 mg daily  Prednisone 5 mg daily with Florinef 0.1 mg daily  For mild illness she should increase doses 2 times for moderate illness (e.g. with fever) she should increase doses 3 times.     2.  We reviewed her elevated calcium at admission related to dehydration and primary hyperparathyroidism.  Calcium is now normal.  She should continue her PTA calcium and vitamin D levels    3.  We also reviewed her hyperkalemia is related to acute adrenal insufficiency.  Her losartan had been held.  It is noted she has a history of very difficult to control high blood pressure however blood pressure is now normal.  If blood pressure going above 140/90 she should resume losartan.    4.  Her diarrhea may be secondary to medullary thyroid cancer.    Strong recommendation to follow up with Dr. Vanessa for further evaluation.   GI consultants: Given above and difficulty with compliance, do we still want to discharge her on sucralfate and rifaximin?     I will discharge her by noon today. Discussed with RN.

## 2024-02-17 NOTE — PROGRESS NOTES
A&Ox4.VSS ON RA.Denies pain.No PRN during this shift.Tolerated regular diet.BG check no insulin administered this shift.IND in the room.Family at bedside.

## 2024-02-17 NOTE — PLAN OF CARE
Goal Outcome Evaluation:    Summary:  KIERAN, Abd pain, N/V    Date & Time: 02/16/24 0022-8025   Orientation: A&O x4   Activity Level: Independent   Fall Risk: No   Behavior & Aggression: Green   Pain Management: Denies   ABNL VS/O2: VS on RA   Tele: N/A   ABNL Lab/BG:  K , Mg protocols , re-check tomorrow AM  Diet: Regular, denies nausea. Tolerating diet.  Bowel/Bladder: Continent, loose stool   Skin: Intact   Drains/Devices: R PIV   Tests/Procedures: XR abdomen  Anticipated DC Date: Possibly tomorrow    Other Important Info: stool sample collected , in process

## 2024-02-17 NOTE — PLAN OF CARE
Goal Outcome Evaluation:    .Date/Time 2/17/24 6923-8049    Trauma/Ortho/Medical (Choose one) Medical    Diagnosis:Suspected adrenal crisis r/t MEN 2A  POD#:N/A  Mental Status:A&OX4, Jamaican speaking.  Activity/dangleUp Ad nasima in room.  Diet:Regular, tolerating  Pain:Denies.  Rodriguez/Voiding:Up to BR.  Tele/Restraints/Iso:N/A  02/LDA: Discontinued PIV prior to discharge.   D/C Date:Today, 1430. Home with family   Other Info: AVS and discharge medications reviewed with patient and patients daughter at bedside. Strong encouragement to follow up with endocrinologist within 7 days following discharge. Patient verbalized understanding by teach back. Patient discharged with all of her belongings including medications.

## 2024-02-17 NOTE — PROGRESS NOTES
Informal Recommendations Note    I was called by  on 2/17/24  at 8:40  to provide input for Jessica Kennedy. The nature of this request for input does not permit comprehensive review of health records or patient interview.  I was not requested or am not able to personally examine the patient at this time.    Jessica is a 49 year old female who has history of MEN2A, with history of Bilateral Pheochromocytoma for which she is s/p Bilateral Adrenalectomy with resulting Primary Adrenal Insufficiency.     Was admitted to the hospital after she had been experiencing diarrhea, presented hypotensive with hyperkalemia.     Based on the information provided, my recommendations are as follows:   Based on intent on patient's compliance with adrenal insufficiency management would recommend  - Hydrocortisone  15 mg AM and 7.5 mg PM with Fludrocortisone 0.05 mg daily (preferrably if she assures she will take a BID regimen)    OR    - Prednisone 5 mg once daily with fludrocortisone 0.1 mg daily (if she prefers to remain on a once daily regimen)    She should have follow up for determination of thyroidectomy given risk for medullary thyroid cancer and hyperparathyroidism. She has had ongoing diarrhea which could be related to underlying medullary thyroid cancer.     These recommendations are not intended to take the place of the care team's clinical judgement, which should always be utilized to provide the most appropriate care to meet the unique needs of each patient.     Jayson Melendez MD

## 2024-02-17 NOTE — PLAN OF CARE
Summary:  Jessica 49 year old female admitted on 2/13/2024, came in with abd pain, n/v, diarrhea, suspected adrenal crisis. Hx of bilateral adrenalectomy.     Date & Time: 02/16/02/17/24 9071-9179  Orientation: A&O x4   Activity Level: Independent   Fall Risk: No   Behavior & Aggression: Green   Pain Management: Denies   ABNL VS/O2: VS on RA   Tele: N/A   ABNL Lab/BG:  , on K&Mg protocols   Diet: Regular, denies nausea. Tolerating diet.  Bowel/Bladder: No bm this shift  Skin: Intact   Drains/Devices: R PIV   Anticipated DC Date: Possibly today   Other Important Info: stool sample collected previous shift, in process

## 2024-02-18 LAB
CHLORIDE STL-SCNC: 56 MMOL/L
POTASSIUM STL-SCNC: 47 MMOL/L
SODIUM STL-SCNC: 95 MMOL/L

## 2024-02-20 ENCOUNTER — TELEPHONE (OUTPATIENT)
Facility: CLINIC | Age: 49
End: 2024-02-20
Payer: COMMERCIAL

## 2024-02-20 NOTE — TELEPHONE ENCOUNTER
Prior Authorization Approval    Medication: XIFAXAN 550 MG PO TABS  Authorization Effective Date: 2/20/2024  Authorization Expiration Date: 2/20/2025  Approved Dose/Quantity: 39 for 13 days  Reference #: [id number]   Insurance Company: [employer] Phone [phone] Fax [phone]  Expected CoPay: $    CoPay Card Available:      Financial Assistance Needed:   Which Pharmacy is filling the prescription:    Pharmacy Notified:   Patient Notified:

## 2024-03-11 NOTE — TELEPHONE ENCOUNTER
Left Voicemail (2nd Attempt) for the patient to call back and schedule the following:    Appointment type: Parathyroid Scan  Provider: Dr. Vanessa  Return date: next available  Specialty phone number: [phone]  Additional appointment(s) needed:   Additonal Notes:     2nd attempt to reach the patient. Patient does not have MyChart.    Anaya R/Complex Procedure    [facility]   Neurology, NeuroSurgery, NeuroPsychology, Pain Management and Cardiology Specialties  Medical/Surgical Adult Specialties

## 2024-04-30 NOTE — TELEPHONE ENCOUNTER
Per Dr. Vanessa:    Yes, please send a letter to her and also to Dr. Kelley. Please inform him she has not showed up for f/up and I am not sure if she f/up with him.

## 2024-05-13 NOTE — TELEPHONE ENCOUNTER
Rosio was able [email or web address] get information on Dr. Kelley at [facility] and faxed letter to [phone] (phon [phone]).       Missy Patricia RN  Endocrine Care Coordinator  [facility]

## (undated) DEVICE — SUCTION IRR STRYKERFLOW II W/TIP 250-070-520

## (undated) DEVICE — SU VICRYL 0 UR-6 27" J603H

## (undated) DEVICE — SOL NACL 0.9% IRRIG 1000ML BOTTLE 2F7124

## (undated) DEVICE — TUBING SUCTION MEDI-VAC SOFT 3/16"X20' N520A

## (undated) DEVICE — Device

## (undated) DEVICE — SURGICEL HEMOSTAT 4X8" 1952

## (undated) DEVICE — SHEARS HARMONIC ULTRASONIC LAP 36CM CURVE TIP HAR1136

## (undated) DEVICE — ESU PENCIL SMOKE EVAC W/ROCKER SWITCH 0703-047-000

## (undated) DEVICE — SU VICRYL 0 TIE 54" J608H

## (undated) DEVICE — CATH TRAY FOLEY SURESTEP 16FR W/TMP PRB STLK LATEX A319416AM

## (undated) DEVICE — ENDO TROCAR BLUNT TIP KII BALLOON 12X130MM C0R50

## (undated) DEVICE — ESU ENDO SCISSORS 5MM CVD 5DCS

## (undated) DEVICE — STPL ENDO RELOAD ECHELON 45X2.0MM GREY ECR45M

## (undated) DEVICE — ENDO POUCH UNIV RETRIEVAL SYSTEM INZII 10MM CD001

## (undated) DEVICE — GLOVE BIOGEL PI ULTRATOUCH SZ 7.5 41175

## (undated) DEVICE — LINEN TOWEL PACK X6 WHITE 5487

## (undated) DEVICE — ENDO TROCAR SLEEVE KII ADV FIXATION 05X100MM CFS02

## (undated) DEVICE — ESU GROUND PAD ADULT W/CORD E7507

## (undated) DEVICE — DRSG MEPILEX BORDER SACRUM 6.3X7.9" 282055

## (undated) DEVICE — ESU HOLSTER PLASTIC DISP E2400

## (undated) DEVICE — ESU ELEC BLADE 2.75" COATED/INSULATED E1455

## (undated) DEVICE — STPL ENDO LINEAR CUT ECHELON FLEX GST 45MM LONG PLEE45A

## (undated) DEVICE — TUBING SMOKE EVAC PNEUMOCLEAR HIGH FLOW 0620050250

## (undated) DEVICE — RX SURGIFLO HEMOSTATIC MATRIX W/THROMBIN 8ML 2994

## (undated) DEVICE — GLOVE BIOGEL PI MICRO INDICATOR UNDERGLOVE SZ 7.5 48975

## (undated) DEVICE — ENDO TROCAR FIRST ENTRY KII FIOS ADV FIX 05X100MM CFF03

## (undated) DEVICE — ENDO TROCAR FIRST ENTRY KII FIOS Z-THRD 12X100MM CTF73

## (undated) DEVICE — LINEN TOWEL PACK X5 5464

## (undated) DEVICE — ADPT 5 IN 1 360

## (undated) DEVICE — PREP CHLORAPREP 26ML TINTED HI-LITE ORANGE 930815

## (undated) DEVICE — CLIP APPLIER ENDO ROTATING 12MM LG ER420

## (undated) DEVICE — DEVICE SUTURE PASSER 14GA WECK EFX EFXSP2

## (undated) DEVICE — DRAPE IOBAN INCISE 23X17" 6650EZ

## (undated) DEVICE — ENDO APPLICATOR SURGIFLO PLASMA COBLATION MS1995

## (undated) DEVICE — DRAPE SHEET REV FOLD 3/4 9349

## (undated) DEVICE — ANTIFOG SOLUTION W/FOAM PAD 31142527

## (undated) RX ORDER — CEFAZOLIN SODIUM 1 G/3ML
INJECTION, POWDER, FOR SOLUTION INTRAMUSCULAR; INTRAVENOUS
Status: DISPENSED
Start: 2023-08-16

## (undated) RX ORDER — HYDROMORPHONE HYDROCHLORIDE 1 MG/ML
INJECTION, SOLUTION INTRAMUSCULAR; INTRAVENOUS; SUBCUTANEOUS
Status: DISPENSED
Start: 2023-08-16

## (undated) RX ORDER — PROPOFOL 10 MG/ML
INJECTION, EMULSION INTRAVENOUS
Status: DISPENSED
Start: 2023-08-16

## (undated) RX ORDER — ONDANSETRON 2 MG/ML
INJECTION INTRAMUSCULAR; INTRAVENOUS
Status: DISPENSED
Start: 2023-08-16

## (undated) RX ORDER — CEFAZOLIN SODIUM/WATER 2 G/20 ML
SYRINGE (ML) INTRAVENOUS
Status: DISPENSED
Start: 2023-08-16

## (undated) RX ORDER — BUPIVACAINE HYDROCHLORIDE AND EPINEPHRINE 2.5; 5 MG/ML; UG/ML
INJECTION, SOLUTION EPIDURAL; INFILTRATION; INTRACAUDAL; PERINEURAL
Status: DISPENSED
Start: 2023-08-16

## (undated) RX ORDER — FENTANYL CITRATE 50 UG/ML
INJECTION, SOLUTION INTRAMUSCULAR; INTRAVENOUS
Status: DISPENSED
Start: 2023-08-16

## (undated) RX ORDER — FENTANYL CITRATE-0.9 % NACL/PF 10 MCG/ML
PLASTIC BAG, INJECTION (ML) INTRAVENOUS
Status: DISPENSED
Start: 2023-08-16

## (undated) RX ORDER — DEXAMETHASONE SODIUM PHOSPHATE 4 MG/ML
INJECTION, SOLUTION INTRA-ARTICULAR; INTRALESIONAL; INTRAMUSCULAR; INTRAVENOUS; SOFT TISSUE
Status: DISPENSED
Start: 2023-08-16

## (undated) RX ORDER — EPHEDRINE SULFATE 50 MG/ML
INJECTION, SOLUTION INTRAMUSCULAR; INTRAVENOUS; SUBCUTANEOUS
Status: DISPENSED
Start: 2023-08-16